# Patient Record
Sex: MALE | Race: WHITE | NOT HISPANIC OR LATINO | Employment: OTHER | URBAN - METROPOLITAN AREA
[De-identification: names, ages, dates, MRNs, and addresses within clinical notes are randomized per-mention and may not be internally consistent; named-entity substitution may affect disease eponyms.]

---

## 2017-01-27 DIAGNOSIS — E78.5 HYPERLIPIDEMIA LDL GOAL <130: ICD-10-CM

## 2017-01-27 DIAGNOSIS — E78.2 MIXED HYPERLIPIDEMIA: Primary | ICD-10-CM

## 2017-01-27 DIAGNOSIS — R73.01 IMPAIRED FASTING GLUCOSE: ICD-10-CM

## 2017-01-27 DIAGNOSIS — E03.9 ACQUIRED HYPOTHYROIDISM: ICD-10-CM

## 2017-01-27 RX ORDER — SIMVASTATIN 20 MG
20 TABLET ORAL AT BEDTIME
Qty: 90 TABLET | Refills: 0 | Status: SHIPPED | OUTPATIENT
Start: 2017-01-27 | End: 2017-05-01

## 2017-01-27 NOTE — TELEPHONE ENCOUNTER
Pt calls for refill of simvastatin. Scheduled fasting lab appt.         Recent Labs   Lab Test  01/12/16   1122  06/02/15   0924  11/19/13   0837   CHOL  160  152  189   HDL  53  55  60   LDL  85  81  97   TRIG  108  82  165*   CHOLHDLRATIO   --   2.8  3.2

## 2017-02-02 DIAGNOSIS — R73.01 IMPAIRED FASTING GLUCOSE: ICD-10-CM

## 2017-02-02 DIAGNOSIS — E03.9 ACQUIRED HYPOTHYROIDISM: ICD-10-CM

## 2017-02-02 DIAGNOSIS — E78.5 HYPERLIPIDEMIA LDL GOAL <130: ICD-10-CM

## 2017-02-02 LAB
ANION GAP SERPL CALCULATED.3IONS-SCNC: 7 MMOL/L (ref 3–14)
BUN SERPL-MCNC: 10 MG/DL (ref 7–30)
CALCIUM SERPL-MCNC: 9.4 MG/DL (ref 8.5–10.1)
CHLORIDE SERPL-SCNC: 108 MMOL/L (ref 94–109)
CHOLEST SERPL-MCNC: 161 MG/DL
CO2 SERPL-SCNC: 30 MMOL/L (ref 20–32)
CREAT SERPL-MCNC: 0.81 MG/DL (ref 0.66–1.25)
GFR SERPL CREATININE-BSD FRML MDRD: ABNORMAL ML/MIN/1.7M2
GLUCOSE SERPL-MCNC: 122 MG/DL (ref 70–99)
HDLC SERPL-MCNC: 53 MG/DL
LDLC SERPL CALC-MCNC: 89 MG/DL
NONHDLC SERPL-MCNC: 108 MG/DL
POTASSIUM SERPL-SCNC: 4 MMOL/L (ref 3.4–5.3)
SODIUM SERPL-SCNC: 145 MMOL/L (ref 133–144)
TRIGL SERPL-MCNC: 96 MG/DL
TSH SERPL DL<=0.005 MIU/L-ACNC: 0.73 MU/L (ref 0.4–4)

## 2017-02-02 PROCEDURE — 80048 BASIC METABOLIC PNL TOTAL CA: CPT | Performed by: INTERNAL MEDICINE

## 2017-02-02 PROCEDURE — 80061 LIPID PANEL: CPT | Performed by: INTERNAL MEDICINE

## 2017-02-02 PROCEDURE — 36415 COLL VENOUS BLD VENIPUNCTURE: CPT | Performed by: INTERNAL MEDICINE

## 2017-02-02 PROCEDURE — 84443 ASSAY THYROID STIM HORMONE: CPT | Performed by: INTERNAL MEDICINE

## 2017-02-06 DIAGNOSIS — R73.09 ELEVATED GLUCOSE LEVEL: Primary | ICD-10-CM

## 2017-02-09 DIAGNOSIS — R73.09 ELEVATED GLUCOSE LEVEL: ICD-10-CM

## 2017-02-09 LAB — HBA1C MFR BLD: 5.6 % (ref 4.3–6)

## 2017-02-09 PROCEDURE — 83036 HEMOGLOBIN GLYCOSYLATED A1C: CPT | Performed by: INTERNAL MEDICINE

## 2017-02-09 PROCEDURE — 36415 COLL VENOUS BLD VENIPUNCTURE: CPT | Performed by: INTERNAL MEDICINE

## 2017-05-01 DIAGNOSIS — E78.5 HYPERLIPIDEMIA LDL GOAL <130: ICD-10-CM

## 2017-05-01 NOTE — TELEPHONE ENCOUNTER
Simvastatin     Last Written Prescription Date: 01/27/17  Last Fill Quantity: 90, # refills: 0  Last Office Visit with [facility], [facility] or [facility] prescribing provider: 10/28/16       Lab Results   Component Value Date    CHOL 161 02/02/2017     Lab Results   Component Value Date    HDL 53 02/02/2017     Lab Results   Component Value Date    LDL 89 02/02/2017     Lab Results   Component Value Date    TRIG 96 02/02/2017     Lab Results   Component Value Date    CHOLHDLRATIO 2.8 06/02/2015       Labs showing if normal/abnormal  Lab Results   Component Value Date    CHOL 161 02/02/2017    TRIG 96 02/02/2017    HDL 53 02/02/2017    LDL 89 02/02/2017    VLDL 16 06/02/2015    CHOLHDLRATIO 2.8 06/02/2015

## 2017-05-02 RX ORDER — SIMVASTATIN 20 MG
20 TABLET ORAL AT BEDTIME
Qty: 90 TABLET | Refills: 1 | Status: SHIPPED | OUTPATIENT
Start: 2017-05-02 | End: 2017-11-15

## 2017-05-31 ENCOUNTER — OFFICE VISIT (OUTPATIENT)
Dept: UROLOGY | Facility: CLINIC | Age: 67
End: 2017-05-31
Payer: COMMERCIAL

## 2017-05-31 VITALS — HEART RATE: 60 BPM | WEIGHT: 240 LBS | HEIGHT: 71 IN | OXYGEN SATURATION: 95 % | BODY MASS INDEX: 33.6 KG/M2

## 2017-05-31 DIAGNOSIS — N40.0 ENLARGED PROSTATE: Primary | ICD-10-CM

## 2017-05-31 PROCEDURE — 99213 OFFICE O/P EST LOW 20 MIN: CPT | Performed by: UROLOGY

## 2017-05-31 ASSESSMENT — PAIN SCALES - GENERAL: PAINLEVEL: NO PAIN (0)

## 2017-05-31 NOTE — PROGRESS NOTES
Office Visit Note  [facility]  [phone]    UROLOGIC DIAGNOSES:   BPH and nocturia, Hx of retention    CURRENT INTERVENTIONS:   Quanta laser TURP 2016    HISTORY:   Sundar returns to clinic today, now one year after laser TURP. He reports doing very well. He only has nocturia ×1. He has a strong urinary stream and is happy with his urinary symptoms.      PAST MEDICAL HISTORY:   Past Medical History:   Diagnosis Date     Abnormal glucose     A1C 1/06 =  6.3     Allergic rhinitis, cause unspecified     mary spring time     Benign localized hyperplasia of prostate with urinary obstruction and other lower urinary tract symptoms (LUTS)(600.21)     slow stream , nocturia      Generalized osteoarthrosis, unspecified site total L knee 3/09     knees bother;; has had bilat arthroscopic     hypogonadism     low testosterone at times      Mixed hyperlipidemia      Other specified acquired hypothyroidism 10/05       PAST SURGICAL HISTORY:   Past Surgical History:   Procedure Laterality Date     ARTHROPLASTY REVISION KNEE Left 10/26/2015    Procedure: ARTHROPLASTY REVISION KNEE;  Surgeon: Froylan Dudley MD;  Location:  OR     C ANESTH,KNEE AREA SURGERY      bilateral arthroscopic knee surgery     C NONSPECIFIC PROCEDURE  6/03    colonoscopy     C NONSPECIFIC PROCEDURE  3/09     L total knee      C RAD RESEC TONSIL/PILLARS  age 46     COLONOSCOPY  10/2/2013    Procedure: COLONOSCOPY;  Colonoscopy ;  Surgeon: Juliocesar Suarez MD;  Location:  GI     ENDOSCOPIC SINUS SURGERY  3/17/2014    Procedure: ENDOSCOPIC SINUS SURGERY;  Bilateral Endoscopic Sinus Surgery ;  Surgeon: Chuy Johnson MD;  Location: [facility] OR     HC REPAIR UMBILICAL KASI,5+Y/O,REDUC  2002     LASER REVOLIX PHOTOSELECTIVE VAPORIZATION PROSTATE N/A 4/6/2016    Procedure: LASER REVOLIX / QUANTA PHOTOSELECTIVE VAPORIZATION PROSTATE;  Surgeon: Sean Malave MD;  Location: [facility] OR     ROTATOR CUFF REPAIR RT/LT  3/2012    left shoulder  "      FAMILY HISTORY:   Family History   Problem Relation Age of Onset     Hypertension Mother      Cardiovascular Mother 84     MI     Lipids Mother      C.A.D. Mother      C.A.D. Father      CHF     CEREBROVASCULAR DISEASE Brother 61       SOCIAL HISTORY:   Social History   Substance Use Topics     Smoking status: Former Smoker     Packs/day: 1.00     Years: 20.00     Quit date: 1/1/1981     Smokeless tobacco: Former User     Quit date: 1/1/1992     Alcohol use 24.0 oz/week      Comment: case of beer per week or less       Current Outpatient Prescriptions   Medication     simvastatin (ZOCOR) 20 MG tablet     levothyroxine (SYNTHROID, LEVOTHROID) 200 MCG tablet     multivitamin, therapeutic with minerals (MULTI-VITAMIN) TABS     fluticasone (FLONASE) 50 MCG/ACT nasal spray     sildenafil (VIAGRA) 100 MG tablet     SUDAFED SINUS  MG OR TABS     No current facility-administered medications for this visit.          PHYSICAL EXAM:    Pulse 60  Ht 1.803 m (5' 11\")  Wt 108.9 kg (240 lb)  SpO2 95%  BMI 33.47 kg/m2    HEENT: Normocephalic and atraumatic   Cardiac: Not done  Back/Flank: Not done  CNS/PNS: Not done  Respiratory: Normal non-labored breathing  Abdomen: Soft nontender and nondistended  Peripheral Vascular: Not done  Mental Status: Not done    Penis: Not done  Scrotal Skin: Not done  Testicles: Not done  Epididymis: Not done  Digital Rectal Exam: His prostate is moderately enlarged but benign and symmetric to palpation, central TUR defect    Cystoscopy: Not done    Imaging: None    Urinalysis: UA RESULTS:  Recent Labs   Lab Test  01/12/16   1122   COLOR  Yellow   APPEARANCE  Clear   URINEGLC  Negative   URINEBILI  Negative   URINEKETONE  Negative   SG  1.020   UBLD  Negative   URINEPH  5.5   PROTEIN  Negative   UROBILINOGEN  0.2   NITRITE  Negative   LEUKEST  Negative       PSA: Last PSA was January 2016, he reports that he is to see Dr. Fraser for his annual physical and PSA shortly.     Post Void " Residual:     Other labs: None today      IMPRESSION:  Doing well     PLAN:  He is doing very well. He was reminded to have his PSA checked at his next physical exam. He will plan on following up with me as needed in the future.     Total Time: 15 minutes                                      Total in Consultation: 15 minutes    Sean Malave M.D.

## 2017-05-31 NOTE — NURSING NOTE
Pt denies any voiding problems.  Just here for yearly fu.  Pt would like a refill of generic viagra.  NORBERT Aviles CMA

## 2017-05-31 NOTE — MR AVS SNAPSHOT
"              After Visit Summary   2017    Sundar German    MRN: [id number]           Patient Information     Date Of Birth          1950        Visit Information        Provider Department      2017 9:40 AM Sean Malave MD [facility] [place]        Today's Diagnoses     Enlarged prostate    -  1       Follow-ups after your visit        Follow-up notes from your care team     Return if symptoms worsen or fail to improve.      Who to contact     If you have questions or need follow up information about today's clinic visit or your schedule please contact [facility] [place] directly at [phone].  Normal or non-critical lab and imaging results will be communicated to you by MyChart, letter or phone within 4 business days after the clinic has received the results. If you do not hear from us within 7 days, please contact the clinic through [employer] or phone. If you have a critical or abnormal lab result, we will notify you by phone as soon as possible.  Submit refill requests through [employer] or call your pharmacy and they will forward the refill request to us. Please allow 3 business days for your refill to be completed.          Additional Information About Your Visit        MyChart Information     [employer] lets you send messages to your doctor, view your test results, renew your prescriptions, schedule appointments and more. To sign up, go to [email or web address] . Click on \"Log in\" on the left side of the screen, which will take you to the Welcome page. Then click on \"Sign up Now\" on the right side of the page.     You will be asked to enter the access code listed below, as well as some personal information. Please follow the directions to create your username and password.     Your access code is: [email or web address]  Expires: 2017  9:57 AM     Your access code will  in 90 days. If you need help or a new " "code, please call your [facility] clinic or [phone].        Care EveryWhere ID     This is your Care EveryWhere ID. This could be used by other organizations to access your [facility] medical records  [id number]        Your Vitals Were     Pulse Height Pulse Oximetry BMI (Body Mass Index)          60 1.803 m (5' 11\") 95% 33.47 kg/m2         Blood Pressure from Last 3 Encounters:   10/28/16 112/60   04/07/16 143/65   03/30/16 132/74    Weight from Last 3 Encounters:   05/31/17 108.9 kg (240 lb)   10/28/16 112.7 kg (248 lb 6.4 oz)   04/06/16 112 kg (247 lb)              Today, you had the following     No orders found for display       Primary Care Provider Office Phone # Fax #    Marlon Fraser [phone]       [facility] [address] [place] [address]        Thank you!     Thank you for choosing [facility] [place]  for your care. Our goal is always to provide you with excellent care. Hearing back from our patients is one way we can continue to improve our services. Please take a few minutes to complete the written survey that you may receive in the mail after your visit with us. Thank you!             Your Updated Medication List - Protect others around you: Learn how to safely use, store and throw away your medicines at www.disposemymeds.org.          This list is accurate as of: 5/31/17  9:57 AM.  Always use your most recent med list.                   Brand Name Dispense Instructions for use    fluticasone 50 MCG/ACT spray    FLONASE    3 Package    Spray 1-2 sprays into both nostrils daily       levothyroxine 200 MCG tablet    SYNTHROID/LEVOTHROID    90 tablet    Take 1 tablet (200 mcg) by mouth daily       Multi-vitamin Tabs tablet      Take 1 tablet by mouth daily       sildenafil 100 MG cap/tab    VIAGRA    10 tablet    Take 1 tablet (100 mg) by mouth daily as needed for erectile dysfunction       simvastatin 20 MG tablet    " ZOCOR    90 tablet    Take 1 tablet (20 mg) by mouth At Bedtime       SUDAFED SINUS  MG Tabs   Generic drug:  Pseudoephedrine-APAP      Takes when needed

## 2017-05-31 NOTE — LETTER
5/31/2017       RE: Sundar German  [address] [address]     Dear Colleague,    Thank you for referring your patient, Sundar German, to the [facility] [place] at [facility]. Please see a copy of my visit note below.    Office Visit Note  [facility]  [phone]    UROLOGIC DIAGNOSES:   BPH and nocturia, Hx of retention    CURRENT INTERVENTIONS:   Quanta laser TURP 2016    HISTORY:   Sundar returns to clinic today, now one year after laser TURP. He reports doing very well. He only has nocturia ×1. He has a strong urinary stream and is happy with his urinary symptoms.      PAST MEDICAL HISTORY:   Past Medical History:   Diagnosis Date     Abnormal glucose     A1C 1/06 =  6.3     Allergic rhinitis, cause unspecified     mary spring time     Benign localized hyperplasia of prostate with urinary obstruction and other lower urinary tract symptoms (LUTS)(600.21)     slow stream , nocturia      Generalized osteoarthrosis, unspecified site total L knee 3/09     knees bother;; has had bilat arthroscopic     hypogonadism     low testosterone at times      Mixed hyperlipidemia      Other specified acquired hypothyroidism 10/05       PAST SURGICAL HISTORY:   Past Surgical History:   Procedure Laterality Date     ARTHROPLASTY REVISION KNEE Left 10/26/2015    Procedure: ARTHROPLASTY REVISION KNEE;  Surgeon: Froylan Dudley MD;  Location:  OR     C ANESTH,KNEE AREA SURGERY      bilateral arthroscopic knee surgery     C NONSPECIFIC PROCEDURE  6/03    colonoscopy     C NONSPECIFIC PROCEDURE  3/09     L total knee      C RAD RESEC TONSIL/PILLARS  age 46     COLONOSCOPY  10/2/2013    Procedure: COLONOSCOPY;  Colonoscopy ;  Surgeon: Juliocesar Suarez MD;  Location:  GI     ENDOSCOPIC SINUS SURGERY  3/17/2014    Procedure: ENDOSCOPIC SINUS SURGERY;  Bilateral Endoscopic Sinus Surgery ;  Surgeon: Alex  "MD Chuy;  Location: [facility] OR     HC REPAIR UMBILICAL KASI,5+Y/O,REDUC  2002     LASER REVOLIX PHOTOSELECTIVE VAPORIZATION PROSTATE N/A 4/6/2016    Procedure: LASER REVOLIX / QUANTA PHOTOSELECTIVE VAPORIZATION PROSTATE;  Surgeon: Sean Malave MD;  Location: [facility] OR     ROTATOR CUFF REPAIR RT/LT  3/2012    left shoulder       FAMILY HISTORY:   Family History   Problem Relation Age of Onset     Hypertension Mother      Cardiovascular Mother 84     MI     Lipids Mother      C.A.D. Mother      C.A.D. Father      CHF     CEREBROVASCULAR DISEASE Brother 61       SOCIAL HISTORY:   Social History   Substance Use Topics     Smoking status: Former Smoker     Packs/day: 1.00     Years: 20.00     Quit date: 1/1/1981     Smokeless tobacco: Former User     Quit date: 1/1/1992     Alcohol use 24.0 oz/week      Comment: case of beer per week or less       Current Outpatient Prescriptions   Medication     simvastatin (ZOCOR) 20 MG tablet     levothyroxine (SYNTHROID, LEVOTHROID) 200 MCG tablet     multivitamin, therapeutic with minerals (MULTI-VITAMIN) TABS     fluticasone (FLONASE) 50 MCG/ACT nasal spray     sildenafil (VIAGRA) 100 MG tablet     SUDAFED SINUS  MG OR TABS     No current facility-administered medications for this visit.          PHYSICAL EXAM:    Pulse 60  Ht 1.803 m (5' 11\")  Wt 108.9 kg (240 lb)  SpO2 95%  BMI 33.47 kg/m2    HEENT: Normocephalic and atraumatic   Cardiac: Not done  Back/Flank: Not done  CNS/PNS: Not done  Respiratory: Normal non-labored breathing  Abdomen: Soft nontender and nondistended  Peripheral Vascular: Not done  Mental Status: Not done    Penis: Not done  Scrotal Skin: Not done  Testicles: Not done  Epididymis: Not done  Digital Rectal Exam: His prostate is moderately enlarged but benign and symmetric to palpation, central TUR defect    Cystoscopy: Not done    Imaging: None    Urinalysis: UA RESULTS:  Recent Labs   Lab Test  01/12/16   1122   COLOR  Yellow   APPEARANCE  " Clear   URINEGLC  Negative   URINEBILI  Negative   URINEKETONE  Negative   SG  1.020   UBLD  Negative   URINEPH  5.5   PROTEIN  Negative   UROBILINOGEN  0.2   NITRITE  Negative   LEUKEST  Negative       PSA: Last PSA was January 2016, he reports that he is to see Dr. Fraser for his annual physical and PSA shortly.     Post Void Residual:     Other labs: None today      IMPRESSION:  Doing well     PLAN:  He is doing very well. He was reminded to have his PSA checked at his next physical exam. He will plan on following up with me as needed in the future.     Total Time: 15 minutes                                      Total in Consultation: 15 minutes    Sean Malave M.D.

## 2017-08-24 ENCOUNTER — OFFICE VISIT (OUTPATIENT)
Dept: INTERNAL MEDICINE | Facility: CLINIC | Age: 67
End: 2017-08-24
Payer: COMMERCIAL

## 2017-08-24 VITALS
TEMPERATURE: 98.6 F | HEIGHT: 71 IN | BODY MASS INDEX: 34.55 KG/M2 | WEIGHT: 246.8 LBS | SYSTOLIC BLOOD PRESSURE: 142 MMHG | HEART RATE: 65 BPM | OXYGEN SATURATION: 98 % | DIASTOLIC BLOOD PRESSURE: 70 MMHG

## 2017-08-24 DIAGNOSIS — Z01.818 PREOPERATIVE EXAMINATION: Primary | ICD-10-CM

## 2017-08-24 DIAGNOSIS — Z01.818 PREOP GENERAL PHYSICAL EXAM: ICD-10-CM

## 2017-08-24 PROCEDURE — 99214 OFFICE O/P EST MOD 30 MIN: CPT | Performed by: NURSE PRACTITIONER

## 2017-08-24 NOTE — PROGRESS NOTES
[place] [address]  [facility] [address]  [phone]  Dept: [phone]    PRE-OP EVALUATION:  Today's date: 2017    Sundar German (: 1950) presents for pre-operative evaluation assessment as requested by Dr. Lange.  He requires evaluation and anesthesia risk assessment prior to undergoing surgery/procedure for treatment of bilateral cataracts .  Proposed procedure: bilateral cataract extraction    Date of Surgery/ Procedure: 17 right eye, 17 left eye  Time of Surgery/ Procedure: unknown  Hospital/Surgical Facility: [facility] [address] [place] [address] [place]  Fax number for surgical facility: [phone]  Primary Physician: Marlon Fraser  Type of Anesthesia Anticipated: to be determined    Patient has a Health Care Directive or Living Will:  NO    1. NO - Do you have a history of heart attack, stroke, stent, bypass or surgery on an artery in the head, neck, heart or legs?  2. NO - Do you ever have any pain or discomfort in your chest?  3. NO - Do you have a history of  Heart Failure?  4. NO - Are you troubled by shortness of breath when: walking on the level, up a slight hill or at night?  5. NO - Do you currently have a cold, bronchitis or other respiratory infection?  6. NO - Do you have a cough, shortness of breath or wheezing?  7. NO - Do you sometimes get pains in the calves of your legs when you walk?  8. NO - Do you or anyone in your family have previous history of blood clots?  9. NO - Do you or does anyone in your family have a serious bleeding problem such as prolonged bleeding following surgeries or cuts?  10. YES - HAVE YOU EVER HAD PROBLEMS WITH ANEMIA OR BEEN TOLD TO TAKE IRON PILLS? anemia  11. NO - Have you had any abnormal blood loss such as black, tarry or bloody stools, or abnormal vaginal bleeding?  12. NO - Have you ever had a blood transfusion?  13. NO - Have you or any of your relatives ever had problems with  anesthesia?  14. NO - Do you have sleep apnea, excessive snoring or daytime drowsiness?  15. NO - Do you have any prosthetic heart valves?  16. YES - DO YOU HAVE PROSTHETIC JOINTS? L total knee replacement  17. NO - Is there any chance that you may be pregnant?        HPI:                                                      Brief HPI related to upcoming procedure: bilateral cataracts      See problem list for active medical problems.  Problems all longstanding and stable, except as noted/documented.  See ROS for pertinent symptoms related to these conditions.                                                                                                  .    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 3/21/2012          Impaired memory 02/11/2011     Priority: Medium     Hyperlipidemia LDL goal <130 10/31/2010     Priority: Medium     BPH loc w urin obs/LUTS      Priority: Medium     slow stream , nocturia        Acquired hypothyroidism 10/11/2005     Priority: Medium     Problem list name updated by automated process. Provider to review       Allergic rhinitis 12/13/2004     Priority: Medium     Problem list name updated by automated process. Provider to review        Past Medical History:   Diagnosis Date     Abnormal glucose     A1C 1/06 =  6.3     Allergic rhinitis, cause  unspecified     mary spring time     BPH loc w urin obs/LUTS     slow stream , nocturia      Generalized osteoarthrosis, unspecified site total L knee 3/09     knees bother;; has had bilat arthroscopic     hypogonadism     low testosterone at times      Mixed hyperlipidemia      Other specified acquired hypothyroidism 10/05     Past Surgical History:   Procedure Laterality Date     ARTHROPLASTY REVISION KNEE Left 10/26/2015    Procedure: ARTHROPLASTY REVISION KNEE;  Surgeon: Froylan Dudley MD;  Location: [facility] OR     C ANESTH,KNEE AREA SURGERY      bilateral arthroscopic knee surgery     C NONSPECIFIC PROCEDURE  6/03    colonoscopy     C NONSPECIFIC PROCEDURE  3/09     L total knee      C RAD RESEC TONSIL/PILLARS  age 46     COLONOSCOPY  10/2/2013    Procedure: COLONOSCOPY;  Colonoscopy ;  Surgeon: Juliocesar Suarez MD;  Location:  GI     CYSTOSCOPY       ENDOSCOPIC SINUS SURGERY  3/17/2014    Procedure: ENDOSCOPIC SINUS SURGERY;  Bilateral Endoscopic Sinus Surgery ;  Surgeon: Chuy Johnson MD;  Location: [facility] OR     HC REPAIR UMBILICAL KASI,5+Y/O,REDUC  2002     LASER REVOLIX PHOTOSELECTIVE VAPORIZATION PROSTATE N/A 4/6/2016    Procedure: LASER REVOLIX / QUANTA PHOTOSELECTIVE VAPORIZATION PROSTATE;  Surgeon: Sean Malave MD;  Location: [facility] OR     PROSTATE SURGERY       ROTATOR CUFF REPAIR RT/LT  3/2012    left shoulder     TESTICLE SURGERY       VASECTOMY       Current Outpatient Prescriptions   Medication Sig Dispense Refill     simvastatin (ZOCOR) 20 MG tablet Take 1 tablet (20 mg) by mouth At Bedtime 90 tablet 1     levothyroxine (SYNTHROID, LEVOTHROID) 200 MCG tablet Take 1 tablet (200 mcg) by mouth daily 90 tablet 3     multivitamin, therapeutic with minerals (MULTI-VITAMIN) TABS Take 1 tablet by mouth daily       sildenafil (VIAGRA) 100 MG tablet Take 1 tablet (100 mg) by mouth daily as needed for erectile dysfunction 10 tablet 6     fluticasone (FLONASE) 50 MCG/ACT nasal spray Spray 1-2 sprays  "into both nostrils daily 3 Package 3     SUDAFED SINUS  MG OR TABS Takes when needed       OTC products: None, except as noted above    No Known Allergies   Latex Allergy: NO    Social History   Substance Use Topics     Smoking status: Former Smoker     Packs/day: 1.00     Years: 20.00     Quit date: 1/1/1981     Smokeless tobacco: Former User     Quit date: 1/1/1992     Alcohol use 24.0 oz/week      Comment: case of beer per week or less     History   Drug Use No       REVIEW OF SYSTEMS:                                                    C: NEGATIVE for fever, chills, change in weight  R: NEGATIVE for significant cough or SOB  CV: NEGATIVE for chest pain, palpitations or peripheral edema  GI: NEGATIVE for nausea, abdominal pain, heartburn, or change in bowel habits  : NEGATIVE for frequency, dysuria, or hematuria  M: NEGATIVE for significant arthralgias or myalgia  N: NEGATIVE for weakness, dizziness or paresthesias  E: NEGATIVE for temperature intolerance, skin/hair changes  H: NEGATIVE for bleeding problems  P: NEGATIVE for changes in mood or affect    EXAM:                                                    /70 (BP Location: Right arm, Patient Position: Sitting, Cuff Size: Adult Large)  Pulse 65  Temp 98.6  F (37  C) (Oral)  Ht 5' 11\" (1.803 m)  Wt 246 lb 12.8 oz (111.9 kg)  SpO2 98%  BMI 34.42 kg/m2    GENERAL APPEARANCE: healthy, alert and no distress     NECK: no adenopathy, no asymmetry, masses, or scars and thyroid normal to palpation     RESP: lungs clear to auscultation - no rales, rhonchi or wheezes     CV: regular rates and rhythm, normal S1 S2, no S3 or S4 and no murmur, click or rub     ABDOMEN:  soft, nontender, no HSM or masses and bowel sounds normal     MS: extremities normal- no gross deformities noted, no evidence of inflammation in joints, FROM in all extremities.     NEURO: Normal strength and tone, sensory exam grossly normal, mentation intact and speech normal     PSYCH: " mentation appears normal. and affect normal/bright    DIAGNOSTICS:                                                    No labs or EKG required for low risk surgery (cataract, skin procedure, breast biopsy, etc)    Recent Labs   Lab Test  02/09/17   1009  02/02/17   0926  10/28/16   1132  05/11/16   1043   11/20/09   0932   HGB   --    --   13.4  13.7   < >  13.8   PLT   --    --   237  251   < >  290   NA   --   145*  140   --    < >  139   POTASSIUM   --   4.0  4.3   --    < >  4.4   CR   --   0.81  0.81   --    < >  0.81   A1C  5.6   --    --    --    --   5.8    < > = values in this interval not displayed.        IMPRESSION:                                                    Reason for surgery/procedure: bilateral cataracts    The proposed surgical procedure is considered LOW risk.    REVISED CARDIAC RISK INDEX  The patient has the following serious cardiovascular risks for perioperative complications such as (MI, PE, VFib and 3  AV Block):  No serious cardiac risks  INTERPRETATION: 0 risks: Class I (very low risk - 0.4% complication rate)    The patient has the following additional risks for perioperative complications:  No identified additional risks      ICD-10-CM    1. Preoperative examination Z01.818 CANCELED: EKG 12-lead complete w/read - Clinics       RECOMMENDATIONS:                                                      --Consult hospital rounder / IM to assist post-op medical management        APPROVAL GIVEN to proceed with proposed procedure, without further diagnostic evaluation       Signed Electronically by: Chayito Nelson NP    Copy of this evaluation report is provided to requesting physician.    [facility] Preop Guidelines

## 2017-08-24 NOTE — MR AVS SNAPSHOT
After Visit Summary   8/24/2017    Sundar German    MRN: [id number]           Patient Information     Date Of Birth          1950        Visit Information        Provider Department      8/24/2017 10:40 AM Chayito Nelson NP [facility]        Today's Diagnoses     Preoperative examination    -  1    Preop general physical exam          Care Instructions      Before Your Surgery      Call your surgeon if there is any change in your health. This includes signs of a cold or flu (such as a sore throat, runny nose, cough, rash or fever).    Do not smoke, drink alcohol or take over the counter medicine (unless your surgeon or primary care doctor tells you to) for the 24 hours before and after surgery.    If you take prescribed drugs: Follow your doctor s orders about which medicines to take and which to stop until after surgery.    Eating and drinking prior to surgery: follow the instructions from your surgeon    Take a shower or bath the night before surgery. Use the soap your surgeon gave you to gently clean your skin. If you do not have soap from your surgeon, use your regular soap. Do not shave or scrub the surgery site.  Wear clean pajamas and have clean sheets on your bed.           Follow-ups after your visit        Who to contact     If you have questions or need follow up information about today's clinic visit or your schedule please contact [facility] directly at [phone].  Normal or non-critical lab and imaging results will be communicated to you by MyChart, letter or phone within 4 business days after the clinic has received the results. If you do not hear from us within 7 days, please contact the clinic through MyChart or phone. If you have a critical or abnormal lab result, we will notify you by phone as soon as possible.  Submit refill requests through [employer] or call your pharmacy and they will forward the refill request to us. Please allow 3  "business days for your refill to be completed.          Additional Information About Your Visit        MyChart Information     [employer] lets you send messages to your doctor, view your test results, renew your prescriptions, schedule appointments and more. To sign up, go to [email or web address] . Click on \"Log in\" on the left side of the screen, which will take you to the Welcome page. Then click on \"Sign up Now\" on the right side of the page.     You will be asked to enter the access code listed below, as well as some personal information. Please follow the directions to create your username and password.     Your access code is: [email or web address]  Expires: 2017  9:57 AM     Your access code will  in 90 days. If you need help or a new code, please call your [facility] clinic or [phone].        Care EveryWhere ID     This is your Care EveryWhere ID. This could be used by other organizations to access your [facility] medical records  [id number]        Your Vitals Were     Pulse Temperature Height Pulse Oximetry BMI (Body Mass Index)       65 98.6  F (37  C) (Oral) 5' 11\" (1.803 m) 98% 34.42 kg/m2        Blood Pressure from Last 3 Encounters:   17 142/70   10/28/16 112/60   16 143/65    Weight from Last 3 Encounters:   17 246 lb 12.8 oz (111.9 kg)   17 240 lb (108.9 kg)   10/28/16 248 lb 6.4 oz (112.7 kg)              Today, you had the following     No orders found for display       Primary Care Provider Office Phone # Fax #    Marlon Fraser [phone]       [address] [facility] [address]        Equal Access to Services     [facility] : Shannon Chin, wafito lewis, qaybta kaalmabatsheva harvey, natalia mckenzie. So [facility] [phone].    ATENCIÓN: Si habla español, tiene a bhatt disposición servicios gratuitos de asistencia lingüística. Lldimitry al [phone].    We comply with applicable federal civil rights " laws and [place] laws. We do not discriminate on the basis of race, color, national origin, age, disability sex, sexual orientation or gender identity.            Thank you!     Thank you for choosing [facility]  for your care. Our goal is always to provide you with excellent care. Hearing back from our patients is one way we can continue to improve our services. Please take a few minutes to complete the written survey that you may receive in the mail after your visit with us. Thank you!             Your Updated Medication List - Protect others around you: Learn how to safely use, store and throw away your medicines at www.disposemymeds.org.          This list is accurate as of: 8/24/17 11:33 AM.  Always use your most recent med list.                   Brand Name Dispense Instructions for use Diagnosis    fluticasone 50 MCG/ACT spray    FLONASE    3 Package    Spray 1-2 sprays into both nostrils daily    Sinusitis, chronic       levothyroxine 200 MCG tablet    SYNTHROID/LEVOTHROID    90 tablet    Take 1 tablet (200 mcg) by mouth daily    Acquired hypothyroidism       Multi-vitamin Tabs tablet      Take 1 tablet by mouth daily        sildenafil 100 MG cap/tab    VIAGRA    10 tablet    Take 1 tablet (100 mg) by mouth daily as needed for erectile dysfunction    Hypogonadism male       simvastatin 20 MG tablet    ZOCOR    90 tablet    Take 1 tablet (20 mg) by mouth At Bedtime    Hyperlipidemia LDL goal <130       SUDAFED SINUS  MG Tabs   Generic drug:  Pseudoephedrine-APAP      Takes when needed

## 2017-08-24 NOTE — NURSING NOTE
"Chief Complaint   Patient presents with     Pre-Op Exam     cataract both eyes right 8/30/17 left 9/6/17, [facility]       Initial /70 (BP Location: Right arm, Patient Position: Sitting, Cuff Size: Adult Large)  Pulse 65  Temp 98.6  F (37  C) (Oral)  Ht 5' 11\" (1.803 m)  Wt 246 lb 12.8 oz (111.9 kg)  SpO2 98%  BMI 34.42 kg/m2 Estimated body mass index is 34.42 kg/(m^2) as calculated from the following:    Height as of this encounter: 5' 11\" (1.803 m).    Weight as of this encounter: 246 lb 12.8 oz (111.9 kg).  Medication Reconciliation: complete     Kelley Cruz, JOAQUIN      "

## 2017-11-01 DIAGNOSIS — E03.9 ACQUIRED HYPOTHYROIDISM: ICD-10-CM

## 2017-11-04 RX ORDER — LEVOTHYROXINE SODIUM 200 UG/1
TABLET ORAL
Qty: 90 TABLET | Refills: 0 | Status: SHIPPED | OUTPATIENT
Start: 2017-11-04 | End: 2018-01-31

## 2017-11-15 DIAGNOSIS — E78.5 HYPERLIPIDEMIA LDL GOAL <130: ICD-10-CM

## 2017-11-15 NOTE — LETTER
[facility]  [address], [address]  [place], [place]  [address]                                            TEL:[phone]  FAX:[phone]      Sundar German  [address] [place] [phone]      November 17, 2017    Dear Sundar       We have received a refill request from your pharmacy, however, we were only able to provide a one time fill because you are due for fasting labs. Please call [phone] to schedule a lab appointment before you are due for your next refill.      Thank you,     Stormy, Triage RN

## 2017-11-15 NOTE — LETTER
[facility]  [address], [address]  [place], [place]  [address]                                            TEL:[phone]  FAX:[phone]      Sundar German  [address] [place] [id number]      November 17, 2017    Dear Sundar       We have received a refill request from your pharmacy, however, we were only able to provide a one time fill because you are due for a fasting lab appointment in Feb 2018. Please call [phone] to schedule an appointment before you are due for your next refill.      Thank you,     Rosalia Burrows RN

## 2017-11-17 RX ORDER — SIMVASTATIN 20 MG
TABLET ORAL
Qty: 90 TABLET | Refills: 0 | Status: SHIPPED | OUTPATIENT
Start: 2017-11-17 | End: 2018-01-31

## 2017-11-17 NOTE — TELEPHONE ENCOUNTER
Medication is being filled for 1 time refill only due to:  due for labs in Feb 2018.  Future order in chart.    Letter mailed.

## 2018-01-31 DIAGNOSIS — E78.5 HYPERLIPIDEMIA LDL GOAL <130: ICD-10-CM

## 2018-01-31 DIAGNOSIS — E03.9 ACQUIRED HYPOTHYROIDISM: ICD-10-CM

## 2018-02-02 RX ORDER — LEVOTHYROXINE SODIUM 200 UG/1
TABLET ORAL
Qty: 90 TABLET | Refills: 0 | Status: SHIPPED | OUTPATIENT
Start: 2018-02-02 | End: 2018-03-02

## 2018-02-02 RX ORDER — SIMVASTATIN 20 MG
TABLET ORAL
Qty: 90 TABLET | Refills: 0 | Status: SHIPPED | OUTPATIENT
Start: 2018-02-02 | End: 2018-03-02

## 2018-02-02 NOTE — TELEPHONE ENCOUNTER
Prescription approved per [facility] Refill Protocol.  Pts wife calls and schedules his yearly ov.     TSH   Date Value Ref Range Status   02/02/2017 0.73 0.40 - 4.00 mU/L Final   ]    Recent Labs   Lab Test  02/02/17   0926  01/12/16   1122  06/02/15   0924  11/19/13   0837   CHOL  161  160  152  189   HDL  53  53  55  60   LDL  89  85  81  97   TRIG  96  108  82  165*   CHOLHDLRATIO   --    --   2.8  3.2

## 2018-03-02 ENCOUNTER — OFFICE VISIT (OUTPATIENT)
Dept: FAMILY MEDICINE | Facility: CLINIC | Age: 68
End: 2018-03-02
Payer: COMMERCIAL

## 2018-03-02 VITALS
OXYGEN SATURATION: 97 % | HEART RATE: 64 BPM | BODY MASS INDEX: 35.56 KG/M2 | DIASTOLIC BLOOD PRESSURE: 82 MMHG | WEIGHT: 254 LBS | SYSTOLIC BLOOD PRESSURE: 138 MMHG | HEIGHT: 71 IN | TEMPERATURE: 97.9 F

## 2018-03-02 DIAGNOSIS — Z13.6 SCREENING FOR AAA (ABDOMINAL AORTIC ANEURYSM): ICD-10-CM

## 2018-03-02 DIAGNOSIS — Z91.81 AT RISK FOR FALLING: ICD-10-CM

## 2018-03-02 DIAGNOSIS — E03.9 ACQUIRED HYPOTHYROIDISM: ICD-10-CM

## 2018-03-02 DIAGNOSIS — E78.5 HYPERLIPIDEMIA LDL GOAL <130: ICD-10-CM

## 2018-03-02 DIAGNOSIS — M15.0 PRIMARY OSTEOARTHRITIS INVOLVING MULTIPLE JOINTS: ICD-10-CM

## 2018-03-02 DIAGNOSIS — L98.9 SKIN LESION: ICD-10-CM

## 2018-03-02 DIAGNOSIS — G47.62 SLEEP RELATED LEG CRAMPS: ICD-10-CM

## 2018-03-02 DIAGNOSIS — N40.1 BENIGN PROSTATIC HYPERPLASIA WITH LOWER URINARY TRACT SYMPTOMS, SYMPTOM DETAILS UNSPECIFIED: ICD-10-CM

## 2018-03-02 DIAGNOSIS — B35.1 FUNGAL NAIL INFECTION: ICD-10-CM

## 2018-03-02 DIAGNOSIS — Z12.11 SCREEN FOR COLON CANCER: ICD-10-CM

## 2018-03-02 DIAGNOSIS — Z12.5 SCREENING FOR PROSTATE CANCER: ICD-10-CM

## 2018-03-02 DIAGNOSIS — Z87.891 HISTORY OF SMOKING: ICD-10-CM

## 2018-03-02 DIAGNOSIS — Z23 NEED FOR PNEUMOCOCCAL VACCINATION: ICD-10-CM

## 2018-03-02 DIAGNOSIS — J32.9 CHRONIC SINUSITIS, UNSPECIFIED LOCATION: ICD-10-CM

## 2018-03-02 DIAGNOSIS — Z11.59 NEED FOR HEPATITIS C SCREENING TEST: ICD-10-CM

## 2018-03-02 DIAGNOSIS — Z00.00 ENCOUNTER FOR ROUTINE ADULT HEALTH EXAMINATION WITHOUT ABNORMAL FINDINGS: Primary | ICD-10-CM

## 2018-03-02 DIAGNOSIS — R82.90 NONSPECIFIC FINDING ON EXAMINATION OF URINE: ICD-10-CM

## 2018-03-02 DIAGNOSIS — Z23 NEED FOR TD VACCINE: ICD-10-CM

## 2018-03-02 DIAGNOSIS — Z51.81 MEDICATION MONITORING ENCOUNTER: ICD-10-CM

## 2018-03-02 LAB
ALBUMIN SERPL-MCNC: 4 G/DL (ref 3.4–5)
ALBUMIN UR-MCNC: 100 MG/DL
ALP SERPL-CCNC: 52 U/L (ref 40–150)
ALT SERPL W P-5'-P-CCNC: 64 U/L (ref 0–70)
AMORPH CRY #/AREA URNS HPF: ABNORMAL /HPF
ANION GAP SERPL CALCULATED.3IONS-SCNC: 5 MMOL/L (ref 3–14)
APPEARANCE UR: CLEAR
AST SERPL W P-5'-P-CCNC: 47 U/L (ref 0–45)
BACTERIA #/AREA URNS HPF: ABNORMAL /HPF
BILIRUB SERPL-MCNC: 0.4 MG/DL (ref 0.2–1.3)
BILIRUB UR QL STRIP: NEGATIVE
BUN SERPL-MCNC: 13 MG/DL (ref 7–30)
CALCIUM SERPL-MCNC: 9.3 MG/DL (ref 8.5–10.1)
CHLORIDE SERPL-SCNC: 106 MMOL/L (ref 94–109)
CHOLEST SERPL-MCNC: 138 MG/DL
CK SERPL-CCNC: 126 U/L (ref 30–300)
CO2 SERPL-SCNC: 29 MMOL/L (ref 20–32)
COLOR UR AUTO: YELLOW
CREAT SERPL-MCNC: 0.77 MG/DL (ref 0.66–1.25)
CREAT UR-MCNC: 170 MG/DL
ERYTHROCYTE [DISTWIDTH] IN BLOOD BY AUTOMATED COUNT: 12.7 % (ref 10–15)
GFR SERPL CREATININE-BSD FRML MDRD: >90 ML/MIN/1.7M2
GLUCOSE SERPL-MCNC: 120 MG/DL (ref 70–99)
GLUCOSE UR STRIP-MCNC: NEGATIVE MG/DL
HBA1C MFR BLD: 5.6 % (ref 4.3–6)
HCT VFR BLD AUTO: 40.9 % (ref 40–53)
HDLC SERPL-MCNC: 58 MG/DL
HGB BLD-MCNC: 13.8 G/DL (ref 13.3–17.7)
HGB UR QL STRIP: ABNORMAL
KETONES UR STRIP-MCNC: NEGATIVE MG/DL
LDLC SERPL CALC-MCNC: 60 MG/DL
LEUKOCYTE ESTERASE UR QL STRIP: NEGATIVE
MCH RBC QN AUTO: 33.9 PG (ref 26.5–33)
MCHC RBC AUTO-ENTMCNC: 33.7 G/DL (ref 31.5–36.5)
MCV RBC AUTO: 101 FL (ref 78–100)
MICROALBUMIN UR-MCNC: 115 MG/L
MICROALBUMIN/CREAT UR: 67.65 MG/G CR (ref 0–17)
MUCOUS THREADS #/AREA URNS LPF: PRESENT /LPF
NITRATE UR QL: NEGATIVE
NON-SQ EPI CELLS #/AREA URNS LPF: ABNORMAL /LPF
NONHDLC SERPL-MCNC: 80 MG/DL
PH UR STRIP: 5.5 PH (ref 5–7)
PLATELET # BLD AUTO: 249 10E9/L (ref 150–450)
POTASSIUM SERPL-SCNC: 4.1 MMOL/L (ref 3.4–5.3)
PROT SERPL-MCNC: 7.5 G/DL (ref 6.8–8.8)
PSA SERPL-ACNC: 1.07 UG/L (ref 0–4)
RBC # BLD AUTO: 4.07 10E12/L (ref 4.4–5.9)
RBC #/AREA URNS AUTO: ABNORMAL /HPF
SODIUM SERPL-SCNC: 140 MMOL/L (ref 133–144)
SOURCE: ABNORMAL
SP GR UR STRIP: >1.03 (ref 1–1.03)
T4 FREE SERPL-MCNC: 1.15 NG/DL (ref 0.76–1.46)
TRIGL SERPL-MCNC: 100 MG/DL
TSH SERPL DL<=0.005 MIU/L-ACNC: 1.25 MU/L (ref 0.4–4)
UROBILINOGEN UR STRIP-ACNC: 0.2 EU/DL (ref 0.2–1)
WBC # BLD AUTO: 5.4 10E9/L (ref 4–11)
WBC #/AREA URNS AUTO: ABNORMAL /HPF

## 2018-03-02 PROCEDURE — 90472 IMMUNIZATION ADMIN EACH ADD: CPT | Performed by: FAMILY MEDICINE

## 2018-03-02 PROCEDURE — 85027 COMPLETE CBC AUTOMATED: CPT | Performed by: FAMILY MEDICINE

## 2018-03-02 PROCEDURE — 80061 LIPID PANEL: CPT | Performed by: FAMILY MEDICINE

## 2018-03-02 PROCEDURE — G0009 ADMIN PNEUMOCOCCAL VACCINE: HCPCS | Performed by: FAMILY MEDICINE

## 2018-03-02 PROCEDURE — 90732 PPSV23 VACC 2 YRS+ SUBQ/IM: CPT | Performed by: FAMILY MEDICINE

## 2018-03-02 PROCEDURE — 83036 HEMOGLOBIN GLYCOSYLATED A1C: CPT | Performed by: FAMILY MEDICINE

## 2018-03-02 PROCEDURE — 84439 ASSAY OF FREE THYROXINE: CPT | Performed by: FAMILY MEDICINE

## 2018-03-02 PROCEDURE — 84443 ASSAY THYROID STIM HORMONE: CPT | Performed by: FAMILY MEDICINE

## 2018-03-02 PROCEDURE — 81001 URINALYSIS AUTO W/SCOPE: CPT | Performed by: FAMILY MEDICINE

## 2018-03-02 PROCEDURE — 80053 COMPREHEN METABOLIC PANEL: CPT | Performed by: FAMILY MEDICINE

## 2018-03-02 PROCEDURE — G0438 PPPS, INITIAL VISIT: HCPCS | Performed by: FAMILY MEDICINE

## 2018-03-02 PROCEDURE — G0103 PSA SCREENING: HCPCS | Performed by: FAMILY MEDICINE

## 2018-03-02 PROCEDURE — 87086 URINE CULTURE/COLONY COUNT: CPT | Performed by: FAMILY MEDICINE

## 2018-03-02 PROCEDURE — 82550 ASSAY OF CK (CPK): CPT | Performed by: FAMILY MEDICINE

## 2018-03-02 PROCEDURE — 86803 HEPATITIS C AB TEST: CPT | Performed by: FAMILY MEDICINE

## 2018-03-02 PROCEDURE — 90714 TD VACC NO PRESV 7 YRS+ IM: CPT | Performed by: FAMILY MEDICINE

## 2018-03-02 PROCEDURE — 82043 UR ALBUMIN QUANTITATIVE: CPT | Performed by: FAMILY MEDICINE

## 2018-03-02 PROCEDURE — 36415 COLL VENOUS BLD VENIPUNCTURE: CPT | Performed by: FAMILY MEDICINE

## 2018-03-02 RX ORDER — SIMVASTATIN 20 MG
20 TABLET ORAL AT BEDTIME
Qty: 90 TABLET | Refills: 3 | Status: SHIPPED | OUTPATIENT
Start: 2018-03-02 | End: 2019-03-23

## 2018-03-02 RX ORDER — FLUTICASONE PROPIONATE 50 MCG
1-2 SPRAY, SUSPENSION (ML) NASAL DAILY
Qty: 48 G | Refills: 3 | Status: SHIPPED | OUTPATIENT
Start: 2018-03-02 | End: 2018-03-02

## 2018-03-02 RX ORDER — LEVOTHYROXINE SODIUM 200 UG/1
200 TABLET ORAL DAILY
Qty: 90 TABLET | Refills: 3 | Status: SHIPPED | OUTPATIENT
Start: 2018-03-02 | End: 2019-03-23

## 2018-03-02 NOTE — MR AVS SNAPSHOT
After Visit Summary   3/2/2018    Sundar German    MRN: [id number]           Patient Information     Date Of Birth          1950        Visit Information        Provider Department      3/2/2018 10:00 AM Liu Nolasco MD [facility] [place]        Today's Diagnoses     Encounter for routine adult health examination without abnormal findings    -  1    At risk for falling        Need for prophylactic vaccination against Streptococcus pneumoniae (pneumococcus)        Screening for AAA (abdominal aortic aneurysm)        History of smoking        Primary osteoarthritis involving multiple joints        Nonspecific finding on examination of urine        Hyperlipidemia LDL goal <130        Acquired hypothyroidism        Sleep related leg cramps        Benign prostatic hyperplasia with lower urinary tract symptoms, symptom details unspecified        Screening for prostate cancer        Fungal nail infection        Chronic sinusitis, unspecified location        Need for pneumococcal vaccination        Skin lesion        Need for TD vaccine        Need for hepatitis C screening test          Care Instructions    Lamisil cream twice daily to affected toenails -- along with washing and drying thoroughly    Follow up with Dermatology for skin check, as discussed      Preventive Health Recommendations:     Male Ages 65 and over    Yearly exam:             See your health care provider every year in order to  o   Review health changes.   o   Discuss preventive care.    o   Review your medicines if your doctor has prescribed any.    Talk with your health care provider about whether you should have a test to screen for prostate cancer (PSA).    Every 3 years, have a diabetes test (fasting glucose). If you are at risk for diabetes, you should have this test more often.    Every 5 years, have a cholesterol test. Have this test more often if you are at risk for high cholesterol or heart disease.     Every 10  years, have a colonoscopy. Or, have a yearly FIT test (stool test). These exams will check for colon cancer.    Talk to with your health care provider about screening for Abdominal Aortic Aneurysm if you have a family history of AAA or have a history of smoking.  Shots:     Get a flu shot each year.     Get a tetanus shot every 10 years.     Talk to your doctor about your pneumonia vaccines. There are now two you should receive - Pneumovax (PPSV 23) and Prevnar (PCV 13).    Talk to your doctor about a shingles vaccine.     Talk to your doctor about the hepatitis B vaccine.  Nutrition:     Eat at least 5 servings of fruits and vegetables each day.     Eat whole-grain bread, whole-wheat pasta and brown rice instead of white grains and rice.     Talk to your doctor about Calcium and Vitamin D.   Lifestyle    Exercise for at least 150 minutes a week (30 minutes a day, 5 days a week). This will help you control your weight and prevent disease.     Limit alcohol to one drink per day.     No smoking.     Wear sunscreen to prevent skin cancer.     See your dentist every six months for an exam and cleaning.     See your eye doctor every 1 to 2 years to screen for conditions such as glaucoma, macular degeneration and cataracts.          Follow-ups after your visit        Additional Services     DERMATOLOGY REFERRAL       Your provider has referred you to:     FMG: [facility] [phone]   [email or web address]    [facility] D[facility]atology [facility] [phone]   [email or web address] Dr Sierra    Please be aware that coverage of these services is subject to the terms and limitations of your health insurance plan.  Call member services at your health plan with any benefit or coverage questions.      Please bring the following with you to your appointment:    (1) Any X-Rays, CTs or MRIs which have been performed.  Contact the facility where  "they were done to arrange for  prior to your scheduled appointment.    (2) List of current medications  (3) This referral request   (4) Any documents/labs given to you for this referral                  Follow-up notes from your care team     Return in about 1 year (around 3/2/2019).      Future tests that were ordered for you today     Open Future Orders        Priority Expected Expires Ordered    US Aorta Medicare AAA Screening Routine  3/2/2019 3/2/2018    Fecal colorectal cancer screen FIT Routine 3/23/2018 2018 3/2/2018            Who to contact     If you have questions or need follow up information about today's clinic visit or your schedule please contact [facility] [place] directly at [phone].  Normal or non-critical lab and imaging results will be communicated to you by [employer], letter or phone within 4 business days after the clinic has received the results. If you do not hear from us within 7 days, please contact the clinic through [employer] or phone. If you have a critical or abnormal lab result, we will notify you by phone as soon as possible.  Submit refill requests through [employer] or call your pharmacy and they will forward the refill request to us. Please allow 3 business days for your refill to be completed.          Additional Information About Your Visit        Lob Information     [employer]ob lets you send messages to your doctor, view your test results, renew your prescriptions, schedule appointments and more. To sign up, go to [email or web address] . Click on \"Log in\" on the left side of the screen, which will take you to the Welcome page. Then click on \"Sign up Now\" on the right side of the page.     You will be asked to enter the access code listed below, as well as some personal information. Please follow the directions to create your username and password.     Your access code is: [email or web address]  Expires: 2018 11:07 AM     Your access code will  in 90 " "days. If you need help or a new code, please call your [facility] clinic or [phone].        Care EveryWhere ID     This is your Care EveryWhere ID. This could be used by other organizations to access your [facility] medical records  [id number]        Your Vitals Were     Pulse Temperature Height Pulse Oximetry BMI (Body Mass Index)       64 97.9  F (36.6  C) (Oral) 5' 11\" (1.803 m) 97% 35.43 kg/m2        Blood Pressure from Last 3 Encounters:   03/02/18 142/82   08/24/17 142/70   10/28/16 112/60    Weight from Last 3 Encounters:   03/02/18 254 lb (115.2 kg)   08/24/17 246 lb 12.8 oz (111.9 kg)   05/31/17 240 lb (108.9 kg)              We Performed the Following     *UA reflex to Microscopic     Abdominal Aortic Aneurysm Screening/Tracking     Albumin Random Urine Quantitative with Creat Ratio     CBC with platelets     CK total     Comprehensive metabolic panel     DERMATOLOGY REFERRAL     Hemoglobin A1c     Hepatitis C Screen Reflex to HCV RNA Quant and Genotype     Lipid panel reflex to direct LDL Fasting     PNEUMOCOCCAL VACCINE,ADULT,SQ OR IM     Prostate spec antigen screen     T4 free     TD PRESERV FREE >=7 YRS ADS IM     TSH WITH FREE T4 REFLEX     Urine Culture Aerobic Bacterial     Urine Microscopic          Today's Medication Changes          These changes are accurate as of 3/2/18 11:07 AM.  If you have any questions, ask your nurse or doctor.               These medicines have changed or have updated prescriptions.        Dose/Directions    levothyroxine 200 MCG tablet   Commonly known as:  SYNTHROID/LEVOTHROID   This may have changed:  See the new instructions.   Used for:  Acquired hypothyroidism   Changed by:  Liu Nolasco MD        Dose:  200 mcg   Take 1 tablet (200 mcg) by mouth daily   Quantity:  90 tablet   Refills:  3       simvastatin 20 MG tablet   Commonly known as:  ZOCOR   This may have changed:  See the new instructions.   Used for:  Hyperlipidemia LDL goal <130   Changed by:  Gaurav" MD Liu        Dose:  20 mg   Take 1 tablet (20 mg) by mouth At Bedtime   Quantity:  90 tablet   Refills:  3            Where to get your medicines      [facility] [address] [address] AT [facility] [address] [address], [facility] [address]    Hours:  24-hours Phone:  [phone]     fluticasone 50 MCG/ACT spray    levothyroxine 200 MCG tablet    simvastatin 20 MG tablet                Primary Care Provider Office Phone # Fax #    Liu Nolasco [phone]       [address] [place] [address]        Equal Access to Services     DIAZ MONROE : Shannon franco hadasho Soethan, waaxda luqadaha, qaybta kaalmada adeegyada, natalia mckenzie. So [facility] [phone].    ATENCIÓN: Si habla español, tiene a bhatt disposición servicios gratuitos de asistencia lingüística. [facility] [phone].    We comply with applicable federal civil rights laws and [place] laws. We do not discriminate on the basis of race, color, national origin, age, disability, sex, sexual orientation, or gender identity.            Thank you!     Thank you for choosing [facility]  for your care. Our goal is always to provide you with excellent care. Hearing back from our patients is one way we can continue to improve our services. Please take a few minutes to complete the written survey that you may receive in the mail after your visit with us. Thank you!             Your Updated Medication List - Protect others around you: Learn how to safely use, store and throw away your medicines at www.disposemymeds.org.          This list is accurate as of 3/2/18 11:07 AM.  Always use your most recent med list.                   Brand Name Dispense Instructions for use Diagnosis    fluticasone 50 MCG/ACT spray    FLONASE    48 g    Spray 1-2 sprays into both nostrils daily    Chronic sinusitis, unspecified location        levothyroxine 200 MCG tablet    SYNTHROID/LEVOTHROID    90 tablet    Take 1 tablet (200 mcg) by mouth daily    Acquired hypothyroidism       Multi-vitamin Tabs tablet      Take 1 tablet by mouth daily        sildenafil 100 MG tablet    VIAGRA    10 tablet    Take 1 tablet (100 mg) by mouth daily as needed for erectile dysfunction    Hypogonadism male       simvastatin 20 MG tablet    ZOCOR    90 tablet    Take 1 tablet (20 mg) by mouth At Bedtime    Hyperlipidemia LDL goal <130       SUDAFED SINUS  MG Tabs   Generic drug:  Pseudoephedrine-APAP      Takes when needed

## 2018-03-02 NOTE — PROGRESS NOTES
SUBJECTIVE:   Sundar German is a 68 year old male who presents for Preventive Visit.    Are you in the first 12 months of your Medicare Part B coverage?  Yes,     Healthy Habits:    Do you get at least three servings of calcium containing foods daily (dairy, green leafy vegetables, etc.)? yes    Amount of exercise or daily activities, outside of work: none    Problems taking medications regularly No    Medication side effects: No    Have you had an eye exam in the past two years? yes    Do you see a dentist twice per year? yes    Do you have sleep apnea, excessive snoring or daytime drowsiness?no    Allergic Rhinitis/Sinusitis -- Flonase spray daily, Sudafed prn. Receiving allergy shots per Allergist (Pacheco).     Tinea Pedis -- Sundar reported fungal changes to bilateral great toenails.     ED -- Viagra 100 mg prn.     BPH -- Followed by Urology (Ananda) - last visit in 5/17. Hx of laser TURP procedure in 2016.     Hypothyroidism -- Levothyroxine 200 mcg daily.     TSH   Date Value Ref Range Status   03/02/2018 1.25 0.40 - 4.00 mU/L Final   02/02/2017 0.73 0.40 - 4.00 mU/L Final   10/28/2016 11.56 (H) 0.40 - 4.00 mU/L Final   01/12/2016 1.43 0.40 - 4.00 mU/L Final   08/25/2015 2.74 0.40 - 4.00 mU/L Final     T4 Free   Date Value Ref Range Status   03/02/2018 1.15 0.76 - 1.46 ng/dL Final   10/28/2016 0.95 0.76 - 1.46 ng/dL Final   06/02/2015 1.07 0.76 - 1.46 ng/dL Final   11/19/2013 1.21 0.70 - 1.85 ng/dL Final   07/13/2011 1.04 0.70 - 1.85 ng/dL Final     Hyperlipidemia Follow-Up    Rate your low fat/cholesterol diet?: good    Taking statin?  Yes, no muscle aches from statin    Other lipid medications/supplements?:  none    20 mg daily.     Recent Labs   Lab Test  02/02/17   0926  01/12/16   1122  06/02/15   0924  11/19/13   0837   CHOL  161  160  152  189   HDL  53  53  55  60   LDL  89  85  81  97   TRIG  96  108  82  165*   CHOLHDLRATIO   --    --   2.8  3.2       Past/recent records reviewed and  discussed for -- family hx, social hx, surgical hx, medications, immunizations, allergies.        Ability to successfully perform activities of daily living: Yes, no assistance needed    Home safety:  none identified     Hearing impairment: Yes, wears bilateral hearing aids    Fall risk:  Fallen 2 or more times in the past year?: No  Any fall with injury in the past year?: No    COGNITIVE SCREEN  1) Repeat 3 items (Banana, Sunrise, Chair)    2) Clock draw: NORMAL  3) 3 item recall: Recalls 3 objects  Results: 3 items recalled: COGNITIVE IMPAIRMENT LESS LIKELY    Mini-CogTM Copyright S Kymberly. Licensed by the author for use in [facility]; reprinted with permission ([email or web address]@[email or web address]). All rights reserved.      Reviewed and updated as needed this visit by clinical staff  Tobacco  Allergies  Meds  Med Hx  Surg Hx  Fam Hx  Soc Hx      Reviewed and updated as needed this visit by Provider  Allergies        Social History   Substance Use Topics     Smoking status: Former Smoker     Packs/day: 1.00     Years: 20.00     Quit date: 1/1/1981     Smokeless tobacco: Former User     Quit date: 1/1/1992     Alcohol use 7.2 oz/week     12 Standard drinks or equivalent per week     If you drink alcohol do you typically have >3 drinks per day or >7 drinks per week? No                        Today's PHQ-2 Score:   PHQ-2 ( 1999 Pfizer) 3/2/2018 10/28/2016   Q1: Little interest or pleasure in doing things 0 0   Q2: Feeling down, depressed or hopeless 0 0   PHQ-2 Score 0 0     Do you feel safe in your environment - Yes    Do you have a Health Care Directive?: Yes: Advance Directive has been received and scanned.    Current providers sharing in care for this patient include:   Patient Care Team:  Liu Nolasco MD as PCP - General (Family Practice)    The following health maintenance items are reviewed in Epic and correct as of today:  Health Maintenance   Topic Date Due     FIT Q1 YR  02/04/1960     ADVANCE DIRECTIVE  PLANNING Q5 YRS  03/21/2017     FALL RISK ASSESSMENT  03/30/2017     INFLUENZA VACCINE (SYSTEM ASSIGNED)  09/01/2018     TSH Q1 YEAR  03/02/2019     PSA Q1 YR  03/02/2019     WELLNESS VISIT Q1 YR  03/02/2019     LIPID SCREEN Q5 YR MALE (SYSTEM ASSIGNED)  03/02/2023     COLONOSCOPY Q10 YR  10/02/2023     TETANUS IMMUNIZATION (SYSTEM ASSIGNED)  03/02/2028     PNEUMOCOCCAL  Completed     AORTIC ANEURYSM SCREENING (SYSTEM ASSIGNED)  Completed     HEPATITIS C SCREENING  Completed     Health Maintenance     Colonoscopy:  10 years, due 10/23 (last 10/13)   FIT:  Yearly, due (none on file)              PSA:  Yearly, due (last 1/16)   DEXA:  n/a    Health Maintenance Due   Topic Date Due     FIT Q1 YR  02/04/1960     ADVANCE DIRECTIVE PLANNING Q5 YRS  03/21/2017     FALL RISK ASSESSMENT  03/30/2017       Current Problem List    Patient Active Problem List   Diagnosis     Allergic rhinitis     Acquired hypothyroidism     Hyperlipidemia LDL goal <130     Impaired memory     Advanced directives, counseling/discussion     Sleep related leg cramps     Chronic sinusitis     S/P revision of total knee, left     BPH (benign prostatic hyperplasia)     OA (osteoarthritis)       Past Medical History    Past Medical History:   Diagnosis Date     Abnormal glucose     A1C 1/06 =  6.3     Acquired hypothyroidism 10/11/2005     Problem list name updated by automated process. Provider to review     Allergic rhinitis, cause unspecified     mary spring time     Benign localized hyperplasia of prostate with urinary obstruction and other lower urinary tract symptoms (LUTS)(600.21)     slow stream , nocturia      Chronic sinusitis 3/14/2014     Environmental allergies     AIT - Dr Weiss     Hyperlipidemia LDL goal <130 10/31/2010     hypogonadism     low testosterone at times      RAFIQ (iron deficiency anemia)      Impaired memory 02/11/2011    ?     OA (osteoarthritis) total L knee 3/09    knees bother;; has had bilat arthroscopic     Sleep  related leg cramps 06/2012       Past Surgical History    Past Surgical History:   Procedure Laterality Date     ARTHROPLASTY REVISION KNEE Left 10/26/2015     ARTHROPLASTY REVISION KNEE - Dr Dudley     COLONOSCOPY  10/2/2013    diverticulosis - due 10 yrs (10/2023), initial 10/2003     CYSTOSCOPY  04/2016     ENDOSCOPIC SINUS SURGERY  3/17/2014    Procedure: ENDOSCOPIC SINUS SURGERY;  Bilateral Endoscopic Sinus Surgery ;  Surgeon: Chuy Johnson MD;  Location: [facility] OR     HC REPAIR UMBILICAL KASI,5+Y/O,REDUC  2002     LASER REVOLIX PHOTOSELECTIVE VAPORIZATION PROSTATE N/A 4/6/2016    Procedure: LASER REVOLIX / QUANTA PHOTOSELECTIVE VAPORIZATION PROSTATE;  Surgeon: Sean Malave MD;  Location: [facility] OR     ROTATOR CUFF REPAIR RT/LT  3/2012    left shoulder     SURGICAL HISTORY OF -   1996    tonsils and adenoids     SURGICAL HISTORY OF -  Left 03/2009    Lt TKA     SURGICAL HISTORY OF -       Rt Knee x 1 (meniscus, 1995), Lt x 3 (last 2009)     VASECTOMY  1986       Current Medications    Current Outpatient Prescriptions   Medication Sig Dispense Refill     levothyroxine (SYNTHROID/LEVOTHROID) 200 MCG tablet Take 1 tablet (200 mcg) by mouth daily 90 tablet 3     simvastatin (ZOCOR) 20 MG tablet Take 1 tablet (20 mg) by mouth At Bedtime 90 tablet 3     fluticasone (FLONASE) 50 MCG/ACT spray Spray 1-2 sprays into both nostrils daily 48 g 3     multivitamin, therapeutic with minerals (MULTI-VITAMIN) TABS Take 1 tablet by mouth daily       sildenafil (VIAGRA) 100 MG tablet Take 1 tablet (100 mg) by mouth daily as needed for erectile dysfunction 10 tablet 6     SUDAFED SINUS  MG OR TABS Takes when needed         Allergies    No Known Allergies    Immunizations    Immunization History   Administered Date(s) Administered     Influenza (IIV3) PF 10/01/2003, 10/21/2012, 11/19/2013, 09/14/2015     Influenza Vaccine IM 3yrs+ 4 Valent IIV4 09/15/2017     Pneumo Conj 13-V (2010&after) 01/12/2016     Pneumococcal 23  "valent 11/21/2003, 03/02/2018     TD (ADULT, 7+) 03/02/2018     TDAP Vaccine (Adacel) 10/06/2008     Zoster vaccine, live 11/19/2013       Family History    Family History   Problem Relation Age of Onset     C.A.D. Father      CHF     Heart Failure Father      Chronic Obstructive Pulmonary Disease Father      CEREBROVASCULAR DISEASE Brother 61     GASTROINTESTINAL DISEASE Brother      GI Bleed - colectomy     Hypertension Mother      Cardiovascular Mother 84     MI     Lipids Mother      Chronic Obstructive Pulmonary Disease Mother      No Known Problems Sister      No Known Problems Brother        Social History    Social History     Social History     Marital status:      Spouse name: Ingrid     Number of children: 4     Years of education: N/A     Occupational History      [employer]     Social History Main Topics     Smoking status: Former Smoker     Packs/day: 1.00     Years: 20.00     Quit date: 1/1/1981     Smokeless tobacco: Former User     Quit date: 1/1/1992     Alcohol use 7.2 oz/week     12 Standard drinks or equivalent per week     Drug use: No     Sexual activity: Yes     Partners: Female     Other Topics Concern     Not on file     Social History Narrative       ROS:  Constitutional, HEENT, cardiovascular, pulmonary, GI, , musculoskeletal, neuro, skin, endocrine and psych systems are negative, except as in HPI or otherwise noted     This document serves as a record of the services and decisions personally performed and made by Liu Nolasco MD [facility]. It was created on their behalf by Rom Silva, a trained medical scribe. The creation of this document is based the provider's statements to the medical scribe.  Rom Silva March 2, 2018 10:36 AM      OBJECTIVE:   /82  Pulse 64  Temp 97.9  F (36.6  C) (Oral)  Ht 5' 11\" (1.803 m)  Wt 254 lb (115.2 kg)  SpO2 97%  BMI 35.43 kg/m2 Estimated body mass index is 35.43 kg/(m^2) as calculated from the following:    Height as of this " "encounter: 5' 11\" (1.803 m).    Weight as of this encounter: 254 lb (115.2 kg).  EXAM:   GENERAL: healthy, alert and no distress, morbidly obese   HENT: ear canals and TM's normal upon viewing with otoscope, nose and mouth without ulcers or lesions upon viewing with otoscope  NECK: no adenopathy, no asymmetry, masses, or scars and thyroid normal to palpation  RESP: lungs clear to auscultation - no rales, rhonchi or wheezes  CV: regular rate and rhythm, normal S1 S2, no S3 or S4, no murmur, click or rub, no peripheral edema and peripheral pulses strong - no carotid bruits  ABDOMEN: soft, nontender, no hepatosplenomegaly, no masses and bowel sounds normal   (male): testicles normal without atrophy or masses, no hernias and penis normal without urethral discharge  RECTAL: normal sphincter tone, no rectal masses and prostate trace to 1+ enlarged, smooth, nontender without masses/nodules  MS: no gross musculoskeletal defects noted, no edema  SKIN: several possibly suspicious lesions noted to back  NEURO: mentation intact and speech normal  PSYCH: mentation appears normal, affect normal/bright    ASSESSMENT / PLAN:       ICD-10-CM    1. Encounter for routine adult health examination without abnormal findings Z00.00 Hepatitis C Screen Reflex to HCV RNA Quant and Genotype     TSH WITH FREE T4 REFLEX     Abdominal Aortic Aneurysm Screening/Tracking     US Aorta Medicare AAA Screening     Albumin Random Urine Quantitative with Creat Ratio     Hemoglobin A1c     CBC with platelets     Comprehensive metabolic panel     Lipid panel reflex to direct LDL Fasting     Prostate spec antigen screen     *UA reflex to Microscopic     CK total     Fecal colorectal cancer screen FIT     Urine Culture Aerobic Bacterial     Urine Microscopic     TD PRESERV FREE >=7 YRS ADS IM     PNEUMOCOCCAL VACCINE,ADULT,SQ OR IM   2. Primary osteoarthritis involving multiple joints M15.0    3. Hyperlipidemia LDL goal <130 E78.5 Comprehensive metabolic " panel     Lipid panel reflex to direct LDL Fasting     CK total     simvastatin (ZOCOR) 20 MG tablet   4. Acquired hypothyroidism E03.9 TSH WITH FREE T4 REFLEX     levothyroxine (SYNTHROID/LEVOTHROID) 200 MCG tablet     T4 free   5. Sleep related leg cramps G47.62    6. Chronic sinusitis, unspecified location J32.9 fluticasone (FLONASE) 50 MCG/ACT spray   7. Benign prostatic hyperplasia with lower urinary tract symptoms, symptom details unspecified N40.1 Prostate spec antigen screen   8. Skin lesion L98.9 DERMATOLOGY REFERRAL   9. Fungal nail infection B35.1    10. History of smoking Z87.891 Abdominal Aortic Aneurysm Screening/Tracking     US Aorta Medicare AAA Screening   11. Screening for AAA (abdominal aortic aneurysm) Z13.6 Abdominal Aortic Aneurysm Screening/Tracking     US Aorta Medicare AAA Screening   12. Screening for prostate cancer Z12.5 Prostate spec antigen screen   13. Screen for colon cancer Z12.11 Fecal colorectal cancer screen FIT   14. Nonspecific finding on examination of urine R82.90 Urine Culture Aerobic Bacterial   15. Medication monitoring encounter Z51.81 TSH WITH FREE T4 REFLEX     Albumin Random Urine Quantitative with Creat Ratio     Hemoglobin A1c     CBC with platelets     Comprehensive metabolic panel     Lipid panel reflex to direct LDL Fasting     *UA reflex to Microscopic     CK total     Urine Culture Aerobic Bacterial     Urine Microscopic     T4 free   16. Need for pneumococcal vaccination Z23 PNEUMOCOCCAL VACCINE,ADULT,SQ OR IM   17. Need for TD vaccine Z23 TD PRESERV FREE >=7 YRS ADS IM   18. Need for hepatitis C screening test Z11.59 Hepatitis C Screen Reflex to HCV RNA Quant and Genotype   19. At risk for falling Z91.81      Discussed treatment/modality options, including risk and benefits, he desires advised alcohol consumption 1oz per day or less, advised aspirin 81 mg po daily, advised 1 multivitamin per day, advised calcium 7504-8023 mg/d and Vitamin D 800-1200 IU/d,  "advised dentist every 6 months, advised diet, exercise, and weight loss, advised opthalmologist every 1-2 years, advised self testicular exam q month, diet discussed, further diagnostic(s), further health care maintenance, further lab(s), immunizations (TD, pneumovax 23), medication refill(s), OTC meds, referrals (Dermatology), and observation. All diagnosis above reviewed and noted above, otherwise stable.  See [employer] orders for further details.  Follow up in 1 year(s) and as needed.    - Pt already received influenza vaccination this season.     Health Maintenance Due   Topic Date Due     FIT Q1 YR  02/04/1960     ADVANCE DIRECTIVE PLANNING Q5 YRS  03/21/2017     FALL RISK ASSESSMENT  03/30/2017     End of Life Planning:  Patient currently has an advanced directive: none on file    COUNSELING:  Reviewed preventive health counseling, as reflected in patient instructions    BP Screening:   Last 3 BP Readings:    BP Readings from Last 3 Encounters:   03/02/18 138/82   08/24/17 142/70   10/28/16 112/60     The following was recommended to the patient:  Recommend lifestyle modifications and Anti-hypertensive phramacology therapy    Estimated body mass index is 35.43 kg/(m^2) as calculated from the following:    Height as of this encounter: 5' 11\" (1.803 m).    Weight as of this encounter: 254 lb (115.2 kg).  Weight management plan: Discussed healthy diet and exercise guidelines and patient will follow up in 12 months in clinic to re-evaluate.     reports that he quit smoking about 37 years ago. He has a 20.00 pack-year smoking history. He quit smokeless tobacco use about 26 years ago.    Appropriate preventive services were discussed with this patient, including applicable screening as appropriate for cardiovascular disease, diabetes, osteopenia/osteoporosis, and glaucoma.  As appropriate for age/gender, discussed screening for colorectal cancer, prostate cancer, breast cancer, and cervical cancer. Checklist " reviewing preventive services available has been given to the patient.    Reviewed patients plan of care and provided an AVS. The Basic Care Plan (routine screening as documented in Health Maintenance) for Sundar meets the Care Plan requirement. This Care Plan has been established and reviewed with the Patient.    Counseling Resources:  ATP IV Guidelines  Pooled Cohorts Equation Calculator  Breast Cancer Risk Calculator  FRAX Risk Assessment  ICSI Preventive Guidelines  Dietary Guidelines for Americans, 2010  USDA's MyPlate  ASA Prophylaxis  Lung CA Screening    The information in this document, created by the medical scribe for me, accurately reflects the services I personally performed and the decisions made by me. I have reviewed and approved this document for accuracy.   Liu Nolasco MD FAAFP            Liu Nolasco MD, [address]  [address] [phone] Fax

## 2018-03-02 NOTE — NURSING NOTE
"Chief Complaint   Patient presents with     Physical       Initial /82  Pulse 64  Temp 97.9  F (36.6  C) (Oral)  Ht 5' 11\" (1.803 m)  Wt 254 lb (115.2 kg)  SpO2 97%  BMI 35.43 kg/m2 Estimated body mass index is 35.43 kg/(m^2) as calculated from the following:    Height as of this encounter: 5' 11\" (1.803 m).    Weight as of this encounter: 254 lb (115.2 kg)..  BP completed using cuff size: ursula White MA  "

## 2018-03-02 NOTE — PATIENT INSTRUCTIONS
Lamisil cream twice daily to affected toenails -- along with washing and drying thoroughly    Follow up with Dermatology for skin check, as discussed      Preventive Health Recommendations:     Male Ages 65 and over    Yearly exam:             See your health care provider every year in order to  o   Review health changes.   o   Discuss preventive care.    o   Review your medicines if your doctor has prescribed any.    Talk with your health care provider about whether you should have a test to screen for prostate cancer (PSA).    Every 3 years, have a diabetes test (fasting glucose). If you are at risk for diabetes, you should have this test more often.    Every 5 years, have a cholesterol test. Have this test more often if you are at risk for high cholesterol or heart disease.     Every 10 years, have a colonoscopy. Or, have a yearly FIT test (stool test). These exams will check for colon cancer.    Talk to with your health care provider about screening for Abdominal Aortic Aneurysm if you have a family history of AAA or have a history of smoking.  Shots:     Get a flu shot each year.     Get a tetanus shot every 10 years.     Talk to your doctor about your pneumonia vaccines. There are now two you should receive - Pneumovax (PPSV 23) and Prevnar (PCV 13).    Talk to your doctor about a shingles vaccine.     Talk to your doctor about the hepatitis B vaccine.  Nutrition:     Eat at least 5 servings of fruits and vegetables each day.     Eat whole-grain bread, whole-wheat pasta and brown rice instead of white grains and rice.     Talk to your doctor about Calcium and Vitamin D.   Lifestyle    Exercise for at least 150 minutes a week (30 minutes a day, 5 days a week). This will help you control your weight and prevent disease.     Limit alcohol to one drink per day.     No smoking.     Wear sunscreen to prevent skin cancer.     See your dentist every six months for an exam and cleaning.     See your eye doctor every 1  to 2 years to screen for conditions such as glaucoma, macular degeneration and cataracts.

## 2018-03-03 LAB
BACTERIA SPEC CULT: NO GROWTH
SPECIMEN SOURCE: NORMAL

## 2018-03-05 ENCOUNTER — DOCUMENTATION ONLY (OUTPATIENT)
Dept: VASCULAR SURGERY | Facility: CLINIC | Age: 68
End: 2018-03-05

## 2018-03-05 LAB — HCV AB SERPL QL IA: NONREACTIVE

## 2018-03-05 RX ORDER — FLUTICASONE PROPIONATE 50 MCG
SPRAY, SUSPENSION (ML) NASAL
Qty: 3 BOTTLE | Refills: 3 | Status: SHIPPED | OUTPATIENT
Start: 2018-03-05 | End: 2019-03-06

## 2018-03-05 NOTE — TELEPHONE ENCOUNTER
90 day supply requested.  Prescription approved per [facility] Refill Protocol.      Cate Painting, BS, RN, PHN  [facility]) [phone]

## 2018-03-06 ENCOUNTER — TELEPHONE (OUTPATIENT)
Dept: FAMILY MEDICINE | Facility: CLINIC | Age: 68
End: 2018-03-06

## 2018-03-06 DIAGNOSIS — R73.03 PREDIABETES: Primary | ICD-10-CM

## 2018-03-06 NOTE — TELEPHONE ENCOUNTER
See result note.    Cate Painting, PARVIZ, RN, N  [facility]) [phone]

## 2018-03-06 NOTE — LETTER
[facility]  [address] [place] [address]                  [phone]   March 6, 2018    Sundar ANDRAE German  [address] [address]      Dear Sundar,    Here is a summary of your recent test results:    They showed:     Labs are overall quite good, except:     Minimally increased protein in urine.   Elevated glucose in the prediabetes range.     We advise:     Nutrition consult/Prediabetes consult with Paige.     FM[facility]: Diabetes Education - [facility] [phone]   [email or web address]     Balanced low cholesterol diet.   Regular exercise.   Weight loss.   Close follow up to review all labs.     For additional lab test information, [email or web address] is an excellent reference.     Let us know if you have any questions or concerns.     Thank you for choosing us for your health care needs!     Your test results are enclosed.      Please contact me if you have any questions.    In addition, here is a list of due or overdue Health Maintenance reminders.    Health Maintenance Due   Topic Date Due     Colon Cancer Screening - FIT Test - yearly  02/04/1960     Discuss Advance Directive Planning  03/21/2017       Please call us at [phone] (or use [employer]) to address the above recommendations.            Thank you very much for trusting [facility]..     Healthy regards,        Liu Nolasco M.D.        Results for orders placed or performed in visit on 03/02/18   Hepatitis C Screen Reflex to HCV RNA Quant and Genotype   Result Value Ref Range    Hepatitis C Antibody Nonreactive NR^Nonreactive   TSH WITH FREE T4 REFLEX   Result Value Ref Range    TSH 1.25 0.40 - 4.00 mU/L   Albumin Random Urine Quantitative with Creat Ratio   Result Value Ref Range    Creatinine Urine 170 mg/dL    Albumin Urine mg/L 115 mg/L    Albumin Urine mg/g Cr 67.65 (H) 0 - 17 mg/g Cr   Hemoglobin A1c    Result Value Ref Range    Hemoglobin A1C 5.6 4.3 - 6.0 %   CBC with platelets   Result Value Ref Range    WBC 5.4 4.0 - 11.0 10e9/L    RBC Count 4.07 (L) 4.4 - 5.9 10e12/L    Hemoglobin 13.8 13.3 - 17.7 g/dL    Hematocrit 40.9 40.0 - 53.0 %     (H) 78 - 100 fl    MCH 33.9 (H) 26.5 - 33.0 pg    MCHC 33.7 31.5 - 36.5 g/dL    RDW 12.7 10.0 - 15.0 %    Platelet Count 249 150 - 450 10e9/L   Comprehensive metabolic panel   Result Value Ref Range    Sodium 140 133 - 144 mmol/L    Potassium 4.1 3.4 - 5.3 mmol/L    Chloride 106 94 - 109 mmol/L    Carbon Dioxide 29 20 - 32 mmol/L    Anion Gap 5 3 - 14 mmol/L    Glucose 120 (H) 70 - 99 mg/dL    Urea Nitrogen 13 7 - 30 mg/dL    Creatinine 0.77 0.66 - 1.25 mg/dL    GFR Estimate >90 >60 mL/min/1.7m2    GFR Estimate If Black >90 >60 mL/min/1.7m2    Calcium 9.3 8.5 - 10.1 mg/dL    Bilirubin Total 0.4 0.2 - 1.3 mg/dL    Albumin 4.0 3.4 - 5.0 g/dL    Protein Total 7.5 6.8 - 8.8 g/dL    Alkaline Phosphatase 52 40 - 150 U/L    ALT 64 0 - 70 U/L    AST 47 (H) 0 - 45 U/L   Lipid panel reflex to direct LDL Fasting   Result Value Ref Range    Cholesterol 138 <200 mg/dL    Triglycerides 100 <150 mg/dL    HDL Cholesterol 58 >39 mg/dL    LDL Cholesterol Calculated 60 <100 mg/dL    Non HDL Cholesterol 80 <130 mg/dL   Prostate spec antigen screen   Result Value Ref Range    PSA 1.07 0 - 4 ug/L   *UA reflex to Microscopic   Result Value Ref Range    Color Urine Yellow     Appearance Urine Clear     Glucose Urine Negative NEG^Negative mg/dL    Bilirubin Urine Negative NEG^Negative    Ketones Urine Negative NEG^Negative mg/dL    Specific Gravity Urine >1.030 1.003 - 1.035    Blood Urine Small (A) NEG^Negative    pH Urine 5.5 5.0 - 7.0 pH    Protein Albumin Urine 100 (A) NEG^Negative mg/dL    Urobilinogen Urine 0.2 0.2 - 1.0 EU/dL    Nitrite Urine Negative NEG^Negative    Leukocyte Esterase Urine Negative NEG^Negative    Source Midstream Urine    CK total   Result Value Ref Range    CK  Total 126 30 - 300 U/L   Urine Microscopic   Result Value Ref Range    WBC Urine 10-25 (A) OTO5^0 - 5 /HPF    RBC Urine 10-25 (A) OTO2^O - 2 /HPF    Squamous Epithelial /LPF Urine Few FEW^Few /LPF    Bacteria Urine Few (A) NEG^Negative /HPF    Amorphous Crystals Many (A) NEG^Negative /HPF    Mucous Urine Present (A) NEG^Negative /LPF   T4 free   Result Value Ref Range    T4 Free 1.15 0.76 - 1.46 ng/dL   Urine Culture Aerobic Bacterial   Result Value Ref Range    Specimen Description Midstream Urine     Culture Micro No growth

## 2018-03-06 NOTE — TELEPHONE ENCOUNTER
Reason for Call:  Other returning call    Detailed comments: Pt called this afternoon returning a phone call for a nurse. Please give pt a call back ASAP. Thank you.    Phone Number Patient can be reached at: Home number on file [phone] (home)    Best Time:     Can we leave a detailed message on this number? YES    Call taken on 3/6/2018 at 3:29 PM by Nathalie Linares

## 2018-03-07 ENCOUNTER — DOCUMENTATION ONLY (OUTPATIENT)
Dept: VASCULAR SURGERY | Facility: CLINIC | Age: 68
End: 2018-03-07

## 2018-03-07 ENCOUNTER — HOSPITAL ENCOUNTER (OUTPATIENT)
Dept: ULTRASOUND IMAGING | Facility: CLINIC | Age: 68
Discharge: HOME | End: 2018-03-07
Attending: FAMILY MEDICINE | Admitting: FAMILY MEDICINE
Payer: MEDICARE

## 2018-03-07 DIAGNOSIS — Z13.6 SCREENING FOR AAA (ABDOMINAL AORTIC ANEURYSM): ICD-10-CM

## 2018-03-07 DIAGNOSIS — Z87.891 HISTORY OF SMOKING: ICD-10-CM

## 2018-03-07 DIAGNOSIS — Z00.00 ENCOUNTER FOR ROUTINE ADULT HEALTH EXAMINATION WITHOUT ABNORMAL FINDINGS: ICD-10-CM

## 2018-03-07 PROCEDURE — 76706 US ABDL AORTA SCREEN AAA: CPT

## 2018-03-29 ENCOUNTER — TRANSFERRED RECORDS (OUTPATIENT)
Dept: HEALTH INFORMATION MANAGEMENT | Facility: CLINIC | Age: 68
End: 2018-03-29

## 2018-04-15 PROBLEM — C43.9 MELANOMA (H): Status: ACTIVE | Noted: 2018-04-01

## 2018-04-21 ENCOUNTER — NURSE TRIAGE (OUTPATIENT)
Dept: NURSING | Facility: CLINIC | Age: 68
End: 2018-04-21

## 2018-04-21 NOTE — TELEPHONE ENCOUNTER
"Spouse calling reporting patient had \"a mole removed from his back\" 4/19/19. Reporting \"5 stitches underneath and 40 stitches on top.\" Patient is currently sleeping. \"He has thrown up twice.\" Vomiting at 6 pm and 830 pm. Temp 101 (O) in past 30 minutes.  Attempted to contact [facility] ([facility] [place]) they do not have an on call provider. Advised caller recommendation would be to be seen with in 4 hours. Caller verbalized understanding. Denies further questions.    Rivka Hargrove RN  [facility]      "

## 2018-08-03 RX ORDER — SILDENAFIL CITRATE 20 MG/1
TABLET ORAL
Qty: 90 TABLET | Refills: 0 | OUTPATIENT
Start: 2018-08-03

## 2018-08-10 DIAGNOSIS — E29.1 HYPOGONADISM MALE: ICD-10-CM

## 2018-08-10 RX ORDER — SILDENAFIL 100 MG/1
100 TABLET, FILM COATED ORAL DAILY PRN
Qty: 10 TABLET | Refills: 0 | Status: SHIPPED | OUTPATIENT
Start: 2018-08-10 | End: 2018-11-13

## 2018-08-15 DIAGNOSIS — N52.9 ED (ERECTILE DYSFUNCTION): Primary | ICD-10-CM

## 2018-08-15 RX ORDER — SILDENAFIL CITRATE 20 MG/1
TABLET ORAL
Qty: 90 TABLET | Refills: 0 | Status: SHIPPED | OUTPATIENT
Start: 2018-08-15 | End: 2018-09-26

## 2018-09-26 DIAGNOSIS — N52.9 ERECTILE DYSFUNCTION, UNSPECIFIED ERECTILE DYSFUNCTION TYPE: ICD-10-CM

## 2018-09-26 RX ORDER — SILDENAFIL CITRATE 20 MG/1
20 TABLET ORAL DAILY
Qty: 90 TABLET | Refills: 0 | Status: SHIPPED | OUTPATIENT
Start: 2018-09-26 | End: 2018-11-13

## 2018-10-24 ENCOUNTER — OFFICE VISIT (OUTPATIENT)
Dept: UROLOGY | Facility: CLINIC | Age: 68
End: 2018-10-24
Payer: COMMERCIAL

## 2018-10-24 VITALS — HEART RATE: 62 BPM | HEIGHT: 71 IN | WEIGHT: 254 LBS | BODY MASS INDEX: 35.56 KG/M2 | OXYGEN SATURATION: 96 %

## 2018-10-24 DIAGNOSIS — N40.1 BENIGN PROSTATIC HYPERPLASIA WITH LOWER URINARY TRACT SYMPTOMS, SYMPTOM DETAILS UNSPECIFIED: Primary | ICD-10-CM

## 2018-10-24 DIAGNOSIS — E66.01 MORBID OBESITY (H): ICD-10-CM

## 2018-10-24 DIAGNOSIS — N52.9 ERECTILE DYSFUNCTION, UNSPECIFIED ERECTILE DYSFUNCTION TYPE: ICD-10-CM

## 2018-10-24 LAB
ALBUMIN UR-MCNC: NEGATIVE MG/DL
APPEARANCE UR: CLEAR
BILIRUB UR QL STRIP: NEGATIVE
COLOR UR AUTO: YELLOW
GLUCOSE UR STRIP-MCNC: NEGATIVE MG/DL
HGB UR QL STRIP: ABNORMAL
KETONES UR STRIP-MCNC: NEGATIVE MG/DL
LEUKOCYTE ESTERASE UR QL STRIP: NEGATIVE
NITRATE UR QL: NEGATIVE
PH UR STRIP: 5.5 PH (ref 5–7)
RESIDUAL VOLUME (RV) (EXTERNAL): 124
SOURCE: ABNORMAL
SP GR UR STRIP: 1.02 (ref 1–1.03)
UROBILINOGEN UR STRIP-ACNC: 0.2 EU/DL (ref 0.2–1)

## 2018-10-24 PROCEDURE — 81003 URINALYSIS AUTO W/O SCOPE: CPT | Mod: QW | Performed by: UROLOGY

## 2018-10-24 PROCEDURE — 99213 OFFICE O/P EST LOW 20 MIN: CPT | Performed by: UROLOGY

## 2018-10-24 RX ORDER — SILDENAFIL CITRATE 20 MG/1
TABLET ORAL
Qty: 90 TABLET | Refills: 3 | Status: SHIPPED | OUTPATIENT
Start: 2018-10-24 | End: 2018-12-31

## 2018-10-24 RX ORDER — SILDENAFIL CITRATE 20 MG/1
TABLET ORAL
Qty: 90 TABLET | Refills: 0 | Status: SHIPPED | OUTPATIENT
Start: 2018-10-24 | End: 2021-10-01

## 2018-10-24 ASSESSMENT — PAIN SCALES - GENERAL: PAINLEVEL: NO PAIN (0)

## 2018-10-24 NOTE — NURSING NOTE
Pt up to void 3 times a nt.  Wants to talk new options.  pvr by [id number]  Pt needs a refill of sildenafil.    NORBERT Aviles, CMA

## 2018-10-24 NOTE — PROGRESS NOTES
Office Visit Note  [facility]  [phone]    UROLOGIC DIAGNOSES:   BPH and Hx of retention    CURRENT INTERVENTIONS:   Quanta laser TURP 2016    HISTORY:   Sundar returns to clinic today for refill of generic viagra/ he reports good success with the medication at the 60mg dose. He has noted some slight slowing of his urinary stream compared to right after his surgery but nothing bothersome      PAST MEDICAL HISTORY:   Past Medical History:   Diagnosis Date     Abnormal glucose     A1C 1/06 =  6.3     Acquired hypothyroidism 10/11/2005     Problem list name updated by automated process. Provider to review     Allergic rhinitis, cause unspecified     mary spring time     Benign localized hyperplasia of prostate with urinary obstruction and other lower urinary tract symptoms (LUTS)(600.21)     slow stream , nocturia      Chronic sinusitis 3/14/2014     Environmental allergies     AIT - Dr Weiss     Hyperlipidemia LDL goal <130 10/31/2010     hypogonadism     low testosterone at times      RAFIQ (iron deficiency anemia)      Impaired memory 02/11/2011    ?     Melanoma (H) 04/2018    Melanoma 0.7 mm depth, Levar III, superficial spreading, stage T1a     OA (osteoarthritis) total L knee 3/09    knees bother;; has had bilat arthroscopic     Prediabetes      Sleep related leg cramps 06/2012       PAST SURGICAL HISTORY:   Past Surgical History:   Procedure Laterality Date     ARTHROPLASTY REVISION KNEE Left 10/26/2015     ARTHROPLASTY REVISION KNEE - Dr Dudley     COLONOSCOPY  10/2/2013    diverticulosis - due 10 yrs (10/2023), initial 10/2003     CYSTOSCOPY  04/2016     ENDOSCOPIC SINUS SURGERY  3/17/2014    Procedure: ENDOSCOPIC SINUS SURGERY;  Bilateral Endoscopic Sinus Surgery ;  Surgeon: Chuy Johnson MD;  Location: [facility] OR     HC REPAIR UMBILICAL KASI,5+Y/O,REDUC  2002     LASER REVOLIX PHOTOSELECTIVE VAPORIZATION PROSTATE N/A 4/6/2016    Procedure: LASER REVOLIX / QUANTA PHOTOSELECTIVE VAPORIZATION  PROSTATE;  Surgeon: Sean Malave MD;  Location: [facility] OR     ROTATOR CUFF REPAIR RT/LT  3/2012    left shoulder     SURGICAL HISTORY OF -   1996    tonsils and adenoids     SURGICAL HISTORY OF -  Left 03/2009    Lt TKA     SURGICAL HISTORY OF -       Rt Knee x 1 (meniscus, 1995), Lt x 3 (last 2009)     VASECTOMY  1986       FAMILY HISTORY:   Family History   Problem Relation Age of Onset     C.A.D. Father      CHF     Heart Failure Father      Chronic Obstructive Pulmonary Disease Father      Cerebrovascular Disease Brother 61     GASTROINTESTINAL DISEASE Brother      GI Bleed - colectomy     Hypertension Mother      Cardiovascular Mother 84     MI     Lipids Mother      Chronic Obstructive Pulmonary Disease Mother      No Known Problems Sister      No Known Problems Brother        SOCIAL HISTORY:   Social History   Substance Use Topics     Smoking status: Former Smoker     Packs/day: 1.00     Years: 20.00     Quit date: 1/1/1981     Smokeless tobacco: Former User     Quit date: 1/1/1992     Alcohol use 7.2 oz/week     12 Standard drinks or equivalent per week       Current Outpatient Prescriptions   Medication     fluticasone (FLONASE) 50 MCG/ACT spray     levothyroxine (SYNTHROID/LEVOTHROID) 200 MCG tablet     multivitamin, therapeutic with minerals (MULTI-VITAMIN) TABS     sildenafil (REVATIO) 20 MG tablet     sildenafil (VIAGRA) 100 MG tablet     simvastatin (ZOCOR) 20 MG tablet     SUDAFED SINUS  MG OR TABS     No current facility-administered medications for this visit.          PHYSICAL EXAM:    There were no vitals taken for this visit.    Constitutional: Well developed. Conversant and in no acute distress  Eyes: Anicteric sclera, conjunctiva clear, normal extraocular movements  ENT: Normocephalic and atraumatic,   Skin: Warm and dry. No rashes or lesions  Cardiac: No peripheral edema  Back/Flank: Not done  CNS/PNS: Normal musculature and movements, moves all extremities normally  Respiratory:  Normal non-labored breathing  Abdomen: Soft nontender and nondistended  Peripheral Vascular: No peripheral edema  Mental Status/Psych: Alert and Oriented x 3. Normal mood and affect    Penis: Not done  Scrotal Skin: Not done  Testicles: Not done  Epididymis: Not done  Digital Rectal Exam:     Cystoscopy: Not done    Imaging: None    Urinalysis: UA RESULTS:  Recent Labs   Lab Test  03/02/18   1017   COLOR  Yellow   APPEARANCE  Clear   URINEGLC  Negative   URINEBILI  Negative   URINEKETONE  Negative   SG  >1.030   UBLD  Small*   URINEPH  5.5   PROTEIN  100*   UROBILINOGEN  0.2   NITRITE  Negative   LEUKEST  Negative   RBCU  10-25*   WBCU  10-25*       PSA: 1.07    Post Void Residual:     Other labs: None today      IMPRESSION:  Doing well    PLAN:  Viagra refilled. Discussed urinary symptoms. He will observe for now and follow up with me as needed if symptoms worsen. He can have generic viagra refilled at his annual physical in the future      Sean Malave M.D.

## 2018-10-24 NOTE — LETTER
10/24/2018       RE: Sundar German  [address] [address]     Dear Colleague,    Thank you for referring your patient, Sundar German, to the [facility] [place] at [facility]. Please see a copy of my visit note below.    Office Visit Note  [facility]  [phone]    UROLOGIC DIAGNOSES:   BPH and Hx of retention    CURRENT INTERVENTIONS:   Quanta laser TURP 2016    HISTORY:   Sundar returns to clinic today for refill of generic viagra/ he reports good success with the medication at the 60mg dose. He has noted some slight slowing of his urinary stream compared to right after his surgery but nothing bothersome      PAST MEDICAL HISTORY:   Past Medical History:   Diagnosis Date     Abnormal glucose     A1C 1/06 =  6.3     Acquired hypothyroidism 10/11/2005     Problem list name updated by automated process. Provider to review     Allergic rhinitis, cause unspecified     mary spring time     Benign localized hyperplasia of prostate with urinary obstruction and other lower urinary tract symptoms (LUTS)(600.21)     slow stream , nocturia      Chronic sinusitis 3/14/2014     Environmental allergies     AIT - Dr Weiss     Hyperlipidemia LDL goal <130 10/31/2010     hypogonadism     low testosterone at times      RAFIQ (iron deficiency anemia)      Impaired memory 02/11/2011    ?     Melanoma (H) 04/2018    Melanoma 0.7 mm depth, Levar III, superficial spreading, stage T1a     OA (osteoarthritis) total L knee 3/09    knees bother;; has had bilat arthroscopic     Prediabetes      Sleep related leg cramps 06/2012       PAST SURGICAL HISTORY:   Past Surgical History:   Procedure Laterality Date     ARTHROPLASTY REVISION KNEE Left 10/26/2015     ARTHROPLASTY REVISION KNEE - Dr Dudley     COLONOSCOPY  10/2/2013    diverticulosis - due 10 yrs (10/2023), initial 10/2003     CYSTOSCOPY  04/2016     ENDOSCOPIC  SINUS SURGERY  3/17/2014    Procedure: ENDOSCOPIC SINUS SURGERY;  Bilateral Endoscopic Sinus Surgery ;  Surgeon: Chuy Johnson MD;  Location: [facility] OR     HC REPAIR UMBILICAL KASI,5+Y/O,REDUC  2002     LASER REVOLIX PHOTOSELECTIVE VAPORIZATION PROSTATE N/A 4/6/2016    Procedure: LASER REVOLIX / QUANTA PHOTOSELECTIVE VAPORIZATION PROSTATE;  Surgeon: Sean Malave MD;  Location: [facility] OR     ROTATOR CUFF REPAIR RT/LT  3/2012    left shoulder     SURGICAL HISTORY OF -   1996    tonsils and adenoids     SURGICAL HISTORY OF -  Left 03/2009    Lt TKA     SURGICAL HISTORY OF -       Rt Knee x 1 (meniscus, 1995), Lt x 3 (last 2009)     VASECTOMY  1986       FAMILY HISTORY:   Family History   Problem Relation Age of Onset     C.A.D. Father      CHF     Heart Failure Father      Chronic Obstructive Pulmonary Disease Father      Cerebrovascular Disease Brother 61     GASTROINTESTINAL DISEASE Brother      GI Bleed - colectomy     Hypertension Mother      Cardiovascular Mother 84     MI     Lipids Mother      Chronic Obstructive Pulmonary Disease Mother      No Known Problems Sister      No Known Problems Brother        SOCIAL HISTORY:   Social History   Substance Use Topics     Smoking status: Former Smoker     Packs/day: 1.00     Years: 20.00     Quit date: 1/1/1981     Smokeless tobacco: Former User     Quit date: 1/1/1992     Alcohol use 7.2 oz/week     12 Standard drinks or equivalent per week       Current Outpatient Prescriptions   Medication     fluticasone (FLONASE) 50 MCG/ACT spray     levothyroxine (SYNTHROID/LEVOTHROID) 200 MCG tablet     multivitamin, therapeutic with minerals (MULTI-VITAMIN) TABS     sildenafil (REVATIO) 20 MG tablet     sildenafil (VIAGRA) 100 MG tablet     simvastatin (ZOCOR) 20 MG tablet     SUDAFED SINUS  MG OR TABS     No current facility-administered medications for this visit.          PHYSICAL EXAM:    There were no vitals taken for this visit.    Constitutional: Well developed.  Conversant and in no acute distress  Eyes: Anicteric sclera, conjunctiva clear, normal extraocular movements  ENT: Normocephalic and atraumatic,   Skin: Warm and dry. No rashes or lesions  Cardiac: No peripheral edema  Back/Flank: Not done  CNS/PNS: Normal musculature and movements, moves all extremities normally  Respiratory: Normal non-labored breathing  Abdomen: Soft nontender and nondistended  Peripheral Vascular: No peripheral edema  Mental Status/Psych: Alert and Oriented x 3. Normal mood and affect    Penis: Not done  Scrotal Skin: Not done  Testicles: Not done  Epididymis: Not done  Digital Rectal Exam:     Cystoscopy: Not done    Imaging: None    Urinalysis: UA RESULTS:  Recent Labs   Lab Test  03/02/18   1017   COLOR  Yellow   APPEARANCE  Clear   URINEGLC  Negative   URINEBILI  Negative   URINEKETONE  Negative   SG  >1.030   UBLD  Small*   URINEPH  5.5   PROTEIN  100*   UROBILINOGEN  0.2   NITRITE  Negative   LEUKEST  Negative   RBCU  10-25*   WBCU  10-25*       PSA: 1.07    Post Void Residual:     Other labs: None today      IMPRESSION:  Doing well    PLAN:  Viagra refilled. Discussed urinary symptoms. He will observe for now and follow up with me as needed if symptoms worsen. He can have generic viagra refilled at his annual physical in the future      Sean Malave M.D.

## 2018-10-24 NOTE — MR AVS SNAPSHOT
After Visit Summary   10/24/2018    Sundar German    MRN: [id number]           Patient Information     Date Of Birth          1950        Visit Information        Provider Department      10/24/2018 10:45 AM Sean Malave MD [facility]        Today's Diagnoses     Benign prostatic hyperplasia with lower urinary tract symptoms, symptom details unspecified    -  1       Follow-ups after your visit        Your next 10 appointments already scheduled     Jan 02, 2019  8:45 AM CST   Return Visit with Sean Malave MD   [facility] ([facility])    [address]  [address]  [facility] [address] [phone]              Who to contact     If you have questions or need follow up information about today's clinic visit or your schedule please contact [facility] directly at [phone].  Normal or non-critical lab and imaging results will be communicated to you by MyChart, letter or phone within 4 business days after the clinic has received the results. If you do not hear from us within 7 days, please contact the clinic through [employer] or phone. If you have a critical or abnormal lab result, we will notify you by phone as soon as possible.  Submit refill requests through [employer]form or call your pharmacy and they will forward the refill request to us. Please allow 3 business days for your refill to be completed.          Additional Information About Your Visit        MyChart Information     [employer]form gives you secure access to your electronic health record. If you see a primary care provider, you can also send messages to your care team and make appointments. If you have questions, please call your primary care clinic.  If you do not have a primary care provider, please call [phone] and they will assist you.        Care  "EveryWhere ID     This is your Care EveryWhere ID. This could be used by other organizations to access your [facility] medical records  [id number]        Your Vitals Were     Pulse Height Pulse Oximetry BMI (Body Mass Index)          62 1.803 m (5' 11\") 96% 35.43 kg/m2         Blood Pressure from Last 3 Encounters:   03/02/18 138/82   08/24/17 142/70   10/28/16 112/60    Weight from Last 3 Encounters:   10/24/18 115.2 kg (254 lb)   03/02/18 115.2 kg (254 lb)   08/24/17 111.9 kg (246 lb 12.8 oz)              We Performed the Following     Bladder scan     UA without Microscopic        Primary Care Provider Office Phone # Fax #    Liu Nolasco [phone] [address] [address]        Equal Access to Services     [facility]: Hadii shawna franco hadasho Soomaali, waaxda luqadaha, qaybta kaalmada adeegyada, natalia trevinoin hayalexandra damon . So [facility] [phone].    ATENCIÓN: Si habla español, tiene a bhatt disposición servicios gratuitos de asistencia lingüística. Moise al [phone].    We comply with applicable federal civil rights laws and [place] laws. We do not discriminate on the basis of race, color, national origin, age, disability, sex, sexual orientation, or gender identity.            Thank you!     Thank you for choosing [facility] [place]  for your care. Our goal is always to provide you with excellent care. Hearing back from our patients is one way we can continue to improve our services. Please take a few minutes to complete the written survey that you may receive in the mail after your visit with us. Thank you!             Your Updated Medication List - Protect others around you: Learn how to safely use, store and throw away your medicines at www.disposemymeds.org.          This list is accurate as of 10/24/18 11:10 AM.  Always use your most recent med list.                   Brand Name Dispense Instructions for use " Diagnosis    fluticasone 50 MCG/ACT spray    FLONASE    3 Bottle    SHAKE LIQUID AND USE 1 TO 2 SPRAYS IN EACH NOSTRIL DAILY    Chronic sinusitis, unspecified location       levothyroxine 200 MCG tablet    SYNTHROID/LEVOTHROID    90 tablet    Take 1 tablet (200 mcg) by mouth daily    Acquired hypothyroidism       Multi-vitamin Tabs tablet      Take 1 tablet by mouth daily        sildenafil 100 MG tablet    VIAGRA    10 tablet    Take 1 tablet (100 mg) by mouth daily as needed    Hypogonadism male       sildenafil 20 MG tablet    REVATIO    90 tablet    Take 1 tablet (20 mg) by mouth daily    Erectile dysfunction, unspecified erectile dysfunction type       simvastatin 20 MG tablet    ZOCOR    90 tablet    Take 1 tablet (20 mg) by mouth At Bedtime    Hyperlipidemia LDL goal <130       SUDAFED SINUS  MG Tabs   Generic drug:  Pseudoephedrine-APAP      Takes when needed

## 2018-11-13 ENCOUNTER — OFFICE VISIT (OUTPATIENT)
Dept: FAMILY MEDICINE | Facility: CLINIC | Age: 68
End: 2018-11-13
Payer: COMMERCIAL

## 2018-11-13 ENCOUNTER — TELEPHONE (OUTPATIENT)
Dept: FAMILY MEDICINE | Facility: CLINIC | Age: 68
End: 2018-11-13

## 2018-11-13 VITALS
WEIGHT: 240 LBS | SYSTOLIC BLOOD PRESSURE: 120 MMHG | HEART RATE: 79 BPM | DIASTOLIC BLOOD PRESSURE: 80 MMHG | BODY MASS INDEX: 33.6 KG/M2 | TEMPERATURE: 98.3 F | HEIGHT: 71 IN | OXYGEN SATURATION: 97 %

## 2018-11-13 DIAGNOSIS — C43.59 MALIGNANT MELANOMA OF TORSO EXCLUDING BREAST (H): ICD-10-CM

## 2018-11-13 DIAGNOSIS — J20.9 ACUTE BRONCHITIS, UNSPECIFIED ORGANISM: Primary | ICD-10-CM

## 2018-11-13 PROCEDURE — 99213 OFFICE O/P EST LOW 20 MIN: CPT | Performed by: FAMILY MEDICINE

## 2018-11-13 RX ORDER — AMOXICILLIN 875 MG
875 TABLET ORAL 2 TIMES DAILY
Qty: 14 TABLET | Refills: 0 | Status: SHIPPED | OUTPATIENT
Start: 2018-11-13 | End: 2019-02-27

## 2018-11-13 NOTE — TELEPHONE ENCOUNTER
Returned patient call, patient is having lasix eye surgery this Thursday.  A same -day appointment was schedule with a provider.    KARY Esteban, RN  [employer] [facility]

## 2018-11-13 NOTE — PROGRESS NOTES
"  SUBJECTIVE:                                                      Sundar German is a 68 year old male who presents to clinic today for the following health issues:    Acute Illness   Acute illness concerns: Sinus- pt having eye surgery (2 days from now) 11/15/2018  Onset: 3 days ago    Fever: no    Chills/Sweats: no    Headache (location?): no    Sinus Pressure:no    Conjunctivitis:  no    Ear Pain: no    Rhinorrhea: no     Congestion: YES    Sore Throat: no     Cough: YES-productive of yellow sputum and green sputum    Wheeze: no    Decreased Appetite: no    Nausea: no    Vomiting: no    Diarrhea:  no    Dysuria/Freq.: no    Fatigue/Achiness: sleeping a lot last couple of days    Sick/Strep Exposure: no     Therapies Tried and outcome: Sudafed- [place]    Worse today than yesterday.     Problem list and histories reviewed & adjusted, as indicated.  Additional history: as documented    ROS:  Constitutional, HEENT, cardiovascular, pulmonary, GI, , musculoskeletal, neuro, skin, endocrine and psych systems are negative, except as otherwise noted.    This document serves as a record of the services and decisions personally performed and made by Roscoe Womack MD. It was created on his behalf by Erwin Marie, a trained medical scribe. The creation of this document is based the provider's statements to the medical scribe.  Scribe Erwin Marie 2:58 PM, November 13, 2018    OBJECTIVE:                                                    /80  Pulse 79  Temp 98.3  F (36.8  C) (Oral)  Ht 1.803 m (5' 11\")  Wt 108.9 kg (240 lb)  SpO2 97%  BMI 33.47 kg/m2 Body mass index is 33.47 kg/(m^2).   GENERAL: healthy, alert, well nourished, well hydrated, no distress  HENT: ear canals- normal; TMs- normal; Nose- normal; Mouth- no ulcers, no lesions  NECK: no tenderness, no adenopathy, no asymmetry, no masses, no stiffness; thyroid- normal to palpation  RESP: lungs clear to auscultation - no rales, no rhonchi, no wheezes  CV: regular " "rates and rhythm, normal S1 S2, no S3 or S4 and no murmur, no click or rub -  ABDOMEN: soft, no tenderness, no  hepatosplenomegaly, no masses, normal bowel sounds    ASSESSMENT/PLAN:                                                    Sundar was seen today for sinus problem.    Diagnoses and all orders for this visit:    Acute bronchitis, unspecified organism - viral vs bacterial, begin antibiotic trial  -     amoxicillin (AMOXIL) 875 MG tablet; Take 1 tablet (875 mg) by mouth 2 times daily for 7 days    h/o Malignant melanoma of torso excluding breast (H) - surgery done and doing well now.       Risks, benefits and alternatives of treatments discussed. Plan agreed on.      Followup: Return in about 1 week (around 11/20/2018), or if symptoms worsen or fail to improve.    See patient instructions.       BMI:   Estimated body mass index is 33.47 kg/(m^2) as calculated from the following:    Height as of this encounter: 1.803 m (5' 11\").    Weight as of this encounter: 108.9 kg (240 lb).   Weight management plan: Discussed healthy diet and exercise guidelines and patient will follow up in 12 months in clinic to re-evaluate.        Buddy Womack MD   Pager: [phone]    "

## 2018-11-13 NOTE — TELEPHONE ENCOUNTER
Reason for Call:  Other call back    Detailed comments: Pt's wife called this afternoon and wanted to see if the pt could get in with Dr. Nolasco today due to the pt having surgery on Thursday of this week, but he is NOT feeling well with a poss. Sinus infection. Please give PT a call directly. Thank you.    Phone Number Patient can be reached at: Home number on file [phone] (home)    Best Time:     Can we leave a detailed message on this number? YES    Call taken on 11/13/2018 at 12:46 PM by Nathalie Linares

## 2018-11-13 NOTE — MR AVS SNAPSHOT
"              After Visit Summary   11/13/2018    Sundar German    MRN: [id number]           Patient Information     Date Of Birth          1950        Visit Information        Provider Department      11/13/2018 2:20 PM Roscoe Womack MD [facility]        Today's Diagnoses     Acute bronchitis, unspecified organism    -  1    h/o Malignant melanoma of torso excluding breast (H)           Follow-ups after your visit        Who to contact     If you have questions or need follow up information about today's clinic visit or your schedule please contact [facility] directly at [phone].  Normal or non-critical lab and imaging results will be communicated to you by MyChart, letter or phone within 4 business days after the clinic has received the results. If you do not hear from us within 7 days, please contact the clinic through [employer] or phone. If you have a critical or abnormal lab result, we will notify you by phone as soon as possible.  Submit refill requests through [employer] or call your pharmacy and they will forward the refill request to us. Please allow 3 business days for your refill to be completed.          Additional Information About Your Visit        MyChart Information     [employer] gives you secure access to your electronic health record. If you see a primary care provider, you can also send messages to your care team and make appointments. If you have questions, please call your primary care clinic.  If you do not have a primary care provider, please call [phone] and they will assist you.        Care EveryWhere ID     This is your Care EveryWhere ID. This could be used by other organizations to access your [facility] medical records  [id number]        Your Vitals Were     Pulse Temperature Height Pulse Oximetry BMI (Body Mass Index)       79 98.3  F (36.8  C) (Oral) 5' 11\" (1.803 m) 97% 33.47 kg/m2        Blood Pressure from Last 3 Encounters:   11/13/18 " 120/80   03/02/18 138/82   08/24/17 142/70    Weight from Last 3 Encounters:   11/13/18 240 lb (108.9 kg)   10/24/18 254 lb (115.2 kg)   03/02/18 254 lb (115.2 kg)              Today, you had the following     No orders found for display         Today's Medication Changes          These changes are accurate as of 11/13/18  3:09 PM.  If you have any questions, ask your nurse or doctor.               Start taking these medicines.        Dose/Directions    amoxicillin 875 MG tablet   Commonly known as:  AMOXIL   Used for:  Acute bronchitis, unspecified organism   Started by:  Roscoe Womack MD        Dose:  875 mg   Take 1 tablet (875 mg) by mouth 2 times daily for 7 days   Quantity:  14 tablet   Refills:  0            Where to get your medicines      These medications were sent to [facility] - [address] [address]     Phone:  [phone]     amoxicillin 875 MG tablet                Primary Care Provider Office Phone # Fax #    Liu Nolasco [phone]       [address] [place] [id number]        Equal Access to Services     DIAZ MONROE AH: Hadii shawna franco hadasho Sofélixali, waaxda luqadaha, qaybta kaalmada adeegyada, natalia mckenzie. So [facility] [phone].    ATENCIÓN: Si habla español, tiene a bhatt disposición servicios gratuitos de asistencia lingüística. [facility] [phone].    We comply with applicable federal civil rights laws and [place] laws. We do not discriminate on the basis of race, color, national origin, age, disability, sex, sexual orientation, or gender identity.            Thank you!     Thank you for choosing [facility]  for your care. Our goal is always to provide you with excellent care. Hearing back from our patients is one way we can continue to improve our services. Please take a few minutes to complete the written survey that you may  receive in the mail after your visit with us. Thank you!             Your Updated Medication List - Protect others around you: Learn how to safely use, store and throw away your medicines at www.disposemymeds.org.          This list is accurate as of 11/13/18  3:09 PM.  Always use your most recent med list.                   Brand Name Dispense Instructions for use Diagnosis    amoxicillin 875 MG tablet    AMOXIL    14 tablet    Take 1 tablet (875 mg) by mouth 2 times daily for 7 days    Acute bronchitis, unspecified organism       fluticasone 50 MCG/ACT spray    FLONASE    3 Bottle    SHAKE LIQUID AND USE 1 TO 2 SPRAYS IN EACH NOSTRIL DAILY    Chronic sinusitis, unspecified location       levothyroxine 200 MCG tablet    SYNTHROID/LEVOTHROID    90 tablet    Take 1 tablet (200 mcg) by mouth daily    Acquired hypothyroidism       Multi-vitamin Tabs tablet      Take 1 tablet by mouth daily        * sildenafil 20 MG tablet    REVATIO    90 tablet    Take 1 tablet (20 mg) by mouth three times daily for pulmonary hypertension.  Never use with nitroglycerin, terazosin or doxazosin.    Erectile dysfunction, unspecified erectile dysfunction type       * sildenafil 20 MG tablet    REVATIO    90 tablet    Take 1 tablet (20 mg) by mouth three daily as needed.    Erectile dysfunction, unspecified erectile dysfunction type       simvastatin 20 MG tablet    ZOCOR    90 tablet    Take 1 tablet (20 mg) by mouth At Bedtime    Hyperlipidemia LDL goal <130       SUDAFED SINUS  MG Tabs   Generic drug:  Pseudoephedrine-APAP      Takes when needed        * Notice:  This list has 2 medication(s) that are the same as other medications prescribed for you. Read the directions carefully, and ask your doctor or other care provider to review them with you.

## 2018-12-31 ENCOUNTER — OFFICE VISIT (OUTPATIENT)
Dept: FAMILY MEDICINE | Facility: CLINIC | Age: 68
End: 2018-12-31
Payer: COMMERCIAL

## 2018-12-31 ENCOUNTER — ANCILLARY PROCEDURE (OUTPATIENT)
Dept: GENERAL RADIOLOGY | Facility: CLINIC | Age: 68
End: 2018-12-31
Attending: FAMILY MEDICINE
Payer: COMMERCIAL

## 2018-12-31 VITALS
WEIGHT: 245.8 LBS | HEIGHT: 71 IN | HEART RATE: 59 BPM | DIASTOLIC BLOOD PRESSURE: 78 MMHG | BODY MASS INDEX: 34.41 KG/M2 | OXYGEN SATURATION: 95 % | TEMPERATURE: 98.4 F | SYSTOLIC BLOOD PRESSURE: 130 MMHG

## 2018-12-31 DIAGNOSIS — R05.8 PRODUCTIVE COUGH: ICD-10-CM

## 2018-12-31 DIAGNOSIS — J01.90 ACUTE SINUSITIS WITH COEXISTING CONDITION REQUIRING PROPHYLACTIC TREATMENT: ICD-10-CM

## 2018-12-31 DIAGNOSIS — J20.9 ACUTE BRONCHITIS WITH COEXISTING CONDITION REQUIRING PROPHYLACTIC TREATMENT: Primary | ICD-10-CM

## 2018-12-31 PROCEDURE — 99214 OFFICE O/P EST MOD 30 MIN: CPT | Performed by: FAMILY MEDICINE

## 2018-12-31 PROCEDURE — 71046 X-RAY EXAM CHEST 2 VIEWS: CPT | Mod: FY

## 2018-12-31 RX ORDER — ALBUTEROL SULFATE 90 UG/1
2 AEROSOL, METERED RESPIRATORY (INHALATION) EVERY 6 HOURS PRN
Qty: 8.5 G | Refills: 1 | Status: SHIPPED | OUTPATIENT
Start: 2018-12-31 | End: 2019-02-27

## 2018-12-31 RX ORDER — AZITHROMYCIN 250 MG/1
TABLET, FILM COATED ORAL
Qty: 6 TABLET | Refills: 1 | Status: SHIPPED | OUTPATIENT
Start: 2018-12-31 | End: 2019-02-27

## 2018-12-31 RX ORDER — CODEINE PHOSPHATE AND GUAIFENESIN 10; 100 MG/5ML; MG/5ML
1-2 SOLUTION ORAL EVERY 4 HOURS PRN
Qty: 240 ML | Refills: 1 | Status: SHIPPED | OUTPATIENT
Start: 2018-12-31 | End: 2019-02-27

## 2018-12-31 ASSESSMENT — MIFFLIN-ST. JEOR: SCORE: 1912.07

## 2018-12-31 NOTE — PROGRESS NOTES
SUBJECTIVE:                                                    Sundar German is a 68 year old male who presents to clinic today for the following health issues:    Acute Illness - he was seen 6 weeks ago by Dr. Womack and diagnosed with acute bronchitis, he was given Amoxicillin   Acute illness concerns: sinus  Onset: x1.5 weeks    Fever: no    Chills/Sweats: no    Headache (location?): no    Sinus Pressure:YES- post-nasal drainage and facial pain    Conjunctivitis:  no    Ear Pain: no    Rhinorrhea: YES- yellow/green    Congestion: YES- nasal    Sore Throat: YES- mild     Cough: YES-productive of yellow sputum, productive of green sputum    Wheeze: YES    Decreased Appetite: no    Nausea: no    Vomiting: no    Diarrhea:  no    Dysuria/Freq.: no    Fatigue/Achiness: YES- both    Sick/Strep Exposure: YES- spouse was recently seen for cold     Therapies Tried and outcome: Mucinex D, Mucinex DM, Delsym cough syrup, Benadryl, cough drops, Alcaseltzer cold plus - no relief    Patient Active Problem List   Diagnosis     Allergic rhinitis     Acquired hypothyroidism     Hyperlipidemia LDL goal <130     Impaired memory     Advanced directives, counseling/discussion     Sleep related leg cramps     Chronic sinusitis     S/P revision of total knee, left     BPH (benign prostatic hyperplasia)     OA (osteoarthritis)     Prediabetes     Obesity (BMI 35.0-39.9) with comorbidity (H)     h/o Malignant melanoma of torso excluding breast (H)       Current Outpatient Medications   Medication Sig Dispense Refill     fluticasone (FLONASE) 50 MCG/ACT spray SHAKE LIQUID AND USE 1 TO 2 SPRAYS IN EACH NOSTRIL DAILY 3 Bottle 3     levothyroxine (SYNTHROID/LEVOTHROID) 200 MCG tablet Take 1 tablet (200 mcg) by mouth daily 90 tablet 3     multivitamin, therapeutic with minerals (MULTI-VITAMIN) TABS Take 1 tablet by mouth daily       sildenafil (REVATIO) 20 MG tablet Take 1 tablet (20 mg) by mouth three daily as needed. 90 tablet 0      "simvastatin (ZOCOR) 20 MG tablet Take 1 tablet (20 mg) by mouth At Bedtime 90 tablet 3     SUDAFED SINUS  MG OR TABS Takes when needed          No Known Allergies       Problem list and histories reviewed & adjusted, as indicated.  Additional history: as documented    Reviewed and updated as needed this visit by clinical staff  Tobacco  Allergies  Meds  Med Hx  Surg Hx  Fam Hx  Soc Hx      Reviewed and updated as needed this visit by Provider         ROS:   ROS: 12 point ROS neg other than the symptoms noted above    OBJECTIVE:                                                    /78 (BP Location: Left arm, Patient Position: Chair, Cuff Size: Adult Large)   Pulse 59   Temp 98.4  F (36.9  C) (Oral)   Ht 1.803 m (5' 11\")   Wt 111.5 kg (245 lb 12.8 oz)   SpO2 95%   BMI 34.28 kg/m    Body mass index is 34.28 kg/m .   GENERAL: healthy, alert, well nourished, well hydrated, no distress  HENT: ear canals- normal; TMs- normal; Nose- congested; with bilateral maxillary sinus tenderness to palpation; Mouth- no ulcers, no lesions  NECK: no tenderness, no adenopathy, no asymmetry, no masses, no stiffness; thyroid- normal to palpation  RESP: lungs clear to auscultation - no rales, no rhonchi, no wheezes,  with deep breathing, but with cough has coarse moist rhonchi and wheezes  at the mid posterior fields that radiate throughout the lungs and don't clear with continued coughing.   CV: regular rates and rhythm, normal S1 S2, no S3 or S4 and no murmur, no click or rub -  ABDOMEN: soft, no tenderness, no  hepatosplenomegaly, no masses, normal bowel sounds  MS: extremities- no gross deformities noted, no edema    Diagnostic test results:  Diagnostic Test Results:  CXR - negative     ASSESSMENT/PLAN:                                                        ICD-10-CM    1. Productive cough R05 XR Chest 2 Views     guaiFENesin-codeine (ROBITUSSIN AC) 100-10 MG/5ML solution   2. Acute bronchitis with coexisting " condition requiring prophylactic treatment J20.9 azithromycin (ZITHROMAX) 250 MG tablet     albuterol (PROAIR HFA/PROVENTIL HFA/VENTOLIN HFA) 108 (90 Base) MCG/ACT inhaler     guaiFENesin-codeine (ROBITUSSIN AC) 100-10 MG/5ML solution   3. Acute sinusitis with coexisting condition requiring prophylactic treatment J01.90 azithromycin (ZITHROMAX) 250 MG tablet       See Patient Instructions.  Please, call or return to clinic or go to the ER immediately if signs or symptoms worsen or fail to improve as anticipated.          Adriana Stiles MD    [facility]

## 2018-12-31 NOTE — PATIENT INSTRUCTIONS
Acute Bronchitis                  What is acute bronchitis?   Bronchitis is an infection of the air passages--that is, the tubes that connect the windpipe to the lungs. It causes swelling and irritation of the airways. With acute bronchitis you usually have a cough that produces phlegm and pain behind the breastbone when you breathe deeply or cough.   How does it occur?   Bronchitis often occurs with viral infections of the respiratory tract, such as colds and flu. Bronchitis may also be caused by bacterial infections. It may occur with childhood illnesses such as measles and whooping cough.   Attacks are most frequent during the winter or when the level of air pollution is high.   Infants, young children, older adults, smokers, and people with heart disease or lung disease (including asthma and allergies) are most likely to get acute bronchitis.   What are the symptoms?   Symptoms may include:   a deep cough that produces yellowish or greenish phlegm   pain behind the breastbone when you breathe deeply or cough   wheezing   feeling short of breath   fever   chills   headache   sore muscles.   How is it diagnosed?   Your healthcare provider will ask about your symptoms and examine you. You may have tests, such as:   a test of phlegm to look for bacteria   chest X-ray   blood tests.   How is it treated?   Acute bronchitis often does not require medical treatment. Resting at home and drinking plenty of fluids to keep the mucus loose may be all you need to do to get better in a few days. If your symptoms are severe or you have other health problems (such as heart or lung disease or diabetes), you may need to take antibiotics.   How long will the effects last?   Most of the time acute bronchitis clears up in a few days. Your cough may slowly get better in 1 to 2 weeks.   It may take you longer to recover if:   You are a smoker.   You live in an area where air pollution is a problem.   You have a heart  or lung disease.   You have any other continuing health problems.   How can I take care of myself?   You can help yourself by:   following the full treatment your healthcare provider recommends   using a vaporizer, humidifier, or steam from hot water to add moisture to the air   drinking plenty of liquids   taking cough medicine if recommended by your healthcare provider   resting in bed   taking aspirin or acetaminophen to reduce fever and relieve headache and muscle pain (Check with your healthcare provider before you give any medicine that contains aspirin or salicylates to a child or teen. This includes medicines like baby aspirin, some cold medicines, and Pepto Bismol. Children and teens who take aspirin are at risk for a serious illness called Reye's syndrome.)   eating healthy meals.   Call your healthcare provider if:   You have trouble breathing.   You have a fever of 101.5?F (38.6?C) or higher.   You cough up blood.   Your symptoms are getting worse instead of better.   You don't start to feel better after 3 days of treatment.   You have any symptoms that concern you.   How can I help prevent acute bronchitis?   To reduce your risk of getting a respiratory infection:   Do not smoke.   Wash your hands often, especially when you are around people with colds (upper respiratory infections).   If you have asthma or allergies, keep your symptoms under good control.   Get regular exercise.   Eat healthy foods.       Published by [employer].  This content is reviewed periodically and is subject to change as new health information becomes available. The information is intended to inform and educate and is not a replacement for medical evaluation, advice, diagnosis or treatment by a healthcare professional.   Developed by [employer].   ? 2010 [employer] and/or its affiliates. All Rights Reserved.   Copyright   Clinical Reference Systems 2011                        Sinusitis           What is sinusitis?    Sinusitis is swollen, infected linings of the sinuses. The sinuses are hollow spaces in the bones of your face and skull. They connect with the nose through small openings. Like the nose, their linings make mucus.   How does it occur?   Sinusitis occurs when the sinus linings become infected. The passageways from the sinuses to the nose are very narrow. Swelling and mucus may block the passageways. This leads to pressure changes in the sinuses that can be painful.   A number of things can cause swelling and sinusitis. Most often it's allergens (things that cause allergies, like pollen and mold) and viruses, such as viruses that cause the common cold. Whether the cause is allergies or a virus, the sinus linings can swell. When swelling causes the sinus passageway to swell shut, bacteria, viruses, and even fungus can be trapped in the sinuses and cause a sinus infection.   If your nasal bones have been injured or are deformed, causing partial blockage of the sinus openings, you are more likely to get sinusitis.   What are the symptoms?   Symptoms include:   feeling of fullness or pressure in your head   a headache that is most painful when you first wake up in the morning or when you bend your head down or forward   pain above or below your eyes   aching in the upper jaw and teeth   runny or stuffy nose   cough, especially at night   fluid draining down the back of your throat (postnasal drainage)   sore throat in the morning or evening.   How is it diagnosed?   Your healthcare provider will ask about your symptoms and will examine you. You may have an X-ray to look for swelling, fluid, or small benign growths (polyps) in the sinuses.   How is it treated?   Decongestants may help. They may be nonprescription or prescription. They are available as liquids, pills, and nose sprays.   Your healthcare provider may prescribe an antibiotic. In some cases you may need to take decongestants and antibiotics for several weeks.    You may need nonprescription medicine for pain, such as acetaminophen or ibuprofen. Check with your healthcare provider before you give any medicine that contains aspirin or salicylates to a child or teen. This includes medicines like baby aspirin, some cold medicines, and Pepto Bismol. Children and teens who take aspirin are at risk for a serious illness called Reye's syndrome. Ibuprofen is an NSAID. Nonsteroidal anti-inflammatory medicines (NSAIDs) may cause stomach bleeding and other problems. These risks increase with age. Read the label and take as directed. Unless recommended by your healthcare provider, do not take NSAIDs for more than 10 days for any reason.   If you have chronic or repeated sinus infections, allergies may be the cause. Your healthcare provider may prescribe antihistamine tablets or prescription nasal sprays (steroids or cromolyn) to treat the allergies.   If you have chronic, severe sinusitis that does not respond to treatment with medicines, surgery may be done. The surgeon can create an extra or enlarged passageway in the wall of the sinus cavity. This allows the sinuses to drain more easily through the nasal passages. This should help them stay free of infection.   How long will the effects last?   Symptoms may get better gradually over 3 to 10 days. Depending on what caused the sinusitis and how severe it is, it may last for days or weeks. The symptoms may come back if you do not finish all of your antibiotic.   How can I take care of myself?   Follow your healthcare provider's instructions.   If you are taking an antibiotic, take all of it as directed by your provider. If you stop taking the medicine when your symptoms are gone but before you have taken all of the medicine, symptoms may come back.   Avoid tobacco smoke.   If you have allergies, take care to avoid the things you are allergic to, such as animal dander.   Add moisture to the air with a humidifier or a vaporizer, unless  you have mold allergy (mold may grow in your vaporizer).   Inhale steam from a basin of hot water or shower to help open your sinuses and relieve pain.   Use saline nasal sprays to help wash out nasal passages and clear some mucus from the airways.   Use decongestants as directed on the label or by your provider.   If you are using a nonprescription nasal-spray decongestant, generally you should not use it for more than 3 days. After 3 days it may cause your symptoms to get worse. Ask your healthcare provider if it is OK for you to use a nasal spray decongestant longer than this.   Get plenty of rest.   Drink more fluids to keep the mucus as thin as possible so your sinuses can drain more easily.   Put warm compresses on painful areas.   Take antibiotics as prescribed. Use all of the medicine, even after you feel better. Some sinus infections require 2 to 4 weeks of antibiotic treatment.   See your healthcare provider if the pain lasts for several days or gets worse.   If the sinus areas above or below your eyes are swollen or bulging, see your healthcare provider right away. This symptom may mean that the infection is spreading. A spreading infection can affect other parts of your body--even the brain--and needs to be treated promptly.   How can I help prevent sinusitis?   Treat your colds and allergies promptly. Use decongestants as soon as you start having symptoms.   Do not smoke and stay away from secondhand smoke.   Drink lots of fluids to keep the mucus thin.   Humidify your home if the air is particularly dry.   If you have sinus infections often, consider having allergy tests.   If sinusitis continues to be a problem despite treatment, you might need an exam by an ear, nose, and throat doctor (called an ENT or otolaryngologist). The specialist will check for polyps or a deformed bone that may be blocking your sinuses.     Published by [employer].  This content is reviewed periodically and is subject to  change as new health information becomes available. The information is intended to inform and educate and is not a replacement for medical evaluation, advice, diagnosis or treatment by a healthcare professional.   Developed by [employer].   ? 2010 [employer] [facility] and/or its affiliates. All Rights Reserved.   Copyright   Clinical Reference Systems 2011  Adult Health Advisor                 Thank you for choosing [facility]  for your Health Care. It was a pleasure seeing you at your visit today. Please contact us with any questions or concerns you may have.                   Adriana Stiles MD                                  To reach your [facility] care team after hours call:   [phone]    Our clinic hours are:     Monday- 7:30 am - 7:00 pm                             Tuesday through Friday- 7:30 am - 5:00 pm                                        Saturday- 8:00 am - 12:00 pm                  Phone:  [phone]    Our pharmacy hours are:     Monday  8:00 am to 7:00 pm      Tuesday through Friday 8:00am to 6:00pm                        Saturday - 9:00 am to 1:00 pm      Sunday : Closed.              Phone:  [phone]      There is also information available at our web site:  [email or web address]    If your provider ordered any lab tests and you do not receive the results within 10 business days, please call the clinic.    If you need a medication refill please contact your pharmacy.  Please allow 2 business days for your refill to be completed.    Our clinic offers telephone visits and e visits.  Please ask one of your team members to explain more.      Use [employer] (secure email communication and access to your chart) to send your primary care provider a message or make an appointment. Ask someone on your Team how to sign up for [employer].

## 2019-02-27 ENCOUNTER — HOSPITAL ENCOUNTER (INPATIENT)
Facility: CLINIC | Age: 69
LOS: 2 days | Discharge: HOME | DRG: 378 | End: 2019-03-01
Attending: EMERGENCY MEDICINE | Admitting: HOSPITALIST
Payer: COMMERCIAL

## 2019-02-27 ENCOUNTER — HOSPITAL ENCOUNTER (OUTPATIENT)
Facility: CLINIC | Age: 69
End: 2019-02-27
Attending: INTERNAL MEDICINE | Admitting: INTERNAL MEDICINE
Payer: COMMERCIAL

## 2019-02-27 ENCOUNTER — TELEPHONE (OUTPATIENT)
Dept: FAMILY MEDICINE | Facility: CLINIC | Age: 69
End: 2019-02-27

## 2019-02-27 ENCOUNTER — OFFICE VISIT (OUTPATIENT)
Dept: FAMILY MEDICINE | Facility: CLINIC | Age: 69
End: 2019-02-27
Payer: COMMERCIAL

## 2019-02-27 VITALS
HEART RATE: 83 BPM | OXYGEN SATURATION: 98 % | BODY MASS INDEX: 34.3 KG/M2 | SYSTOLIC BLOOD PRESSURE: 118 MMHG | WEIGHT: 245 LBS | DIASTOLIC BLOOD PRESSURE: 66 MMHG | HEIGHT: 71 IN | TEMPERATURE: 98.2 F

## 2019-02-27 DIAGNOSIS — R73.03 PREDIABETES: ICD-10-CM

## 2019-02-27 DIAGNOSIS — K92.2 UGI BLEED: ICD-10-CM

## 2019-02-27 DIAGNOSIS — K62.5 BRBPR (BRIGHT RED BLOOD PER RECTUM): ICD-10-CM

## 2019-02-27 DIAGNOSIS — K25.4 GASTROINTESTINAL HEMORRHAGE ASSOCIATED WITH GASTRIC ULCER: Primary | ICD-10-CM

## 2019-02-27 DIAGNOSIS — K92.1 MELENA: ICD-10-CM

## 2019-02-27 DIAGNOSIS — E78.5 HYPERLIPIDEMIA LDL GOAL <130: ICD-10-CM

## 2019-02-27 DIAGNOSIS — K92.1 MELENA: Primary | ICD-10-CM

## 2019-02-27 DIAGNOSIS — E66.811 CLASS 1 OBESITY WITH SERIOUS COMORBIDITY AND BODY MASS INDEX (BMI) OF 34.0 TO 34.9 IN ADULT, UNSPECIFIED OBESITY TYPE: ICD-10-CM

## 2019-02-27 LAB
ALBUMIN SERPL-MCNC: 3.5 G/DL (ref 3.4–5)
ALP SERPL-CCNC: 53 U/L (ref 40–150)
ALT SERPL W P-5'-P-CCNC: 39 U/L (ref 0–70)
ANION GAP SERPL CALCULATED.3IONS-SCNC: 7 MMOL/L (ref 3–14)
AST SERPL W P-5'-P-CCNC: 34 U/L (ref 0–45)
BASOPHILS # BLD AUTO: 0 10E9/L (ref 0–0.2)
BASOPHILS NFR BLD AUTO: 0.3 %
BILIRUB SERPL-MCNC: 0.4 MG/DL (ref 0.2–1.3)
BLD PROD TYP BPU: NORMAL
BLD UNIT ID BPU: 0
BLOOD PRODUCT CODE: NORMAL
BPU ID: NORMAL
BUN SERPL-MCNC: 14 MG/DL (ref 7–30)
CALCIUM SERPL-MCNC: 8.6 MG/DL (ref 8.5–10.1)
CHLORIDE SERPL-SCNC: 109 MMOL/L (ref 94–109)
CO2 SERPL-SCNC: 26 MMOL/L (ref 20–32)
CREAT SERPL-MCNC: 0.85 MG/DL (ref 0.66–1.25)
DIFFERENTIAL METHOD BLD: ABNORMAL
EOSINOPHIL # BLD AUTO: 0.3 10E9/L (ref 0–0.7)
EOSINOPHIL NFR BLD AUTO: 4.3 %
ERYTHROCYTE [DISTWIDTH] IN BLOOD BY AUTOMATED COUNT: 15.3 % (ref 10–15)
ERYTHROCYTE [DISTWIDTH] IN BLOOD BY AUTOMATED COUNT: 15.8 % (ref 10–15)
GFR SERPL CREATININE-BSD FRML MDRD: 90 ML/MIN/{1.73_M2}
GLUCOSE SERPL-MCNC: 119 MG/DL (ref 70–99)
HCT VFR BLD AUTO: 21.8 % (ref 40–53)
HCT VFR BLD AUTO: 22.8 % (ref 40–53)
HGB BLD-MCNC: 6.9 G/DL (ref 13.3–17.7)
HGB BLD-MCNC: 7 G/DL (ref 13.3–17.7)
HGB BLD-MCNC: 7.2 G/DL (ref 13.3–17.7)
IMM GRANULOCYTES # BLD: 0.1 10E9/L (ref 0–0.4)
IMM GRANULOCYTES NFR BLD: 1.2 %
LYMPHOCYTES # BLD AUTO: 2.3 10E9/L (ref 0.8–5.3)
LYMPHOCYTES NFR BLD AUTO: 30.5 %
MCH RBC QN AUTO: 34.3 PG (ref 26.5–33)
MCH RBC QN AUTO: 34.7 PG (ref 26.5–33)
MCHC RBC AUTO-ENTMCNC: 30.7 G/DL (ref 31.5–36.5)
MCHC RBC AUTO-ENTMCNC: 31.7 G/DL (ref 31.5–36.5)
MCV RBC AUTO: 110 FL (ref 78–100)
MCV RBC AUTO: 112 FL (ref 78–100)
MONOCYTES # BLD AUTO: 0.8 10E9/L (ref 0–1.3)
MONOCYTES NFR BLD AUTO: 10.8 %
NEUTROPHILS # BLD AUTO: 4.1 10E9/L (ref 1.6–8.3)
NEUTROPHILS NFR BLD AUTO: 52.9 %
NRBC # BLD AUTO: 0.3 10*3/UL
NRBC BLD AUTO-RTO: 4 /100
PLATELET # BLD AUTO: 258 10E9/L (ref 150–450)
PLATELET # BLD AUTO: 264 10E9/L (ref 150–450)
POTASSIUM SERPL-SCNC: 3.5 MMOL/L (ref 3.4–5.3)
PROT SERPL-MCNC: 6.9 G/DL (ref 6.8–8.8)
RBC # BLD AUTO: 1.99 10E12/L (ref 4.4–5.9)
RBC # BLD AUTO: 2.04 10E12/L (ref 4.4–5.9)
SODIUM SERPL-SCNC: 142 MMOL/L (ref 133–144)
TRANSFUSION STATUS PATIENT QL: NORMAL
TRANSFUSION STATUS PATIENT QL: NORMAL
TSH SERPL DL<=0.005 MIU/L-ACNC: 3.09 MU/L (ref 0.4–4)
WBC # BLD AUTO: 7.4 10E9/L (ref 4–11)
WBC # BLD AUTO: 7.7 10E9/L (ref 4–11)

## 2019-02-27 PROCEDURE — 25800030 ZZH RX IP 258 OP 636: Performed by: HOSPITALIST

## 2019-02-27 PROCEDURE — 80053 COMPREHEN METABOLIC PANEL: CPT | Performed by: FAMILY MEDICINE

## 2019-02-27 PROCEDURE — 96366 THER/PROPH/DIAG IV INF ADDON: CPT

## 2019-02-27 PROCEDURE — C9113 INJ PANTOPRAZOLE SODIUM, VIA: HCPCS | Performed by: EMERGENCY MEDICINE

## 2019-02-27 PROCEDURE — 36430 TRANSFUSION BLD/BLD COMPNT: CPT

## 2019-02-27 PROCEDURE — P9016 RBC LEUKOCYTES REDUCED: HCPCS | Performed by: EMERGENCY MEDICINE

## 2019-02-27 PROCEDURE — 12000000 ZZH R&B MED SURG/OB

## 2019-02-27 PROCEDURE — 25800030 ZZH RX IP 258 OP 636: Performed by: EMERGENCY MEDICINE

## 2019-02-27 PROCEDURE — 86850 RBC ANTIBODY SCREEN: CPT | Performed by: EMERGENCY MEDICINE

## 2019-02-27 PROCEDURE — 96367 TX/PROPH/DG ADDL SEQ IV INF: CPT

## 2019-02-27 PROCEDURE — 99285 EMERGENCY DEPT VISIT HI MDM: CPT | Mod: 25

## 2019-02-27 PROCEDURE — 25000128 H RX IP 250 OP 636: Performed by: EMERGENCY MEDICINE

## 2019-02-27 PROCEDURE — 99223 1ST HOSP IP/OBS HIGH 75: CPT | Mod: AI | Performed by: HOSPITALIST

## 2019-02-27 PROCEDURE — 80053 COMPREHEN METABOLIC PANEL: CPT | Performed by: EMERGENCY MEDICINE

## 2019-02-27 PROCEDURE — 96365 THER/PROPH/DIAG IV INF INIT: CPT

## 2019-02-27 PROCEDURE — 85025 COMPLETE CBC W/AUTO DIFF WBC: CPT | Performed by: EMERGENCY MEDICINE

## 2019-02-27 PROCEDURE — 85018 HEMOGLOBIN: CPT | Performed by: HOSPITALIST

## 2019-02-27 PROCEDURE — 85027 COMPLETE CBC AUTOMATED: CPT | Performed by: FAMILY MEDICINE

## 2019-02-27 PROCEDURE — 36415 COLL VENOUS BLD VENIPUNCTURE: CPT | Performed by: HOSPITALIST

## 2019-02-27 PROCEDURE — 86901 BLOOD TYPING SEROLOGIC RH(D): CPT | Performed by: EMERGENCY MEDICINE

## 2019-02-27 PROCEDURE — 99214 OFFICE O/P EST MOD 30 MIN: CPT | Performed by: FAMILY MEDICINE

## 2019-02-27 PROCEDURE — 86900 BLOOD TYPING SEROLOGIC ABO: CPT | Performed by: EMERGENCY MEDICINE

## 2019-02-27 PROCEDURE — 84443 ASSAY THYROID STIM HORMONE: CPT | Performed by: EMERGENCY MEDICINE

## 2019-02-27 PROCEDURE — 86923 COMPATIBILITY TEST ELECTRIC: CPT | Performed by: EMERGENCY MEDICINE

## 2019-02-27 RX ORDER — POTASSIUM CHLORIDE 1.5 G/1.58G
20-40 POWDER, FOR SOLUTION ORAL
Status: DISCONTINUED | OUTPATIENT
Start: 2019-02-27 | End: 2019-03-01 | Stop reason: HOSPADM

## 2019-02-27 RX ORDER — POTASSIUM CHLORIDE 1500 MG/1
20-40 TABLET, EXTENDED RELEASE ORAL
Status: DISCONTINUED | OUTPATIENT
Start: 2019-02-27 | End: 2019-03-01 | Stop reason: HOSPADM

## 2019-02-27 RX ORDER — OMEPRAZOLE 40 MG/1
40 CAPSULE, DELAYED RELEASE ORAL DAILY
Qty: 30 CAPSULE | Refills: 1 | Status: SHIPPED | OUTPATIENT
Start: 2019-02-27 | End: 2019-02-27

## 2019-02-27 RX ORDER — ONDANSETRON 4 MG/1
4 TABLET, ORALLY DISINTEGRATING ORAL EVERY 6 HOURS PRN
Status: DISCONTINUED | OUTPATIENT
Start: 2019-02-27 | End: 2019-03-01 | Stop reason: HOSPADM

## 2019-02-27 RX ORDER — LANOLIN ALCOHOL/MO/W.PET/CERES
3 CREAM (GRAM) TOPICAL
Status: DISCONTINUED | OUTPATIENT
Start: 2019-02-27 | End: 2019-03-01 | Stop reason: HOSPADM

## 2019-02-27 RX ORDER — POTASSIUM CHLORIDE 7.45 MG/ML
10 INJECTION INTRAVENOUS
Status: DISCONTINUED | OUTPATIENT
Start: 2019-02-27 | End: 2019-03-01 | Stop reason: HOSPADM

## 2019-02-27 RX ORDER — SODIUM CHLORIDE 9 MG/ML
INJECTION, SOLUTION INTRAVENOUS CONTINUOUS
Status: DISCONTINUED | OUTPATIENT
Start: 2019-02-27 | End: 2019-02-28

## 2019-02-27 RX ORDER — OMEPRAZOLE 40 MG/1
CAPSULE, DELAYED RELEASE ORAL
Qty: 90 CAPSULE | Refills: 1 | OUTPATIENT
Start: 2019-02-27

## 2019-02-27 RX ORDER — PROCHLORPERAZINE MALEATE 5 MG
5 TABLET ORAL EVERY 6 HOURS PRN
Status: DISCONTINUED | OUTPATIENT
Start: 2019-02-27 | End: 2019-03-01 | Stop reason: HOSPADM

## 2019-02-27 RX ORDER — ACETAMINOPHEN 325 MG/1
650 TABLET ORAL EVERY 4 HOURS PRN
Status: DISCONTINUED | OUTPATIENT
Start: 2019-02-27 | End: 2019-03-01 | Stop reason: HOSPADM

## 2019-02-27 RX ORDER — NALOXONE HYDROCHLORIDE 0.4 MG/ML
.1-.4 INJECTION, SOLUTION INTRAMUSCULAR; INTRAVENOUS; SUBCUTANEOUS
Status: DISCONTINUED | OUTPATIENT
Start: 2019-02-27 | End: 2019-03-01 | Stop reason: HOSPADM

## 2019-02-27 RX ORDER — CYCLOSPORINE 0.5 MG/ML
1 EMULSION OPHTHALMIC 2 TIMES DAILY
COMMUNITY
End: 2020-07-27

## 2019-02-27 RX ORDER — POTASSIUM CL/LIDO/0.9 % NACL 10MEQ/0.1L
10 INTRAVENOUS SOLUTION, PIGGYBACK (ML) INTRAVENOUS
Status: DISCONTINUED | OUTPATIENT
Start: 2019-02-27 | End: 2019-03-01 | Stop reason: HOSPADM

## 2019-02-27 RX ORDER — POTASSIUM CHLORIDE 29.8 MG/ML
20 INJECTION INTRAVENOUS
Status: DISCONTINUED | OUTPATIENT
Start: 2019-02-27 | End: 2019-03-01 | Stop reason: HOSPADM

## 2019-02-27 RX ORDER — PROCHLORPERAZINE 25 MG
12.5 SUPPOSITORY, RECTAL RECTAL EVERY 12 HOURS PRN
Status: DISCONTINUED | OUTPATIENT
Start: 2019-02-27 | End: 2019-03-01 | Stop reason: HOSPADM

## 2019-02-27 RX ORDER — ONDANSETRON 2 MG/ML
4 INJECTION INTRAMUSCULAR; INTRAVENOUS EVERY 6 HOURS PRN
Status: DISCONTINUED | OUTPATIENT
Start: 2019-02-27 | End: 2019-03-01 | Stop reason: HOSPADM

## 2019-02-27 RX ORDER — LEVOTHYROXINE SODIUM 100 UG/1
200 TABLET ORAL DAILY
Status: DISCONTINUED | OUTPATIENT
Start: 2019-02-28 | End: 2019-03-01 | Stop reason: HOSPADM

## 2019-02-27 RX ADMIN — SODIUM CHLORIDE 100 ML: 9 INJECTION, SOLUTION INTRAVENOUS at 18:13

## 2019-02-27 RX ADMIN — SODIUM CHLORIDE 8 MG/HR: 9 INJECTION, SOLUTION INTRAVENOUS at 18:44

## 2019-02-27 RX ADMIN — SODIUM CHLORIDE: 9 INJECTION, SOLUTION INTRAVENOUS at 21:22

## 2019-02-27 RX ADMIN — SODIUM CHLORIDE 80 MG: 9 INJECTION, SOLUTION INTRAVENOUS at 18:43

## 2019-02-27 ASSESSMENT — MIFFLIN-ST. JEOR
SCORE: 1892.56
SCORE: 1903.44

## 2019-02-27 ASSESSMENT — ACTIVITIES OF DAILY LIVING (ADL)
TRANSFERRING: 0-->INDEPENDENT
AMBULATION: 0-->INDEPENDENT
RETIRED_EATING: 0-->INDEPENDENT
TOILETING: 0-->INDEPENDENT
RETIRED_COMMUNICATION: 0-->UNDERSTANDS/COMMUNICATES WITHOUT DIFFICULTY
FALL_HISTORY_WITHIN_LAST_SIX_MONTHS: NO
SWALLOWING: 0-->SWALLOWS FOODS/LIQUIDS WITHOUT DIFFICULTY
BATHING: 0-->INDEPENDENT
COGNITION: 0 - NO COGNITION ISSUES REPORTED
DRESS: 0-->INDEPENDENT

## 2019-02-27 ASSESSMENT — ENCOUNTER SYMPTOMS
ABDOMINAL PAIN: 0
WEAKNESS: 0
SHORTNESS OF BREATH: 1
NAUSEA: 0
VOMITING: 0
BLOOD IN STOOL: 1

## 2019-02-27 NOTE — ED TRIAGE NOTES
Pt presents to ED with c/o black stools x2 wks. Pt was seen at clinic today and had blood drawn. Hgb 6.9. Referred to ED. ABC intact.

## 2019-02-27 NOTE — ED NOTES
Bed: ED12  Expected date: 2/27/19  Expected time: 4:50 PM  Means of arrival:   Comments:  Pt from triage BIMAL

## 2019-02-27 NOTE — TELEPHONE ENCOUNTER
"Requested Prescriptions   Pending Prescriptions Disp Refills     omeprazole (PRILOSEC) 40 MG DR capsule [Pharmacy Med Name: OMEPRAZOLE 40MG CAPSULES] 90 capsule 1    Last Written Prescription Date:  2/27/2019  Last Fill Quantity: 30,  # refills: 1   Last office visit: No previous visit found with prescribing provider:  2/27/2019 Gaurav   Future Office Visit:   Next 5 appointments (look out 90 days)    May 15, 2019 10:40 AM CDT  PHYSICAL with Liu Nolasco MD  [facility] ([facility] [address] [place] [address] [phone]          Sig: TAKE 1 CAPSULE(40 MG) BY MOUTH DAILY    PPI Protocol Passed - 2/27/2019  3:23 PM       Passed - Not on Clopidogrel (unless Pantoprazole ordered)       Passed - No diagnosis of osteoporosis on record       Passed - Recent (12 mo) or future (30 days) visit within the authorizing provider's specialty    Patient had office visit in the last 12 months or has a visit in the next 30 days with authorizing provider or within the authorizing provider's specialty.  See \"Patient Info\" tab in inbasket, or \"Choose Columns\" in Meds & Orders section of the refill encounter.             Passed - Medication is active on med list       Passed - Patient is age 18 or older        Duplicate.  Provider sent in a short supply, may not be intended for long-term use.    PARVIZ EstebanN, RN  [employer]    "

## 2019-02-27 NOTE — ED PROVIDER NOTES
"  History     Chief Complaint:  Rectal Bleeding    The history is provided by the patient and the spouse.      Sundar German is a 69 year old male with a history of diverticulosis, iron deficiency anemia, not anticoagulated outside of [facility] daily for shoulder pain, who presents for evaluation of noticing dark stools. The patient's wife reports that the patient sought evaluation in [facility] at around 2:20 PM as he had been passing more BM's than normal, as well as that his BM's have been black / occasionally \"pinkish\" since yesterday. He was then advised to come into the emergency department which he promptly did. Patient states that in the last few days he has also noticed that he will become dizzy if he gets up too quickly. Patient denies nausea, vomiting, abdominal pain, weakness, pain anywhere, or other complaint. Denies past history of a blood transfusion. Patient adds that he was somewhat more short of breath yesterday with exertion.    Hematology from [facility] (3:21 PM, 2/27/2019):   2/27/2019   WBC 7.4   Hemoglobin 6.9 (LL)   Hematocrit 21.8 (L)   Platelet Count 264   RBC Count 1.99 (L)    (H)   MCH 34.7 (H)   MCHC 31.7   RDW 15.3 (H)     Allergies:  No known drug allergies     Medications:    Flonase  Levothyroxine  Multivitamin   Prilosec  Metamucil  Zocor  Revatio     Past Medical History:    Allergic rhinitis  Acquired hypothyroidism  ACP  Chronic sinusitis  Osteoarthritis  Benign prostatic hyperplasia  Prediabetes  Malignant melanoma of torso excluding breast  Hypogonadism  Iron deficiency anemia     Past Surgical History:    Arthroplasty revision of knee  Colonoscopy  Cystoscopy  Endoscopic sinus surgery  Repair umbilical hernia   Rotator cuff repair  Tonsillectomy & adenoidectomy, combined  Meniscus repair  Vasectomy     Family History:    CHF  Heart failure  COPD  CVD  GI bleed - colectomy  Hypertension  MI    Social History:  Presents with spouse   Tobacco use: Former smoker (1 " ppd for 20 years, quit 38 years ago)  Alcohol use: Yes (2-3 drinks per week)  PCP: Liu Nolasco   Marital Status:        Review of Systems   Respiratory: Positive for shortness of breath (exertional).    Gastrointestinal: Positive for blood in stool. Negative for abdominal pain, nausea and vomiting.   Neurological: Negative for weakness.   All other systems reviewed and are negative.    Physical Exam     Patient Vitals for the past 24 hrs:   BP Temp Temp src Pulse Heart Rate Resp SpO2   02/27/19 1815 -- -- -- -- 64 23 98 %   02/27/19 1715 151/64 -- -- -- 67 22 98 %   02/27/19 1714 151/64 -- -- 67 -- -- 98 %   02/27/19 1709 -- 98.4  F (36.9  C) Oral -- -- 16 98 %        Physical Exam  Nursing note and vitals reviewed.  Constitutional: Cooperative. Appears pale  HENT:   Mouth/Throat: Moist mucous membranes.   Eyes: EOMI, nonicteric sclera  Cardiovascular: Normal rate, regular rhythm, no murmurs, rubs, or gallops  Pulmonary/Chest: Effort normal and breath sounds normal. No respiratory distress. No wheezes. No rales.   Abdominal: Soft. Nontender, nondistended, no guarding or rigidity. BS present.   Musculoskeletal: Normal range of motion.   Neurological: Alert. Moves all extremities spontaneously.   Skin: Skin is warm and dry. No rash noted.   Psychiatric: Normal mood and affect.      Emergency Department Course     Laboratory:  CBC: HGB 7.0 (L) o/w WNL (WBC 7.7, )   CMP:  (H) o/w WNL (Creatinine 0.85)      Interventions:  1813:  mL IV Bolus every hour PRN  1844: Protonix 8 mg/hr in NaCl 100 mL IV Infusion  1843: Protonix 80 mg intermittent infusion in NaCl 100 mL     Emergency Department Course:  Past medical records, nursing notes, and vitals reviewed.  1720: I performed an exam of the patient and obtained history, as documented above.     IV inserted and blood drawn. Above interventions provided. Blood was sent to the lab for further testing, results above.     1745: I discussed the patient  with Dr. Cai of [facility].     Findings and plan explained to the Patient and patient who consent to admission.     1805: Discussed the patient with Dr. Castro, who will admit the patient to a med telemetry bed for further monitoring, evaluation, and treatment.       Impression & Plan      Medical Decision Making:  Sundar German is a 69 year old male who presents to the emergency department for evaluation of frequent stools in the last 2-3 days which have recently turned black with an occasional pink tinge to them.  Patient has been experiencing these for the last 2 days.  Patient sought evaluation for this in clinic at about 3:20 PM today where he was found to have a hemoglobin of 6.9.  He then promptly came to the emergency department where a repeat CBC found hemoglobin to be 7.0.  On my initial evaluation, the patient does appear somewhat pale but otherwise denies being symptomatic. Verbal consent for blood transfusion obtained at that time. I discussed the patient with Dr. Cai of [facility] regarding potential upper endoscopy. Ultimately, the patient was accepted by Dr. Castro to a med telemetry bed to monitor his hemoglobin and vital signs overnight with plan for EGD in the morning.. Patient admitted in stable condition.  All questions answered.    Diagnosis:    ICD-10-CM   1. UGI bleed K92.2       Disposition:  Admitted to hospitalist service.    Scribe Disclosure:  I, Yifan Henry, am serving as a scribe at 5:13 PM on 2/27/2019 to document services personally performed by Monster Claros MD based on my observations and the provider's statements to me.   2/27/2019   [facility] EMERGENCY DEPARTMENT     Monster Claros MD  02/28/19 0313

## 2019-02-27 NOTE — TELEPHONE ENCOUNTER
Called # below - spoke with patient's spouse, Ingrid (CTC on file)    Stated patient was not currently with her to have RN triage - will have patient call back.       Tanna Goodrich RN  [facility]

## 2019-02-27 NOTE — TELEPHONE ENCOUNTER
Patient stopped into clinic    Black Stools  Onset: 1 Week    Description:   Consistency of stool: black, formed stool  Blood in stool: YES - maybe 1 x small amount in toilet  Number of loose stools: 2-3 stools per day for 1 week    Progression of Symptoms:  same    Accompanying Signs & Symptoms:  Fever: no   Nausea or vomiting; no   Abdominal pain: no   Episodes of constipation: no - patient takes Metamucil daily  Weight loss: no     History:   Ill contacts: no   Recent use of antibiotics: YES- 2018 & 2018 for bronchitis   Recent travels: no          Recent medication-new or changes(Rx or OTC): YES- Patient started taking Total Restore (for healthy gut) x 10 days ago - 3 caps per day    Precipitating factors:   None    Alleviating factors:   None    Therapies Tried and outcome:  Stopped taking Total Restore 4 days ago - unsure if this is helping      Last Colonoscopy - 10/02/2013  Impression:                - Diverticulosis sigmoid colon and descending colon.                 - The examination was otherwise normal.  Recommendation:            - Repeat colonoscopy in 10 years for screening purposes.    Family history - brother had GI bleed 2018 ( 3 months after due to infection, could not find bleed)    Advised OV with MD MUNA - scheduled for today, 19, @ 8734 with MD MUNA    Advised patient that if new or worsening symptoms appear (reviewed new & worsening symptoms) to call the clinic or be seen in the the ER  Patient stated an understanding and agreed with plan.      Tanna Goodrich RN  [place] [facility]

## 2019-02-27 NOTE — PATIENT INSTRUCTIONS
Stop Total Restore.  Endoscopy and colonoscopy ordered today -   scheduled for Friday, 3/1/19, @ 1:30pm (check-in at 1pm).     [facility] - check in at emergency room entrance,   [address]  [place]     Nurse will call to go over prep directions        [facility]                        To reach your care team during and after hours:   [phone]  To reach our pharmacy:        [phone]    Clinic Hours                        Our clinic hours are:    Monday   7:30 am to 7:00 pm                  Tuesday through Friday 7:30 am to 5:00 pm                             Saturday   8:00 am to 12:00 pm      Sunday   Closed      Pharmacy Hours                        Our pharmacy hours are:    Monday   8:30 am to 7:00 pm       Tuesday to Friday  8:30 am to 6:00 pm                       Saturday    9:00 am to 1:00 pm              Sunday    Closed              There is also information available at our web site:  [email or web address]    If your provider ordered any lab tests and you do not receive the results within 10 business days, please call the clinic.    If you need a medication refill please contact your pharmacy.  Please allow 2-3 business days for your refill to be completed.    Our clinic offers telephone visits and e visits.  Please ask one of your team members to explain more.      Use [employer] (secure email communication and access to your chart) to send your primary care provider a message or make an appointment. Ask someone on your Team how to sign up for [employer].  Immunizations                      Immunization History   Administered Date(s) Administered     Influenza (IIV3) PF 10/01/2003, 10/21/2012, 11/19/2013, 09/14/2015     Influenza Vaccine IM 3yrs+ 4 Valent IIV4 09/15/2017     Pneumo Conj 13-V (2010&after) 01/12/2016     Pneumococcal 23 valent 11/21/2003, 03/02/2018     TD (ADULT, 7+) 03/02/2018     TDAP Vaccine (Adacel) 10/06/2008     Zoster vaccine,  live 11/19/2013        Health Maintenance                         Health Maintenance Due   Topic Date Due     Colon Cancer Screening - FIT Test - yearly  [birth year]04/1960     Zoster (Shingles) Vaccine (2 of 3) 01/14/2014     Discuss Advance Directive Planning  03/21/2017     Thyroid Function Lab (TSH) - yearly  03/02/2019     Annual Wellness Visit  03/02/2019     FALL RISK ASSESSMENT  03/02/2019     Prostate Test (PSA) - yearly  03/02/2019     Depression Assessment 2 - yearly  03/02/2019

## 2019-02-27 NOTE — LETTER
[facility]  [address] [place] [address]                  [phone]   March 4, 2019    Sundar German  [address] [address]      Dear Sundar,    Here is a summary of your recent test results:    Labs are as discussed, glucose was elevated as well.     We advise:     Follow up in clinic, after being discharged from the hospital.     For additional lab test information, [email or web address] is an excellent reference.     Let us know if you have any questions or concerns.     Your test results are enclosed.      Please contact me if you have any questions.    In addition, here is a list of due or overdue Health Maintenance reminders.    Health Maintenance Due   Topic Date Due     Colon Cancer Screening - FIT Test - yearly  02[birth year]04/1960     Zoster (Shingles) Vaccine (2 of 3) 01/14/2014     Prostate Test (PSA) - yearly  03/02/2019     Annual Wellness Visit  03/02/2019     FALL RISK ASSESSMENT  03/02/2019     Depression Assessment 2 - yearly  03/02/2019       Please call us at [phone] (or use [employer]) to address the above recommendations.            Thank you very much for trusting [facility]..     Healthy regards,        Liu Nolasco M.D.        Results for orders placed or performed in visit on 02/27/19   Comprehensive metabolic panel   Result Value Ref Range    Sodium 139 133 - 144 mmol/L    Potassium 4.1 3.4 - 5.3 mmol/L    Chloride 108 94 - 109 mmol/L    Carbon Dioxide 25 20 - 32 mmol/L    Anion Gap 6 3 - 14 mmol/L    Glucose 127 (H) 70 - 99 mg/dL    Urea Nitrogen 14 7 - 30 mg/dL    Creatinine 0.81 0.66 - 1.25 mg/dL    GFR Estimate >90 >60 mL/min/[1.73_m2]    GFR Estimate If Black >90 >60 mL/min/[1.73_m2]    Calcium 9.1 8.5 - 10.1 mg/dL    Bilirubin Total 0.4 0.2 - 1.3 mg/dL    Albumin 3.6 3.4 - 5.0 g/dL    Protein Total 6.5 (L) 6.8 - 8.8 g/dL    Alkaline Phosphatase 47 40 - 150 U/L    ALT 36 0 - 70 U/L    AST 31 0 -  45 U/L   CBC with platelets   Result Value Ref Range    WBC 7.4 4.0 - 11.0 10e9/L    RBC Count 1.99 (L) 4.4 - 5.9 10e12/L    Hemoglobin 6.9 (LL) 13.3 - 17.7 g/dL    Hematocrit 21.8 (L) 40.0 - 53.0 %     (H) 78 - 100 fl    MCH 34.7 (H) 26.5 - 33.0 pg    MCHC 31.7 31.5 - 36.5 g/dL    RDW 15.3 (H) 10.0 - 15.0 %    Platelet Count 264 150 - 450 10e9/L

## 2019-02-27 NOTE — PROGRESS NOTES
SUBJECTIVE:   Sundar German is a 69 year old male who presents to clinic today for the following health issues:    Black Stools  Onset: 1 Week    Description:   Consistency of stool: black, formed stool  Blood in stool: YES - maybe 1 x small amount in toilet  Number of loose stools: 2-3 stools per day for 1 week    Progression of Symptoms:  same    Accompanying Signs & Symptoms:  Fever: no   Nausea or vomiting; no   Abdominal pain: no   Episodes of constipation: no - patient takes Metamucil daily  Weight loss: no     History:   Ill contacts: no   Recent use of antibiotics: YES- 11/2018 & 12/2018 for bronchitis   Recent travels: no          Recent medication-new or changes(Rx or OTC): YES- Patient started taking Total Restore (for healthy gut) x 10 days ago - 3 caps per day    Precipitating factors:   None    Alleviating factors:   None  Therapies Tried and outcome:  Stopped taking Total Restore 4 days ago - unsure if this is helping     Last Colonoscopy - 10/02/2013  Impression:                - Diverticulosis sigmoid colon and descending colon.                 - The examination was otherwise normal.  Recommendation:            - Repeat colonoscopy in 10 years for screening purposes.    Patient reports he started taking Total Restore 10 days ago and noticed his stools started to turn black and saw some blood in the toilet. Patient reports he stopped the Total Restore on 2/23/2019 and his stools have not returned to normal color. Patient reports on 2/24-2/25/2019 he saw that the water in the toilet was pink in color. Patient reports over the last 2 days the pink water is no longer present. Patient denies taking Pepto-bismal. Patient reports he use to take an Iron supplement but has not taken that in 1 month plus. Patient reports he takes 1 Aleve per day for joint pain management. Patient reports he has been having some mild lightheadedness/dizziness when he stands up or moves. Patient reports some sharp  short-lived RLQ pain that happened 1 time. Family history - brother had GI bleed 2018 ( 3 months after due to infection, could not find bleed)    Pt also notes that he is noticing his heart skipping - has noticed up to 3 times in 1 minute    Allergic Rhinitis/Chronic Sinusitis: Stable. Patient's Allergic Rhinitis/Chronic Sinusitis is well controlled by Flonase and Zyrtec D.     Problem list and histories reviewed & adjusted, as indicated.  Additional history: as documented    BP Readings from Last 3 Encounters:   19 117/70   19 118/66   18 130/78       body mass index is 34.17 kg/m .    Wt Readings from Last 4 Encounters:   19 111.1 kg (245 lb)   18 111.5 kg (245 lb 12.8 oz)   18 108.9 kg (240 lb)   10/24/18 115.2 kg (254 lb)       Health Maintenance    Health Maintenance Due   Topic Date Due     FIT Q1 YR  1960     ZOSTER IMMUNIZATION (2 of 3) 2014     ADVANCE DIRECTIVE PLANNING Q5 YRS  2017     TSH Q1 YEAR  2019     MEDICARE ANNUAL WELLNESS VISIT  2019     FALL RISK ASSESSMENT  2019     PSA Q1 YR  2019     PHQ-2 Q1 YR  2019       Current Problem List    Patient Active Problem List   Diagnosis     Allergic rhinitis     Acquired hypothyroidism     Hyperlipidemia LDL goal <130     Impaired memory     Advanced directives, counseling/discussion     Sleep related leg cramps     Chronic sinusitis     S/P revision of total knee, left     BPH (benign prostatic hyperplasia)     OA (osteoarthritis)     Prediabetes     Obesity (BMI 35.0-39.9) with comorbidity (H)     h/o Malignant melanoma of torso excluding breast (H)       Past Medical History    Past Medical History:   Diagnosis Date     Abnormal glucose     A1C  =  6.3     Acquired hypothyroidism 10/11/2005     Problem list name updated by automated process. Provider to review     Allergic rhinitis, cause unspecified     mary spring time     Benign localized hyperplasia of  prostate with urinary obstruction and other lower urinary tract symptoms (LUTS)(600.21)     slow stream, nocturia - Dr Malave     Chronic sinusitis 3/14/2014     Environmental allergies     AIT - Dr Weiss     Hyperlipidemia LDL goal <130 10/31/2010     hypogonadism     low testosterone at times      RAFIQ (iron deficiency anemia)      Impaired memory 02/11/2011    ?     Melanoma (H) 04/2018    Melanoma 0.7 mm depth, Levar III, superficial spreading, stage T1a     OA (osteoarthritis) total L knee 3/09    knees bother;; has had bilat arthroscopic     Prediabetes      Sleep related leg cramps 06/2012       Past Surgical History    Past Surgical History:   Procedure Laterality Date     ARTHROPLASTY REVISION KNEE Left 10/26/2015     ARTHROPLASTY REVISION KNEE - Dr Dudley     COLONOSCOPY  10/2/2013    diverticulosis - due 10 yrs (10/2023), initial 10/2003     CYSTOSCOPY  04/2016     ENDOSCOPIC SINUS SURGERY  3/17/2014    Procedure: ENDOSCOPIC SINUS SURGERY;  Bilateral Endoscopic Sinus Surgery ;  Surgeon: Chuy Johnson MD;  Location: [facility] OR     HC REPAIR UMBILICAL KASI,5+Y/O,REDUC  2002     LASER REVOLIX PHOTOSELECTIVE VAPORIZATION PROSTATE N/A 4/6/2016    Procedure: LASER REVOLIX / QUANTA PHOTOSELECTIVE VAPORIZATION PROSTATE;  Surgeon: Sean Malave MD;  Location: [facility] OR     ROTATOR CUFF REPAIR RT/LT  3/2012    left shoulder     SURGICAL HISTORY OF -   1996    tonsils and adenoids     SURGICAL HISTORY OF -  Left 03/2009    Lt TKA     SURGICAL HISTORY OF -       Rt Knee x 1 (meniscus, 1995), Lt x 3 (last 2009)     VASECTOMY  1986       Current Medications    No current outpatient medications on file.       Allergies    No Known Allergies    Immunizations    Immunization History   Administered Date(s) Administered     Influenza (IIV3) PF 10/01/2003, 10/21/2012, 11/19/2013, 09/14/2015     Influenza Vaccine IM 3yrs+ 4 Valent IIV4 09/15/2017     Pneumo Conj 13-V (2010&after) 01/12/2016     Pneumococcal 23 valent  2003, 2018     TD (ADULT, 7+) 2018     TDAP Vaccine (Adacel) 10/06/2008     Zoster vaccine, live 2013       Family History    Family History   Problem Relation Age of Onset     C.A.D. Father         CHF     Heart Failure Father      Chronic Obstructive Pulmonary Disease Father      Cerebrovascular Disease Brother 61     Gastrointestinal Disease Brother         GI Bleed - colectomy     Hypertension Mother      Cardiovascular Mother 84        MI     Lipids Mother      Chronic Obstructive Pulmonary Disease Mother      No Known Problems Sister      No Known Problems Brother        Social History    Social History     Socioeconomic History     Marital status:      Spouse name: Ingrid     Number of children: 4     Years of education: Not on file     Highest education level: Not on file   Occupational History     Employer: [employer]   Social Needs     Financial resource strain: Not on file     Food insecurity:     Worry: Not on file     Inability: Not on file     Transportation needs:     Medical: Not on file     Non-medical: Not on file   Tobacco Use     Smoking status: Former Smoker     Packs/day: 1.00     Years: 20.00     Pack years: 20.00     Last attempt to quit: 1981     Years since quittin.1     Smokeless tobacco: Former User     Quit date: 1992   Substance and Sexual Activity     Alcohol use: Yes     Alcohol/week: 7.2 oz     Types: 12 Standard drinks or equivalent per week     Comment: 12 per week avg     Drug use: No     Sexual activity: Yes     Partners: Female   Lifestyle     Physical activity:     Days per week: Not on file     Minutes per session: Not on file     Stress: Not on file   Relationships     Social connections:     Talks on phone: Not on file     Gets together: Not on file     Attends [employer] service: Not on file     Active member of club or organization: Not on file     Attends meetings of clubs or organizations: Not on file     Relationship  "status: Not on file     Intimate partner violence:     Fear of current or ex partner: Not on file     Emotionally abused: Not on file     Physically abused: Not on file     Forced sexual activity: Not on file   Other Topics Concern     Parent/sibling w/ CABG, MI or angioplasty before 65F 55M? Not Asked   Social History Narrative     Not on file       All above reviewed and updated, all stable unless otherwise noted    Recent labs reviewed    ROS:  Constitutional, HEENT, cardiovascular, pulmonary, GI, , musculoskeletal, neuro, skin, endocrine and psych systems are negative, except as otherwise noted.    OBJECTIVE:                                                    /66   Pulse 83   Temp 98.2  F (36.8  C) (Oral)   Ht 1.803 m (5' 11\")   Wt 111.1 kg (245 lb)   SpO2 98%   BMI 34.17 kg/m    Body mass index is 34.17 kg/m .  GENERAL: healthy, alert and no distress  EYES: Eyes grossly normal to inspection  HENT:ear canals and TM's normal upon viewing with otoscope, nose and mouth without ulcers or lesions upon viewing with otoscope  NECK: no tenderness, no adenopathy, no asymmetry, no masses, no stiffness; thyroid- normal to palpation  RESP: lungs clear to auscultation - no rales, no rhonchi, no wheezes  CV: regular rates and rhythm, normal S1 S2, no S3 or S4 and no murmur, no click or rub -  ABDOMEN: soft, no tenderness, no  hepatosplenomegaly, no masses, normal bowel sounds  MS: extremities- no gross deformities noted, no edema  SKIN: no suspicious lesions, no rashes  NEURO: strength and tone- normal, sensory exam- grossly normal, mentation- intact, speech- normal  BACK: no CVA tenderness, no paralumbar tenderness  PSYCH: Alert and oriented times 3; speech- coherent , normal rate and volume; able to articulate logical thoughts, able to abstract reason, no tangential thoughts, no hallucinations or delusions, affect- normal    DIAGNOSTICS/PROCEDURES:                                                      Results " for orders placed or performed in visit on 02/27/19 (from the past 24 hour(s))   CBC with platelets   Result Value Ref Range    WBC 7.4 4.0 - 11.0 10e9/L    RBC Count 1.99 (L) 4.4 - 5.9 10e12/L    Hemoglobin 6.9 (LL) 13.3 - 17.7 g/dL    Hematocrit 21.8 (L) 40.0 - 53.0 %     (H) 78 - 100 fl    MCH 34.7 (H) 26.5 - 33.0 pg    MCHC 31.7 31.5 - 36.5 g/dL    RDW 15.3 (H) 10.0 - 15.0 %    Platelet Count 264 150 - 450 10e9/L        ASSESSMENT/PLAN:                                                        ICD-10-CM    1. Melena K92.1 GASTROENTEROLOGY ADULT REF PROCEDURE ONLY     Comprehensive metabolic panel     CBC with platelets     DISCONTINUED: omeprazole (PRILOSEC) 40 MG DR capsule     DISCONTINUED: omeprazole (PRILOSEC) 40 MG DR capsule   2. BRBPR (bright red blood per rectum) K62.5 GASTROENTEROLOGY ADULT REF PROCEDURE ONLY     Comprehensive metabolic panel     CBC with platelets     DISCONTINUED: omeprazole (PRILOSEC) 40 MG DR capsule     DISCONTINUED: omeprazole (PRILOSEC) 40 MG DR capsule   3. Prediabetes R73.03 Comprehensive metabolic panel   4. Hyperlipidemia LDL goal <130 E78.5    5. Class 1 obesity with serious comorbidity and body mass index (BMI) of 34.0 to 34.9 in adult, unspecified obesity type E66.9     Z68.34      Discussed treatment/modality options, including risk and benefits, he desires advised 1 multivitamin per day, advised dentist every 6 months, advised diet and exercise and advised opthalmologist every 1-2 years. All diagnosis above reviewed and noted above, otherwise stable.  See [facility] orders for further details.  Follow up as needed.    1) Patient presented today with black stools. Patient advised to stop taking Total Restore. Labs drawn today. Endoscopy and Colonoscopy ordered today. Patient prescribed Omeprazole today.    2) Follow up if symptoms persist or worsen.    3) Patient's Allergic Rhinitis/Chronic Sinusitis is well controlled by Flonase and Zyrtec D.     4) Recommend  Heat/Ice/Stretching and 1000 mg Tylenol every 8 hours as needed for joint pain management. Advised to hold Aleve.    5) Follow up in 3 months for complete physical exam.    Hemoglobin returned as 6.9, Sent to [facility] ER ASAP, kellie 911, wife to drive    Health Maintenance Due   Topic Date Due     FIT Q1 YR  02/04/1960     ZOSTER IMMUNIZATION (2 of 3) 01/14/2014     ADVANCE DIRECTIVE PLANNING Q5 YRS  03/21/2017     TSH Q1 YEAR  03/02/2019     MEDICARE ANNUAL WELLNESS VISIT  03/02/2019     FALL RISK ASSESSMENT  03/02/2019     PSA Q1 YR  03/02/2019     PHQ-2 Q1 YR  03/02/2019     This document serves as a record of the services and decisions personally performed and made by Liu Nolasco MD. It was created on his behalf by Moises Vitale, a trained medical scribe. The creation of this document is based on the provider's statements to the medical scribe.  Moises Vitale February 27, 2019 2:47 PM     The information in this document, created by the medical scribe for me, accurately reflects the services I personally performed and the decisions made by me. I have reviewed and approved this document for accuracy prior to leaving the patient care area.  February 27, 2019            Liu Nolasco MD [address]  [phone] Fax

## 2019-02-27 NOTE — TELEPHONE ENCOUNTER
Reason for Call:  Same Day Appointment, Requested Provider:  Liu Nolasco MD    PCP: Liu Nolasco    Reason for visit: Pt wife called in to schedule an appt for stomach issues    Duration of symptoms: a couple weeks    Have you been treated for this in the past? Yes    Additional comments: Please call back with a time to be schedule. Pt is open to a day or time    Can we leave a detailed message on this number? YES    Phone number patient can be reached at: Home number on file [phone] (home)    Best Time:     Call taken on 2/27/2019 at 10:20 AM by Kae Aguilar

## 2019-02-28 LAB
ABO + RH BLD: NORMAL
ABO + RH BLD: NORMAL
ALBUMIN SERPL-MCNC: 3.6 G/DL (ref 3.4–5)
ALP SERPL-CCNC: 47 U/L (ref 40–150)
ALT SERPL W P-5'-P-CCNC: 36 U/L (ref 0–70)
ANION GAP SERPL CALCULATED.3IONS-SCNC: 5 MMOL/L (ref 3–14)
ANION GAP SERPL CALCULATED.3IONS-SCNC: 6 MMOL/L (ref 3–14)
AST SERPL W P-5'-P-CCNC: 31 U/L (ref 0–45)
BILIRUB SERPL-MCNC: 0.4 MG/DL (ref 0.2–1.3)
BLD GP AB SCN SERPL QL: NORMAL
BLD PROD TYP BPU: NORMAL
BLD PROD TYP BPU: NORMAL
BLD UNIT ID BPU: 0
BLOOD BANK CMNT PATIENT-IMP: NORMAL
BLOOD PRODUCT CODE: NORMAL
BPU ID: NORMAL
BUN SERPL-MCNC: 11 MG/DL (ref 7–30)
BUN SERPL-MCNC: 14 MG/DL (ref 7–30)
CALCIUM SERPL-MCNC: 8 MG/DL (ref 8.5–10.1)
CALCIUM SERPL-MCNC: 9.1 MG/DL (ref 8.5–10.1)
CHLORIDE SERPL-SCNC: 108 MMOL/L (ref 94–109)
CHLORIDE SERPL-SCNC: 113 MMOL/L (ref 94–109)
CO2 SERPL-SCNC: 24 MMOL/L (ref 20–32)
CO2 SERPL-SCNC: 25 MMOL/L (ref 20–32)
CREAT SERPL-MCNC: 0.81 MG/DL (ref 0.66–1.25)
CREAT SERPL-MCNC: 0.88 MG/DL (ref 0.66–1.25)
ERYTHROCYTE [DISTWIDTH] IN BLOOD BY AUTOMATED COUNT: 17.2 % (ref 10–15)
FOLATE SERPL-MCNC: 43.5 NG/ML
GFR SERPL CREATININE-BSD FRML MDRD: 88 ML/MIN/{1.73_M2}
GFR SERPL CREATININE-BSD FRML MDRD: >90 ML/MIN/{1.73_M2}
GLUCOSE SERPL-MCNC: 108 MG/DL (ref 70–99)
GLUCOSE SERPL-MCNC: 127 MG/DL (ref 70–99)
HCT VFR BLD AUTO: 22.2 % (ref 40–53)
HGB BLD-MCNC: 6.9 G/DL (ref 13.3–17.7)
HGB BLD-MCNC: 8.1 G/DL (ref 13.3–17.7)
MCH RBC QN AUTO: 33.3 PG (ref 26.5–33)
MCHC RBC AUTO-ENTMCNC: 31.1 G/DL (ref 31.5–36.5)
MCV RBC AUTO: 107 FL (ref 78–100)
NUM BPU REQUESTED: 2
PLATELET # BLD AUTO: 202 10E9/L (ref 150–450)
POTASSIUM SERPL-SCNC: 3.9 MMOL/L (ref 3.4–5.3)
POTASSIUM SERPL-SCNC: 4.1 MMOL/L (ref 3.4–5.3)
PROT SERPL-MCNC: 6.5 G/DL (ref 6.8–8.8)
RBC # BLD AUTO: 2.07 10E12/L (ref 4.4–5.9)
SODIUM SERPL-SCNC: 139 MMOL/L (ref 133–144)
SODIUM SERPL-SCNC: 142 MMOL/L (ref 133–144)
SPECIMEN EXP DATE BLD: NORMAL
TRANSFUSION STATUS PATIENT QL: NORMAL
TRANSFUSION STATUS PATIENT QL: NORMAL
UPPER GI ENDOSCOPY: NORMAL
VIT B12 SERPL-MCNC: 706 PG/ML (ref 193–986)
WBC # BLD AUTO: 5.8 10E9/L (ref 4–11)

## 2019-02-28 PROCEDURE — 43239 EGD BIOPSY SINGLE/MULTIPLE: CPT | Performed by: INTERNAL MEDICINE

## 2019-02-28 PROCEDURE — 85018 HEMOGLOBIN: CPT | Performed by: HOSPITALIST

## 2019-02-28 PROCEDURE — 36415 COLL VENOUS BLD VENIPUNCTURE: CPT | Performed by: HOSPITALIST

## 2019-02-28 PROCEDURE — P9016 RBC LEUKOCYTES REDUCED: HCPCS | Performed by: EMERGENCY MEDICINE

## 2019-02-28 PROCEDURE — 88305 TISSUE EXAM BY PATHOLOGIST: CPT | Mod: 26 | Performed by: INTERNAL MEDICINE

## 2019-02-28 PROCEDURE — C9113 INJ PANTOPRAZOLE SODIUM, VIA: HCPCS | Performed by: EMERGENCY MEDICINE

## 2019-02-28 PROCEDURE — 0DB68ZX EXCISION OF STOMACH, VIA NATURAL OR ARTIFICIAL OPENING ENDOSCOPIC, DIAGNOSTIC: ICD-10-PCS | Performed by: INTERNAL MEDICINE

## 2019-02-28 PROCEDURE — 25000132 ZZH RX MED GY IP 250 OP 250 PS 637: Performed by: HOSPITALIST

## 2019-02-28 PROCEDURE — 80048 BASIC METABOLIC PNL TOTAL CA: CPT | Performed by: HOSPITALIST

## 2019-02-28 PROCEDURE — 25000125 ZZHC RX 250: Performed by: INTERNAL MEDICINE

## 2019-02-28 PROCEDURE — 88342 IMHCHEM/IMCYTCHM 1ST ANTB: CPT | Mod: 26 | Performed by: INTERNAL MEDICINE

## 2019-02-28 PROCEDURE — 25800030 ZZH RX IP 258 OP 636: Performed by: HOSPITALIST

## 2019-02-28 PROCEDURE — 99233 SBSQ HOSP IP/OBS HIGH 50: CPT | Performed by: HOSPITALIST

## 2019-02-28 PROCEDURE — 25000128 H RX IP 250 OP 636: Performed by: EMERGENCY MEDICINE

## 2019-02-28 PROCEDURE — 25800030 ZZH RX IP 258 OP 636: Performed by: EMERGENCY MEDICINE

## 2019-02-28 PROCEDURE — 82746 ASSAY OF FOLIC ACID SERUM: CPT | Performed by: HOSPITALIST

## 2019-02-28 PROCEDURE — 12000000 ZZH R&B MED SURG/OB

## 2019-02-28 PROCEDURE — 82607 VITAMIN B-12: CPT | Performed by: HOSPITALIST

## 2019-02-28 PROCEDURE — 25000128 H RX IP 250 OP 636: Performed by: INTERNAL MEDICINE

## 2019-02-28 PROCEDURE — 88342 IMHCHEM/IMCYTCHM 1ST ANTB: CPT | Performed by: INTERNAL MEDICINE

## 2019-02-28 PROCEDURE — G0500 MOD SEDAT ENDO SERVICE >5YRS: HCPCS | Performed by: INTERNAL MEDICINE

## 2019-02-28 PROCEDURE — 88305 TISSUE EXAM BY PATHOLOGIST: CPT | Performed by: INTERNAL MEDICINE

## 2019-02-28 PROCEDURE — 85027 COMPLETE CBC AUTOMATED: CPT | Performed by: HOSPITALIST

## 2019-02-28 RX ORDER — SIMVASTATIN 20 MG
20 TABLET ORAL AT BEDTIME
Status: DISCONTINUED | OUTPATIENT
Start: 2019-02-28 | End: 2019-03-01 | Stop reason: HOSPADM

## 2019-02-28 RX ORDER — PANTOPRAZOLE SODIUM 40 MG/1
40 TABLET, DELAYED RELEASE ORAL
Status: DISCONTINUED | OUTPATIENT
Start: 2019-02-28 | End: 2019-03-01 | Stop reason: HOSPADM

## 2019-02-28 RX ORDER — FLUMAZENIL 0.1 MG/ML
0.2 INJECTION, SOLUTION INTRAVENOUS
Status: ACTIVE | OUTPATIENT
Start: 2019-02-28 | End: 2019-03-01

## 2019-02-28 RX ORDER — FENTANYL CITRATE 50 UG/ML
INJECTION, SOLUTION INTRAMUSCULAR; INTRAVENOUS PRN
Status: DISCONTINUED | OUTPATIENT
Start: 2019-02-28 | End: 2019-02-28 | Stop reason: HOSPADM

## 2019-02-28 RX ORDER — LIDOCAINE 40 MG/G
CREAM TOPICAL
Status: DISCONTINUED | OUTPATIENT
Start: 2019-02-28 | End: 2019-02-28 | Stop reason: HOSPADM

## 2019-02-28 RX ORDER — CYCLOSPORINE 0.5 MG/ML
1 EMULSION OPHTHALMIC 2 TIMES DAILY
Status: DISCONTINUED | OUTPATIENT
Start: 2019-02-28 | End: 2019-03-01 | Stop reason: HOSPADM

## 2019-02-28 RX ORDER — NALOXONE HYDROCHLORIDE 0.4 MG/ML
.1-.4 INJECTION, SOLUTION INTRAMUSCULAR; INTRAVENOUS; SUBCUTANEOUS
Status: DISCONTINUED | OUTPATIENT
Start: 2019-02-28 | End: 2019-03-01 | Stop reason: HOSPADM

## 2019-02-28 RX ORDER — SIMVASTATIN 20 MG
20 TABLET ORAL AT BEDTIME
Status: DISCONTINUED | OUTPATIENT
Start: 2019-02-28 | End: 2019-02-28

## 2019-02-28 RX ADMIN — PANTOPRAZOLE SODIUM 40 MG: 40 TABLET, DELAYED RELEASE ORAL at 16:14

## 2019-02-28 RX ADMIN — CYCLOSPORINE 1 DROP: 0.5 EMULSION OPHTHALMIC at 19:49

## 2019-02-28 RX ADMIN — SIMVASTATIN 20 MG: 20 TABLET, FILM COATED ORAL at 19:48

## 2019-02-28 RX ADMIN — LEVOTHYROXINE SODIUM 200 MCG: 100 TABLET ORAL at 08:04

## 2019-02-28 RX ADMIN — SODIUM CHLORIDE: 9 INJECTION, SOLUTION INTRAVENOUS at 08:04

## 2019-02-28 RX ADMIN — ACETAMINOPHEN 650 MG: 325 TABLET, FILM COATED ORAL at 09:58

## 2019-02-28 RX ADMIN — SODIUM CHLORIDE 8 MG/HR: 9 INJECTION, SOLUTION INTRAVENOUS at 03:03

## 2019-02-28 ASSESSMENT — ACTIVITIES OF DAILY LIVING (ADL)
ADLS_ACUITY_SCORE: 12

## 2019-02-28 NOTE — H&P
[facility]  Hospitalist H&P    Name: Sundar German      MRN: [id number]  YOB: 1950    Age: 69 year old  Date of admission: 2/27/2019  Primary care provider: Liu Nolasco            Assessment and Plan:   Sundar German is a 69 year old male with a history of osteoarthritis, hyperlipidemia, BPH, allergies and chronic sinusitis, insulin resistance, hypothyroidism who presents with melena and acute blood loss anemia likely consistent with an upper GI bleed.    1. Melena and suspected upper GI bleed: Clearly has GI blood loss and symptomatic blood loss anemia.  Suspect he has peptic ulcer disease related to his NSAID use.  We will continue n.p.o. status and IV fluids.  Continue Protonix drip overnight, was given 80 mg IV bolus in the ED.  Request GI consultation and I anticipate EGD will be performed in the morning.  Monitor on telemetry and watch heart rate and blood pressure closely.  2. Symptomatic blood loss anemia: Having shortness of breath, lightheadedness, and activity intolerance likely related to symptomatic anemia.  I agree with 1 unit of packed red blood cells now.  We will continue serial hemoglobin checks and transfuse for hemoglobin of less than 7.0.    3. Hyperlipidemia: Resume statin when diet started.  4. Hypothyroidism: Resume levothyroxine when diet started.  Check TSH given macrocytosis.    Code status: Full.  Admit to inpatient status.  Prophylaxis: PCD's.  Disposition: Home 2-3 days.            Chief Complaint:   Dark stools         History of Present Illness:   Sundar German is a 69 year old male who presents with dark stools.  History was obtained from my discussion with the patient at the bedside.  I also discussed the case with the ED provider.  The electronic medical record was also reviewed.    Symptoms began last well, on Sunday.  He noticed dark stools, black.  Having about 2-3 a day since that time.  No abdominal pain nor nausea/vomiting.  No red stools.   Some lightheadedness and dizziness.  Some shortness of breath and fatigue with minimal activity tolerance.  Has not lost consciousness.  Reports taking daily naproxen, typically on an empty stomach in the morning.  Has occasional alcohol but not regular.  No aspirin use and no strong anticoagulant use.    Due to his stools he went to see his PCP today.  Had labs drawn showing a hemoglobin of 6.9 and was directed to the emergency department.  Here his vital signs are stable but his hemoglobin was confirmed at about 7.0.  Getting 1 unit of packed red blood cells.  GI was consulted from the ED and will see him tomorrow and likely perform EGD.  Started on a Protonix drip with IV fluids.  Otherwise feels well.            Past Medical History:     Past Medical History:   Diagnosis Date     Abnormal glucose     A1C 1/06 =  6.3     Acquired hypothyroidism 10/11/2005     Problem list name updated by automated process. Provider to review     Allergic rhinitis, cause unspecified     mary spring time     Benign localized hyperplasia of prostate with urinary obstruction and other lower urinary tract symptoms (LUTS)(600.21)     slow stream, nocturia - Dr Malave     Chronic sinusitis 3/14/2014     Environmental allergies     AIT - Dr Weiss     Hyperlipidemia LDL goal <130 10/31/2010     hypogonadism     low testosterone at times      RAFIQ (iron deficiency anemia)      Impaired memory 02/11/2011    ?     Melanoma (H) 04/2018    Melanoma 0.7 mm depth, Levar III, superficial spreading, stage T1a     OA (osteoarthritis) total L knee 3/09    knees bother;; has had bilat arthroscopic     Prediabetes      Sleep related leg cramps 06/2012             Past Surgical History:     Past Surgical History:   Procedure Laterality Date     ARTHROPLASTY REVISION KNEE Left 10/26/2015     ARTHROPLASTY REVISION KNEE - Dr Dudley     COLONOSCOPY  10/2/2013    diverticulosis - due 10 yrs (10/2023), initial 10/2003     CYSTOSCOPY  04/2016      ENDOSCOPIC SINUS SURGERY  3/17/2014    Procedure: ENDOSCOPIC SINUS SURGERY;  Bilateral Endoscopic Sinus Surgery ;  Surgeon: Chuy Johnson MD;  Location: [facility] OR     HC REPAIR UMBILICAL KASI,5+Y/O,REDUC       LASER REVOLIX PHOTOSELECTIVE VAPORIZATION PROSTATE N/A 2016    Procedure: LASER REVOLIX / QUANTA PHOTOSELECTIVE VAPORIZATION PROSTATE;  Surgeon: Sean Malave MD;  Location: [facility] OR     ROTATOR CUFF REPAIR RT/LT  3/2012    left shoulder     SURGICAL HISTORY OF -       tonsils and adenoids     SURGICAL HISTORY OF -  Left 2009    Lt TKA     SURGICAL HISTORY OF -       Rt Knee x 1 (meniscus, ), Lt x 3 (last )     VASECTOMY               Social History:     Social History     Tobacco Use     Smoking status: Former Smoker     Packs/day: 1.00     Years: 20.00     Pack years: 20.00     Last attempt to quit: 1981     Years since quittin.1     Smokeless tobacco: Former User     Quit date: 1992   Substance Use Topics     Alcohol use: Yes     Alcohol/week: 7.2 oz     Types: 12 Standard drinks or equivalent per week     Comment: 12 per week avg             Family History:   The family history was fully reviewed and non-contributory in this case.         Allergies:   No Known Allergies          Medications:     Prior to Admission medications    Medication Sig Last Dose Taking? Auth Provider   SUDAFED SINUS  MG OR TABS Takes when needed Past Month at Unknown time Yes    fluticasone (FLONASE) 50 MCG/ACT spray SHAKE LIQUID AND USE 1 TO 2 SPRAYS IN EACH NOSTRIL DAILY   Liu Nolasco MD   levothyroxine (SYNTHROID/LEVOTHROID) 200 MCG tablet Take 1 tablet (200 mcg) by mouth daily   Liu Nolasco MD   multivitamin, therapeutic with minerals (MULTI-VITAMIN) TABS Take 1 tablet by mouth daily   Reported, Patient   psyllium (METAMUCIL/KONSYL) Packet Take 1 packet by mouth daily   Reported, Patient   sildenafil (REVATIO) 20 MG tablet Take 1 tablet (20 mg) by mouth three daily as needed.  More than a month at Unknown time  Sean Malave MD   simvastatin (ZOCOR) 20 MG tablet Take 1 tablet (20 mg) by mouth At Bedtime   Liu Nolasco MD             Review of Systems:   A Comprehensive greater than 10 system review of systems was carried out.  Pertinent positives and negatives are noted above.  Otherwise negative for contributory information.           Physical Exam:   Blood pressure 151/64, pulse 67, temperature 98.4  F (36.9  C), temperature source Oral, resp. rate 23, SpO2 98 %.  Wt Readings from Last 1 Encounters:   02/27/19 111.1 kg (245 lb)     Exam:  GENERAL: No apparent distress. Awake, alert, and fully oriented.  HEENT: Normocephalic, atraumatic. Extraocular movements intact.  CARDIOVASCULAR: Regular rate and rhythm without murmurs or rubs. No S3.  PULMONARY: Clear to auscultation bilaterally.  ABDOMINAL: Soft, non-tender, non-distended. Bowel sounds normoactive.   EXTREMITIES: No cyanosis or clubbing. No appreciable edema.  NEUROLOGICAL: CN 2-12 grossly intact, no focal neurological deficits.  DERMATOLOGICAL: No rash, ulcer, bruising, nor jaundice.          Data:   Laboratory:  Recent Labs   Lab 02/27/19  1718 02/27/19  1521   WBC 7.7 7.4   HGB 7.0* 6.9*   HCT 22.8* 21.8*   * 110*    264     Recent Labs   Lab 02/27/19  1718      POTASSIUM 3.5   CHLORIDE 109   CO2 26   ANIONGAP 7   *   BUN 14   CR 0.85   GFRESTIMATED 90   GFRESTBLACK >90   VIKKI 8.6     No results for input(s): CULT in the last 168 hours.    Imaging:  No results found for this or any previous visit (from the past 24 hour(s)).    Satya Castro DO MPH  [facility] Hospitalist  [address] [place] [place] [address]  Pager: [phone]  02/27/2019

## 2019-02-28 NOTE — ED TRIAGE NOTES
[facility]  ED Nurse Handoff Report    Sundar German is a 69 year old male   ED Chief complaint: Rectal Bleeding  . ED Diagnosis:   Final diagnoses:   UGI bleed     Allergies: No Known Allergies    Code Status:    Activity level - Baseline/Home:  Independent. Activity Level - Current:   Stand with Assist. Lift room needed: No. Bariatric: No   Needed: No   Isolation: No. Infection: Not Applicable.     Vital Signs:   Vitals:    02/27/19 1715 02/27/19 1815 02/27/19 1859 02/27/19 1913   BP: 151/64  141/81 150/68   Pulse:       Resp: 22 23 18   Temp:   98.5  F (36.9  C) 98.2  F (36.8  C)   TempSrc:   Oral Oral   SpO2: 98% 98%  99%       Cardiac Rhythm:  ,      Pain level:    Patient confused: No. Patient Falls Risk: Yes.   Elimination Status:     Patient Report - Initial Complaint: Abnormal Labs. Focused Assessment: Pt c/o black stools x 2 weeks. Pt was seen at clinic and hbg 6.9  Tests Performed: labs. Abnormal Results: hgb 7.   Treatments provided: protonix, PRBC  Family Comments: at bedside  OBS brochure/video discussed/provided to patient:  N/A  ED Medications:   Medications   pantoprazole (PROTONIX) 80 mg in sodium chloride 0.9 % 100 mL infusion (8 mg/hr Intravenous Started 2/27/19 1844)   0.9% sodium chloride BOLUS (100 mLs Intravenous New Bag 2/27/19 1813)   pantoprazole (PROTONIX) 80 mg in sodium chloride 0.9 % 100 mL intermittent infusion (80 mg Intravenous Given 2/27/19 1843)     Drips infusing:  Yes  For the majority of the shift, the patient's behavior Green. Interventions performed were .     Severe Sepsis OR Septic Shock Diagnosis Present: No      ED Nurse Name/Phone Number: Renata Samir,   7:35 PM    RECEIVING UNIT ED HANDOFF REVIEW    Above ED Nurse Handoff Report was reviewed: Yes  Reviewed by: Zuly Plascencia on February 27, 2019 at 8:08 PM

## 2019-02-28 NOTE — PLAN OF CARE
Patient transferred from ED at approximately 2045 with melena. Denies pain. VSS. SBA for mobility. NPO. No bowel movement since admission to floor. I unit blood given, tolerated. IV protonix and fluids. Wife and daughter present. Tele autumn at 55. GI consult.

## 2019-02-28 NOTE — PLAN OF CARE
Ambulatory Status:  Pt up standby.  Orientation: a/o  VS:  HR autumn at times   Tele:SR with 1st degree block (per tele tech)   Pain:  denies  Resp: LS clear.  GI:  denies nausea.  NPO with ice chips/meds diet. +BS.  Passing flatus.  Last BM 2/27.  :  voiding without difficulty   Labs:  HGB 6.9-infused 1 unit RBC  Consults:  GI  Disposition:  possible discharge tomorrow

## 2019-02-28 NOTE — DISCHARGE INSTRUCTIONS
The patient has received a copy of the Provation  report the doctor has written and discharge instructions have been discussed with the patient and responsible adult.  All questions were addressed and answered prior to patient discharge.    Understanding H. pylori and Ulcers  Traditionally, ulcers, or sores in the lining of your digestive tract, were thought to be caused by too much spicy food, stress, or an anxious personality. We now know that most ulcers are probably due to infection with bacteria known as Helicobacter pylori (H. pylori).     H. pylori invade and disturb the lining of the digestive tract. Acid may weaken the area, causing an ulcer.      Common Ulcer Symptoms  Burning, cramping, or hunger-like pain in the stomach area, often one to three hours after a meal or in the middle of the night  Pain that gets better or worse with eating  Nausea or vomiting  Black, tarry, or bloody stools (which means the ulcer is bleeding)  Or you may have no symptoms.     An ulcer can form in two areas of the digestive tract; the stomach and the duodenum (where the stomach meets the small intestine).      Your Evaluation  An evaluation by your doctor can show if you have an ulcer and determine whether it was caused by H. pylori. Your doctor may ask you questions, examine you, and possibly do some tests. These may include:  A special X-ray called a barium upper gastrointestinal series, to help locate an ulcer. During the test, you drink a chalky liquid. This liquid helps the ulcer show up on the X-ray.  An endoscopic exam, done with a long tube passed through your mouth into your stomach, to give the doctor a closer look at your ulcer. You will be lightly sedated for this procedure. Your doctor can also take a tissue sample to test for H. pylori.  Blood, stool, and breath tests are also available to show whether you have H. pylori in your digestive tract.  Your Treatment  To kill H. pylori so your ulcer can heal, your  doctor will probably prescribe antibiotics. Other ulcer medications that help reduce stomach acid may also be prescribed as well. Testing after treatment is recommended to be sure the H. pylori infection is gone. Usually, killing H. pylori helps keep the ulcer from returning.    [id number] [facility], [address] [place] [address], [place] [address]. All rights reserved. This information is not intended as a substitute for professional medical care. Always follow your healthcare professional's instructions.

## 2019-02-28 NOTE — CONSULTS
GASTROENTEROLOGY CONSULTATION      Sundar German  [address] [facility] [address]  69 year old male     Admission Date/Time: 2/27/2019  Primary Care Provider: Liu Nolasco  Referring / Attending Physician:  Dr. Castro     We were asked to see the patient in consultation by Dr. Castro for evaluation of melena.        HPI:  Sundar German is a 69 year old male with history of arthritis, hyperlipidemia, BPH, allergies, insulin resistance, hypothyroidism and chronic sinusitis who presents with black  Stools x 5 days. He reports noting black stools starting Sunday. He is having about 1 stool per day. He reports associated mild bloating but otherwise feels well and denies nausea, vomiting, dysphagia, heartburn, abdominal pain, weakness. He has noted mild  Shortness of breath or lightheadedness. He does take Aleve daily.    Reports colonoscopy 2014 unremarkable.       PAST MEDICAL HISTORY:  Patient Active Problem List    Diagnosis Date Noted     GI bleed 02/27/2019     Priority: Medium     Obesity (BMI 35.0-39.9) with comorbidity (H) 10/24/2018     Priority: Medium     h/o Malignant melanoma of torso excluding breast (H) 04/13/2018     Priority: Medium     Prediabetes      Priority: Medium     OA (osteoarthritis)      Priority: Medium     knees bother;; has had bilat arthroscopic       BPH (benign prostatic hyperplasia) 04/06/2016     Priority: Medium     S/P revision of total knee, left 10/26/2015     Priority: Medium     Chronic sinusitis 03/14/2014     Priority: Medium     Sleep related leg cramps 06/20/2012     Priority: Medium     Impaired memory 02/11/2011     Priority: Medium     Hyperlipidemia LDL goal <130 10/31/2010     Priority: Medium     Acquired hypothyroidism 10/11/2005     Priority: Medium     Problem list name updated by automated process. Provider to review       Allergic rhinitis 12/13/2004     Priority: Medium     Problem list name updated by automated process. Provider to review             ROS: A comprehensive ten point review of systems was negative aside from those in mentioned in the HPI.       MEDICATIONS:   Prior to Admission medications    Medication Sig Start Date End Date Taking? Authorizing Provider   cycloSPORINE (RESTASIS) 0.05 % ophthalmic emulsion Place 1 drop into the right eye 2 times daily   Yes Unknown, Entered By History   fluticasone (FLONASE) 50 MCG/ACT spray SHAKE LIQUID AND USE 1 TO 2 SPRAYS IN EACH NOSTRIL DAILY 3/5/18  Yes Liu Nolasco MD   levothyroxine (SYNTHROID/LEVOTHROID) 200 MCG tablet Take 1 tablet (200 mcg) by mouth daily 3/2/18  Yes Liu Nolasco MD   multivitamin, therapeutic with minerals (MULTI-VITAMIN) TABS Take 1 tablet by mouth daily   Yes Reported, Patient   psyllium (METAMUCIL/KONSYL) Packet Take 1 packet by mouth daily   Yes Reported, Patient   simvastatin (ZOCOR) 20 MG tablet Take 1 tablet (20 mg) by mouth At Bedtime 3/2/18  Yes Liu Nolasco MD   SUDAFED SINUS  MG OR TABS Takes when needed   Yes    sildenafil (REVATIO) 20 MG tablet Take 1 tablet (20 mg) by mouth three daily as needed. 10/24/18   Sean Malave MD        ALLERGIES: No Known Allergies     SOCIAL HISTORY:  Social History     Tobacco Use     Smoking status: Former Smoker     Packs/day: 1.00     Years: 20.00     Pack years: 20.00     Last attempt to quit: 1981     Years since quittin.1     Smokeless tobacco: Former User     Quit date: 1992   Substance Use Topics     Alcohol use: Yes     Alcohol/week: 7.2 oz     Types: 12 Standard drinks or equivalent per week     Comment: 12 per week avg     Drug use: No        FAMILY HISTORY:  Family History   Problem Relation Age of Onset     C.A.D. Father         CHF     Heart Failure Father      Chronic Obstructive Pulmonary Disease Father      Cerebrovascular Disease Brother 61     Gastrointestinal Disease Brother         GI Bleed - colectomy     Hypertension Mother      Cardiovascular Mother 84        MI     Lipids Mother       "Chronic Obstructive Pulmonary Disease Mother      No Known Problems Sister      No Known Problems Brother       Brother with some unknown source of GI bleed.  after operative complications following colectomy.      PHYSICAL EXAM:     /49 (BP Location: Right arm)   Pulse 57   Temp 96.8  F (36  C) (Oral)   Resp 18   Ht 1.803 m (5' 11\")   Wt 110 kg (242 lb 9.6 oz)   SpO2 97%   BMI 33.84 kg/m       PHYSICAL EXAM:  GENERAL: No acute distress  SKIN: no suspicious lesions, rashes, jaundice, or spider angiomas  HEAD: Normocephalic. Atraumatic.  NECK: Neck supple. No adenopathy.   EYES: No scleral icterus  RESPIRATORY: Good transmission. CTA bilaterally.   CARDIOVASCULAR: RRR, normal S1, S2,  No murmur appreciated  GASTROINTESTINAL: +BS, soft, non tender, non distended, no hepatosplenomegaly, no masses/guarding/rebound  JOINT/EXTREMITIES:  no gross deformities noted, normal muscle tone  NEURO: CN 2-12 grossly intact, no focal deficits  PSYCH: Normal affect              ADDITIONAL COMMENTS:   I reviewed the patient's new clinical lab test results.   Recent Labs   Lab Test 19  0617 19  2230 19  1718 19  1521   WBC 5.8  --  7.7 7.4   HGB 6.9* 7.2* 7.0* 6.9*   *  --  112* 110*     --  258 264     Recent Labs   Lab Test 19  0617 19  1718 18  1016   POTASSIUM 3.9 3.5 4.1   CHLORIDE 113* 109 106   CO2 24 26 29   BUN 11 14 13   ANIONGAP 5 7 5     Recent Labs   Lab Test 19  1718 10/24/18  1103 18  1017 18  1016 10/28/16  1132 16  1122   ALBUMIN 3.5  --   --  4.0 4.0 3.9   BILITOTAL 0.4  --   --  0.4 0.4 0.3   ALT 39  --   --  64 58 45   AST 34  --   --  47* 40 27   PROTEIN  --  Negative 100*  --   --  Negative        IMAGING / ENDOSCOPY          CONSULTATION ASSESSMENT AND PLAN:    Sundar German is a 69 year old admitted with melena in setting of NSAID use. Hg down to 6.9 (was 13 last year). MCV is noted to be elevated 110. He does " drink regularly but low suspicion for variceal bleeding given normal platelets and rate of bleeding. Other considerations would include AVM, small bowel or colonic source or less likely malignancy.    -Continue PPI drip  -EGD planned for later today  -Monitor H/H and transfuse as needed      I discussed the patient plan with Dr. Sinclair. Thank you for asking us to participate in the care of this patient.    Aurelia Gore PA-C  [facility]

## 2019-02-28 NOTE — PLAN OF CARE
Pt alert and oriented, makes his needs known. Pt states no dizziness when up now. VSS. Pt did receive 1 unit of blood yesterday for a hemoglobin of 6.9/7.0. Recheck 1 hour after the blood infused was 7.2. Will recheck hemoglobin this am. Pt to have a GI consult and probable EGD later today. Pt's wife at the bedside and supportive.

## 2019-02-28 NOTE — PROGRESS NOTES
[facility]    Hospitalist Progress Note  Name: Sundar German    MRN: [id number]  Provider:  Satya Castro DO, MPH  Date of Service: 02/28/2019    Summary of Stay: Sundar German is a 69 year old male with a history of osteoarthritis, hyperlipidemia, BPH, allergies and chronic sinusitis, insulin resistance, hypothyroidism who presents with melena and acute blood loss anemia likely consistent with an upper GI bleed.     1. Melena and suspected upper GI bleed: Clearly has GI blood loss and symptomatic blood loss anemia.  Suspect he has peptic ulcer disease related to his NSAID use.  We will continue n.p.o. status and IV fluids.  Continue Protonix drip overnight, was given 80 mg IV bolus in the ED.  Appreciate GI consultation and EGD will be performed in the morning.  Monitor on telemetry and watch heart rate and blood pressure closely.  2. Symptomatic blood loss anemia and macrocytic anemia: Had shortness of breath, lightheadedness, and activity intolerance likely related to symptomatic anemia.  Had 1 U PRBC last night, hemoglobin still 6.9 likely from hemodilution.  I agree with another 1 unit of packed red blood cells now.  We will continue serial hemoglobin checks and transfuse for hemoglobin of less than 7.0.  Check B12 and folate level given macrocytosis.  3. Hyperlipidemia: Resume statin when diet started.  4. Hypothyroidism: Resume levothyroxine when diet started.  TSH OK.    DVT Prophylaxis: Pneumatic Compression Devices  Code Status: Full Code  Disposition: Expected discharge in 1 days to home. Goals prior to discharge include EGD and monitor Hb.   Incidental Findings: None.  Family updated today: Yes      Interval History   The patient reports doing well. No chest pain or shortness of breath. No nausea, vomiting, diarrhea, constipation. No fevers. No other specific complaints identified.     -Data reviewed today: I personally reviewed all new labs and imaging results over the last 24 hours.      Physical Exam   Temp: 97.1  F (36.2  C) Temp src: Oral BP: 121/60 Pulse: 51 Heart Rate: (!) 49 Resp: 16 SpO2: 97 % O2 Device: None (Room air)    Vitals:    02/27/19 2047   Weight: 110 kg (242 lb 9.6 oz)     Vital Signs with Ranges  Temp:  [96.8  F (36  C)-98.5  F (36.9  C)] 97.1  F (36.2  C)  Pulse:  [51-83] 51  Heart Rate:  [49-67] 49  Resp:  [16-23] 16  BP: (115-151)/(40-91) 121/60  SpO2:  [97 %-100 %] 97 %  I/O last 3 completed shifts:  In: 1276 [I.V.:976]  Out: -     GENERAL: No apparent distress. Awake, alert, and fully oriented.  HEENT: Normocephalic, atraumatic. Extraocular movements intact.  CARDIOVASCULAR: Regular rate and rhythm without murmurs or rubs. No S3.  PULMONARY: Clear bilaterally.  GASTROINTESTINAL: Soft, non-tender, non-distended. Bowel sounds normoactive.   EXTREMITIES: No cyanosis or clubbing. No edema.  NEUROLOGICAL: CN 2-12 grossly intact, no focal neurological deficits.  DERMATOLOGICAL: No rash, ulcer, bruising, nor jaundice.     Medications     pantoprazole (PROTONIX) infusion ADULT/PEDS GREATER than or EQUAL to 45 kg 8 mg/hr (02/28/19 0812)     sodium chloride 100 mL/hr at 02/28/19 0804       cycloSPORINE  1 drop Right Eye BID     levothyroxine  200 mcg Oral Daily     Data     Laboratory:  Recent Labs   Lab 02/28/19  0617 02/27/19  2230 02/27/19  1718 02/27/19  1521   WBC 5.8  --  7.7 7.4   HGB 6.9* 7.2* 7.0* 6.9*   HCT 22.2*  --  22.8* 21.8*   *  --  112* 110*     --  258 264     Recent Labs   Lab 02/28/19  0617 02/27/19  1718    142   POTASSIUM 3.9 3.5   CHLORIDE 113* 109   CO2 24 26   ANIONGAP 5 7   * 119*   BUN 11 14   CR 0.88 0.85   GFRESTIMATED 88 90   GFRESTBLACK >90 >90   VIKKI 8.0* 8.6     Recent Labs   Lab 02/28/19  0617 02/27/19  2230 02/27/19  1718   HGB 6.9* 7.2* 7.0*     No results for input(s): CULT in the last 168 hours.    Imaging:  No results found for this or any previous visit (from the past 24 hour(s)).      Satya Castro DO MPH  [facility]  Hospitalist  [address] [place] [address]  Pager: [phone]  02/28/2019

## 2019-02-28 NOTE — PHARMACY-ADMISSION MEDICATION HISTORY
Admission medication history interview status for this patient is complete. See [facility] admission navigator for allergy information, prior to admission medications and immunization status.     Medication history interview source(s):Patient  Medication history resources (including written lists, pill bottles, clinic record):[facility] List  Primary pharmacy[facility] [address] [facility] [address]    Changes made to PTA medication list:  Added: Restasis  Deleted: -  Changed: -    Actions taken by pharmacist (provider contacted, etc):None     Additional medication history information:None    Medication reconciliation/reorder completed by provider prior to medication history? No    Do you take OTC medications (eg tylenol, ibuprofen, fish oil, eye/ear drops, etc)? Y    Prior to Admission medications    Medication Sig Last Dose Taking? Auth Provider   cycloSPORINE (RESTASIS) 0.05 % ophthalmic emulsion Place 1 drop into the right eye 2 times daily 2/27/2019 at am Yes Unknown, Entered By History   fluticasone (FLONASE) 50 MCG/ACT spray SHAKE LIQUID AND USE 1 TO 2 SPRAYS IN EACH NOSTRIL DAILY 2/26/2019 at Unknown time Yes Liu Nolasco MD   levothyroxine (SYNTHROID/LEVOTHROID) 200 MCG tablet Take 1 tablet (200 mcg) by mouth daily 2/27/2019 at am Yes Liu Nolasco MD   multivitamin, therapeutic with minerals (MULTI-VITAMIN) TABS Take 1 tablet by mouth daily 2/26/2019 at Unknown time Yes Reported, Patient   psyllium (METAMUCIL/KONSYL) Packet Take 1 packet by mouth daily 2/27/2019 at am Yes Reported, Patient   simvastatin (ZOCOR) 20 MG tablet Take 1 tablet (20 mg) by mouth At Bedtime 2/26/2019 at pm Yes Liu Nolasco MD   SUDAFED SINUS  MG OR TABS Takes when needed Past Month at Unknown time Yes    sildenafil (REVATIO) 20 MG tablet Take 1 tablet (20 mg) by mouth three daily as needed. More than a month at Unknown time  Sean Malave MD

## 2019-02-28 NOTE — ED NOTES
Pt resting comfortably at this time awaiting admission, blood transfusion running, no s/s of reaction. Pt denies needs at this time

## 2019-02-28 NOTE — PROCEDURES
PRE-PROCEDURE H&P    CHIEF COMPLAINT / REASON FOR PROCEDURE:  melena    PERTINENT HISTORY :    Past Medical History:   Diagnosis Date     Abnormal glucose     A1C 1/06 =  6.3     Acquired hypothyroidism 10/11/2005     Problem list name updated by automated process. Provider to review     Allergic rhinitis, cause unspecified     mary spring time     Benign localized hyperplasia of prostate with urinary obstruction and other lower urinary tract symptoms (LUTS)(600.21)     slow stream, nocturia - Dr Malave     Chronic sinusitis 3/14/2014     Environmental allergies     AIT - Dr Weiss     Hyperlipidemia LDL goal <130 10/31/2010     hypogonadism     low testosterone at times      RAFIQ (iron deficiency anemia)      Impaired memory 02/11/2011    ?     Melanoma (H) 04/2018    Melanoma 0.7 mm depth, Levar III, superficial spreading, stage T1a     OA (osteoarthritis) total L knee 3/09    knees bother;; has had bilat arthroscopic     Prediabetes      Sleep related leg cramps 06/2012      Past Surgical History:   Procedure Laterality Date     ARTHROPLASTY REVISION KNEE Left 10/26/2015     ARTHROPLASTY REVISION KNEE - Dr Dudley     COLONOSCOPY  10/2/2013    diverticulosis - due 10 yrs (10/2023), initial 10/2003     CYSTOSCOPY  04/2016     ENDOSCOPIC SINUS SURGERY  3/17/2014    Procedure: ENDOSCOPIC SINUS SURGERY;  Bilateral Endoscopic Sinus Surgery ;  Surgeon: Chuy Johnson MD;  Location: [facility] OR     HC REPAIR UMBILICAL KASI,5+Y/O,REDUC  2002     LASER REVOLIX PHOTOSELECTIVE VAPORIZATION PROSTATE N/A 4/6/2016    Procedure: LASER REVOLIX / QUANTA PHOTOSELECTIVE VAPORIZATION PROSTATE;  Surgeon: Sean Malave MD;  Location: [facility] OR     ROTATOR CUFF REPAIR RT/LT  3/2012    left shoulder     SURGICAL HISTORY OF -   1996    tonsils and adenoids     SURGICAL HISTORY OF -  Left 03/2009    Lt TKA     SURGICAL HISTORY OF -       Rt Knee x 1 (meniscus, 1995), Lt x 3 (last 2009)     VASECTOMY  1986         Bleeding tendencies:   No    Relevant Family History:  NONE     Relevant Social History:  NONE      A relevant review of systems was performed and was negative      ALLERGIES/SENSITIVITIES: No Known Allergies    CURRENT MEDICATIONS:   No current outpatient medications on file.        PRE-SEDATION ASSESSMENT:    Lung Exam:  normal  Heart Exam:  normal  Airway Exam: normal  Previous reaction to anesthesia/sedation:   No  Sedation plan based on assessment: Moderate (conscious) sedation  ASA Classification:  2 - Mild systemic disease        IMPRESSION:  melena    PLAN:  EGD    Angela Sinclair  [facility]  Office: [phone]

## 2019-03-01 VITALS
HEIGHT: 71 IN | SYSTOLIC BLOOD PRESSURE: 132 MMHG | BODY MASS INDEX: 33.96 KG/M2 | OXYGEN SATURATION: 100 % | HEART RATE: 50 BPM | TEMPERATURE: 97.1 F | RESPIRATION RATE: 16 BRPM | WEIGHT: 242.6 LBS | DIASTOLIC BLOOD PRESSURE: 61 MMHG

## 2019-03-01 LAB
ERYTHROCYTE [DISTWIDTH] IN BLOOD BY AUTOMATED COUNT: 17.2 % (ref 10–15)
HCT VFR BLD AUTO: 26 % (ref 40–53)
HGB BLD-MCNC: 8.2 G/DL (ref 13.3–17.7)
MCH RBC QN AUTO: 33.5 PG (ref 26.5–33)
MCHC RBC AUTO-ENTMCNC: 31.5 G/DL (ref 31.5–36.5)
MCV RBC AUTO: 106 FL (ref 78–100)
PLATELET # BLD AUTO: 220 10E9/L (ref 150–450)
RBC # BLD AUTO: 2.45 10E12/L (ref 4.4–5.9)
WBC # BLD AUTO: 5.7 10E9/L (ref 4–11)

## 2019-03-01 PROCEDURE — 99239 HOSP IP/OBS DSCHRG MGMT >30: CPT | Performed by: HOSPITALIST

## 2019-03-01 PROCEDURE — 85027 COMPLETE CBC AUTOMATED: CPT | Performed by: HOSPITALIST

## 2019-03-01 PROCEDURE — 25000132 ZZH RX MED GY IP 250 OP 250 PS 637: Performed by: HOSPITALIST

## 2019-03-01 PROCEDURE — 36415 COLL VENOUS BLD VENIPUNCTURE: CPT | Performed by: HOSPITALIST

## 2019-03-01 RX ORDER — PANTOPRAZOLE SODIUM 40 MG/1
40 TABLET, DELAYED RELEASE ORAL
Qty: 180 TABLET | Refills: 0 | Status: SHIPPED | OUTPATIENT
Start: 2019-03-01 | End: 2019-06-19

## 2019-03-01 RX ADMIN — CYCLOSPORINE 1 DROP: 0.5 EMULSION OPHTHALMIC at 08:39

## 2019-03-01 RX ADMIN — LEVOTHYROXINE SODIUM 200 MCG: 100 TABLET ORAL at 06:54

## 2019-03-01 RX ADMIN — PANTOPRAZOLE SODIUM 40 MG: 40 TABLET, DELAYED RELEASE ORAL at 06:54

## 2019-03-01 RX ADMIN — ACETAMINOPHEN 650 MG: 325 TABLET, FILM COATED ORAL at 06:54

## 2019-03-01 ASSESSMENT — ACTIVITIES OF DAILY LIVING (ADL)
ADLS_ACUITY_SCORE: 12

## 2019-03-01 NOTE — DISCHARGE SUMMARY
Hospitalist Discharge Summary  [facility]    Sundar German MRN# [id number]   YOB: 1950 Age: 69 year old     Date of Admission:  2/27/2019  Date of Discharge:  3/1/2019  Admitting Physician:  Satya Castro DO  Discharge Physician:  Satya Castro  Discharging Service:  Hospitalist     Primary Provider: Liu Nolasco          Discharge Diagnosis:   Melena and upper GI bleeding secondary to peptic ulcer disease with gastric and duodenal ulcers  Acute blood loss anemia  Symptomatic anemia  Hyperlipidemia  Hypothyroidism  Osteoarthritis and degenerative joint disease             Discharge Disposition:   Discharged to home           Allergies:   No Known Allergies           Discharge Medications:   Current Discharge Medication List      START taking these medications    Details   pantoprazole (PROTONIX) 40 MG EC tablet Take 1 tablet (40 mg) by mouth 2 times daily (before meals)  Qty: 180 tablet, Refills: 0    Associated Diagnoses: Gastrointestinal hemorrhage associated with gastric ulcer         CONTINUE these medications which have NOT CHANGED    Details   cycloSPORINE (RESTASIS) 0.05 % ophthalmic emulsion Place 1 drop into the right eye 2 times daily      fluticasone (FLONASE) 50 MCG/ACT spray SHAKE LIQUID AND USE 1 TO 2 SPRAYS IN EACH NOSTRIL DAILY  Qty: 3 Bottle, Refills: 3    Comments: **Patient requests 90 days supply**  Associated Diagnoses: Chronic sinusitis, unspecified location      levothyroxine (SYNTHROID/LEVOTHROID) 200 MCG tablet Take 1 tablet (200 mcg) by mouth daily  Qty: 90 tablet, Refills: 3    Associated Diagnoses: Acquired hypothyroidism      multivitamin, therapeutic with minerals (MULTI-VITAMIN) TABS Take 1 tablet by mouth daily      psyllium (METAMUCIL/KONSYL) Packet Take 1 packet by mouth daily      simvastatin (ZOCOR) 20 MG tablet Take 1 tablet (20 mg) by mouth At Bedtime  Qty: 90 tablet, Refills: 3    Associated Diagnoses: Hyperlipidemia LDL goal <130     "  SUDAFED SINUS  MG OR TABS Takes when needed      sildenafil (REVATIO) 20 MG tablet Take 1 tablet (20 mg) by mouth three daily as needed.  Qty: 90 tablet, Refills: 0    Associated Diagnoses: Erectile dysfunction, unspecified erectile dysfunction type                    Condition on Discharge:   Discharge condition: Stable   Discharge vitals: Blood pressure 134/61, pulse 50, temperature 97.2  F (36.2  C), temperature source Oral, resp. rate 16, height 1.803 m (5' 11\"), weight 110 kg (242 lb 9.6 oz), SpO2 97 %.   Code status on discharge: Full Code      BASIC PHYSICAL EXAMINATION:  GENERAL: No apparent distress.  CARDIOVASCULAR: Regular rate and rhythm without murmurs.  PULMONARY: Clear to auscultation bilaterally.   GASTROINTESTINAL: Abdomen soft, non-tender.  EXTREMITIES: No edema, pulses intact.  NEUROLOGIC: No focal deficits.            History of Illness:   See detailed admission note for full details.               Procedures excluding imaging which is summarized below:   Please see details in the electronic medical record.           Consultations:   GASTROENTEROLOGY IP CONSULT          Significant Results:   Results for orders placed or performed in visit on 12/31/18   XR Chest 2 Views    Narrative    CHEST TWO VIEWS  12/31/2018 4:54 PM     HISTORY: 68-year-old patient with productive cough.       Impression    IMPRESSION: Since March 7, 2014, heart size is normal. No pleural  effusion, pneumothorax, or abnormal area of consolidation.    SUNDAR LERMA MD       Transthoracic Echocardiogram Results:  No results found for this or any previous visit (from the past 4320 hour(s)).             Pending Results:   Unresulted Labs Ordered in the Past 30 Days of this Admission     Date and Time Order Name Status Description    2/28/2019 1501 Surgical pathology exam In process                       Discharge Instructions and Follow-Up:   Discharge instructions and follow-up:   Discharge Procedure Orders "   Follow-up and recommended labs and tests    Order Comments: Follow up with primary care provider, Liu Nolasco, within 7 days to evaluate medication change and for hospital follow- up.  No follow up labs or test are needed.  Follow up with GI in 2-3 months to repeat EGD.  Protonix 40 mg twice daily for 3 months, then daily indefinitely.   Avoid NSAIDs and alcohol.     Activity   Order Comments: Your activity upon discharge: activity as tolerated     Order Specific Question Answer Comments   Is discharge order? Yes      Full Code     Order Specific Question Answer Comments   Code status determined by: Discussion with patient/legal decision maker      Diet   Order Comments: Follow this diet upon discharge: Orders Placed This Encounter      Regular Diet Adult     Order Specific Question Answer Comments   Is discharge order? Yes              Hospital Course:   Sundar German is a 69 year old male with a history of osteoarthritis, hyperlipidemia, BPH, allergies and chronic sinusitis, insulin resistance, hypothyroidism who presents with melena and acute blood loss anemia likely consistent with an upper GI bleed.  He was started on a Protonix drip.  He required 2 units of packed red blood cells now with stable hemoglobin of about 8.  He underwent EGD yesterday showing nonbleeding gastric ulcers with no stigmata of bleeding and one nonbleeding duodenal ulcer with no stigmata of bleeding.  Likely etiology of his peptic ulcer disease is NSAIDs.  He will avoid NSAIDs that he uses for his arthritis pain.  The recommendation from gastroenterology is twice daily PPI for 3 months and a repeat EGD at that time.  I also instructed him to avoid alcohol use.  He is aware of the signs and symptoms of a return of GI bleeding.  Appears with all bleeding at this time has stopped that he is feeling much better after transfusion.  He is hemodynamically stable and appears suitable for discharge.  Surgical pathology and EGD biopsies are  pending at this time.    The patient was seen, examined, and counseled on this day. The hospitalization and plan of care was reviewed with the patient extensively. All questions were addressed and the patient agreed to follow-up as noted above.      Total time spent in face to face contact with the patient and coordinating discharge was:  35 Minutes    Satya Castro DO, MPH  [facility] Hospitalist  [address] [place] [address]  Pager: [phone]  03/01/2019

## 2019-03-01 NOTE — PLAN OF CARE
Pt alert and oriented, up ind in the room. Pt denies complaints overnight. Pt's hemoglobin 8.1 yesterday after 2nd unit of blood. EGD showed non-bleeding ulcers to stomach and duodenum. Pt hopes to discharge to home later today.

## 2019-03-01 NOTE — PLAN OF CARE
Patient hospitalized for melena. Cleared for discharge to home today per MD. Discharge instructions, medications, and follow-ups reviewed with patient and spouse in detail. Discharge medications, including Protonix were filled and sent with patient. Patient and spouse verbalized understanding of discharge instructions. Belongings were returned to patient at time of discharge. Spouse providing transport home.

## 2019-03-01 NOTE — PLAN OF CARE
Admitted for UGI bleed.   VSS, Tele bradycardic, RA, denies pain.   BS active x4, passing flatus, no stools, voiding well.   IVF and protonix drip dc'd.   Tolerating regular diet.   EGD done today.   Hgb 8.1 this handy.   Likely discharge tomorrow.   Will continue to monitor.

## 2019-03-04 ENCOUNTER — TELEPHONE (OUTPATIENT)
Dept: FAMILY MEDICINE | Facility: CLINIC | Age: 69
End: 2019-03-04

## 2019-03-04 LAB — COPATH REPORT: NORMAL

## 2019-03-04 NOTE — TELEPHONE ENCOUNTER
ED / Discharge Outreach Protocol    Patient Contact    Attempt # 1    Was call answered?  No.  Unable to leave message.    Pt is scheduled 3/6/19 with TS.    Kae Wang RN  [facility]

## 2019-03-04 NOTE — TELEPHONE ENCOUNTER
Patient discharged from [facility] for inpatient hospital stay on 3/1 for ugi bleen, gi hemorrhage associated with gastric ulcer.    Please contact patient to follow up;  appointment scheduled 3/6 with TS.    ER / IP:  0/1    Care Coordination:  yeni Martinez

## 2019-03-05 NOTE — TELEPHONE ENCOUNTER
"ED/Discharge Protocol    \"Hi, my name is Neris Laguerre, a registered nurse, and I am calling on behalf of Dr. Nolasco's office at [facility].  I am calling to follow up and see how things are going for you after your recent visit.\"    \"I see that you were in the (ER/UC/IP) on 2/27/2019.    How are you doing now that you are home?\" I am doing better     Is patient experiencing symptoms that may require a hospital visit?  None     Discharge Instructions    \"Let's review your discharge instructions.  What is/are the follow-up recommendations?  Pt. Response: I am going to see MD MUNA tomorrow     \"Were you instructed to make a follow-up appointment?\"  Pt. Response: Yes.  Has appointment been made?   Yes      \"When you see the provider, I would recommend that you bring your discharge instructions with you.    Medications    \"How many new medications are you on since your hospitalization/ED visit?\"    0-1  \"How many of your current medicines changed (dose, timing, name, etc.) while you were in the hospital/ED visit?\"   0-1  \"Do you have questions about your medications?\"   No  \"Were you newly diagnosed with heart failure, COPD, diabetes or did you have a heart attack?\"   No  For patients on insulin: \"Did you start on insulin in the hospital or did you have your insulin dose changed?\"   No    Medication reconciliation completed? Yes    Was MTM referral placed (*Make sure to put transitions as reason for referral)?   No    Call Summary    \"Do you have any questions or concerns about your condition or care plan at the moment?\"    No  Triage nurse advice given: if anything changes please call the clinic     Patient was in ER 1 in the past year (assess appropriateness of ER visits.)      \"If you have questions or things don't continue to improve, we encourage you contact us through the main clinic number,  [phone].  Even if the clinic is not open, triage nurses are available 24/7 to help you.     We would like you to " "know that our clinic has extended hours (provide information).  We also have urgent care (provide details on closest location and hours/contact info)\"      \"Thank you for your time and take care!\"             "

## 2019-03-06 ENCOUNTER — OFFICE VISIT (OUTPATIENT)
Dept: FAMILY MEDICINE | Facility: CLINIC | Age: 69
End: 2019-03-06
Payer: COMMERCIAL

## 2019-03-06 VITALS
SYSTOLIC BLOOD PRESSURE: 124 MMHG | OXYGEN SATURATION: 98 % | WEIGHT: 242 LBS | BODY MASS INDEX: 33.88 KG/M2 | DIASTOLIC BLOOD PRESSURE: 60 MMHG | HEART RATE: 66 BPM | HEIGHT: 71 IN | TEMPERATURE: 98.1 F

## 2019-03-06 DIAGNOSIS — K25.4 GASTROINTESTINAL HEMORRHAGE ASSOCIATED WITH GASTRIC ULCER: Primary | ICD-10-CM

## 2019-03-06 DIAGNOSIS — R74.8 ABNORMAL LEVELS OF OTHER SERUM ENZYMES: ICD-10-CM

## 2019-03-06 DIAGNOSIS — C43.59 MALIGNANT MELANOMA OF TORSO EXCLUDING BREAST (H): ICD-10-CM

## 2019-03-06 DIAGNOSIS — R73.03 PREDIABETES: ICD-10-CM

## 2019-03-06 DIAGNOSIS — Z51.81 MEDICATION MONITORING ENCOUNTER: ICD-10-CM

## 2019-03-06 DIAGNOSIS — E66.811 CLASS 1 OBESITY WITH SERIOUS COMORBIDITY AND BODY MASS INDEX (BMI) OF 33.0 TO 33.9 IN ADULT, UNSPECIFIED OBESITY TYPE: ICD-10-CM

## 2019-03-06 DIAGNOSIS — K26.4 GASTROINTESTINAL HEMORRHAGE ASSOCIATED WITH DUODENAL ULCER: ICD-10-CM

## 2019-03-06 DIAGNOSIS — D64.9 ANEMIA, UNSPECIFIED TYPE: ICD-10-CM

## 2019-03-06 PROBLEM — C43.9 MELANOMA (H): Status: RESOLVED | Noted: 2018-04-01 | Resolved: 2018-11-13

## 2019-03-06 LAB
ERYTHROCYTE [DISTWIDTH] IN BLOOD BY AUTOMATED COUNT: 14.8 % (ref 10–15)
FOLATE SERPL-MCNC: 28.5 NG/ML
HCT VFR BLD AUTO: 33.1 % (ref 40–53)
HGB BLD-MCNC: 10.6 G/DL (ref 13.3–17.7)
MCH RBC QN AUTO: 33 PG (ref 26.5–33)
MCHC RBC AUTO-ENTMCNC: 32 G/DL (ref 31.5–36.5)
MCV RBC AUTO: 103 FL (ref 78–100)
PLATELET # BLD AUTO: 373 10E9/L (ref 150–450)
RBC # BLD AUTO: 3.21 10E12/L (ref 4.4–5.9)
VIT B12 SERPL-MCNC: 740 PG/ML (ref 193–986)
WBC # BLD AUTO: 5 10E9/L (ref 4–11)

## 2019-03-06 PROCEDURE — 82607 VITAMIN B-12: CPT | Performed by: FAMILY MEDICINE

## 2019-03-06 PROCEDURE — 85027 COMPLETE CBC AUTOMATED: CPT | Performed by: FAMILY MEDICINE

## 2019-03-06 PROCEDURE — 82728 ASSAY OF FERRITIN: CPT | Performed by: FAMILY MEDICINE

## 2019-03-06 PROCEDURE — 83550 IRON BINDING TEST: CPT | Performed by: FAMILY MEDICINE

## 2019-03-06 PROCEDURE — 82977 ASSAY OF GGT: CPT | Performed by: FAMILY MEDICINE

## 2019-03-06 PROCEDURE — 83540 ASSAY OF IRON: CPT | Performed by: FAMILY MEDICINE

## 2019-03-06 PROCEDURE — 82746 ASSAY OF FOLIC ACID SERUM: CPT | Performed by: FAMILY MEDICINE

## 2019-03-06 PROCEDURE — 80053 COMPREHEN METABOLIC PANEL: CPT | Performed by: FAMILY MEDICINE

## 2019-03-06 PROCEDURE — 99495 TRANSJ CARE MGMT MOD F2F 14D: CPT | Performed by: FAMILY MEDICINE

## 2019-03-06 PROCEDURE — 36415 COLL VENOUS BLD VENIPUNCTURE: CPT | Performed by: FAMILY MEDICINE

## 2019-03-06 ASSESSMENT — MIFFLIN-ST. JEOR: SCORE: 1889.83

## 2019-03-06 NOTE — PROGRESS NOTES
SUBJECTIVE:   Sundar German is a 69 year old male who presents to clinic today for the following health issues:    Hospital Follow-up Visit:    Hospital/Nursing Home/IP Rehab Facility: [facility]  Date of Admission: 2/27/19  Date of Discharge: 3/1/19  Reason(s) for Admission: GI bleed            Problems taking medications regularly:  None       Medication changes since discharge: protonix was added       Problems adhering to non-medication therapy:  None    Summary of hospitalization:  [facility] discharge summary reviewed  Diagnostic Tests/Treatments reviewed.  Follow up needed: none  Other Healthcare Providers Involved in Patient s Care:         None  Update since discharge: improved.     Post Discharge Medication Reconciliation: discharge medications reconciled, continue medications without change.  Plan of care communicated with patient     Coding guidelines for this visit:  Type of Medical   Decision Making Face-to-Face Visit       within 7 Days of discharge Face-to-Face Visit        within 14 days of discharge   Moderate Complexity [id number]   High Complexity [id number]          3/6/2019 Hospital Follow up    Patient was seen in the hospital from 2/27-3/1/2019 due to melena and upper GI bleeding. Patient was transfused with 2 units of blood. Patient reports he is feeling good since being discharged from the hospital on 3/1/2019. Patient reports he no longer has any black or bloody stools. Patient informed GI doctor that saw him in the hospital that he has been taking 2 Aleve daily for years. Patient was advised to discontinue anti-inflammatories and use only Tylenol as needed to prevent future GI bleeding. Patient reports he had a HA this morning but took some Tylenol and his symptoms were relieved. Patient reports his SOB on exertion and fatigue have improved since last week but are still present. Patient was prescribed 40 mg Protonix BID for further GI bleed prevention. Patient  reports he feels 110% improved since hospitalization.    EGD noted:                            - Normal esophagus.                             - Non-bleeding gastric ulcers with no stigmata of                             bleeding.                             - One non-bleeding duodenal ulcer with no stigmata                             of bleeding.     Melanoma: Stable. No issues. Patient had complete skin examination last month.     Prediabetes:     Glucose   Date Value Ref Range Status   02/28/2019 108 (H) 70 - 99 mg/dL Final   02/27/2019 119 (H) 70 - 99 mg/dL Final   02/27/2019 127 (H) 70 - 99 mg/dL Final   03/02/2018 120 (H) 70 - 99 mg/dL Final     Comment:     Fasting specimen   02/02/2017 122 (H) 70 - 99 mg/dL Final     Comment:     Fasting specimen     Lab Results   Component Value Date    A1C 5.6 03/02/2018    A1C 5.6 02/09/2017    A1C 5.8 11/20/2009    A1C 5.9 10/06/2008    A1C 5.3 08/23/2007 2/27-3/1/2019 Hospital Note       Hospital Course:   Sundar German is a 69 year old male with a history of osteoarthritis, hyperlipidemia, BPH, allergies and chronic sinusitis, insulin resistance, hypothyroidism who presents with melena and acute blood loss anemia likely consistent with an upper GI bleed.  He was started on a Protonix drip.  He required 2 units of packed red blood cells now with stable hemoglobin of about 8.  He underwent EGD yesterday showing nonbleeding gastric ulcers with no stigmata of bleeding and one nonbleeding duodenal ulcer with no stigmata of bleeding.  Likely etiology of his peptic ulcer disease is NSAIDs.  He will avoid NSAIDs that he uses for his arthritis pain.  The recommendation from gastroenterology is twice daily PPI for 3 months and a repeat EGD at that time.  I also instructed him to avoid alcohol use.  He is aware of the signs and symptoms of a return of GI bleeding.  Appears with all bleeding at this time has stopped that he is feeling much better after transfusion.  He is  hemodynamically stable and appears suitable for discharge.  Surgical pathology and EGD biopsies are pending at this time.     The patient was seen, examined, and counseled on this day. The hospitalization and plan of care was reviewed with the patient extensively. All questions were addressed and the patient agreed to follow-up as noted above.     Current Discharge Medication List            START taking these medications     Details   pantoprazole (PROTONIX) 40 MG EC tablet Take 1 tablet (40 mg) by mouth 2 times daily (before meals)  Qty: 180 tablet, Refills: 0     Associated Diagnoses: Gastrointestinal hemorrhage associated with gastric ulcer                CONTINUE these medications which have NOT CHANGED     Details   cycloSPORINE (RESTASIS) 0.05 % ophthalmic emulsion Place 1 drop into the right eye 2 times daily       fluticasone (FLONASE) 50 MCG/ACT spray SHAKE LIQUID AND USE 1 TO 2 SPRAYS IN EACH NOSTRIL DAILY  Qty: 3 Bottle, Refills: 3     Comments: **Patient requests 90 days supply**  Associated Diagnoses: Chronic sinusitis, unspecified location       levothyroxine (SYNTHROID/LEVOTHROID) 200 MCG tablet Take 1 tablet (200 mcg) by mouth daily  Qty: 90 tablet, Refills: 3     Associated Diagnoses: Acquired hypothyroidism       multivitamin, therapeutic with minerals (MULTI-VITAMIN) TABS Take 1 tablet by mouth daily       psyllium (METAMUCIL/KONSYL) Packet Take 1 packet by mouth daily       simvastatin (ZOCOR) 20 MG tablet Take 1 tablet (20 mg) by mouth At Bedtime  Qty: 90 tablet, Refills: 3     Associated Diagnoses: Hyperlipidemia LDL goal <130       SUDAFED SINUS  MG OR TABS Takes when needed       sildenafil (REVATIO) 20 MG tablet Take 1 tablet (20 mg) by mouth three daily as needed.  Qty: 90 tablet, Refills: 0     Associated Diagnoses: Erectile dysfunction, unspecified erectile dysfunction type          2/27/2019 Office Note    Black Stools  Onset: 1 Week    Description:   Consistency of stool: black,  formed stool  Blood in stool: YES - maybe 1 x small amount in toilet  Number of loose stools: 2-3 stools per day for 1 week    Progression of Symptoms:  same    Accompanying Signs & Symptoms:  Fever: no   Nausea or vomiting; no   Abdominal pain: no   Episodes of constipation: no - patient takes Metamucil daily  Weight loss: no     History:   Ill contacts: no   Recent use of antibiotics: YES- 2018 & 2018 for bronchitis   Recent travels: no          Recent medication-new or changes(Rx or OTC): YES- Patient started taking Total Restore (for healthy gut) x 10 days ago - 3 caps per day    Precipitating factors:   None    Alleviating factors:   None  Therapies Tried and outcome:  Stopped taking Total Restore 4 days ago - unsure if this is helping     Last Colonoscopy - 10/02/2013  Impression:                - Diverticulosis sigmoid colon and descending colon.                 - The examination was otherwise normal.  Recommendation:            - Repeat colonoscopy in 10 years for screening purposes.     Patient reports he started taking Total Restore 10 days ago and noticed his stools started to turn black and saw some blood in the toilet. Patient reports he stopped the Total Restore on 2019 and his stools have not returned to normal color. Patient reports on -2019 he saw that the water in the toilet was pink in color. Patient reports over the last 2 days the pink water is no longer present. Patient denies taking Pepto-bismal. Patient reports he use to take an Iron supplement but has not taken that in 1 month plus. Patient reports he takes 1 Aleve per day for joint pain management. Patient reports he has been having some mild lightheadedness/dizziness when he stands up or moves. Patient reports some sharp short-lived RLQ pain that happened 1 time. Family history - brother had GI bleed 2018 ( 3 months after due to infection, could not find bleed)     Pt also notes that he is noticing his heart  skipping - has noticed up to 3 times in 1 minute     Allergic Rhinitis/Chronic Sinusitis: Stable. Patient's Allergic Rhinitis/Chronic Sinusitis is well controlled by Flonase and Zyrtec D.     Problem list and histories reviewed & adjusted, as indicated.  Additional history: as documented    BP Readings from Last 3 Encounters:   03/06/19 124/60   03/01/19 132/61   02/27/19 118/66       body mass index is 33.75 kg/m .    Wt Readings from Last 4 Encounters:   03/06/19 109.8 kg (242 lb)   02/27/19 110 kg (242 lb 9.6 oz)   02/27/19 111.1 kg (245 lb)   12/31/18 111.5 kg (245 lb 12.8 oz)       Health Maintenance    Health Maintenance Due   Topic Date Due     FIT Q1 YR  02/04/1960     ZOSTER IMMUNIZATION (2 of 3) 01/14/2014     PSA Q1 YR  03/02/2019     MEDICARE ANNUAL WELLNESS VISIT  03/02/2019     FALL RISK ASSESSMENT  03/02/2019     PHQ-2 Q1 YR  03/02/2019       Current Problem List    Patient Active Problem List   Diagnosis     Allergic rhinitis     Acquired hypothyroidism     Hyperlipidemia LDL goal <130     Impaired memory     Sleep related leg cramps     Chronic sinusitis     S/P revision of total knee, left     BPH (benign prostatic hyperplasia)     OA (osteoarthritis)     Prediabetes     Class 1 obesity with serious comorbidity and body mass index (BMI) of 33.0 to 33.9 in adult, unspecified obesity type     h/o Malignant melanoma of torso excluding breast (H)     GI bleed       Past Medical History    Past Medical History:   Diagnosis Date     Abnormal glucose     A1C 1/06 =  6.3     Acquired hypothyroidism 10/11/2005     Problem list name updated by automated process. Provider to review     Allergic rhinitis, cause unspecified     mary spring time     Benign localized hyperplasia of prostate with urinary obstruction and other lower urinary tract symptoms (LUTS)(600.21)     slow stream, nocturia - Dr Malave     Chronic sinusitis 3/14/2014     Environmental allergies     AIT - Dr Weiss     Hyperlipidemia LDL  goal <130 10/31/2010     hypogonadism     low testosterone at times      RAFIQ (iron deficiency anemia)      Impaired memory 02/11/2011    ?     Melanoma (H) 04/2018    Melanoma 0.7 mm depth, Levar III, superficial spreading, stage T1a     OA (osteoarthritis) total L knee 3/09    knees bother;; has had bilat arthroscopic     Prediabetes      Sleep related leg cramps 06/2012       Past Surgical History    Past Surgical History:   Procedure Laterality Date     ARTHROPLASTY REVISION KNEE Left 10/26/2015     ARTHROPLASTY REVISION KNEE - Dr Dudley     COLONOSCOPY  10/2/2013    diverticulosis - due 10 yrs (10/2023), initial 10/2003     CYSTOSCOPY  04/2016     ENDOSCOPIC SINUS SURGERY  3/17/2014    Procedure: ENDOSCOPIC SINUS SURGERY;  Bilateral Endoscopic Sinus Surgery ;  Surgeon: Chuy Johnson MD;  Location: [facility] OR     ESOPHAGOSCOPY, GASTROSCOPY, DUODENOSCOPY (EGD), COMBINED N/A 2/28/2019    Procedure: COMBINED ESOPHAGOSCOPY, GASTROSCOPY, DUODENOSCOPY (EGD), BIOPSY SINGLE OR MULTIPLE with biopsies;  Surgeon: Angela Sinclair MD;  Location:  GI     HC REPAIR UMBILICAL KASI,5+Y/O,REDUC  2002     LASER REVOLIX PHOTOSELECTIVE VAPORIZATION PROSTATE N/A 4/6/2016    Procedure: LASER REVOLIX / QUANTA PHOTOSELECTIVE VAPORIZATION PROSTATE;  Surgeon: Sean Malave MD;  Location: [facility] OR     ROTATOR CUFF REPAIR RT/LT  3/2012    left shoulder     SURGICAL HISTORY OF -   1996    tonsils and adenoids     SURGICAL HISTORY OF -  Left 03/2009    Lt TKA     SURGICAL HISTORY OF -       Rt Knee x 1 (meniscus, 1995), Lt x 3 (last 2009)     VASECTOMY  1986       Current Medications    Current Outpatient Medications   Medication Sig Dispense Refill     levothyroxine (SYNTHROID/LEVOTHROID) 200 MCG tablet Take 1 tablet (200 mcg) by mouth daily 90 tablet 3     multivitamin, therapeutic with minerals (MULTI-VITAMIN) TABS Take 1 tablet by mouth daily       pantoprazole (PROTONIX) 40 MG EC tablet Take 1 tablet (40 mg) by mouth 2 times  daily (before meals) 180 tablet 0     psyllium (METAMUCIL/KONSYL) Packet Take 1 packet by mouth daily       simvastatin (ZOCOR) 20 MG tablet Take 1 tablet (20 mg) by mouth At Bedtime 90 tablet 3     cycloSPORINE (RESTASIS) 0.05 % ophthalmic emulsion Place 1 drop into the right eye 2 times daily       sildenafil (REVATIO) 20 MG tablet Take 1 tablet (20 mg) by mouth three daily as needed. 90 tablet 0     SUDAFED SINUS  MG OR TABS Takes when needed         Allergies    No Known Allergies    Immunizations    Immunization History   Administered Date(s) Administered     Influenza (IIV3) PF 10/01/2003, 10/21/2012, 11/19/2013, 09/14/2015     Influenza Vaccine IM 3yrs+ 4 Valent IIV4 09/15/2017     Pneumo Conj 13-V (2010&after) 01/12/2016     Pneumococcal 23 valent 11/21/2003, 03/02/2018     TD (ADULT, 7+) 03/02/2018     TDAP Vaccine (Adacel) 10/06/2008     Zoster vaccine, live 11/19/2013       Family History    Family History   Problem Relation Age of Onset     C.A.D. Father         CHF     Heart Failure Father      Chronic Obstructive Pulmonary Disease Father      Cerebrovascular Disease Brother 61     Gastrointestinal Disease Brother         GI Bleed - colectomy     Hypertension Mother      Cardiovascular Mother 84        MI     Lipids Mother      Chronic Obstructive Pulmonary Disease Mother      No Known Problems Sister      No Known Problems Brother        Social History    Social History     Socioeconomic History     Marital status:      Spouse name: Ingrid     Number of children: 4     Years of education: Not on file     Highest education level: Not on file   Occupational History     Employer: [employer]   Social Needs     Financial resource strain: Not on file     Food insecurity:     Worry: Not on file     Inability: Not on file     Transportation needs:     Medical: Not on file     Non-medical: Not on file   Tobacco Use     Smoking status: Former Smoker     Packs/day: 1.00     Years: 20.00     Pack  "years: 20.00     Last attempt to quit: 1981     Years since quittin.2     Smokeless tobacco: Former User     Quit date: 1992   Substance and Sexual Activity     Alcohol use: Yes     Alcohol/week: 7.2 oz     Types: 12 Standard drinks or equivalent per week     Comment: 12 per week avg     Drug use: No     Sexual activity: Yes     Partners: Female   Lifestyle     Physical activity:     Days per week: Not on file     Minutes per session: Not on file     Stress: Not on file   Relationships     Social connections:     Talks on phone: Not on file     Gets together: Not on file     Attends Confucianist service: Not on file     Active member of club or organization: Not on file     Attends meetings of clubs or organizations: Not on file     Relationship status: Not on file     Intimate partner violence:     Fear of current or ex partner: Not on file     Emotionally abused: Not on file     Physically abused: Not on file     Forced sexual activity: Not on file   Other Topics Concern     Parent/sibling w/ CABG, MI or angioplasty before 65F 55M? Not Asked   Social History Narrative     Not on file       All above reviewed and updated, all stable unless otherwise noted    Recent labs reviewed    ROS:  Constitutional, HEENT, cardiovascular, pulmonary, GI, , musculoskeletal, neuro, skin, endocrine and psych systems are negative, except as otherwise noted.    OBJECTIVE:                                                    /60   Pulse 66   Temp 98.1  F (36.7  C) (Oral)   Ht 1.803 m (5' 11\")   Wt 109.8 kg (242 lb)   SpO2 98%   BMI 33.75 kg/m    Body mass index is 33.75 kg/m .  GENERAL: healthy, alert and no distress  EYES: Eyes grossly normal to inspection  HENT:ear canals and TM's normal upon viewing with otoscope, nose and mouth without ulcers or lesions upon viewing with otoscope, Minimal bilateral cerumen impaction  NECK: no tenderness, no adenopathy, no asymmetry, no masses, no stiffness; thyroid- normal to " palpation  RESP: lungs clear to auscultation - no rales, no rhonchi, no wheezes  CV: regular rates and rhythm, normal S1 S2, no S3 or S4 and no murmur, no click or rub -  ABDOMEN: soft, no tenderness, no  hepatosplenomegaly, no masses, normal bowel sounds  MS: extremities- no gross deformities noted, no edema  SKIN: no suspicious lesions, no rashes  NEURO: strength and tone- normal, sensory exam- grossly normal, mentation- intact, speech- normal  BACK: no CVA tenderness, no paralumbar tenderness  PSYCH: Alert and oriented times 3; speech- coherent , normal rate and volume; able to articulate logical thoughts, able to abstract reason, no tangential thoughts, no hallucinations or delusions, affect- normal    DIAGNOSTICS/PROCEDURES:                                                      Results for orders placed or performed in visit on 03/06/19 (from the past 24 hour(s))   CBC with platelets   Result Value Ref Range    WBC 5.0 4.0 - 11.0 10e9/L    RBC Count 3.21 (L) 4.4 - 5.9 10e12/L    Hemoglobin 10.6 (L) 13.3 - 17.7 g/dL    Hematocrit 33.1 (L) 40.0 - 53.0 %     (H) 78 - 100 fl    MCH 33.0 26.5 - 33.0 pg    MCHC 32.0 31.5 - 36.5 g/dL    RDW 14.8 10.0 - 15.0 %    Platelet Count 373 150 - 450 10e9/L        Labs Pending     ASSESSMENT/PLAN:                                                        ICD-10-CM    1. Gastrointestinal hemorrhage associated with gastric ulcer K25.4 Comprehensive metabolic panel     GGT     CBC with platelets   2. Gastrointestinal hemorrhage associated with duodenal ulcer K26.4 Comprehensive metabolic panel     GGT     CBC with platelets   3. Anemia, unspecified type D64.9 CBC with platelets     Iron and iron binding capacity     Ferritin     Vitamin B12     Folate   4. Prediabetes R73.03 Comprehensive metabolic panel   5. Malignant melanoma of torso excluding breast (H) C43.59    6. Class 1 obesity with serious comorbidity and body mass index (BMI) of 33.0 to 33.9 in adult, unspecified  obesity type E66.9     Z68.33    7. Medication monitoring encounter Z51.81 Comprehensive metabolic panel     GGT     CBC with platelets   8. Abnormal levels of other serum enzymes  R74.8 GGT     Discussed treatment/modality options, including risk and benefits, he desires advised 1 multivitamin per day, advised dentist every 6 months, advised diet and exercise and advised opthalmologist every 1-2 years. All diagnosis above reviewed and noted above, otherwise stable.  See [facility] orders for further details.  Follow up as needed.    1) Patient reports he is 110% improved since hospitalization for melena and GI bleed. Hemoglobin with further labs performed today. Patient advised to continue Protonix use as prescribed. Patient advised to avoid anti-inflammatories. Patient advised to resume taking multivitamin with iron.    2) Follow up in 2-3 weeks for medication recheck visit.    Health Maintenance Due   Topic Date Due     FIT Q1 YR  02/04/1960     ZOSTER IMMUNIZATION (2 of 3) 01/14/2014     PSA Q1 YR  03/02/2019     MEDICARE ANNUAL WELLNESS VISIT  03/02/2019     FALL RISK ASSESSMENT  03/02/2019     PHQ-2 Q1 YR  03/02/2019     This document serves as a record of the services and decisions personally performed and made by Liu Nolasco MD. It was created on his behalf by Moises Vitale, a trained medical scribe. The creation of this document is based on the provider's statements to the medical scribe.  Moises Vitale March 6, 2019 12:01 PM     The information in this document, created by the medical scribe for me, accurately reflects the services I personally performed and the decisions made by me. I have reviewed and approved this document for accuracy prior to leaving the patient care area.  March 6, 2019            Liu Nolasco MD [address]  [id number] [phone] Fax

## 2019-03-06 NOTE — PATIENT INSTRUCTIONS
Avoid anti-inflammatories. Start taking Multivitamin with Iron again.    [facility]                        To reach your care team during and after hours:   [phone]  To reach our pharmacy:        [phone]    Clinic Hours                        Our clinic hours are:    Monday   7:30 am to 7:00 pm                  Tuesday through Friday 7:30 am to 5:00 pm                             Saturday   8:00 am to 12:00 pm      Sunday   Closed      Pharmacy Hours                        Our pharmacy hours are:    Monday   8:30 am to 7:00 pm       Tuesday to Friday  8:30 am to 6:00 pm                       Saturday    9:00 am to 1:00 pm              Sunday    Closed              There is also information available at our web site:  [email or web address]    If your provider ordered any lab tests and you do not receive the results within 10 business days, please call the clinic.    If you need a medication refill please contact your pharmacy.  Please allow 2-3 business days for your refill to be completed.    Our clinic offers telephone visits and e visits.  Please ask one of your team members to explain more.      Use [employer] (secure email communication and access to your chart) to send your primary care provider a message or make an appointment. Ask someone on your Team how to sign up for [facility].  Immunizations                      Immunization History   Administered Date(s) Administered     Influenza (IIV3) PF 10/01/2003, 10/21/2012, 11/19/2013, 09/14/2015     Influenza Vaccine IM 3yrs+ 4 Valent IIV4 09/15/2017     Pneumo Conj 13-V (2010&after) 01/12/2016     Pneumococcal 23 valent 11/21/2003, 03/02/2018     TD (ADULT, 7+) 03/02/2018     TDAP Vaccine (Adacel) 10/06/2008     Zoster vaccine, live 11/19/2013        Health Maintenance                         Health Maintenance Due   Topic Date Due     Colon Cancer Screening - FIT Test - yearly  02/04/1960     Zoster (Shingles) Vaccine (2 of 3) 01/14/2014      Prostate Test (PSA) - yearly  03/02/2019     Annual Wellness Visit  03/02/2019     FALL RISK ASSESSMENT  03/02/2019     Depression Assessment 2 - yearly  03/02/2019

## 2019-03-06 NOTE — LETTER
[facility]  [address] [place] [address]                  [phone]   March 8, 2019    Sundar German  [address] [facility] [facility] [id number]      Dear Sundar,    Here is a summary of your recent test results:    Labs are overall normal/improving.     We advise:     Continue current cares with close follow up.     For additional lab test information, [email or web address] is an excellent reference.     Let us know if you have any questions or concerns.     Thank you for choosing us for your health care needs!     Your test results are enclosed.      Please contact me if you have any questions.    In addition, here is a list of due or overdue Health Maintenance reminders.    Health Maintenance Due   Topic Date Due     Colon Cancer Screening - FIT Test - yearly  02/04/1960     Zoster (Shingles) Vaccine (2 of 3) 01/14/2014     Prostate Test (PSA) - yearly  03/02/2019     Annual Wellness Visit  03/02/2019     FALL RISK ASSESSMENT  03/02/2019     Depression Assessment 2 - yearly  03/02/2019       Please call us at [phone] (or use [employer]) to address the above recommendations.            Thank you very much for trusting [facility]..     Healthy regards,        Liu Nolasco M.D.        Results for orders placed or performed in visit on 03/06/19   Comprehensive metabolic panel   Result Value Ref Range    Sodium 142 133 - 144 mmol/L    Potassium 4.5 3.4 - 5.3 mmol/L    Chloride 110 (H) 94 - 109 mmol/L    Carbon Dioxide 24 20 - 32 mmol/L    Anion Gap 8 3 - 14 mmol/L    Glucose 101 (H) 70 - 99 mg/dL    Urea Nitrogen 14 7 - 30 mg/dL    Creatinine 1.00 0.66 - 1.25 mg/dL    GFR Estimate 77 >60 mL/min/[1.73_m2]    GFR Estimate If Black 89 >60 mL/min/[1.73_m2]    Calcium 9.3 8.5 - 10.1 mg/dL    Bilirubin Total 0.4 0.2 - 1.3 mg/dL    Albumin 4.0 3.4 - 5.0 g/dL    Protein Total 7.4 6.8 - 8.8 g/dL    Alkaline Phosphatase 53 40 - 150 U/L    ALT 50 0 -  70 U/L    AST 48 (H) 0 - 45 U/L   GGT   Result Value Ref Range    GGT 37 0 - 75 U/L   CBC with platelets   Result Value Ref Range    WBC 5.0 4.0 - 11.0 10e9/L    RBC Count 3.21 (L) 4.4 - 5.9 10e12/L    Hemoglobin 10.6 (L) 13.3 - 17.7 g/dL    Hematocrit 33.1 (L) 40.0 - 53.0 %     (H) 78 - 100 fl    MCH 33.0 26.5 - 33.0 pg    MCHC 32.0 31.5 - 36.5 g/dL    RDW 14.8 10.0 - 15.0 %    Platelet Count 373 150 - 450 10e9/L   Iron and iron binding capacity   Result Value Ref Range    Iron 57 35 - 180 ug/dL    Iron Binding Cap 414 240 - 430 ug/dL    Iron Saturation Index 14 (L) 15 - 46 %   Ferritin   Result Value Ref Range    Ferritin 170 26 - 388 ng/mL   Vitamin B12   Result Value Ref Range    Vitamin B12 740 193 - 986 pg/mL   Folate   Result Value Ref Range    Folate 28.5 >5.4 ng/mL

## 2019-03-07 LAB
ALBUMIN SERPL-MCNC: 4 G/DL (ref 3.4–5)
ALP SERPL-CCNC: 53 U/L (ref 40–150)
ALT SERPL W P-5'-P-CCNC: 50 U/L (ref 0–70)
ANION GAP SERPL CALCULATED.3IONS-SCNC: 8 MMOL/L (ref 3–14)
AST SERPL W P-5'-P-CCNC: 48 U/L (ref 0–45)
BILIRUB SERPL-MCNC: 0.4 MG/DL (ref 0.2–1.3)
BUN SERPL-MCNC: 14 MG/DL (ref 7–30)
CALCIUM SERPL-MCNC: 9.3 MG/DL (ref 8.5–10.1)
CHLORIDE SERPL-SCNC: 110 MMOL/L (ref 94–109)
CO2 SERPL-SCNC: 24 MMOL/L (ref 20–32)
CREAT SERPL-MCNC: 1 MG/DL (ref 0.66–1.25)
FERRITIN SERPL-MCNC: 170 NG/ML (ref 26–388)
GFR SERPL CREATININE-BSD FRML MDRD: 77 ML/MIN/{1.73_M2}
GGT SERPL-CCNC: 37 U/L (ref 0–75)
GLUCOSE SERPL-MCNC: 101 MG/DL (ref 70–99)
IRON SATN MFR SERPL: 14 % (ref 15–46)
IRON SERPL-MCNC: 57 UG/DL (ref 35–180)
POTASSIUM SERPL-SCNC: 4.5 MMOL/L (ref 3.4–5.3)
PROT SERPL-MCNC: 7.4 G/DL (ref 6.8–8.8)
SODIUM SERPL-SCNC: 142 MMOL/L (ref 133–144)
TIBC SERPL-MCNC: 414 UG/DL (ref 240–430)

## 2019-03-23 ENCOUNTER — OFFICE VISIT (OUTPATIENT)
Dept: FAMILY MEDICINE | Facility: CLINIC | Age: 69
End: 2019-03-23
Payer: COMMERCIAL

## 2019-03-23 VITALS
TEMPERATURE: 98.1 F | HEIGHT: 71 IN | HEART RATE: 69 BPM | DIASTOLIC BLOOD PRESSURE: 72 MMHG | OXYGEN SATURATION: 98 % | BODY MASS INDEX: 32.9 KG/M2 | SYSTOLIC BLOOD PRESSURE: 120 MMHG | WEIGHT: 235 LBS

## 2019-03-23 DIAGNOSIS — K26.4 GASTROINTESTINAL HEMORRHAGE ASSOCIATED WITH DUODENAL ULCER: ICD-10-CM

## 2019-03-23 DIAGNOSIS — E03.9 ACQUIRED HYPOTHYROIDISM: ICD-10-CM

## 2019-03-23 DIAGNOSIS — Z51.81 MEDICATION MONITORING ENCOUNTER: ICD-10-CM

## 2019-03-23 DIAGNOSIS — D64.9 ANEMIA, UNSPECIFIED TYPE: ICD-10-CM

## 2019-03-23 DIAGNOSIS — E78.5 HYPERLIPIDEMIA LDL GOAL <130: ICD-10-CM

## 2019-03-23 DIAGNOSIS — K25.4 GASTROINTESTINAL HEMORRHAGE ASSOCIATED WITH GASTRIC ULCER: ICD-10-CM

## 2019-03-23 DIAGNOSIS — J01.90 ACUTE NON-RECURRENT SINUSITIS, UNSPECIFIED LOCATION: ICD-10-CM

## 2019-03-23 DIAGNOSIS — J06.9 ACUTE URI: Primary | ICD-10-CM

## 2019-03-23 DIAGNOSIS — E66.811 CLASS 1 OBESITY WITH SERIOUS COMORBIDITY AND BODY MASS INDEX (BMI) OF 32.0 TO 32.9 IN ADULT, UNSPECIFIED OBESITY TYPE: ICD-10-CM

## 2019-03-23 LAB
ALBUMIN SERPL-MCNC: 3.9 G/DL (ref 3.4–5)
ALP SERPL-CCNC: 54 U/L (ref 40–150)
ALT SERPL W P-5'-P-CCNC: 48 U/L (ref 0–70)
ANION GAP SERPL CALCULATED.3IONS-SCNC: 5 MMOL/L (ref 3–14)
AST SERPL W P-5'-P-CCNC: 31 U/L (ref 0–45)
BILIRUB SERPL-MCNC: 0.3 MG/DL (ref 0.2–1.3)
BUN SERPL-MCNC: 17 MG/DL (ref 7–30)
CALCIUM SERPL-MCNC: 9.5 MG/DL (ref 8.5–10.1)
CHLORIDE SERPL-SCNC: 111 MMOL/L (ref 94–109)
CO2 SERPL-SCNC: 26 MMOL/L (ref 20–32)
CREAT SERPL-MCNC: 0.77 MG/DL (ref 0.66–1.25)
ERYTHROCYTE [DISTWIDTH] IN BLOOD BY AUTOMATED COUNT: 13.3 % (ref 10–15)
FERRITIN SERPL-MCNC: 81 NG/ML (ref 26–388)
GFR SERPL CREATININE-BSD FRML MDRD: >90 ML/MIN/{1.73_M2}
GLUCOSE SERPL-MCNC: 111 MG/DL (ref 70–99)
HCT VFR BLD AUTO: 35.8 % (ref 40–53)
HGB BLD-MCNC: 11.8 G/DL (ref 13.3–17.7)
IRON SATN MFR SERPL: 13 % (ref 15–46)
IRON SERPL-MCNC: 48 UG/DL (ref 35–180)
MCH RBC QN AUTO: 32.6 PG (ref 26.5–33)
MCHC RBC AUTO-ENTMCNC: 33 G/DL (ref 31.5–36.5)
MCV RBC AUTO: 99 FL (ref 78–100)
PLATELET # BLD AUTO: 274 10E9/L (ref 150–450)
POTASSIUM SERPL-SCNC: 4 MMOL/L (ref 3.4–5.3)
PROT SERPL-MCNC: 7.7 G/DL (ref 6.8–8.8)
RBC # BLD AUTO: 3.62 10E12/L (ref 4.4–5.9)
SODIUM SERPL-SCNC: 142 MMOL/L (ref 133–144)
TIBC SERPL-MCNC: 382 UG/DL (ref 240–430)
WBC # BLD AUTO: 5.3 10E9/L (ref 4–11)

## 2019-03-23 PROCEDURE — 83540 ASSAY OF IRON: CPT | Performed by: FAMILY MEDICINE

## 2019-03-23 PROCEDURE — 36415 COLL VENOUS BLD VENIPUNCTURE: CPT | Performed by: FAMILY MEDICINE

## 2019-03-23 PROCEDURE — 80053 COMPREHEN METABOLIC PANEL: CPT | Performed by: FAMILY MEDICINE

## 2019-03-23 PROCEDURE — 82728 ASSAY OF FERRITIN: CPT | Performed by: FAMILY MEDICINE

## 2019-03-23 PROCEDURE — 85027 COMPLETE CBC AUTOMATED: CPT | Performed by: FAMILY MEDICINE

## 2019-03-23 PROCEDURE — 83550 IRON BINDING TEST: CPT | Performed by: FAMILY MEDICINE

## 2019-03-23 PROCEDURE — 99214 OFFICE O/P EST MOD 30 MIN: CPT | Performed by: FAMILY MEDICINE

## 2019-03-23 RX ORDER — SIMVASTATIN 20 MG
20 TABLET ORAL AT BEDTIME
Qty: 90 TABLET | Refills: 3 | Status: SHIPPED | OUTPATIENT
Start: 2019-03-23 | End: 2019-06-19

## 2019-03-23 RX ORDER — AZITHROMYCIN 250 MG/1
TABLET, FILM COATED ORAL
Qty: 6 TABLET | Refills: 0 | Status: SHIPPED | OUTPATIENT
Start: 2019-03-23 | End: 2019-06-19

## 2019-03-23 RX ORDER — LEVOTHYROXINE SODIUM 200 UG/1
200 TABLET ORAL DAILY
Qty: 90 TABLET | Refills: 3 | Status: SHIPPED | OUTPATIENT
Start: 2019-03-23 | End: 2019-05-09

## 2019-03-23 ASSESSMENT — MIFFLIN-ST. JEOR: SCORE: 1858.08

## 2019-03-23 NOTE — PROGRESS NOTES
SUBJECTIVE:   Sundar German is a 69 year old male who presents to clinic today for the following health issues:    Acute Illness     Acute illness concerns: Cough    Onset: 1 week      Fever: no    Chills/Sweats: no    Headache (location?): no    Sinus Pressure:no    Conjunctivitis:  no    Ear Pain: no    Rhinorrhea: YES - yellow/green/clear    Congestion: YES- chest    Sore Throat: no     Cough: YES-productive of clear/yellow/green sputum    Wheeze: YES- at night    Decreased Appetite: no    Nausea: no    Vomiting: no    Diarrhea:  no    Dysuria/Freq.: no    Fatigue/Achiness: YES    Sick/Strep Exposure: YES     Therapies Tried and outcome: mucinex/zyrtec    Recent GI Bleed - no current issues - no signs of bleeding - EGD follow up and physical in the next 1-2 months - on protonix BID - normal brown stools    3/6/19    Hospital Follow-up Visit:     Hospital/Nursing Home/IP Rehab Facility: [facility]  Date of Admission: 2/27/19  Date of Discharge: 3/1/19  Reason(s) for Admission: GI bleed            Problems taking medications regularly:  None       Medication changes since discharge: protonix was added       Problems adhering to non-medication therapy:  None     Summary of hospitalization:  [facility] discharge summary reviewed  Diagnostic Tests/Treatments reviewed.  Follow up needed: none  Other Healthcare Providers Involved in Patient s Care:         None  Update since discharge: improved.      Post Discharge Medication Reconciliation: discharge medications reconciled, continue medications without change.  Plan of care communicated with patient         3/6/2019 Hospital Follow up     Patient was seen in the hospital from 2/27-3/1/2019 due to melena and upper GI bleeding. Patient was transfused with 2 units of blood. Patient reports he is feeling good since being discharged from the hospital on 3/1/2019. Patient reports he no longer has any black or bloody stools. Patient informed GI doctor  that saw him in the hospital that he has been taking 2 Aleve daily for years. Patient was advised to discontinue anti-inflammatories and use only Tylenol as needed to prevent future GI bleeding. Patient reports he had a HA this morning but took some Tylenol and his symptoms were relieved. Patient reports his SOB on exertion and fatigue have improved since last week but are still present. Patient was prescribed 40 mg Protonix BID for further GI bleed prevention. Patient reports he feels 110% improved since hospitalization.     EGD noted:                            - Normal esophagus.                             - Non-bleeding gastric ulcers with no stigmata of                             bleeding.                             - One non-bleeding duodenal ulcer with no stigmata                             of bleeding.      Melanoma: Stable. No issues. Patient had complete skin examination last month.      Prediabetes:              Glucose   Date Value Ref Range Status   02/28/2019 108 (H) 70 - 99 mg/dL Final   02/27/2019 119 (H) 70 - 99 mg/dL Final   02/27/2019 127 (H) 70 - 99 mg/dL Final   03/02/2018 120 (H) 70 - 99 mg/dL Final       Comment:       Fasting specimen   02/02/2017 122 (H) 70 - 99 mg/dL Final       Comment:       Fasting specimen            Lab Results   Component Value Date     A1C 5.6 03/02/2018     A1C 5.6 02/09/2017     A1C 5.8 11/20/2009     A1C 5.9 10/06/2008     A1C 5.3 08/23/2007 2/27-3/1/2019 Hospital Note        Hospital Course:   Sundar German is a 69 year old male with a history of osteoarthritis, hyperlipidemia, BPH, allergies and chronic sinusitis, insulin resistance, hypothyroidism who presents with melena and acute blood loss anemia likely consistent with an upper GI bleed.  He was started on a Protonix drip.  He required 2 units of packed red blood cells now with stable hemoglobin of about 8.  He underwent EGD yesterday showing nonbleeding gastric ulcers with no stigmata of bleeding  and one nonbleeding duodenal ulcer with no stigmata of bleeding.  Likely etiology of his peptic ulcer disease is NSAIDs.  He will avoid NSAIDs that he uses for his arthritis pain.  The recommendation from gastroenterology is twice daily PPI for 3 months and a repeat EGD at that time.  I also instructed him to avoid alcohol use.  He is aware of the signs and symptoms of a return of GI bleeding.  Appears with all bleeding at this time has stopped that he is feeling much better after transfusion.  He is hemodynamically stable and appears suitable for discharge.  Surgical pathology and EGD biopsies are pending at this time.     The patient was seen, examined, and counseled on this day. The hospitalization and plan of care was reviewed with the patient extensively. All questions were addressed and the patient agreed to follow-up as noted above.                                2/27/2019 Office Note     Black Stools  Onset: 1 Week    Description:   Consistency of stool: black, formed stool  Blood in stool: YES - maybe 1 x small amount in toilet  Number of loose stools: 2-3 stools per day for 1 week    Progression of Symptoms:  same    Accompanying Signs & Symptoms:  Fever: no   Nausea or vomiting; no   Abdominal pain: no   Episodes of constipation: no - patient takes Metamucil daily  Weight loss: no     History:   Ill contacts: no   Recent use of antibiotics: YES- 11/2018 & 12/2018 for bronchitis   Recent travels: no          Recent medication-new or changes(Rx or OTC): YES- Patient started taking Total Restore (for healthy gut) x 10 days ago - 3 caps per day    Precipitating factors:   None    Alleviating factors:   None  Therapies Tried and outcome:  Stopped taking Total Restore 4 days ago - unsure if this is helping     Last Colonoscopy - 10/02/2013  Impression:                - Diverticulosis sigmoid colon and descending colon.                 - The examination was otherwise normal.  Recommendation:            - Repeat  colonoscopy in 10 years for screening purposes.     Patient reports he started taking Total Restore 10 days ago and noticed his stools started to turn black and saw some blood in the toilet. Patient reports he stopped the Total Restore on 2019 and his stools have not returned to normal color. Patient reports on -2019 he saw that the water in the toilet was pink in color. Patient reports over the last 2 days the pink water is no longer present. Patient denies taking Pepto-bismal. Patient reports he use to take an Iron supplement but has not taken that in 1 month plus. Patient reports he takes 1 Aleve per day for joint pain management. Patient reports he has been having some mild lightheadedness/dizziness when he stands up or moves. Patient reports some sharp short-lived RLQ pain that happened 1 time. Family history - brother had GI bleed 2018 ( 3 months after due to infection, could not find bleed)     Pt also notes that he is noticing his heart skipping - has noticed up to 3 times in 1 minute     Allergic Rhinitis/Chronic Sinusitis: Stable. Patient's Allergic Rhinitis/Chronic Sinusitis is well controlled by Flonase and Zyrtec D.     Problem list and histories reviewed & adjusted, as indicated.  Additional history: as documented    Reviewed and updated as needed this visit by clinical staff  Tobacco  Allergies  Meds  Med Hx  Surg Hx  Fam Hx  Soc Hx      Reviewed and updated as needed this visit by Provider       BP Readings from Last 3 Encounters:   19 120/72   19 124/60   19 132/61       body mass index is 32.78 kg/m .    Wt Readings from Last 4 Encounters:   19 106.6 kg (235 lb)   19 109.8 kg (242 lb)   19 110 kg (242 lb 9.6 oz)   19 111.1 kg (245 lb)       Health Maintenance    Health Maintenance Due   Topic Date Due     FIT Q1 YR  1960     ZOSTER IMMUNIZATION (2 of 3) 2014     PSA Q1 YR  2019     PHQ-2 Q1 YR  2019      MEDICARE ANNUAL WELLNESS VISIT  03/02/2019     FALL RISK ASSESSMENT  03/02/2019       Current Problem List    Patient Active Problem List   Diagnosis     Allergic rhinitis     Acquired hypothyroidism     Hyperlipidemia LDL goal <130     Impaired memory     Sleep related leg cramps     Chronic sinusitis     S/P revision of total knee, left     BPH (benign prostatic hyperplasia)     OA (osteoarthritis)     Prediabetes     Class 1 obesity with serious comorbidity and body mass index (BMI) of 33.0 to 33.9 in adult, unspecified obesity type     h/o Malignant melanoma of torso excluding breast (H)     GI bleed       Past Medical History    Past Medical History:   Diagnosis Date     Abnormal glucose     A1C 1/06 =  6.3     Acquired hypothyroidism 10/11/2005     Problem list name updated by automated process. Provider to review     Allergic rhinitis, cause unspecified     mary spring time     Benign localized hyperplasia of prostate with urinary obstruction and other lower urinary tract symptoms (LUTS)(600.21)     slow stream, nocturia - Dr Malave     Chronic sinusitis 3/14/2014     Environmental allergies     AIT - Dr Weiss     Hyperlipidemia LDL goal <130 10/31/2010     hypogonadism     low testosterone at times      RAFIQ (iron deficiency anemia)      Impaired memory 02/11/2011    ?     Melanoma (H) 04/2018    Melanoma 0.7 mm depth, Levar III, superficial spreading, stage T1a     OA (osteoarthritis) total L knee 3/09    knees bother;; has had bilat arthroscopic     Prediabetes      Sleep related leg cramps 06/2012       Past Surgical History    Past Surgical History:   Procedure Laterality Date     ARTHROPLASTY REVISION KNEE Left 10/26/2015     ARTHROPLASTY REVISION KNEE - Dr Dudley     COLONOSCOPY  10/2/2013    diverticulosis - due 10 yrs (10/2023), initial 10/2003     CYSTOSCOPY  04/2016     ENDOSCOPIC SINUS SURGERY  3/17/2014    Procedure: ENDOSCOPIC SINUS SURGERY;  Bilateral Endoscopic Sinus Surgery ;  Surgeon:  Chuy Johnson MD;  Location: [facility] OR     ESOPHAGOSCOPY, GASTROSCOPY, DUODENOSCOPY (EGD), COMBINED N/A 2/28/2019    Non bleeding gastric ulcers and duodenal ulcer. Recheck 3 months. Dr Sinclair     HC REPAIR UMBILICAL KASI,5+Y/O,REDUC  2002     LASER REVOLIX PHOTOSELECTIVE VAPORIZATION PROSTATE N/A 4/6/2016    Procedure: LASER REVOLIX / QUANTA PHOTOSELECTIVE VAPORIZATION PROSTATE;  Surgeon: Sean Malave MD;  Location: [facility] OR     ROTATOR CUFF REPAIR RT/LT  3/2012    left shoulder     SURGICAL HISTORY OF -   1996    tonsils and adenoids     SURGICAL HISTORY OF -  Left 03/2009    Lt TKA     SURGICAL HISTORY OF -       Rt Knee x 1 (meniscus, 1995), Lt x 3 (last 2009)     VASECTOMY  1986       Current Medications    Current Outpatient Medications   Medication Sig Dispense Refill     azithromycin (ZITHROMAX) 250 MG tablet 2 tabs day 1 and the 1 tab daily for 4 more days 6 tablet 0     levothyroxine (SYNTHROID/LEVOTHROID) 200 MCG tablet Take 1 tablet (200 mcg) by mouth daily 90 tablet 3     multivitamin, therapeutic with minerals (MULTI-VITAMIN) TABS Take 1 tablet by mouth daily       pantoprazole (PROTONIX) 40 MG EC tablet Take 1 tablet (40 mg) by mouth 2 times daily (before meals) 180 tablet 0     psyllium (METAMUCIL/KONSYL) Packet Take 1 packet by mouth daily       simvastatin (ZOCOR) 20 MG tablet Take 1 tablet (20 mg) by mouth At Bedtime 90 tablet 3     SUDAFED SINUS  MG OR TABS Takes when needed       cycloSPORINE (RESTASIS) 0.05 % ophthalmic emulsion Place 1 drop into the right eye 2 times daily       sildenafil (REVATIO) 20 MG tablet Take 1 tablet (20 mg) by mouth three daily as needed. 90 tablet 0       Allergies    No Known Allergies    Immunizations    Immunization History   Administered Date(s) Administered     Influenza (IIV3) PF 10/01/2003, 10/21/2012, 11/19/2013, 09/14/2015     Influenza Vaccine IM 3yrs+ 4 Valent IIV4 09/15/2017     Pneumo Conj 13-V (2010&after) 01/12/2016     Pneumococcal 23 valent  2003, 2018     TD (ADULT, 7+) 2018     TDAP Vaccine (Adacel) 10/06/2008     Zoster vaccine, live 2013       Family History    Family History   Problem Relation Age of Onset     C.A.D. Father         CHF     Heart Failure Father      Chronic Obstructive Pulmonary Disease Father      Cerebrovascular Disease Brother 61     Gastrointestinal Disease Brother         GI Bleed - colectomy     Hypertension Mother      Cardiovascular Mother 84        MI     Lipids Mother      Chronic Obstructive Pulmonary Disease Mother      No Known Problems Sister      No Known Problems Brother        Social History    Social History     Socioeconomic History     Marital status:      Spouse name: Ingrid     Number of children: 4     Years of education: Not on file     Highest education level: Not on file   Occupational History     Employer: [employer]   Social Needs     Financial resource strain: Not on file     Food insecurity:     Worry: Not on file     Inability: Not on file     Transportation needs:     Medical: Not on file     Non-medical: Not on file   Tobacco Use     Smoking status: Former Smoker     Packs/day: 1.00     Years: 20.00     Pack years: 20.00     Last attempt to quit: 1981     Years since quittin.2     Smokeless tobacco: Former User     Quit date: 1992   Substance and Sexual Activity     Alcohol use: Yes     Alcohol/week: 7.2 oz     Types: 12 Standard drinks or equivalent per week     Comment: 12 per week avg     Drug use: No     Sexual activity: Yes     Partners: Female   Lifestyle     Physical activity:     Days per week: Not on file     Minutes per session: Not on file     Stress: Not on file   Relationships     Social connections:     Talks on phone: Not on file     Gets together: Not on file     Attends Christian service: Not on file     Active member of club or organization: Not on file     Attends meetings of clubs or organizations: Not on file     Relationship  "status: Not on file     Intimate partner violence:     Fear of current or ex partner: Not on file     Emotionally abused: Not on file     Physically abused: Not on file     Forced sexual activity: Not on file   Other Topics Concern     Parent/sibling w/ CABG, MI or angioplasty before 65F 55M? Not Asked   Social History Narrative     Not on file       All above reviewed and updated, all stable unless otherwise noted    Recent labs reviewed    ROS:  CONSTITUTIONAL: NEGATIVE for fever, chills, change in weight  INTEGUMENTARY/SKIN: NEGATIVE for worrisome rashes, moles or lesions  EYES: NEGATIVE for vision changes or irritation  ENT/MOUTH: NEGATIVE for ear, mouth and throat problems  RESP: NEGATIVE for significant cough or SOB  CV: NEGATIVE for chest pain, palpitations or peripheral edema  GI: NEGATIVE for nausea, abdominal pain, heartburn, or change in bowel habits  : NEGATIVE for frequency, dysuria, or hematuria  MUSCULOSKELETAL: NEGATIVE for significant arthralgias or myalgia  NEURO: NEGATIVE for weakness, dizziness or paresthesias  ENDOCRINE: NEGATIVE for temperature intolerance, skin/hair changes  HEME: NEGATIVE for bleeding problems  PSYCHIATRIC: NEGATIVE for changes in mood or affect    OBJECTIVE:                                                    /72   Pulse 69   Temp 98.1  F (36.7  C) (Oral)   Ht 1.803 m (5' 11\")   Wt 106.6 kg (235 lb)   SpO2 98%   BMI 32.78 kg/m    Body mass index is 32.78 kg/m .  GENERAL: healthy, alert and no distress  EYES: Eyes grossly normal to inspection, extraocular movements - intact, and PERRL  HENT: ear canals- normal; TMs- normal; Nose- normal; Mouth- no ulcers, no lesions  NECK: no tenderness, no adenopathy, no asymmetry, no masses, no stiffness; thyroid- normal to palpation  RESP: lungs clear to auscultation - no rales, no rhonchi, no wheezes  CV: regular rates and rhythm, normal S1 S2, no S3 or S4 and no murmur, no click or rub -  ABDOMEN: soft, no tenderness, no  " hepatosplenomegaly, no masses, normal bowel sounds  MS: extremities- no gross deformities noted, no edema  SKIN: no suspicious lesions, no rashes  NEURO: strength and tone- normal, sensory exam- grossly normal, mentation- intact, speech- normal, reflexes- symmetric  BACK: no CVA tenderness, no paralumbar tenderness  PSYCH: Alert and oriented times 3; speech- coherent , normal rate and volume; able to articulate logical thoughts, able to abstract reason, no tangential thoughts, no hallucinations or delusions, affect- normal  LYMPHATICS: no cervical adenopathy    DIAGNOSTICS/PROCEDURES:                                                      Pending     ASSESSMENT/PLAN:                                                        ICD-10-CM    1. Acute URI J06.9 azithromycin (ZITHROMAX) 250 MG tablet   2. Acute non-recurrent sinusitis, unspecified location J01.90 azithromycin (ZITHROMAX) 250 MG tablet   3. Gastrointestinal hemorrhage associated with gastric ulcer K25.4 Comprehensive metabolic panel     CBC with platelets     Iron and iron binding capacity     Ferritin   4. Gastrointestinal hemorrhage associated with duodenal ulcer K26.4 Comprehensive metabolic panel     CBC with platelets     Iron and iron binding capacity     Ferritin   5. Anemia, unspecified type D64.9 CBC with platelets     Iron and iron binding capacity     Ferritin   6. Acquired hypothyroidism E03.9 levothyroxine (SYNTHROID/LEVOTHROID) 200 MCG tablet   7. Hyperlipidemia LDL goal <130 E78.5 simvastatin (ZOCOR) 20 MG tablet   8. Class 1 obesity with serious comorbidity and body mass index (BMI) of 32.0 to 32.9 in adult, unspecified obesity type E66.9     Z68.32    9. Medication monitoring encounter Z51.81 Comprehensive metabolic panel     CBC with platelets     Iron and iron binding capacity     Ferritin     Discussed treatment/modality options, including risk and benefits he desires:    Zithromax.  Sx cares.  Labs.  Med refills.  EGD soon  CPX fasting soon.      All diagnosis above reviewed and noted above, otherwise stable.  [facility].     Return in about 2 months (around 5/23/2019), or if symptoms worsen or fail to improve, for Complete Physical.    Health Maintenance Due   Topic Date Due     FIT Q1 YR  02/04/1960     ZOSTER IMMUNIZATION (2 of 3) 01/14/2014     PSA Q1 YR  03/02/2019     PHQ-2 Q1 YR  03/02/2019     MEDICARE ANNUAL WELLNESS VISIT  03/02/2019     FALL RISK ASSESSMENT  03/02/2019       See Patient Instructions           Liu Nolasco MD [address]  [address] [phone] Fax

## 2019-03-25 PROBLEM — Z29.8 * * * SBE PROPHYLAXIS * * *: Status: ACTIVE | Noted: 2019-03-25

## 2019-05-06 DIAGNOSIS — E03.9 ACQUIRED HYPOTHYROIDISM: ICD-10-CM

## 2019-05-07 ENCOUNTER — TELEPHONE (OUTPATIENT)
Dept: FAMILY MEDICINE | Facility: CLINIC | Age: 69
End: 2019-05-07

## 2019-05-07 DIAGNOSIS — E66.811 CLASS 1 OBESITY WITH SERIOUS COMORBIDITY AND BODY MASS INDEX (BMI) OF 33.0 TO 33.9 IN ADULT, UNSPECIFIED OBESITY TYPE: ICD-10-CM

## 2019-05-07 DIAGNOSIS — Z00.00 ROUTINE HISTORY AND PHYSICAL EXAMINATION OF ADULT: Primary | ICD-10-CM

## 2019-05-07 DIAGNOSIS — Z00.00 ROUTINE HISTORY AND PHYSICAL EXAMINATION OF ADULT: ICD-10-CM

## 2019-05-07 DIAGNOSIS — R73.03 PREDIABETES: ICD-10-CM

## 2019-05-07 DIAGNOSIS — Z12.5 SCREENING FOR PROSTATE CANCER: ICD-10-CM

## 2019-05-07 DIAGNOSIS — K92.2 GI BLEED: ICD-10-CM

## 2019-05-07 DIAGNOSIS — E03.9 ACQUIRED HYPOTHYROIDISM: ICD-10-CM

## 2019-05-07 DIAGNOSIS — E78.5 HYPERLIPIDEMIA LDL GOAL <130: ICD-10-CM

## 2019-05-07 DIAGNOSIS — Z12.11 SPECIAL SCREENING FOR MALIGNANT NEOPLASMS, COLON: Primary | ICD-10-CM

## 2019-05-07 PROCEDURE — G0103 PSA SCREENING: HCPCS | Performed by: FAMILY MEDICINE

## 2019-05-07 PROCEDURE — 83540 ASSAY OF IRON: CPT | Performed by: FAMILY MEDICINE

## 2019-05-07 PROCEDURE — 82728 ASSAY OF FERRITIN: CPT | Performed by: FAMILY MEDICINE

## 2019-05-07 PROCEDURE — 82550 ASSAY OF CK (CPK): CPT | Performed by: FAMILY MEDICINE

## 2019-05-07 PROCEDURE — 83036 HEMOGLOBIN GLYCOSYLATED A1C: CPT | Performed by: FAMILY MEDICINE

## 2019-05-07 PROCEDURE — 84439 ASSAY OF FREE THYROXINE: CPT | Performed by: FAMILY MEDICINE

## 2019-05-07 PROCEDURE — 80053 COMPREHEN METABOLIC PANEL: CPT | Performed by: FAMILY MEDICINE

## 2019-05-07 PROCEDURE — 80061 LIPID PANEL: CPT | Performed by: FAMILY MEDICINE

## 2019-05-07 PROCEDURE — 83550 IRON BINDING TEST: CPT | Performed by: FAMILY MEDICINE

## 2019-05-07 PROCEDURE — 84443 ASSAY THYROID STIM HORMONE: CPT | Performed by: FAMILY MEDICINE

## 2019-05-07 PROCEDURE — 36415 COLL VENOUS BLD VENIPUNCTURE: CPT | Performed by: FAMILY MEDICINE

## 2019-05-07 PROCEDURE — 85027 COMPLETE CBC AUTOMATED: CPT | Performed by: FAMILY MEDICINE

## 2019-05-07 NOTE — TELEPHONE ENCOUNTER
Cmp, cbc, ck, lipid reflex, iron, ferritin, tibc, UA micro/UC if, microalbumin, psa, fit, a1c, tsh, free t4    DX  Routine physical  Recent GI bleed  See problem list

## 2019-05-07 NOTE — TELEPHONE ENCOUNTER
Future orders placed.      [employer], PARVIZ, RN, N  [facility] Triage  ) [phone]

## 2019-05-08 LAB
ALBUMIN SERPL-MCNC: 3.9 G/DL (ref 3.4–5)
ALP SERPL-CCNC: 61 U/L (ref 40–150)
ALT SERPL W P-5'-P-CCNC: 30 U/L (ref 0–70)
ANION GAP SERPL CALCULATED.3IONS-SCNC: 1 MMOL/L (ref 3–14)
AST SERPL W P-5'-P-CCNC: 27 U/L (ref 0–45)
BILIRUB SERPL-MCNC: 0.4 MG/DL (ref 0.2–1.3)
BUN SERPL-MCNC: 14 MG/DL (ref 7–30)
CALCIUM SERPL-MCNC: 9.6 MG/DL (ref 8.5–10.1)
CHLORIDE SERPL-SCNC: 109 MMOL/L (ref 94–109)
CHOLEST SERPL-MCNC: 157 MG/DL
CK SERPL-CCNC: 165 U/L (ref 30–300)
CO2 SERPL-SCNC: 29 MMOL/L (ref 20–32)
CREAT SERPL-MCNC: 0.78 MG/DL (ref 0.66–1.25)
ERYTHROCYTE [DISTWIDTH] IN BLOOD BY AUTOMATED COUNT: 13.7 % (ref 10–15)
FERRITIN SERPL-MCNC: 54 NG/ML (ref 26–388)
GFR SERPL CREATININE-BSD FRML MDRD: >90 ML/MIN/{1.73_M2}
GLUCOSE SERPL-MCNC: 106 MG/DL (ref 70–99)
HBA1C MFR BLD: 5.5 % (ref 0–5.6)
HCT VFR BLD AUTO: 41.3 % (ref 40–53)
HDLC SERPL-MCNC: 51 MG/DL
HGB BLD-MCNC: 13.7 G/DL (ref 13.3–17.7)
IRON SATN MFR SERPL: 16 % (ref 15–46)
IRON SERPL-MCNC: 59 UG/DL (ref 35–180)
LDLC SERPL CALC-MCNC: 94 MG/DL
MCH RBC QN AUTO: 31.7 PG (ref 26.5–33)
MCHC RBC AUTO-ENTMCNC: 33.2 G/DL (ref 31.5–36.5)
MCV RBC AUTO: 96 FL (ref 78–100)
NONHDLC SERPL-MCNC: 106 MG/DL
PLATELET # BLD AUTO: 307 10E9/L (ref 150–450)
POTASSIUM SERPL-SCNC: 4.2 MMOL/L (ref 3.4–5.3)
PROT SERPL-MCNC: 8 G/DL (ref 6.8–8.8)
PSA SERPL-ACNC: 0.77 UG/L (ref 0–4)
RBC # BLD AUTO: 4.32 10E12/L (ref 4.4–5.9)
SODIUM SERPL-SCNC: 139 MMOL/L (ref 133–144)
T4 FREE SERPL-MCNC: 1.41 NG/DL (ref 0.76–1.46)
TIBC SERPL-MCNC: 372 UG/DL (ref 240–430)
TRIGL SERPL-MCNC: 61 MG/DL
TSH SERPL DL<=0.005 MIU/L-ACNC: 0.03 MU/L (ref 0.4–4)
WBC # BLD AUTO: 6 10E9/L (ref 4–11)

## 2019-05-08 RX ORDER — LEVOTHYROXINE SODIUM 200 UG/1
TABLET ORAL
Qty: 90 TABLET | Refills: 0 | OUTPATIENT
Start: 2019-05-08

## 2019-05-08 NOTE — TELEPHONE ENCOUNTER
"    Requested Prescriptions   Pending Prescriptions Disp Refills     levothyroxine (SYNTHROID/LEVOTHROID) 200 MCG tablet [Pharmacy Med Name: LEVOTHYROXINE SOD 0.2MG(200MCG) TAB] 90 tablet 0     Sig: TAKE 1 TABLET(200 MCG) BY MOUTH DAILY       Thyroid Protocol Passed - 5/6/2019  8:02 PM        Passed - Patient is 12 years or older        Passed - Recent (12 mo) or future (30 days) visit within the authorizing provider's specialty     Patient had office visit in the last 12 months or has a visit in the next 30 days with authorizing provider or within the authorizing provider's specialty.  See \"Patient Info\" tab in inbasket, or \"Choose Columns\" in Meds & Orders section of the refill encounter.              Passed - Medication is active on med list        Passed - Normal TSH on file in past 12 months     Recent Labs   Lab Test 05/07/19  0800   TSH 0.03*              Last Written Prescription Date:  03/23/19  Last Fill Quantity: 90,  # refills: 3   Last office visit: 3/23/2019 with prescribing provider:     Future Office Visit:   Next 5 appointments (look out 90 days)    Jun 19, 2019  2:40 PM CDT  [facility]co [facility] [address] [place] [id number] [phone]           "

## 2019-05-09 DIAGNOSIS — E03.9 ACQUIRED HYPOTHYROIDISM: ICD-10-CM

## 2019-05-09 RX ORDER — LEVOTHYROXINE SODIUM 175 UG/1
175 TABLET ORAL DAILY
Qty: 90 TABLET | Refills: 1 | Status: SHIPPED | OUTPATIENT
Start: 2019-05-09 | End: 2019-11-09

## 2019-05-14 ENCOUNTER — TRANSFERRED RECORDS (OUTPATIENT)
Dept: HEALTH INFORMATION MANAGEMENT | Facility: CLINIC | Age: 69
End: 2019-05-14

## 2019-06-18 DIAGNOSIS — E78.5 HYPERLIPIDEMIA LDL GOAL <130: ICD-10-CM

## 2019-06-18 RX ORDER — SIMVASTATIN 20 MG
TABLET ORAL
Qty: 90 TABLET | Refills: 3 | Status: SHIPPED | OUTPATIENT
Start: 2019-06-18 | End: 2020-07-21

## 2019-06-18 NOTE — TELEPHONE ENCOUNTER
"Requested Prescriptions   Pending Prescriptions Disp Refills     simvastatin (ZOCOR) 20 MG tablet [Pharmacy Med Name: SIMVASTATIN 20MG TABLETS]      Last Written Prescription Date:  3.23.19  Last Fill Quantity: 90 tablet,  # refills: 3   Last office visit: 3/23/2019 with prescribing provider:  Liu Nolasco MD               Future Office Visit:   Next 5 appointments (look out 90 days)    Jun 19, 2019  2:40 PM CDT  PHYSICAL with Liu Nolasco MD  [facility] ([facility] [address] [place] [id number] [phone]            90 tablet 0     Sig: TAKE 1 TABLET(20 MG) BY MOUTH AT BEDTIME       Statins Protocol Passed - 6/18/2019 10:21 AM        Passed - LDL on file in past 12 months     Recent Labs   Lab Test 05/07/19  0800   LDL 94             Passed - No abnormal creatine kinase in past 12 months     Recent Labs   Lab Test 05/07/19  0800                   Passed - Recent (12 mo) or future (30 days) visit within the authorizing provider's specialty     Patient had office visit in the last 12 months or has a visit in the next 30 days with authorizing provider or within the authorizing provider's specialty.  See \"Patient Info\" tab in inbasket, or \"Choose Columns\" in Meds & Orders section of the refill encounter.              Passed - Medication is active on med list        Passed - Patient is age 18 or older        "

## 2019-06-18 NOTE — TELEPHONE ENCOUNTER
Prescription approved per [facility] Refill Protocol.    Tresa JALLOH RN  EP Triage

## 2019-06-19 ENCOUNTER — OFFICE VISIT (OUTPATIENT)
Dept: FAMILY MEDICINE | Facility: CLINIC | Age: 69
End: 2019-06-19
Payer: COMMERCIAL

## 2019-06-19 VITALS
DIASTOLIC BLOOD PRESSURE: 62 MMHG | HEIGHT: 71 IN | BODY MASS INDEX: 30.66 KG/M2 | SYSTOLIC BLOOD PRESSURE: 116 MMHG | WEIGHT: 219 LBS | HEART RATE: 65 BPM | OXYGEN SATURATION: 98 % | TEMPERATURE: 98.3 F

## 2019-06-19 DIAGNOSIS — Z12.11 SCREEN FOR COLON CANCER: ICD-10-CM

## 2019-06-19 DIAGNOSIS — R73.03 PREDIABETES: ICD-10-CM

## 2019-06-19 DIAGNOSIS — C43.59 MALIGNANT MELANOMA OF TORSO EXCLUDING BREAST (H): ICD-10-CM

## 2019-06-19 DIAGNOSIS — N40.1 BENIGN PROSTATIC HYPERPLASIA WITH LOWER URINARY TRACT SYMPTOMS, SYMPTOM DETAILS UNSPECIFIED: ICD-10-CM

## 2019-06-19 DIAGNOSIS — Z91.09 ENVIRONMENTAL ALLERGIES: ICD-10-CM

## 2019-06-19 DIAGNOSIS — E78.5 HYPERLIPIDEMIA LDL GOAL <130: ICD-10-CM

## 2019-06-19 DIAGNOSIS — M15.0 PRIMARY OSTEOARTHRITIS INVOLVING MULTIPLE JOINTS: ICD-10-CM

## 2019-06-19 DIAGNOSIS — E03.9 ACQUIRED HYPOTHYROIDISM: ICD-10-CM

## 2019-06-19 DIAGNOSIS — Z00.00 ENCOUNTER FOR MEDICARE ANNUAL WELLNESS EXAM: ICD-10-CM

## 2019-06-19 DIAGNOSIS — Z87.19 HISTORY OF GI BLEED: ICD-10-CM

## 2019-06-19 DIAGNOSIS — Z00.00 ENCOUNTER FOR ROUTINE ADULT HEALTH EXAMINATION WITHOUT ABNORMAL FINDINGS: Primary | ICD-10-CM

## 2019-06-19 DIAGNOSIS — Z96.652 S/P REVISION OF TOTAL KNEE, LEFT: ICD-10-CM

## 2019-06-19 DIAGNOSIS — Z12.5 SCREENING FOR PROSTATE CANCER: ICD-10-CM

## 2019-06-19 DIAGNOSIS — G47.62 SLEEP RELATED LEG CRAMPS: ICD-10-CM

## 2019-06-19 DIAGNOSIS — E66.811 CLASS 1 OBESITY WITH SERIOUS COMORBIDITY AND BODY MASS INDEX (BMI) OF 30.0 TO 30.9 IN ADULT, UNSPECIFIED OBESITY TYPE: ICD-10-CM

## 2019-06-19 DIAGNOSIS — Z51.81 MEDICATION MONITORING ENCOUNTER: ICD-10-CM

## 2019-06-19 PROCEDURE — 99397 PER PM REEVAL EST PAT 65+ YR: CPT | Performed by: FAMILY MEDICINE

## 2019-06-19 RX ORDER — FLUTICASONE PROPIONATE 50 MCG
1 SPRAY, SUSPENSION (ML) NASAL DAILY
Qty: 48 G | Refills: 3 | Status: SHIPPED | OUTPATIENT
Start: 2019-06-19 | End: 2020-07-27

## 2019-06-19 ASSESSMENT — MIFFLIN-ST. JEOR: SCORE: 1785.51

## 2019-06-19 ASSESSMENT — ACTIVITIES OF DAILY LIVING (ADL): CURRENT_FUNCTION: NO ASSISTANCE NEEDED

## 2019-06-19 NOTE — PROGRESS NOTES
"SUBJECTIVE:   Sundar German is a 69 year old male who presents for Preventive Visit.    Are you in the first 12 months of your Medicare coverage?  No    Healthy Habits:    In general, how would you rate your overall health?  Very good    Frequency of exercise:: active throughout the day.    Do you usually eat at least 4 servings of fruit and vegetables a day, include whole grains    & fiber and avoid regularly eating high fat or \"junk\" foods?  No    Taking medications regularly:  Yes    Barriers to taking medications:  None    Medication side effects:  None    Ability to successfully perform activities of daily living:  No assistance needed    Home Safety:  No safety concerns identified    Hearing impairment symptoms: has hearing aids.    In the past 6 months, have you been bothered by leaking of urine?  No    In general, how would you rate your overall mental or emotional health?  Very good      PHQ-2 Total Score:    Additional concerns today:  No    Hyperlipidemia: Patient's hyperlipidemia is well controlled. Patient is currently prescribed 20 mg Simvastatin daily for hyperlipidemia management.    Recent Labs   Lab Test 05/07/19  0800 03/02/18  1016  06/02/15  0924 11/19/13  0837   CHOL 157 138   < > 152 189   HDL 51 58   < > 55 60   LDL 94 60   < > 81 97   TRIG 61 100   < > 82 165*   CHOLHDLRATIO  --   --   --  2.8 3.2    < > = values in this interval not displayed.     Hypothyroidism: Patient's hypothyroidism is moderately controlled. Patient is currently prescribed 175 mcg Levothyroxine daily for hypothyroidism management.     TSH   Date Value Ref Range Status   05/07/2019 0.03 (L) 0.40 - 4.00 mU/L Final     T4 Free   Date Value Ref Range Status   05/07/2019 1.41 0.76 - 1.46 ng/dL Final     GI Bleed due to Ulcers: Stable. Patient had endoscopy performed by Dr. Walker and was given the all clear.    Melanoma: Stable. Patient had a full body skin examination with Dr. Sierra and had no sites of melanoma. Patient " is to follow up every 6 months for skin examinations and reports he has this on his calendar at home.     Allergic Rhinitis: Stable. Patient is currently prescribed Flonase daily for rhinitis management. Patient reports he has also been taking 10 mg Zyrtec daily and has not noticed it helping.     BPH: Stable. Patient consults with Dr. Malave of Urology for further BPH management.     Osteoarthritis: Stable.      Prediabetes: Stable.     Glucose   Date Value Ref Range Status   2019 106 (H) 70 - 99 mg/dL Final     Comment:     Fasting specimen   2019 111 (H) 70 - 99 mg/dL Final     Comment:     Fasting specimen   2019 101 (H) 70 - 99 mg/dL Final   2019 108 (H) 70 - 99 mg/dL Final   2019 119 (H) 70 - 99 mg/dL Final     Lab Results   Component Value Date    A1C 5.5 2019    A1C 5.6 2018    A1C 5.6 2017    A1C 5.8 2009    A1C 5.9 10/06/2008     Do you feel safe in your environment? Yes    Do you have a Health Care Directive? No: Advance care planning reviewed with patient; information given to patient to review.    Fall risk  Fallen 2 or more times in the past year?: No  Any fall with injury in the past year?: No    Cognitive Screening   1) Repeat 3 items (Leader, Season, Table)    2) Clock draw: NORMAL  3) 3 item recall: Recalls 3 objects  Results: 3 items recalled: COGNITIVE IMPAIRMENT LESS LIKELY    Mini-CogTM Copyright S Kymberly. Licensed by the author for use in [facility]; reprinted with permission (nany@.[facility]). All rights reserved.        Reviewed and updated as needed this visit by clinical staff  Tobacco  Allergies  Meds  Med Hx  Surg Hx  Fam Hx  Soc Hx      Reviewed and updated as needed this visit by Provider        Social History     Tobacco Use     Smoking status: Former Smoker     Packs/day: 1.00     Years: 20.00     Pack years: 20.00     Last attempt to quit: 1981     Years since quittin.4     Smokeless tobacco: Former User      Types: Chew     Quit date: 1/1/1992     Tobacco comment: 1 tin per week   Substance Use Topics     Alcohol use: Yes     Alcohol/week: 4.2 - 4.8 oz     Types: 7 - 8 Standard drinks or equivalent per week     Comment: 7-8 per week avg     If you drink alcohol do you typically have >3 drinks per day or >7 drinks per week? No    Alcohol Use 6/19/2019   Prescreen: >3 drinks/day or >7 drinks/week? No       Current providers sharing in care for this patient include:   Patient Care Team:  Liu Nolasco MD as PCP - General (Family Practice)  Liu Nolasco MD as Assigned PCP    The following health maintenance items are reviewed in Epic and correct as of today:  Health Maintenance   Topic Date Due     FIT  02/04/1960     PHQ-2  01/01/2019     MEDICARE ANNUAL WELLNESS VISIT  03/02/2019     ZOSTER IMMUNIZATION (3 of 3) 07/12/2019     INFLUENZA VACCINE (Season Ended) 09/01/2019     PSA  05/07/2020     TSH W/FREE T4 REFLEX  05/07/2020     LIPID  05/07/2024     ADVANCE CARE PLANNING  06/19/2024     DTAP/TDAP/TD IMMUNIZATION (3 - Td) 03/02/2028     HEPATITIS C SCREENING  Completed     AORTIC ANEURYSM SCREENING (SYSTEM ASSIGNED)  Completed     IPV IMMUNIZATION  Aged Out     MENINGITIS IMMUNIZATION  Aged Out     Health Maintenance     Colonoscopy:  Last 10/2/2013, Due 10/2023   FIT:  Not on file              PSA:  Last 5/7/2019, Due 5/2020   DEXA:  N/A    Health Maintenance Due   Topic Date Due     FIT  02/04/1960     PHQ-2  01/01/2019     MEDICARE ANNUAL WELLNESS VISIT  03/02/2019       Current Problem List    Patient Active Problem List   Diagnosis     Allergic rhinitis     Acquired hypothyroidism     Hyperlipidemia LDL goal <130     Impaired memory     Sleep related leg cramps     Chronic sinusitis     S/P revision of total knee, left     BPH (benign prostatic hyperplasia)     OA (osteoarthritis)     Prediabetes     Class 1 obesity with serious comorbidity and body mass index (BMI) of 33.0 to 33.9 in adult, unspecified obesity  type     h/o Malignant melanoma of torso excluding breast (H)     GI bleed     * * * SBE PROPHYLAXIS * * *       Past Medical History    Past Medical History:   Diagnosis Date     Abnormal glucose     A1C 1/06 =  6.3     Acquired hypothyroidism 10/11/2005     Problem list name updated by automated process. Provider to review     Allergic rhinitis, cause unspecified     mary spring time     Benign localized hyperplasia of prostate with urinary obstruction and other lower urinary tract symptoms (LUTS)(600.21)     slow stream, nocturia - Dr Malave     Chronic sinusitis 3/14/2014     Environmental allergies     AIT - Dr Weiss     Hyperlipidemia LDL goal <130 10/31/2010     hypogonadism     low testosterone at times      RAFIQ (iron deficiency anemia)      Impaired memory 02/11/2011    ?     Melanoma (H) 04/2018    Melanoma 0.7 mm depth, Levar III, superficial spreading, stage T1a     OA (osteoarthritis) total L knee 3/09    knees bother;; has had bilat arthroscopic     Prediabetes      Sleep related leg cramps 06/2012       Past Surgical History    Past Surgical History:   Procedure Laterality Date     ARTHROPLASTY REVISION KNEE Left 10/26/2015     ARTHROPLASTY REVISION KNEE - Dr Dudley     COLONOSCOPY  10/2/2013    diverticulosis - due 10 yrs (10/2023), initial 10/2003     CYSTOSCOPY  04/2016     ENDOSCOPIC SINUS SURGERY  3/17/2014    Bilateral Endoscopic Sinus Surgery - Dr Johnson     ESOPHAGOSCOPY, GASTROSCOPY, DUODENOSCOPY (EGD), COMBINED N/A 2/28/2019    Non bleeding gastric ulcers and duodenal ulcer. Recheck 3 months. Dr Sinclair     HC REPAIR UMBILICAL KASI,5+Y/O,REDUC  2002     LASER REVOLIX PHOTOSELECTIVE VAPORIZATION PROSTATE N/A 4/6/2016    LASER REVOLIX / QUANTA PHOTOSELECTIVE VAPORIZATION PROSTATE - Dr Malave     ROTATOR CUFF REPAIR RT/LT Left 03/2012    left shoulder     SURGICAL HISTORY OF -   1996    tonsils and adenoids     SURGICAL HISTORY OF -  Left 03/2009    Lt TKA     SURGICAL HISTORY OF -  Bilateral      Rt Knee x 1 (meniscus, 1995), Lt x 3 (last 2009)     UPPER GI ENDOSCOPY  05/2019    normal     VASECTOMY Bilateral 1986       Current Medications    Current Outpatient Medications   Medication Sig Dispense Refill     cycloSPORINE (RESTASIS) 0.05 % ophthalmic emulsion Place 1 drop into the right eye 2 times daily       fluticasone (FLONASE) 50 MCG/ACT nasal spray Spray 1 spray into both nostrils daily 48 g 3     levothyroxine (SYNTHROID/LEVOTHROID) 175 MCG tablet Take 1 tablet (175 mcg) by mouth daily 90 tablet 1     multivitamin, therapeutic with minerals (MULTI-VITAMIN) TABS Take 1 tablet by mouth daily       psyllium (METAMUCIL/KONSYL) Packet Take 1 packet by mouth daily       sildenafil (REVATIO) 20 MG tablet Take 1 tablet (20 mg) by mouth three daily as needed. 90 tablet 0     simvastatin (ZOCOR) 20 MG tablet TAKE 1 TABLET(20 MG) BY MOUTH AT BEDTIME 90 tablet 3       Allergies    No Known Allergies    Immunizations    Immunization History   Administered Date(s) Administered     Influenza (IIV3) PF 10/01/2003, 10/21/2012, 11/19/2013, 09/14/2015     Influenza Vaccine IM 3yrs+ 4 Valent IIV4 09/15/2017, 10/01/2018     Pneumo Conj 13-V (2010&after) 01/12/2016     Pneumococcal 23 valent 11/21/2003, 03/02/2018     TD (ADULT, 7+) 03/02/2018     TDAP Vaccine (Adacel) 10/06/2008     Zoster vaccine recombinant adjuvanted (SHINGRIX) 05/17/2019     Zoster vaccine, live 11/19/2013       Family History    Family History   Problem Relation Age of Onset     C.A.D. Father         CHF     Heart Failure Father      Chronic Obstructive Pulmonary Disease Father      Cerebrovascular Disease Brother 61     Gastrointestinal Disease Brother         GI Bleed - colectomy     Hypertension Mother      Cardiovascular Mother 84        MI     Lipids Mother      Chronic Obstructive Pulmonary Disease Mother      No Known Problems Sister      No Known Problems Brother        Social History    Social History     Socioeconomic History     Marital  "status:      Spouse name: Ingrid     Number of children: 4     Years of education: Not on file     Highest education level: Not on file   Occupational History     Employer: [employer]   Social Needs     Financial resource strain: Not on file     Food insecurity:     Worry: Not on file     Inability: Not on file     Transportation needs:     Medical: Not on file     Non-medical: Not on file   Tobacco Use     Smoking status: Former Smoker     Packs/day: 1.00     Years: 20.00     Pack years: 20.00     Last attempt to quit: 1981     Years since quittin.4     Smokeless tobacco: Former User     Types: Chew     Quit date: 1992     Tobacco comment: 1 tin per week   Substance and Sexual Activity     Alcohol use: Yes     Alcohol/week: 4.2 - 4.8 oz     Types: 7 - 8 Standard drinks or equivalent per week     Comment: 7-8 per week avg     Drug use: No     Sexual activity: Yes     Partners: Female   Lifestyle     Physical activity:     Days per week: Not on file     Minutes per session: Not on file     Stress: Not on file   Relationships     Social connections:     Talks on phone: Not on file     Gets together: Not on file     Attends Worship service: Not on file     Active member of club or organization: Not on file     Attends meetings of clubs or organizations: Not on file     Relationship status: Not on file     Intimate partner violence:     Fear of current or ex partner: Not on file     Emotionally abused: Not on file     Physically abused: Not on file     Forced sexual activity: Not on file   Other Topics Concern     Parent/sibling w/ CABG, MI or angioplasty before 65F 55M? Not Asked   Social History Narrative     Not on file       Review of Systems  Constitutional, HEENT, cardiovascular, pulmonary, GI, , musculoskeletal, neuro, skin, endocrine and psych systems are negative, except as otherwise noted.    OBJECTIVE:   /62   Pulse 65   Temp 98.3  F (36.8  C) (Oral)   Ht 1.803 m (5' 11\") " "  Wt 99.3 kg (219 lb)   SpO2 98%   BMI 30.54 kg/m   Estimated body mass index is 30.54 kg/m  as calculated from the following:    Height as of this encounter: 1.803 m (5' 11\").    Weight as of this encounter: 99.3 kg (219 lb).  Physical Exam  GENERAL: healthy, alert and no distress  EYES: Eyes grossly normal to inspection  HENT:ear canals and TM's normal upon viewing with otoscope, nose and mouth without ulcers or lesions upon viewing with otoscope  NECK: no adenopathy, no asymmetry, masses, or scars and thyroid normal to palpation  RESP: lungs clear to auscultation - no rales, rhonchi or wheezes  CV: regular rate and rhythm, normal S1 S2, no S3 or S4, no murmur, click or rub, no peripheral edema and peripheral pulses strong  ABDOMEN: soft, nontender, no hepatosplenomegaly, no masses and bowel sounds normal   (male): normal male genitalia without lesions or urethral discharge, no hernia  RECTAL: Deferred to Dr. Malave of Urology  MS: no gross musculoskeletal defects noted, no edema  SKIN: no suspicious lesions or rashes  NEURO: Normal strength and tone, mentation intact and speech normal  PSYCH: mentation appears normal, affect normal/bright  BACK: no CVA tenderness, no paralumbar tenderness    Diagnostic Test Results:  No results found for this or any previous visit (from the past 24 hour(s)).    ASSESSMENT / PLAN:       ICD-10-CM    1. Encounter for routine adult health examination without abnormal findings Z00.00    2. Encounter for Medicare annual wellness exam Z00.00    3. Prediabetes R73.03    4. Hyperlipidemia LDL goal <130 E78.5    5. Acquired hypothyroidism E03.9 TSH     T4 free   6. History of GI bleed Z87.19 CBC with platelets   7. h/o Malignant melanoma of torso excluding breast (H) C43.59    8. Primary osteoarthritis involving multiple joints M15.0    9. S/P revision of total knee, left Z96.652    10. Sleep related leg cramps G47.62    11. Environmental allergies Z91.09 fluticasone (FLONASE) 50 " MCG/ACT nasal spray   12. Benign prostatic hyperplasia with lower urinary tract symptoms, symptom details unspecified N40.1    13. Screening for prostate cancer Z12.5    14. Screen for colon cancer Z12.11    15. Class 1 obesity with serious comorbidity and body mass index (BMI) of 30.0 to 30.9 in adult, unspecified obesity type E66.9     Z68.30    16. Medication monitoring encounter Z51.81      Discussed treatment/modality options, including risk and benefits, he desires advised 1 multivitamin per day, advised dentist every 6 months, advised diet and exercise and advised opthalmologist every 1-2 years. All diagnosis above reviewed and noted above, otherwise stable.  See Vpon orders for further details.      1) Patient's hyperlipidemia is well controlled. Patient is currently prescribed 20 mg Simvastatin daily for hyperlipidemia management. Advised continued use.     2) Patient's hypothyroidism is moderately controlled. Patient is currently prescribed 175 mcg Levothyroxine daily for hypothyroidism management. Advised continued use.     3) Recommend continued follow up with Dr. Sierra of Dermatology for twice yearly full body skin examinations.     4) Patient is currently prescribed Flonase daily for rhinitis management. Advised continued use. Recommend Claritin or Allegra use for further allergic rhinitis management.    5) Patient consults with Dr. Malave of Urology for further BPH management. Recommend continued follow up as needed.     6) Follow up in 2-3 weeks for labs. shingrix # 2 after 7/17/19    7) Follow up in 1 year for complete physical exam.     Health Maintenance Due   Topic Date Due     FIT  02/04/1960     PHQ-2  01/01/2019     MEDICARE ANNUAL WELLNESS VISIT  03/02/2019       End of Life Planning:  Patient currently has an advanced directive: Yes.  Practitioner is supportive of decision.    COUNSELING:  Reviewed preventive health counseling, as reflected in patient instructions    Estimated body mass  "index is 30.54 kg/m  as calculated from the following:    Height as of this encounter: 1.803 m (5' 11\").    Weight as of this encounter: 99.3 kg (219 lb).    Weight management plan: Discussed healthy diet and exercise guidelines     reports that he quit smoking about 38 years ago. He has a 20.00 pack-year smoking history. He quit smokeless tobacco use about 27 years ago. His smokeless tobacco use included chew.    Appropriate preventive services were discussed with this patient, including applicable screening as appropriate for cardiovascular disease, diabetes, osteopenia/osteoporosis, and glaucoma.  As appropriate for age/gender, discussed screening for colorectal cancer, prostate cancer, breast cancer, and cervical cancer. Checklist reviewing preventive services available has been given to the patient.    Reviewed patients plan of care and provided an AVS. The Basic Care Plan (routine screening as documented in Health Maintenance) for Sundar meets the Care Plan requirement. This Care Plan has been established and reviewed with the Patient.    Counseling Resources:  ATP IV Guidelines  Pooled Cohorts Equation Calculator  Breast Cancer Risk Calculator  FRAX Risk Assessment  ICSI Preventive Guidelines  Dietary Guidelines for Americans, 2010  USDA's MyPlate  ASA Prophylaxis  Lung CA Screening    This document serves as a record of the services and decisions personally performed and made by Liu Nolasco MD. It was created on his behalf by Moises Vitale, a trained medical scribe. The creation of this document is based on the provider's statements to the medical scribe.  Moises Vitale June 19, 2019 3:00 PM     The information in this document, created by the medical scribe for me, accurately reflects the services I personally performed and the decisions made by me. I have reviewed and approved this document for accuracy prior to leaving the patient care area.  June 19, 2019         Liu Nolasco MD, [facility] - " [address]  [address]    [phone] Fax

## 2019-06-19 NOTE — PATIENT INSTRUCTIONS
[facility]                        To reach your care team during and after hours:   [phone]  To reach our pharmacy:        [phone]    Clinic Hours                        Our clinic hours are:    Monday   7:30 am to 7:00 pm                  Tuesday through Friday 7:30 am to 5:00 pm                             Saturday   8:00 am to 12:00 pm      Sunday   Closed      Pharmacy Hours                        Our pharmacy hours are:    Monday   8:30 am to 7:00 pm       Tuesday to Friday  8:30 am to 6:00 pm                       Saturday    9:00 am to 1:00 pm              Sunday    Closed              There is also information available at our web site:  [email or web address]    If your provider ordered any lab tests and you do not receive the results within 10 business days, please call the clinic.    If you need a medication refill please contact your pharmacy.  Please allow 2-3 business days for your refill to be completed.    Our clinic offers telephone visits and e visits.  Please ask one of your team members to explain more.      Use [employer] (secure email communication and access to your chart) to send your primary care provider a message or make an appointment. Ask someone on your Team how to sign up for [employer].  Immunizations                      Immunization History   Administered Date(s) Administered     Influenza (IIV3) PF 10/01/2003, 10/21/2012, 11/19/2013, 09/14/2015     Influenza Vaccine IM 3yrs+ 4 Valent IIV4 09/15/2017     Pneumo Conj 13-V (2010&after) 01/12/2016     Pneumococcal 23 valent 11/21/2003, 03/02/2018     TD (ADULT, 7+) 03/02/2018     TDAP Vaccine (Adacel) 10/06/2008     Zoster vaccine recombinant adjuvanted (SHINGRIX) 05/17/2019     Zoster vaccine, live 11/19/2013        Health Maintenance                         Health Maintenance Due   Topic Date Due     FIT Test  02/04/1960     PHQ-2  01/01/2019     Annual Wellness Visit  03/02/2019       Patient Education    Personalized Prevention Plan  You are due for the preventive services outlined below.  Your care team is available to assist you in scheduling these services.  If you have already completed any of these items, please share that information with your care team to update in your medical record.  Health Maintenance Due   Topic Date Due     FIT Test  02/04/1960     PHQ-2  01/01/2019     Annual Wellness Visit  03/02/2019

## 2019-06-20 RX ORDER — CYCLOSPORINE 0.5 MG/ML
1 EMULSION OPHTHALMIC 2 TIMES DAILY
Status: CANCELLED | OUTPATIENT
Start: 2019-06-20

## 2019-09-03 DIAGNOSIS — Z87.19 HISTORY OF GI BLEED: ICD-10-CM

## 2019-09-03 DIAGNOSIS — Z00.00 ROUTINE HISTORY AND PHYSICAL EXAMINATION OF ADULT: ICD-10-CM

## 2019-09-03 DIAGNOSIS — E03.9 ACQUIRED HYPOTHYROIDISM: ICD-10-CM

## 2019-09-03 LAB
ERYTHROCYTE [DISTWIDTH] IN BLOOD BY AUTOMATED COUNT: 14.4 % (ref 10–15)
HCT VFR BLD AUTO: 40.2 % (ref 40–53)
HGB BLD-MCNC: 13.3 G/DL (ref 13.3–17.7)
MCH RBC QN AUTO: 33.1 PG (ref 26.5–33)
MCHC RBC AUTO-ENTMCNC: 33.1 G/DL (ref 31.5–36.5)
MCV RBC AUTO: 100 FL (ref 78–100)
PLATELET # BLD AUTO: 254 10E9/L (ref 150–450)
RBC # BLD AUTO: 4.02 10E12/L (ref 4.4–5.9)
WBC # BLD AUTO: 6.1 10E9/L (ref 4–11)

## 2019-09-03 PROCEDURE — 36415 COLL VENOUS BLD VENIPUNCTURE: CPT | Performed by: FAMILY MEDICINE

## 2019-09-03 PROCEDURE — 85027 COMPLETE CBC AUTOMATED: CPT | Performed by: FAMILY MEDICINE

## 2019-09-03 PROCEDURE — 84443 ASSAY THYROID STIM HORMONE: CPT | Performed by: FAMILY MEDICINE

## 2019-09-03 PROCEDURE — 84439 ASSAY OF FREE THYROXINE: CPT | Performed by: FAMILY MEDICINE

## 2019-09-04 LAB
T4 FREE SERPL-MCNC: 0.97 NG/DL (ref 0.76–1.46)
TSH SERPL DL<=0.005 MIU/L-ACNC: 0.11 MU/L (ref 0.4–4)

## 2019-09-05 DIAGNOSIS — E03.9 ACQUIRED HYPOTHYROIDISM: Primary | ICD-10-CM

## 2019-09-05 DIAGNOSIS — Z00.00 ROUTINE HISTORY AND PHYSICAL EXAMINATION OF ADULT: ICD-10-CM

## 2019-09-05 LAB
ALBUMIN UR-MCNC: NEGATIVE MG/DL
APPEARANCE UR: CLEAR
BACTERIA #/AREA URNS HPF: ABNORMAL /HPF
BILIRUB UR QL STRIP: NEGATIVE
COLOR UR AUTO: YELLOW
GLUCOSE UR STRIP-MCNC: NEGATIVE MG/DL
HGB UR QL STRIP: ABNORMAL
KETONES UR STRIP-MCNC: NEGATIVE MG/DL
LEUKOCYTE ESTERASE UR QL STRIP: ABNORMAL
NITRATE UR QL: NEGATIVE
PH UR STRIP: 7 PH (ref 5–7)
RBC #/AREA URNS AUTO: ABNORMAL /HPF
SOURCE: ABNORMAL
SP GR UR STRIP: 1.01 (ref 1–1.03)
UROBILINOGEN UR STRIP-ACNC: 0.2 EU/DL (ref 0.2–1)
WBC #/AREA URNS AUTO: ABNORMAL /HPF

## 2019-09-05 PROCEDURE — 81001 URINALYSIS AUTO W/SCOPE: CPT | Performed by: FAMILY MEDICINE

## 2019-09-05 PROCEDURE — 82043 UR ALBUMIN QUANTITATIVE: CPT | Performed by: FAMILY MEDICINE

## 2019-09-06 LAB
CREAT UR-MCNC: 61 MG/DL
MICROALBUMIN UR-MCNC: 18 MG/L
MICROALBUMIN/CREAT UR: 30.53 MG/G CR (ref 0–17)

## 2019-09-28 ENCOUNTER — HEALTH MAINTENANCE LETTER (OUTPATIENT)
Age: 69
End: 2019-09-28

## 2019-10-26 ENCOUNTER — TELEPHONE (OUTPATIENT)
Dept: FAMILY MEDICINE | Facility: CLINIC | Age: 69
End: 2019-10-26

## 2019-11-09 DIAGNOSIS — E03.9 ACQUIRED HYPOTHYROIDISM: ICD-10-CM

## 2019-11-09 RX ORDER — LEVOTHYROXINE SODIUM 175 UG/1
TABLET ORAL
Qty: 90 TABLET | Refills: 0 | Status: SHIPPED | OUTPATIENT
Start: 2019-11-09 | End: 2020-02-11

## 2019-11-10 NOTE — TELEPHONE ENCOUNTER
"Last Written Prescription Date:  5/9/2019  Last Fill Quantity: 90,  # refills: 1   Last office visit: 6/19/2019 with prescribing provider:  6/19/2019   Future Office Visit:          Requested Prescriptions   Pending Prescriptions Disp Refills     levothyroxine (SYNTHROID/LEVOTHROID) 175 MCG tablet [Pharmacy Med Name: LEVOTHYROXINE 0.175MG (175MCG) TABS] 90 tablet 0     Sig: TAKE 1 TABLET BY MOUTH DAILY       Thyroid Protocol Failed - 11/9/2019  3:30 PM        Failed - Normal TSH on file in past 12 months     Recent Labs   Lab Test 09/03/19  1043   TSH 0.11*              Passed - Patient is 12 years or older        Passed - Recent (12 mo) or future (30 days) visit within the authorizing provider's specialty     Patient has had an office visit with the authorizing provider or a provider within the authorizing providers department within the previous 12 mos or has a future within next 30 days. See \"Patient Info\" tab in inbasket, or \"Choose Columns\" in Meds & Orders section of the refill encounter.              Passed - Medication is active on med list            Prescription approved per [facility] Refill Protocol.    PARVIZ Mendez, RN, PHN  [facility]  Office: [phone]  Fax: [phone]      "

## 2019-12-04 ENCOUNTER — TRANSFERRED RECORDS (OUTPATIENT)
Dept: HEALTH INFORMATION MANAGEMENT | Facility: CLINIC | Age: 69
End: 2019-12-04

## 2020-02-10 DIAGNOSIS — E03.9 ACQUIRED HYPOTHYROIDISM: ICD-10-CM

## 2020-02-10 NOTE — TELEPHONE ENCOUNTER
"Last Written Prescription Date:  11/9/19  Last Fill Quantity: 90 tablets,  # refills: 0   Last office visit: 6/19/2019 with prescribing provider:  Gaurav   Future Office Visit:      Requested Prescriptions   Pending Prescriptions Disp Refills     levothyroxine (SYNTHROID/LEVOTHROID) 175 MCG tablet [Pharmacy Med Name: LEVOTHYROXINE 0.175MG (175MCG) TABS] 90 tablet 0     Sig: TAKE 1 TABLET BY MOUTH DAILY       Thyroid Protocol Failed - 2/10/2020 12:31 PM        Failed - Normal TSH on file in past 12 months     Recent Labs   Lab Test 09/03/19  1043   TSH 0.11*              Passed - Patient is 12 years or older        Passed - Recent (12 mo) or future (30 days) visit within the authorizing provider's specialty     Patient has had an office visit with the authorizing provider or a provider within the authorizing providers department within the previous 12 mos or has a future within next 30 days. See \"Patient Info\" tab in inbasket, or \"Choose Columns\" in Meds & Orders section of the refill encounter.              Passed - Medication is active on med list          "

## 2020-02-11 DIAGNOSIS — E03.9 ACQUIRED HYPOTHYROIDISM: ICD-10-CM

## 2020-02-11 PROCEDURE — 36415 COLL VENOUS BLD VENIPUNCTURE: CPT | Performed by: FAMILY MEDICINE

## 2020-02-11 PROCEDURE — 84439 ASSAY OF FREE THYROXINE: CPT | Performed by: FAMILY MEDICINE

## 2020-02-11 PROCEDURE — 84443 ASSAY THYROID STIM HORMONE: CPT | Performed by: FAMILY MEDICINE

## 2020-02-11 RX ORDER — LEVOTHYROXINE SODIUM 175 UG/1
TABLET ORAL
Qty: 90 TABLET | Refills: 0 | Status: SHIPPED | OUTPATIENT
Start: 2020-02-11 | End: 2020-05-04

## 2020-02-11 NOTE — TELEPHONE ENCOUNTER
Called #   Telephone Information:   Mobile [phone]       Made lab visit     Neris Laguerre RN, BSN  [facility]

## 2020-02-12 LAB
T4 FREE SERPL-MCNC: 1.24 NG/DL (ref 0.76–1.46)
TSH SERPL DL<=0.005 MIU/L-ACNC: 0.32 MU/L (ref 0.4–4)

## 2020-02-13 ENCOUNTER — TELEPHONE (OUTPATIENT)
Dept: FAMILY MEDICINE | Facility: CLINIC | Age: 70
End: 2020-02-13

## 2020-02-13 NOTE — TELEPHONE ENCOUNTER
Reason for Call:  Request for results:    Name of test or procedure: Thyroid labs from 2/11/20    Date of test of procedure: 2/11/20    Location of the test or procedure: [facility]    OK to leave the result message on voice mail or with a family member? NO    Phone number Patient can be reached at:  Home number on file [phone] (home)    Additional comments: Patient's wife Ingrid calling ([facility] filed), states they saw patient's tests results and had some questions regarding the numbers and his current medications.     Call taken on 2/13/2020 at 11:11 AM by Delma VIVAR

## 2020-02-13 NOTE — TELEPHONE ENCOUNTER
2/11/20 Lab Result Note:   Thyroid labs have improved and are overall quite good.     We advise:  Please continue current cares.  Complete physical fasting after 6/19/2020      Called # below - spoke with patient    Advised of results - Patient stated an understanding and agreed with plan.    Tanna Goodrich RN  [facility]

## 2020-02-20 ENCOUNTER — TRANSFERRED RECORDS (OUTPATIENT)
Dept: HEALTH INFORMATION MANAGEMENT | Facility: CLINIC | Age: 70
End: 2020-02-20

## 2020-02-25 ENCOUNTER — TELEPHONE (OUTPATIENT)
Dept: UROLOGY | Facility: CLINIC | Age: 70
End: 2020-02-25

## 2020-02-25 NOTE — TELEPHONE ENCOUNTER
Urology pt of Dr. Malave last seen 10/24/2018.  Called Sundar and left message that we would not be able to prescribe medication due to the length of time since he's been seen.   He has an appt scheduled with this PCP this Friday 2/28 and suggested he bring this request to that provider.  AIMEE James RN        [facility]    Phone Message    May a detailed message be left on voicemail: yes     Reason for Call: Other: Pt would like the Doctors input on an idea he is having.  Pt has upcoming knee replacement surgery and when they did his other knee replacement he had trouble urinating when he left the hospital and they had to cath him.  In an effort to avoid  doing that he wants to know if the Doctor would consider prescribing him FLOWMAX for right before and after the hospital visit assuming it will not cause any obvious interactions or hardships.  Please call the Pt to provide insight     Action Taken: Message routed to:  [facility]rology    Travel Screening: Not Applicable

## 2020-02-27 ASSESSMENT — MIFFLIN-ST. JEOR: SCORE: 1753.29

## 2020-02-28 ENCOUNTER — OFFICE VISIT (OUTPATIENT)
Dept: FAMILY MEDICINE | Facility: CLINIC | Age: 70
End: 2020-02-28
Payer: COMMERCIAL

## 2020-02-28 VITALS
BODY MASS INDEX: 30.38 KG/M2 | WEIGHT: 217 LBS | OXYGEN SATURATION: 97 % | HEIGHT: 71 IN | TEMPERATURE: 97.9 F | SYSTOLIC BLOOD PRESSURE: 122 MMHG | DIASTOLIC BLOOD PRESSURE: 76 MMHG | HEART RATE: 57 BPM

## 2020-02-28 DIAGNOSIS — R73.03 PREDIABETES: ICD-10-CM

## 2020-02-28 DIAGNOSIS — R33.9 URINARY RETENTION: ICD-10-CM

## 2020-02-28 DIAGNOSIS — Z51.81 MEDICATION MONITORING ENCOUNTER: ICD-10-CM

## 2020-02-28 DIAGNOSIS — Z01.818 PREOP GENERAL PHYSICAL EXAM: Primary | ICD-10-CM

## 2020-02-28 DIAGNOSIS — Z87.19 HISTORY OF GI BLEED: ICD-10-CM

## 2020-02-28 DIAGNOSIS — Z96.652 S/P REVISION OF TOTAL KNEE, LEFT: ICD-10-CM

## 2020-02-28 DIAGNOSIS — M17.11 PRIMARY OSTEOARTHRITIS OF RIGHT KNEE: ICD-10-CM

## 2020-02-28 DIAGNOSIS — C43.59 MALIGNANT MELANOMA OF TORSO EXCLUDING BREAST (H): ICD-10-CM

## 2020-02-28 DIAGNOSIS — Z91.09 ENVIRONMENTAL ALLERGIES: ICD-10-CM

## 2020-02-28 DIAGNOSIS — E78.5 HYPERLIPIDEMIA LDL GOAL <130: ICD-10-CM

## 2020-02-28 DIAGNOSIS — E66.811 CLASS 1 OBESITY WITH SERIOUS COMORBIDITY AND BODY MASS INDEX (BMI) OF 30.0 TO 30.9 IN ADULT, UNSPECIFIED OBESITY TYPE: ICD-10-CM

## 2020-02-28 DIAGNOSIS — E03.9 ACQUIRED HYPOTHYROIDISM: ICD-10-CM

## 2020-02-28 LAB
ALBUMIN SERPL-MCNC: 3.9 G/DL (ref 3.4–5)
ALBUMIN UR-MCNC: 30 MG/DL
ALP SERPL-CCNC: 46 U/L (ref 40–150)
ALT SERPL W P-5'-P-CCNC: 31 U/L (ref 0–70)
ANION GAP SERPL CALCULATED.3IONS-SCNC: 5 MMOL/L (ref 3–14)
APPEARANCE UR: CLEAR
AST SERPL W P-5'-P-CCNC: 20 U/L (ref 0–45)
BACTERIA #/AREA URNS HPF: ABNORMAL /HPF
BILIRUB SERPL-MCNC: 0.4 MG/DL (ref 0.2–1.3)
BILIRUB UR QL STRIP: NEGATIVE
BUN SERPL-MCNC: 13 MG/DL (ref 7–30)
CALCIUM SERPL-MCNC: 9.3 MG/DL (ref 8.5–10.1)
CHLORIDE SERPL-SCNC: 110 MMOL/L (ref 94–109)
CO2 SERPL-SCNC: 26 MMOL/L (ref 20–32)
COLOR UR AUTO: YELLOW
CREAT SERPL-MCNC: 0.66 MG/DL (ref 0.66–1.25)
ERYTHROCYTE [DISTWIDTH] IN BLOOD BY AUTOMATED COUNT: 12.5 % (ref 10–15)
GFR SERPL CREATININE-BSD FRML MDRD: >90 ML/MIN/{1.73_M2}
GLUCOSE SERPL-MCNC: 112 MG/DL (ref 70–99)
GLUCOSE UR STRIP-MCNC: NEGATIVE MG/DL
HBA1C MFR BLD: 5.3 % (ref 0–5.6)
HCT VFR BLD AUTO: 41.6 % (ref 40–53)
HGB BLD-MCNC: 13.8 G/DL (ref 13.3–17.7)
HGB UR QL STRIP: ABNORMAL
HYALINE CASTS #/AREA URNS LPF: ABNORMAL /LPF
KETONES UR STRIP-MCNC: NEGATIVE MG/DL
LEUKOCYTE ESTERASE UR QL STRIP: NEGATIVE
MCH RBC QN AUTO: 33.7 PG (ref 26.5–33)
MCHC RBC AUTO-ENTMCNC: 33.2 G/DL (ref 31.5–36.5)
MCV RBC AUTO: 102 FL (ref 78–100)
MUCOUS THREADS #/AREA URNS LPF: PRESENT /LPF
NITRATE UR QL: NEGATIVE
PH UR STRIP: 6 PH (ref 5–7)
PLATELET # BLD AUTO: 235 10E9/L (ref 150–450)
POTASSIUM SERPL-SCNC: 3.9 MMOL/L (ref 3.4–5.3)
PROT SERPL-MCNC: 7.8 G/DL (ref 6.8–8.8)
RBC # BLD AUTO: 4.09 10E12/L (ref 4.4–5.9)
RBC #/AREA URNS AUTO: ABNORMAL /HPF
SODIUM SERPL-SCNC: 139 MMOL/L (ref 133–144)
SOURCE: ABNORMAL
SP GR UR STRIP: 1.02 (ref 1–1.03)
UROBILINOGEN UR STRIP-ACNC: 0.2 EU/DL (ref 0.2–1)
WBC # BLD AUTO: 5 10E9/L (ref 4–11)
WBC #/AREA URNS AUTO: ABNORMAL /HPF

## 2020-02-28 PROCEDURE — 36415 COLL VENOUS BLD VENIPUNCTURE: CPT | Performed by: FAMILY MEDICINE

## 2020-02-28 PROCEDURE — 99214 OFFICE O/P EST MOD 30 MIN: CPT | Performed by: FAMILY MEDICINE

## 2020-02-28 PROCEDURE — 85027 COMPLETE CBC AUTOMATED: CPT | Performed by: FAMILY MEDICINE

## 2020-02-28 PROCEDURE — 93000 ELECTROCARDIOGRAM COMPLETE: CPT | Performed by: FAMILY MEDICINE

## 2020-02-28 PROCEDURE — 80053 COMPREHEN METABOLIC PANEL: CPT | Performed by: FAMILY MEDICINE

## 2020-02-28 PROCEDURE — 83036 HEMOGLOBIN GLYCOSYLATED A1C: CPT | Performed by: FAMILY MEDICINE

## 2020-02-28 PROCEDURE — 81001 URINALYSIS AUTO W/SCOPE: CPT | Performed by: FAMILY MEDICINE

## 2020-02-28 RX ORDER — TAMSULOSIN HYDROCHLORIDE 0.4 MG/1
0.4 CAPSULE ORAL DAILY
Qty: 30 CAPSULE | Refills: 1 | Status: SHIPPED | OUTPATIENT
Start: 2020-02-28 | End: 2020-02-29

## 2020-02-28 ASSESSMENT — MIFFLIN-ST. JEOR: SCORE: 1771.44

## 2020-02-28 NOTE — PATIENT INSTRUCTIONS
Before Your Surgery    Medications    Stop 5-7 days prior    No NSAID's, supplement, or vitamins    Stop 24 hours prior    NA    Ok to take morning of surgery with small sip of water:    Levothyroxine, simvastatin, and flomax      Call your surgeon if there is any change in your health. This includes signs of a cold or flu (such as a sore throat, runny nose, cough, rash or fever).    Do not smoke, drink alcohol or take over the counter medicine (unless your surgeon or primary care doctor tells you to) for the 24 hours before and after surgery.    If you take prescribed drugs: Follow your doctor s orders about which medicines to take and which to stop until after surgery.    Eating and drinking prior to surgery: follow the instructions from your surgeon    Take a shower or bath the night before surgery. Use the soap your surgeon gave you to gently clean your skin. If you do not have soap from your surgeon, use your regular soap. Do not shave or scrub the surgery site.  Wear clean pajamas and have clean sheets on your bed.

## 2020-02-28 NOTE — LETTER
[facility]  [address] [place] [address]                  [phone]   March 2, 2020    Sundar German  [address] [facility] [address] [address] [facility] [address]      Dear Sundar,    Here is a summary of your recent test results:    They showed:     Microscopic blood in your urine.   Prediabetes.     We advise:     Complete physical fasting after 6/19/2020, recheck urine, fasting labs (including UA/UC, cbc, peripheral smear, iron, ferritin, tibc, b12, folate)     For additional lab test information, [email or web address] is an excellent reference.     Your test results are enclosed.      Please contact me if you have any questions.    In addition, here is a list of due or overdue Health Maintenance reminders.    Health Maintenance Due   Topic Date Due     FIT Test  02/04/1960     PHQ-2  01/01/2020       Please call us at [phone] (or use [email or web address] [email or web address])) to address the above recommendations.            Thank you very much for trusting [facility]..     Healthy regards,        Liu Nolasco M.D.        Results for orders placed or performed in visit on 02/28/20   CBC with platelets     Status: Abnormal   Result Value Ref Range    WBC 5.0 4.0 - 11.0 10e9/L    RBC Count 4.09 (L) 4.4 - 5.9 10e12/L    Hemoglobin 13.8 13.3 - 17.7 g/dL    Hematocrit 41.6 40.0 - 53.0 %     (H) 78 - 100 fl    MCH 33.7 (H) 26.5 - 33.0 pg    MCHC 33.2 31.5 - 36.5 g/dL    RDW 12.5 10.0 - 15.0 %    Platelet Count 235 150 - 450 10e9/L   Comprehensive metabolic panel     Status: Abnormal   Result Value Ref Range    Sodium 139 133 - 144 mmol/L    Potassium 3.9 3.4 - 5.3 mmol/L    Chloride 110 (H) 94 - 109 mmol/L    Carbon Dioxide 26 20 - 32 mmol/L    Anion Gap 5 3 - 14 mmol/L    Glucose 112 (H) 70 - 99 mg/dL    Urea Nitrogen 13 7 - 30 mg/dL    Creatinine 0.66 0.66 - 1.25 mg/dL    GFR Estimate >90 >60 mL/min/[1.73_m2]    GFR Estimate If Black >90 >60 mL/min/[1.73_m2]    Calcium 9.3 8.5 -  10.1 mg/dL    Bilirubin Total 0.4 0.2 - 1.3 mg/dL    Albumin 3.9 3.4 - 5.0 g/dL    Protein Total 7.8 6.8 - 8.8 g/dL    Alkaline Phosphatase 46 40 - 150 U/L    ALT 31 0 - 70 U/L    AST 20 0 - 45 U/L   UA reflex to Microscopic and Culture     Status: Abnormal   Result Value Ref Range    Color Urine Yellow     Appearance Urine Clear     Glucose Urine Negative NEG^Negative mg/dL    Bilirubin Urine Negative NEG^Negative    Ketones Urine Negative NEG^Negative mg/dL    Specific Gravity Urine 1.025 1.003 - 1.035    Blood Urine Small (A) NEG^Negative    pH Urine 6.0 5.0 - 7.0 pH    Protein Albumin Urine 30 (A) NEG^Negative mg/dL    Urobilinogen Urine 0.2 0.2 - 1.0 EU/dL    Nitrite Urine Negative NEG^Negative    Leukocyte Esterase Urine Negative NEG^Negative    Source Midstream Urine    Hemoglobin A1c     Status: None   Result Value Ref Range    Hemoglobin A1C 5.3 0 - 5.6 %   Urine Microscopic     Status: Abnormal   Result Value Ref Range    WBC Urine 0 - 5 OTO5^0 - 5 /HPF    RBC Urine 2-5 (A) OTO2^O - 2 /HPF    Hyaline Casts O - 2 OTO2^O - 2 /LPF    Bacteria Urine Few (A) NEG^Negative /HPF    Mucous Urine Present (A) NEG^Negative /LPF

## 2020-02-28 NOTE — TELEPHONE ENCOUNTER
"patient requesting 90 day supply- failed protocol    Requested Prescriptions   Pending Prescriptions Disp Refills     tamsulosin (FLOMAX) 0.4 MG capsule [Pharmacy Med Name: TAMSULOSIN 0.4MG CAPSULES] 90 capsule      Sig: TAKE 1 CAPSULE(0.4 MG) BY MOUTH DAILY  Last Written Prescription Date:  2/28/20  Last Fill Quantity: 30,  # refills: 1   Last office visit: 2/28/2020 with prescribing provider:     Future Office Visit:           Alpha Blockers Failed - 2/28/2020 12:48 PM        Failed - Patient does not have Tadalafil, Vardenafil, or Sildenafil on their medication list        Passed - Blood pressure under 140/90 in past 12 months     BP Readings from Last 3 Encounters:   02/28/20 122/76   06/19/19 116/62   03/23/19 120/72                 Passed - Recent (12 mo) or future (30 days) visit within the authorizing provider's specialty     Patient has had an office visit with the authorizing provider or a provider within the authorizing providers department within the previous 12 mos or has a future within next 30 days. See \"Patient Info\" tab in inbasket, or \"Choose Columns\" in Meds & Orders section of the refill encounter.              Passed - Medication is active on med list        Passed - Patient is 18 years of age or older          "

## 2020-02-28 NOTE — PROGRESS NOTES
[address] [address]  [phone]  Dept: [phone]    PRE-OP EVALUATION:  Today's date: 2020    Sundar German (: 1950) presents for pre-operative evaluation assessment as requested by Dr. Dudley.  He requires evaluation and anesthesia risk assessment prior to undergoing surgery/procedure for treatment of knee pain .    Proposed Surgery/ Procedure: Right TKA  Date of Surgery/ Procedure: 3/16/20  Time of Surgery/ Procedure: 7:20am  Hospital/Surgical Facility[facility]  Primary Physician: Liu Nolasco  Type of Anesthesia Anticipated: General    Patient has a Health Care Directive or Living Will:  NO    1. NO - Do you have a history of heart attack, stroke, stent, bypass or surgery on an artery in the head, neck, heart or legs?  2. NO - Do you ever have any pain or discomfort in your chest?  3. NO - Do you have a history of  Heart Failure?  4. NO - Are you troubled by shortness of breath when: walking on the level, up a slight hill or at night?  5. NO - Do you currently have a cold, bronchitis or other respiratory infection?  6. NO - Do you have a cough, shortness of breath or wheezing?  7. NO - Do you sometimes get pains in the calves of your legs when you walk?  8. NO - Do you or anyone in your family have previous history of blood clots?  9. NO - Do you or does anyone in your family have a serious bleeding problem such as prolonged bleeding following surgeries or cuts?  10. YES - HAVE YOU EVER HAD PROBLEMS WITH ANEMIA OR BEEN TOLD TO TAKE IRON PILLS? After Lt TKA, GI Bleed  11. NO - Have you had any abnormal blood loss such as black, tarry or bloody stools, or abnormal vaginal bleeding?  12. YES - HAVE YOU EVER HAD A BLOOD TRANSFUSION? After GI Bleed  13. YES - HAVE YOU OR ANY OF YOUR RELATIVES EVER HAD PROBLEMS WITH ANESTHESIA? Urinary retention  14. NO - Do you have sleep apnea, excessive snoring or daytime drowsiness?  15. NO - Do you  have any prosthetic heart valves?  16. NO - Do you have prosthetic joints?  17. NO - Is there any chance that you may be pregnant?    [facility]    Preoperative Evaluation    HPI    HPI related to upcoming procedure: Rt Knee pain, severe OA, having Rt Knee replacement, Hx of Lt TKA x 2.    Prediabetes    Glucose   Date Value Ref Range Status   05/07/2019 106 (H) 70 - 99 mg/dL Final     Comment:     Fasting specimen   03/23/2019 111 (H) 70 - 99 mg/dL Final     Comment:     Fasting specimen   03/06/2019 101 (H) 70 - 99 mg/dL Final   02/28/2019 108 (H) 70 - 99 mg/dL Final   02/27/2019 119 (H) 70 - 99 mg/dL Final     Lab Results   Component Value Date    A1C 5.5 05/07/2019    A1C 5.6 03/02/2018    A1C 5.6 02/09/2017    A1C 5.8 11/20/2009    A1C 5.9 10/06/2008     Lipids    Recent Labs   Lab Test 05/07/19  0800 03/02/18  1016  06/02/15  0924 11/19/13  0837   CHOL 157 138   < > 152 189   HDL 51 58   < > 55 60   LDL 94 60   < > 81 97   TRIG 61 100   < > 82 165*   CHOLHDLRATIO  --   --   --  2.8 3.2    < > = values in this interval not displayed.     Hypothyroid    TSH   Date Value Ref Range Status   02/11/2020 0.32 (L) 0.40 - 4.00 mU/L Final     Normal free T4 at 1.24    Medical History    Patient Active Problem List   Diagnosis     Allergic rhinitis     Acquired hypothyroidism     Hyperlipidemia LDL goal <130     Impaired memory     Sleep related leg cramps     Chronic sinusitis     S/P revision of total knee, left     BPH (benign prostatic hyperplasia)     OA (osteoarthritis)     Prediabetes     Class 1 obesity with serious comorbidity and body mass index (BMI) of 33.0 to 33.9 in adult, unspecified obesity type     h/o Malignant melanoma of torso excluding breast (H)     GI bleed     * * * SBE PROPHYLAXIS * * *       Past Medical History:   Diagnosis Date     Abnormal glucose     A1C 1/06 =  6.3     Acquired hypothyroidism 10/11/2005     Problem list name updated by automated process. Provider to  review     Allergic rhinitis, cause unspecified     mary spring time     Benign localized hyperplasia of prostate with urinary obstruction and other lower urinary tract symptoms (LUTS)(600.21)     slow stream, nocturia - Dr Malave     Chronic sinusitis 3/14/2014     Environmental allergies     AIT - Dr Weiss     History of blood transfusion      Hyperlipidemia LDL goal <130 10/31/2010     hypogonadism     low testosterone at times      RAFIQ (iron deficiency anemia)      Impaired memory 02/11/2011    ?     Melanoma (H) 04/2018    Melanoma 0.7 mm depth, Levar III, superficial spreading, stage T1a     OA (osteoarthritis) total L knee 3/09    knees bother;; has had bilat arthroscopic     Prediabetes      Sleep related leg cramps 06/2012       Past Surgical History:   Procedure Laterality Date     ARTHROPLASTY REVISION KNEE Left 10/26/2015     ARTHROPLASTY REVISION KNEE - Dr Dudley     COLONOSCOPY  10/2/2013    diverticulosis - due 10 yrs (10/2023), initial 10/2003     CYSTOSCOPY  04/2016     ENDOSCOPIC SINUS SURGERY  3/17/2014    Bilateral Endoscopic Sinus Surgery - Dr Johnson     ESOPHAGOSCOPY, GASTROSCOPY, DUODENOSCOPY (EGD), COMBINED N/A 2/28/2019    Non bleeding gastric ulcers and duodenal ulcer. Recheck 3 months. Dr Sinclair     HC REPAIR UMBILICAL KASI,5+Y/O,REDUC  2002     LASER REVOLIX PHOTOSELECTIVE VAPORIZATION PROSTATE N/A 4/6/2016    LASER REVOLIX / QUANTA PHOTOSELECTIVE VAPORIZATION PROSTATE - Dr Malave     ROTATOR CUFF REPAIR RT/LT Left 03/2012    left shoulder     SURGICAL HISTORY OF -   1996    tonsils and adenoids     SURGICAL HISTORY OF -  Left 03/2009    Lt TKA     SURGICAL HISTORY OF -  Bilateral     Rt Knee x 1 (meniscus, 1995), Lt x 3 (last 2009)     UPPER GI ENDOSCOPY  05/2019    normal     VASECTOMY Bilateral 1986       Current Outpatient Medications   Medication Sig Dispense Refill     cycloSPORINE (RESTASIS) 0.05 % ophthalmic emulsion Place 1 drop into the right eye 2 times daily        fluticasone (FLONASE) 50 MCG/ACT nasal spray Spray 1 spray into both nostrils daily 48 g 3     levothyroxine (SYNTHROID/LEVOTHROID) 175 MCG tablet TAKE 1 TABLET BY MOUTH DAILY 90 tablet 0     multivitamin, therapeutic with minerals (MULTI-VITAMIN) TABS Take 1 tablet by mouth daily       psyllium (METAMUCIL/KONSYL) Packet Take 1 packet by mouth daily       sildenafil (REVATIO) 20 MG tablet Take 1 tablet (20 mg) by mouth three daily as needed. 90 tablet 0     simvastatin (ZOCOR) 20 MG tablet TAKE 1 TABLET(20 MG) BY MOUTH AT BEDTIME 90 tablet 3     tamsulosin (FLOMAX) 0.4 MG capsule Take 1 capsule (0.4 mg) by mouth daily 30 capsule 1       OTC products: tylenol arthritis    No Known Allergies    Latex Allergy: NO    Family History   Problem Relation Age of Onset     C.A.D. Father         CHF     Heart Failure Father      Chronic Obstructive Pulmonary Disease Father      Cerebrovascular Disease Brother 61     Gastrointestinal Disease Brother         GI Bleed - colectomy     Hypertension Mother      Cardiovascular Mother 84        MI     Lipids Mother      Chronic Obstructive Pulmonary Disease Mother      No Known Problems Sister      No Known Problems Brother        Social History     Socioeconomic History     Marital status:      Spouse name: Ingrid     Number of children: 4     Years of education: Not on file     Highest education level: Not on file   Occupational History     Employer: [employer]   Social Needs     Financial resource strain: Not on file     Food insecurity:     Worry: Not on file     Inability: Not on file     Transportation needs:     Medical: Not on file     Non-medical: Not on file   Tobacco Use     Smoking status: Former Smoker     Packs/day: 1.00     Years: 20.00     Pack years: 20.00     Last attempt to quit: 1981     Years since quittin.1     Smokeless tobacco: Former User     Types: Chew     Quit date: 1992     Tobacco comment: 1 tin per week   Substance and Sexual  Activity     Alcohol use: Yes     Alcohol/week: 7.0 - 8.0 standard drinks     Types: 7 - 8 Standard drinks or equivalent per week     Comment: 1 beer daily     Drug use: No     Sexual activity: Yes     Partners: Female   Lifestyle     Physical activity:     Days per week: Not on file     Minutes per session: Not on file     Stress: Not on file   Relationships     Social connections:     Talks on phone: Not on file     Gets together: Not on file     Attends Cheondoism service: Not on file     Active member of club or organization: Not on file     Attends meetings of clubs or organizations: Not on file     Relationship status: Not on file     Intimate partner violence:     Fear of current or ex partner: Not on file     Emotionally abused: Not on file     Physically abused: Not on file     Forced sexual activity: Not on file   Other Topics Concern     Parent/sibling w/ CABG, MI or angioplasty before 65F 55M? Not Asked   Social History Narrative     Not on file       History   Drug Use No       ROS    CONSTITUTIONAL: NEGATIVE for fever, chills, change in weight  INTEGUMENTARY/SKIN: NEGATIVE for worrisome rashes, moles or lesions  EYES: NEGATIVE for vision changes or irritation  ENT/MOUTH: NEGATIVE for ear, mouth and throat problems  RESP: NEGATIVE for significant cough or SOB  BREAST: NEGATIVE for masses, tenderness or discharge  CV: NEGATIVE for chest pain, palpitations or peripheral edema  GI: NEGATIVE for nausea, abdominal pain, heartburn, or change in bowel habits  : NEGATIVE for frequency, dysuria, or hematuria  MUSCULOSKELETAL: NEGATIVE for significant arthralgias or myalgia  NEURO: NEGATIVE for weakness, dizziness or paresthesias  ENDOCRINE: NEGATIVE for temperature intolerance, skin/hair changes  HEME: NEGATIVE for bleeding problems  PSYCHIATRIC: NEGATIVE for changes in mood or affect    Exam    Vitals:    02/28/20 1113   BP: 122/76   Pulse: 57   Temp: 97.9  F (36.6  C)   TempSrc: Oral   SpO2: 97%   Weight:  "98.4 kg (217 lb)   Height: 1.803 m (5' 11\")         GENERAL APPEARANCE: healthy, alert and no distress     EYES: EOMI,  PERRL     HENT: ear canals and TM's normal and nose and mouth without ulcers or lesions     NECK: no adenopathy, no asymmetry, masses, or scars and thyroid normal to palpation     RESP: lungs clear to auscultation - no rales, rhonchi or wheezes     CV: regular rates and rhythm, normal S1 S2, no S3 or S4 and slight systolic murmur, click or rub     ABDOMEN:  soft, nontender, no HSM or masses and bowel sounds normal     MS: extremities normal- no gross deformities noted, no evidence of inflammation in joints, FROM in all extremities.     SKIN: no suspicious lesions or rashes     NEURO: Normal strength and tone, sensory exam grossly normal, mentation intact and speech normal     PSYCH: mentation appears normal. and affect normal/bright     LYMPHATICS: no cervical adenopathy    Diagnostics    EKG: appears normal, sinus bradycardia, normal axis, normal intervals, except UT at 0.24, no acute ST/T changes c/w ischemia, no LVH by voltage criteria, unchanged from previous tracings    Labs pending    Orders Only on 02/11/2020   Component Date Value Ref Range Status     T4 Free 02/11/2020 1.24  0.76 - 1.46 ng/dL Final     TSH 02/11/2020 0.32* 0.40 - 4.00 mU/L Final     Impression    Reason for surgery/procedure: Right knee severe OA    The proposed surgical procedure is considered INTERMEDIATE risk.    REVISED CARDIAC RISK INDEX  The patient has the following serious cardiovascular risks for perioperative complications such as (MI, PE, VFib and 3  AV Block):  No serious cardiac risks  INTERPRETATION: 1 risks: Class II (low risk - 0.9% complication rate)    The patient has the following additional risks for perioperative complications:  No identified additional risks      ICD-10-CM    1. Preop general physical exam Z01.818 CBC with platelets     Comprehensive metabolic panel     EKG 12-lead complete w/read - " Clinics     UA reflex to Microscopic and Culture   2. Primary osteoarthritis of right knee M17.11    3. Urinary retention R33.9 tamsulosin (FLOMAX) 0.4 MG capsule   4. S/P revision of total knee, left Z96.652    5. Prediabetes R73.03 Hemoglobin A1c   6. Hyperlipidemia LDL goal <130 E78.5    7. Acquired hypothyroidism E03.9    8. History of GI bleed Z87.19    9. h/o Malignant melanoma of torso excluding breast (H) C43.59    10. Environmental allergies Z91.09    11. Class 1 obesity with serious comorbidity and body mass index (BMI) of 30.0 to 30.9 in adult, unspecified obesity type E66.9     Z68.30    12. Medication monitoring encounter Z51.81        Recommendations    --Consult hospital rounder / IM to assist post-op medical management    Medications    Stop 5-7 days prior    No NSAID's, supplement, or vitamins    Stop 24 hours prior    NA    Ok to take morning of surgery with small sip of water:    Levothyroxine, simvastatin, and flomax    APPROVAL GIVEN to proceed with proposed procedure, without further diagnostic evaluation       Signed Electronically by:            Liu Nolasco MD, FAAFP     [facility]  [address] [place] [address]  [email or web address]   Office: [phone]  Fax: [phone]  Pager: [phone]       Copy of this evaluation report is provided to requesting physician.    [facility] Preop Guidelines    Revised Cardiac Risk Index

## 2020-02-29 RX ORDER — TAMSULOSIN HYDROCHLORIDE 0.4 MG/1
CAPSULE ORAL
Qty: 90 CAPSULE | Refills: 3 | Status: SHIPPED | OUTPATIENT
Start: 2020-02-29 | End: 2020-07-27

## 2020-03-02 DIAGNOSIS — Z01.818 PREOP GENERAL PHYSICAL EXAM: Primary | ICD-10-CM

## 2020-03-02 LAB
MRSA DNA SPEC QL NAA+PROBE: NEGATIVE
SPECIMEN SOURCE: NORMAL

## 2020-03-02 PROCEDURE — 87641 MR-STAPH DNA AMP PROBE: CPT | Performed by: FAMILY MEDICINE

## 2020-03-02 PROCEDURE — 87640 STAPH A DNA AMP PROBE: CPT | Mod: XU | Performed by: FAMILY MEDICINE

## 2020-03-16 ENCOUNTER — APPOINTMENT (OUTPATIENT)
Dept: PHYSICAL THERAPY | Facility: CLINIC | Age: 70
End: 2020-03-16
Attending: ORTHOPAEDIC SURGERY
Payer: COMMERCIAL

## 2020-03-16 ENCOUNTER — APPOINTMENT (OUTPATIENT)
Dept: GENERAL RADIOLOGY | Facility: CLINIC | Age: 70
End: 2020-03-16
Attending: ORTHOPAEDIC SURGERY
Payer: COMMERCIAL

## 2020-03-16 ENCOUNTER — ANESTHESIA EVENT (OUTPATIENT)
Dept: SURGERY | Facility: CLINIC | Age: 70
End: 2020-03-16
Payer: COMMERCIAL

## 2020-03-16 ENCOUNTER — HOSPITAL ENCOUNTER (OUTPATIENT)
Facility: CLINIC | Age: 70
Discharge: HOME | End: 2020-03-18
Attending: ORTHOPAEDIC SURGERY | Admitting: ORTHOPAEDIC SURGERY
Payer: COMMERCIAL

## 2020-03-16 ENCOUNTER — ANESTHESIA (OUTPATIENT)
Dept: SURGERY | Facility: CLINIC | Age: 70
End: 2020-03-16
Payer: COMMERCIAL

## 2020-03-16 DIAGNOSIS — Z96.651 STATUS POST TOTAL RIGHT KNEE REPLACEMENT: ICD-10-CM

## 2020-03-16 DIAGNOSIS — Z96.659 HISTORY OF TOTAL KNEE REPLACEMENT, UNSPECIFIED LATERALITY: Primary | ICD-10-CM

## 2020-03-16 PROCEDURE — 97116 GAIT TRAINING THERAPY: CPT | Mod: GP | Performed by: PHYSICAL THERAPIST

## 2020-03-16 PROCEDURE — C1776 JOINT DEVICE (IMPLANTABLE): HCPCS | Performed by: ORTHOPAEDIC SURGERY

## 2020-03-16 PROCEDURE — 27810169 ZZH OR IMPLANT GENERAL: Performed by: ORTHOPAEDIC SURGERY

## 2020-03-16 PROCEDURE — 97110 THERAPEUTIC EXERCISES: CPT | Mod: GP | Performed by: PHYSICAL THERAPIST

## 2020-03-16 PROCEDURE — 37000009 ZZH ANESTHESIA TECHNICAL FEE, EACH ADDTL 15 MIN: Performed by: ORTHOPAEDIC SURGERY

## 2020-03-16 PROCEDURE — 25000128 H RX IP 250 OP 636: Performed by: ANESTHESIOLOGY

## 2020-03-16 PROCEDURE — 25000132 ZZH RX MED GY IP 250 OP 250 PS 637: Performed by: INTERNAL MEDICINE

## 2020-03-16 PROCEDURE — 37000008 ZZH ANESTHESIA TECHNICAL FEE, 1ST 30 MIN: Performed by: ORTHOPAEDIC SURGERY

## 2020-03-16 PROCEDURE — 36000063 ZZH SURGERY LEVEL 4 EA 15 ADDTL MIN: Performed by: ORTHOPAEDIC SURGERY

## 2020-03-16 PROCEDURE — 71000014 ZZH RECOVERY PHASE 1 LEVEL 2 FIRST HR: Performed by: ORTHOPAEDIC SURGERY

## 2020-03-16 PROCEDURE — 25000132 ZZH RX MED GY IP 250 OP 250 PS 637: Performed by: PHYSICIAN ASSISTANT

## 2020-03-16 PROCEDURE — 97530 THERAPEUTIC ACTIVITIES: CPT | Mod: GP | Performed by: PHYSICAL THERAPIST

## 2020-03-16 PROCEDURE — 40000986 XR KNEE PORT RT 1/2 VW: Mod: RT

## 2020-03-16 PROCEDURE — 99207 ZZC CDG-CODE CATEGORY CHANGED: CPT | Performed by: INTERNAL MEDICINE

## 2020-03-16 PROCEDURE — 25000128 H RX IP 250 OP 636: Performed by: ORTHOPAEDIC SURGERY

## 2020-03-16 PROCEDURE — 25800030 ZZH RX IP 258 OP 636: Performed by: ANESTHESIOLOGY

## 2020-03-16 PROCEDURE — 99213 OFFICE O/P EST LOW 20 MIN: CPT | Performed by: INTERNAL MEDICINE

## 2020-03-16 PROCEDURE — 25000128 H RX IP 250 OP 636: Performed by: PHYSICIAN ASSISTANT

## 2020-03-16 PROCEDURE — 25000125 ZZHC RX 250

## 2020-03-16 PROCEDURE — 97161 PT EVAL LOW COMPLEX 20 MIN: CPT | Mod: GP | Performed by: PHYSICAL THERAPIST

## 2020-03-16 PROCEDURE — 27210794 ZZH OR GENERAL SUPPLY STERILE: Performed by: ORTHOPAEDIC SURGERY

## 2020-03-16 PROCEDURE — 40000306 ZZH STATISTIC PRE PROC ASSESS II: Performed by: ORTHOPAEDIC SURGERY

## 2020-03-16 PROCEDURE — 25000128 H RX IP 250 OP 636

## 2020-03-16 PROCEDURE — 25000132 ZZH RX MED GY IP 250 OP 250 PS 637: Performed by: ORTHOPAEDIC SURGERY

## 2020-03-16 PROCEDURE — 25000125 ZZHC RX 250: Performed by: ANESTHESIOLOGY

## 2020-03-16 PROCEDURE — 25800030 ZZH RX IP 258 OP 636: Performed by: ORTHOPAEDIC SURGERY

## 2020-03-16 PROCEDURE — C1713 ANCHOR/SCREW BN/BN,TIS/BN: HCPCS | Performed by: ORTHOPAEDIC SURGERY

## 2020-03-16 PROCEDURE — 25800025 ZZH RX 258: Performed by: ORTHOPAEDIC SURGERY

## 2020-03-16 PROCEDURE — 25000125 ZZHC RX 250: Performed by: PHYSICIAN ASSISTANT

## 2020-03-16 PROCEDURE — 36000093 ZZH SURGERY LEVEL 4 1ST 30 MIN: Performed by: ORTHOPAEDIC SURGERY

## 2020-03-16 PROCEDURE — 25000125 ZZHC RX 250: Performed by: ORTHOPAEDIC SURGERY

## 2020-03-16 DEVICE — IMPLANTABLE DEVICE
Type: IMPLANTABLE DEVICE | Site: KNEE | Status: FUNCTIONAL
Brand: NEXGEN®

## 2020-03-16 DEVICE — IMPLANTABLE DEVICE
Type: IMPLANTABLE DEVICE | Site: KNEE | Status: FUNCTIONAL
Brand: NEXGEN® LEGACY®

## 2020-03-16 DEVICE — BONE CEMENT SIMPLEX FULL DOSE 6191-1-001: Type: IMPLANTABLE DEVICE | Site: KNEE | Status: FUNCTIONAL

## 2020-03-16 DEVICE — IMP ART SURFACE ZIM NEXGEN LPS GH 5-6 10MM 00-5964-042-10: Type: IMPLANTABLE DEVICE | Site: KNEE | Status: FUNCTIONAL

## 2020-03-16 RX ORDER — CEFAZOLIN SODIUM 2 G/100ML
2 INJECTION, SOLUTION INTRAVENOUS
Status: COMPLETED | OUTPATIENT
Start: 2020-03-16 | End: 2020-03-16

## 2020-03-16 RX ORDER — PROPOFOL 10 MG/ML
INJECTION, EMULSION INTRAVENOUS CONTINUOUS PRN
Status: DISCONTINUED | OUTPATIENT
Start: 2020-03-16 | End: 2020-03-16

## 2020-03-16 RX ORDER — ONDANSETRON 2 MG/ML
4 INJECTION INTRAMUSCULAR; INTRAVENOUS EVERY 6 HOURS PRN
Status: DISCONTINUED | OUTPATIENT
Start: 2020-03-16 | End: 2020-03-18 | Stop reason: HOSPADM

## 2020-03-16 RX ORDER — KETOROLAC TROMETHAMINE 15 MG/ML
15 INJECTION, SOLUTION INTRAMUSCULAR; INTRAVENOUS EVERY 6 HOURS
Status: COMPLETED | OUTPATIENT
Start: 2020-03-16 | End: 2020-03-17

## 2020-03-16 RX ORDER — GLYCOPYRROLATE 0.2 MG/ML
INJECTION, SOLUTION INTRAMUSCULAR; INTRAVENOUS PRN
Status: DISCONTINUED | OUTPATIENT
Start: 2020-03-16 | End: 2020-03-16

## 2020-03-16 RX ORDER — KETOROLAC TROMETHAMINE 30 MG/ML
INJECTION, SOLUTION INTRAMUSCULAR; INTRAVENOUS PRN
Status: DISCONTINUED | OUTPATIENT
Start: 2020-03-16 | End: 2020-03-16

## 2020-03-16 RX ORDER — SODIUM CHLORIDE, SODIUM LACTATE, POTASSIUM CHLORIDE, CALCIUM CHLORIDE 600; 310; 30; 20 MG/100ML; MG/100ML; MG/100ML; MG/100ML
INJECTION, SOLUTION INTRAVENOUS CONTINUOUS
Status: DISCONTINUED | OUTPATIENT
Start: 2020-03-16 | End: 2020-03-16 | Stop reason: HOSPADM

## 2020-03-16 RX ORDER — IBUPROFEN 600 MG/1
600 TABLET, FILM COATED ORAL EVERY 8 HOURS
Qty: 90 TABLET | Refills: 0 | Status: SHIPPED | OUTPATIENT
Start: 2020-03-16 | End: 2020-03-17

## 2020-03-16 RX ORDER — HYDROXYZINE HYDROCHLORIDE 10 MG/1
10 TABLET, FILM COATED ORAL EVERY 6 HOURS PRN
Status: DISCONTINUED | OUTPATIENT
Start: 2020-03-16 | End: 2020-03-18 | Stop reason: HOSPADM

## 2020-03-16 RX ORDER — KETAMINE HYDROCHLORIDE 10 MG/ML
INJECTION, SOLUTION INTRAMUSCULAR; INTRAVENOUS PRN
Status: DISCONTINUED | OUTPATIENT
Start: 2020-03-16 | End: 2020-03-16

## 2020-03-16 RX ORDER — FLUTICASONE PROPIONATE 50 MCG
1 SPRAY, SUSPENSION (ML) NASAL DAILY
Status: DISCONTINUED | OUTPATIENT
Start: 2020-03-17 | End: 2020-03-18 | Stop reason: HOSPADM

## 2020-03-16 RX ORDER — OXYCODONE HYDROCHLORIDE 5 MG/1
5-10 TABLET ORAL
Status: DISCONTINUED | OUTPATIENT
Start: 2020-03-16 | End: 2020-03-17

## 2020-03-16 RX ORDER — ACETAMINOPHEN 500 MG
1000 TABLET ORAL ONCE
Status: COMPLETED | OUTPATIENT
Start: 2020-03-16 | End: 2020-03-16

## 2020-03-16 RX ORDER — FENTANYL CITRATE 50 UG/ML
25-50 INJECTION, SOLUTION INTRAMUSCULAR; INTRAVENOUS
Status: DISCONTINUED | OUTPATIENT
Start: 2020-03-16 | End: 2020-03-16 | Stop reason: HOSPADM

## 2020-03-16 RX ORDER — LIDOCAINE HYDROCHLORIDE 10 MG/ML
INJECTION, SOLUTION INFILTRATION; PERINEURAL PRN
Status: DISCONTINUED | OUTPATIENT
Start: 2020-03-16 | End: 2020-03-16

## 2020-03-16 RX ORDER — LIDOCAINE HCL/EPINEPHRINE/PF 2%-1:200K
VIAL (ML) INJECTION PRN
Status: DISCONTINUED | OUTPATIENT
Start: 2020-03-16 | End: 2020-03-16

## 2020-03-16 RX ORDER — LIDOCAINE 40 MG/G
CREAM TOPICAL
Status: DISCONTINUED | OUTPATIENT
Start: 2020-03-16 | End: 2020-03-18 | Stop reason: HOSPADM

## 2020-03-16 RX ORDER — DEXAMETHASONE SODIUM PHOSPHATE 4 MG/ML
INJECTION, SOLUTION INTRA-ARTICULAR; INTRALESIONAL; INTRAMUSCULAR; INTRAVENOUS; SOFT TISSUE PRN
Status: DISCONTINUED | OUTPATIENT
Start: 2020-03-16 | End: 2020-03-16

## 2020-03-16 RX ORDER — ACETAMINOPHEN 325 MG/1
975 TABLET ORAL EVERY 8 HOURS
Status: DISCONTINUED | OUTPATIENT
Start: 2020-03-16 | End: 2020-03-18 | Stop reason: HOSPADM

## 2020-03-16 RX ORDER — HYDROXYZINE HYDROCHLORIDE 10 MG/1
10 TABLET, FILM COATED ORAL EVERY 6 HOURS PRN
Qty: 30 TABLET | Refills: 11 | Status: SHIPPED | OUTPATIENT
Start: 2020-03-16 | End: 2020-07-27

## 2020-03-16 RX ORDER — CEFAZOLIN SODIUM 1 G/3ML
1 INJECTION, POWDER, FOR SOLUTION INTRAMUSCULAR; INTRAVENOUS SEE ADMIN INSTRUCTIONS
Status: DISCONTINUED | OUTPATIENT
Start: 2020-03-16 | End: 2020-03-16 | Stop reason: HOSPADM

## 2020-03-16 RX ORDER — ONDANSETRON 2 MG/ML
4 INJECTION INTRAMUSCULAR; INTRAVENOUS EVERY 30 MIN PRN
Status: DISCONTINUED | OUTPATIENT
Start: 2020-03-16 | End: 2020-03-16 | Stop reason: HOSPADM

## 2020-03-16 RX ORDER — ONDANSETRON 4 MG/1
4 TABLET, ORALLY DISINTEGRATING ORAL EVERY 30 MIN PRN
Status: DISCONTINUED | OUTPATIENT
Start: 2020-03-16 | End: 2020-03-16 | Stop reason: HOSPADM

## 2020-03-16 RX ORDER — NITROGLYCERIN 0.4 MG/1
0.4 TABLET SUBLINGUAL EVERY 5 MIN PRN
Status: DISCONTINUED | OUTPATIENT
Start: 2020-03-16 | End: 2020-03-18 | Stop reason: HOSPADM

## 2020-03-16 RX ORDER — ACETAMINOPHEN 325 MG/1
975 TABLET ORAL EVERY 8 HOURS
Qty: 270 TABLET | Refills: 0 | Status: SHIPPED | OUTPATIENT
Start: 2020-03-16 | End: 2021-11-29

## 2020-03-16 RX ORDER — HYDROMORPHONE HYDROCHLORIDE 1 MG/ML
0.2 INJECTION, SOLUTION INTRAMUSCULAR; INTRAVENOUS; SUBCUTANEOUS
Status: DISCONTINUED | OUTPATIENT
Start: 2020-03-16 | End: 2020-03-18 | Stop reason: HOSPADM

## 2020-03-16 RX ORDER — DOCUSATE SODIUM 100 MG/1
100 CAPSULE, LIQUID FILLED ORAL 2 TIMES DAILY PRN
Status: DISCONTINUED | OUTPATIENT
Start: 2020-03-16 | End: 2020-03-18 | Stop reason: HOSPADM

## 2020-03-16 RX ORDER — TAMSULOSIN HYDROCHLORIDE 0.4 MG/1
0.4 CAPSULE ORAL DAILY
Status: DISCONTINUED | OUTPATIENT
Start: 2020-03-16 | End: 2020-03-18 | Stop reason: HOSPADM

## 2020-03-16 RX ORDER — NALOXONE HYDROCHLORIDE 0.4 MG/ML
.1-.4 INJECTION, SOLUTION INTRAMUSCULAR; INTRAVENOUS; SUBCUTANEOUS
Status: DISCONTINUED | OUTPATIENT
Start: 2020-03-16 | End: 2020-03-16

## 2020-03-16 RX ORDER — ONDANSETRON 2 MG/ML
INJECTION INTRAMUSCULAR; INTRAVENOUS PRN
Status: DISCONTINUED | OUTPATIENT
Start: 2020-03-16 | End: 2020-03-16

## 2020-03-16 RX ORDER — BUPIVACAINE HYDROCHLORIDE AND EPINEPHRINE 2.5; 5 MG/ML; UG/ML
30 INJECTION, SOLUTION EPIDURAL; INFILTRATION; INTRACAUDAL; PERINEURAL ONCE
Status: DISCONTINUED | OUTPATIENT
Start: 2020-03-16 | End: 2020-03-16 | Stop reason: HOSPADM

## 2020-03-16 RX ORDER — OXYCODONE HYDROCHLORIDE 5 MG/1
5 TABLET ORAL
Qty: 50 TABLET | Refills: 0 | Status: SHIPPED | OUTPATIENT
Start: 2020-03-16 | End: 2020-03-17

## 2020-03-16 RX ORDER — LABETALOL 20 MG/4 ML (5 MG/ML) INTRAVENOUS SYRINGE
10
Status: DISCONTINUED | OUTPATIENT
Start: 2020-03-16 | End: 2020-03-16 | Stop reason: HOSPADM

## 2020-03-16 RX ORDER — LIDOCAINE 40 MG/G
CREAM TOPICAL
Status: DISCONTINUED | OUTPATIENT
Start: 2020-03-16 | End: 2020-03-16 | Stop reason: HOSPADM

## 2020-03-16 RX ORDER — ROPIVACAINE HYDROCHLORIDE 7.5 MG/ML
INJECTION, SOLUTION EPIDURAL; PERINEURAL PRN
Status: DISCONTINUED | OUTPATIENT
Start: 2020-03-16 | End: 2020-03-16

## 2020-03-16 RX ORDER — ONDANSETRON 4 MG/1
4 TABLET, ORALLY DISINTEGRATING ORAL EVERY 6 HOURS PRN
Status: DISCONTINUED | OUTPATIENT
Start: 2020-03-16 | End: 2020-03-18 | Stop reason: HOSPADM

## 2020-03-16 RX ORDER — CALCIUM CARBONATE 500 MG/1
1000 TABLET, CHEWABLE ORAL 4 TIMES DAILY PRN
Status: DISCONTINUED | OUTPATIENT
Start: 2020-03-16 | End: 2020-03-18 | Stop reason: HOSPADM

## 2020-03-16 RX ORDER — MULTIPLE VITAMINS W/ MINERALS TAB 9MG-400MCG
1 TAB ORAL DAILY
Status: DISCONTINUED | OUTPATIENT
Start: 2020-03-17 | End: 2020-03-18 | Stop reason: HOSPADM

## 2020-03-16 RX ORDER — LEVOTHYROXINE SODIUM 175 UG/1
175 TABLET ORAL DAILY
Status: DISCONTINUED | OUTPATIENT
Start: 2020-03-17 | End: 2020-03-18 | Stop reason: HOSPADM

## 2020-03-16 RX ORDER — BUPIVACAINE HYDROCHLORIDE 7.5 MG/ML
INJECTION, SOLUTION INTRASPINAL PRN
Status: DISCONTINUED | OUTPATIENT
Start: 2020-03-16 | End: 2020-03-16

## 2020-03-16 RX ORDER — FENTANYL CITRATE 50 UG/ML
INJECTION, SOLUTION INTRAMUSCULAR; INTRAVENOUS PRN
Status: DISCONTINUED | OUTPATIENT
Start: 2020-03-16 | End: 2020-03-16

## 2020-03-16 RX ORDER — NALOXONE HYDROCHLORIDE 0.4 MG/ML
.1-.4 INJECTION, SOLUTION INTRAMUSCULAR; INTRAVENOUS; SUBCUTANEOUS
Status: DISCONTINUED | OUTPATIENT
Start: 2020-03-16 | End: 2020-03-18 | Stop reason: HOSPADM

## 2020-03-16 RX ORDER — SODIUM CHLORIDE, SODIUM LACTATE, POTASSIUM CHLORIDE, CALCIUM CHLORIDE 600; 310; 30; 20 MG/100ML; MG/100ML; MG/100ML; MG/100ML
INJECTION, SOLUTION INTRAVENOUS CONTINUOUS
Status: DISCONTINUED | OUTPATIENT
Start: 2020-03-16 | End: 2020-03-18 | Stop reason: HOSPADM

## 2020-03-16 RX ORDER — SIMVASTATIN 20 MG
20 TABLET ORAL AT BEDTIME
Status: DISCONTINUED | OUTPATIENT
Start: 2020-03-16 | End: 2020-03-18 | Stop reason: HOSPADM

## 2020-03-16 RX ORDER — CEFAZOLIN SODIUM 2 G/100ML
2 INJECTION, SOLUTION INTRAVENOUS EVERY 8 HOURS
Status: COMPLETED | OUTPATIENT
Start: 2020-03-16 | End: 2020-03-17

## 2020-03-16 RX ORDER — AMOXICILLIN 250 MG
1-2 CAPSULE ORAL 2 TIMES DAILY
Qty: 56 TABLET | Refills: 0 | Status: SHIPPED | OUTPATIENT
Start: 2020-03-16 | End: 2020-07-27

## 2020-03-16 RX ADMIN — DOCUSATE SODIUM 100 MG: 100 CAPSULE, LIQUID FILLED ORAL at 22:01

## 2020-03-16 RX ADMIN — SODIUM CHLORIDE, POTASSIUM CHLORIDE, SODIUM LACTATE AND CALCIUM CHLORIDE: 600; 310; 30; 20 INJECTION, SOLUTION INTRAVENOUS at 19:10

## 2020-03-16 RX ADMIN — CEFAZOLIN SODIUM 2 G: 2 INJECTION, SOLUTION INTRAVENOUS at 18:25

## 2020-03-16 RX ADMIN — TAMSULOSIN HYDROCHLORIDE 0.4 MG: 0.4 CAPSULE ORAL at 18:23

## 2020-03-16 RX ADMIN — ONDANSETRON HYDROCHLORIDE 4 MG: 2 INJECTION, SOLUTION INTRAVENOUS at 10:40

## 2020-03-16 RX ADMIN — ONDANSETRON 4 MG: 4 TABLET, ORALLY DISINTEGRATING ORAL at 22:01

## 2020-03-16 RX ADMIN — FENTANYL CITRATE 50 MCG: 50 INJECTION, SOLUTION INTRAMUSCULAR; INTRAVENOUS at 10:12

## 2020-03-16 RX ADMIN — SIMVASTATIN 20 MG: 20 TABLET, FILM COATED ORAL at 22:00

## 2020-03-16 RX ADMIN — GLYCOPYRROLATE 0.2 MG: 0.2 INJECTION, SOLUTION INTRAMUSCULAR; INTRAVENOUS at 10:16

## 2020-03-16 RX ADMIN — SODIUM CHLORIDE, POTASSIUM CHLORIDE, SODIUM LACTATE AND CALCIUM CHLORIDE: 600; 310; 30; 20 INJECTION, SOLUTION INTRAVENOUS at 10:43

## 2020-03-16 RX ADMIN — ACETAMINOPHEN 1000 MG: 500 TABLET, FILM COATED ORAL at 08:46

## 2020-03-16 RX ADMIN — OXYCODONE HYDROCHLORIDE 5 MG: 5 TABLET ORAL at 22:45

## 2020-03-16 RX ADMIN — DEXTRAN 70 AND HYPROMELLOSE 2910 1 DROP: 1; 3 SOLUTION/ DROPS OPHTHALMIC at 22:16

## 2020-03-16 RX ADMIN — SODIUM CHLORIDE, POTASSIUM CHLORIDE, SODIUM LACTATE AND CALCIUM CHLORIDE: 600; 310; 30; 20 INJECTION, SOLUTION INTRAVENOUS at 12:13

## 2020-03-16 RX ADMIN — MIDAZOLAM 2 MG: 1 INJECTION INTRAMUSCULAR; INTRAVENOUS at 09:53

## 2020-03-16 RX ADMIN — KETOROLAC TROMETHAMINE 30 MG: 30 INJECTION, SOLUTION INTRAMUSCULAR at 10:41

## 2020-03-16 RX ADMIN — PROPOFOL 140 MCG/KG/MIN: 10 INJECTION, EMULSION INTRAVENOUS at 10:15

## 2020-03-16 RX ADMIN — TRANEXAMIC ACID 1 G: 100 INJECTION, SOLUTION INTRAVENOUS at 10:03

## 2020-03-16 RX ADMIN — ACETAMINOPHEN 975 MG: 325 TABLET, FILM COATED ORAL at 15:34

## 2020-03-16 RX ADMIN — KETOROLAC TROMETHAMINE 15 MG: 15 INJECTION, SOLUTION INTRAMUSCULAR; INTRAVENOUS at 22:02

## 2020-03-16 RX ADMIN — CALCIUM CARBONATE (ANTACID) CHEW TAB 500 MG 1000 MG: 500 CHEW TAB at 22:00

## 2020-03-16 RX ADMIN — HYDROMORPHONE HYDROCHLORIDE 0.5 MG: 1 INJECTION, SOLUTION INTRAMUSCULAR; INTRAVENOUS; SUBCUTANEOUS at 13:05

## 2020-03-16 RX ADMIN — KETOROLAC TROMETHAMINE 15 MG: 15 INJECTION, SOLUTION INTRAMUSCULAR; INTRAVENOUS at 15:34

## 2020-03-16 RX ADMIN — OXYCODONE HYDROCHLORIDE 5 MG: 5 TABLET ORAL at 18:23

## 2020-03-16 RX ADMIN — MIDAZOLAM 2 MG: 1 INJECTION INTRAMUSCULAR; INTRAVENOUS at 10:02

## 2020-03-16 RX ADMIN — LIDOCAINE HYDROCHLORIDE 40 MG: 10 INJECTION, SOLUTION INFILTRATION; PERINEURAL at 10:14

## 2020-03-16 RX ADMIN — LIDOCAINE HYDROCHLORIDE,EPINEPHRINE BITARTRATE 10 ML: 20; .005 INJECTION, SOLUTION EPIDURAL; INFILTRATION; INTRACAUDAL; PERINEURAL at 09:57

## 2020-03-16 RX ADMIN — DEXAMETHASONE SODIUM PHOSPHATE 4 MG: 4 INJECTION, SOLUTION INTRA-ARTICULAR; INTRALESIONAL; INTRAMUSCULAR; INTRAVENOUS; SOFT TISSUE at 10:20

## 2020-03-16 RX ADMIN — CEFAZOLIN SODIUM 2 G: 2 INJECTION, SOLUTION INTRAVENOUS at 10:09

## 2020-03-16 RX ADMIN — SODIUM CHLORIDE, POTASSIUM CHLORIDE, SODIUM LACTATE AND CALCIUM CHLORIDE: 600; 310; 30; 20 INJECTION, SOLUTION INTRAVENOUS at 09:38

## 2020-03-16 RX ADMIN — KETAMINE HYDROCHLORIDE 10 MG: 10 INJECTION, SOLUTION INTRAMUSCULAR; INTRAVENOUS at 10:22

## 2020-03-16 RX ADMIN — ASPIRIN 325 MG: 325 TABLET, COATED ORAL at 18:23

## 2020-03-16 RX ADMIN — ROPIVACAINE HYDROCHLORIDE 20 ML: 7.5 INJECTION, SOLUTION EPIDURAL; PERINEURAL at 09:57

## 2020-03-16 RX ADMIN — CALCIUM CARBONATE (ANTACID) CHEW TAB 500 MG 1000 MG: 500 CHEW TAB at 19:30

## 2020-03-16 RX ADMIN — GLYCOPYRROLATE 0.2 MG: 0.2 INJECTION, SOLUTION INTRAMUSCULAR; INTRAVENOUS at 10:33

## 2020-03-16 RX ADMIN — BUPIVACAINE HYDROCHLORIDE IN DEXTROSE 1.8 ML: 7.5 INJECTION, SOLUTION SUBARACHNOID at 10:06

## 2020-03-16 ASSESSMENT — ENCOUNTER SYMPTOMS: DYSRHYTHMIAS: 0

## 2020-03-16 ASSESSMENT — MIFFLIN-ST. JEOR: SCORE: 1766.9

## 2020-03-16 NOTE — ANESTHESIA POSTPROCEDURE EVALUATION
Patient: Sundar German    Procedure(s):  Right total knee arthroplasty (spinal with adductor canal block)    Diagnosis:DJD (degenerative joint disease) [M19.90]  Diagnosis Additional Information: No value filed.    Anesthesia Type:  Spinal, Peripheral Nerve Block    Note:  Anesthesia Post Evaluation    Patient location during evaluation: PACU  Patient participation: Able to fully participate in evaluation  Level of consciousness: awake  Pain management: adequate  Airway patency: patent  Cardiovascular status: acceptable  Respiratory status: acceptable  Hydration status: euvolemic  PONV: controlled     Anesthetic complications: None          Last vitals:  Vitals:    03/16/20 1210 03/16/20 1220 03/16/20 1230   BP: 130/71 126/74 136/79   Pulse: 53 60 50   Resp: 14 24 16   Temp:   97.6  F (36.4  C)   SpO2: 95% 95% 96%         Electronically Signed By: Satya Borges MD  March 16, 2020  1:07 PM

## 2020-03-16 NOTE — PLAN OF CARE
PT: Orders received. PT evaluation completed and treatment initiated. Pt reports living in a house with 2 steps to enter and 9 stairs to main living area. Pt lives with a spouse who is motivated (and anxious) to assist at discharge. Pt was indep with all mobility prior to surgery.     Patient plan: Home  Current status: Pt supine upon initiation, agreeable to session. Spouse anxious throughout, and asking multiple questions regarding if the pt will be able to get OPPT as planned due to COVID-19. Reassurance provided, will ensure pt has exercise program upon discharge from hospital. Pt able to SLR therefore no KI needed. Pt completes sit<>supine with SBA, increased time/effort. Upon sitting EOB pt reports lightheadedness and nausea. This improves over ~8minutes. Pt motivated to wait it out so that he can continue with the session. Pt completes sit<>stand with CGA and cues for technique. Upon standing pt denies worsening of lightheadedness. Pt ambulates forward/backward ~10' with use of FWW, CGA and cues for technique. Upon last 1' of ambulation pt reports increasing dizziness and appears pale in color. Pt returns to supine and vitals assessed. All WNL except HR 43. Lightheaded/dizziness improve greatly in supine. RN updated. Pt supine at end of session, all needs in reach.   Anticipated status at discharge: Anticipate pt to be SBA or less for all mobility, including stairs.

## 2020-03-16 NOTE — PROGRESS NOTES
03/16/20 1652   Quick Adds   Type of Visit Initial PT Evaluation   Living Environment   Lives With spouse   Living Arrangements house   Home Accessibility stairs to enter home;stairs within home   Number of Stairs, Main Entrance 2   Stair Railings, Main Entrance none   Number of Stairs, Within Home, Primary 9   Stair Railings, Within Home, Primary railing on left side (ascending)   Transportation Anticipated family or friend will provide   Self-Care   Usual Activity Tolerance good   Current Activity Tolerance moderate   Regular Exercise No   Equipment Currently Used at Home none   Functional Level Prior   Ambulation 0-->independent   Transferring 0-->independent   Toileting 0-->independent   Bathing 0-->independent   Fall history within last six months yes   Number of times patient has fallen within last six months 1   Which of the above functional risks had a recent onset or change? ambulation;transferring;toileting;bathing;dressing   Prior Functional Level Comment FAll was slip on ice   General Information   Onset of Illness/Injury or Date of Surgery - Date 03/16/20   Referring Physician  Froylan Dudley MD     Patient/Family Goals Statement Discharge home   Pertinent History of Current Problem (include personal factors and/or comorbidities that impact the POC) Pt is POD0 R TKA   Precautions/Limitations fall precautions   Weight-Bearing Status - LLE full weight-bearing   Weight-Bearing Status - RLE weight-bearing as tolerated   General Observations Pt supine upon initiation, agreeable to session.    Cognitive Status Examination   Orientation orientation to person, place and time   Level of Consciousness alert   Follows Commands and Answers Questions 100% of the time   Personal Safety and Judgment intact   Memory intact   Pain Assessment   Patient Currently in Pain Yes, see Vital Sign flowsheet   Integumentary/Edema   Integumentary/Edema Comments Ace wrap intact to R LE   Posture    Posture Forward head  "position;Protracted shoulders   Range of Motion (ROM)   ROM Comment R knee limited to ~45 degrees flexion   Strength   Strength Comments Able to SLR, good quad set   Bed Mobility   Bed Mobility Comments sit<>supine with SBA   Transfer Skills   Transfer Comments sit<>stand CGA   Gait   Gait Comments CGA with FWW   Balance   Balance Comments Requires B UE support for safe dynamic mobiltiy   Sensory Examination   Sensory Perception no deficits were identified   Coordination   Coordination no deficits were identified   Muscle Tone   Muscle Tone no deficits were identified   Modality Interventions   Planned Modality Interventions Cryotherapy   Planned Modality Interventions Comments Ice PRN   General Therapy Interventions   Planned Therapy Interventions balance training;bed mobility training;gait training;ROM;strengthening;stretching;transfer training;home program guidelines;progressive activity/exercise   Clinical Impression   Criteria for Skilled Therapeutic Intervention yes, treatment indicated   PT Diagnosis Impaired functional mobility   Influenced by the following impairments Pain, weakness, impaired R knee flexion   Functional limitations due to impairments Difficulty with bed mobility, transfers, ambulation, stairs   Clinical Presentation Stable/Uncomplicated   Clinical Presentation Rationale medically stable, clear POC   Clinical Decision Making (Complexity) Low complexity   Therapy Frequency 2x/day   Predicted Duration of Therapy Intervention (days/wks) 3 days   Anticipated Equipment Needs at Discharge walker   Anticipated Discharge Disposition Other (see comments)  (per ortho)   Risk & Benefits of therapy have been explained Yes   Patient, Family & other staff in agreement with plan of care Yes   [facility] AM-PAC TM \"6 Clicks\"   2016, Trustees of [facility], under license to [employer].  All rights reserved.   6 Clicks Short Forms Basic Mobility Inpatient Short Form   [facility] AM-PAC  " "\"6 Clicks\" V.2 Basic Mobility Inpatient Short Form   1. Turning from your back to your side while in a flat bed without using bedrails? 4 - None   2. Moving from lying on your back to sitting on the side of a flat bed without using bedrails? 3 - A Little   3. Moving to and from a bed to a chair (including a wheelchair)? 3 - A Little   4. Standing up from a chair using your arms (e.g., wheelchair, or bedside chair)? 3 - A Little   5. To walk in hospital room? 3 - A Little   6. Climbing 3-5 steps with a railing? 2 - A Lot   Basic Mobility Raw Score (Score out of 24.Lower scores equate to lower levels of function) 18   Total Evaluation Time   Total Evaluation Time (Minutes) 8     "

## 2020-03-16 NOTE — ANESTHESIA PREPROCEDURE EVALUATION
Anesthesia Pre-Procedure Evaluation    Patient: Sundar German   MRN: [id number] : 1950          Preoperative Diagnosis: DJD (degenerative joint disease) [M19.90]    Procedure(s):  Right total knee arthroplasty (spinal with adductor canal block)    Past Medical History:   Diagnosis Date     Abnormal glucose     A1C  =  6.3     Acquired hypothyroidism 10/11/2005     Problem list name updated by automated process. Provider to review     Allergic rhinitis, cause unspecified     mary spring time     Benign localized hyperplasia of prostate with urinary obstruction and other lower urinary tract symptoms (LUTS)(600.21)     slow stream, nocturia - Dr Malave     Chronic sinusitis 3/14/2014     Complication of anesthesia      Environmental allergies     AIT - Dr Weiss     History of blood transfusion      Hyperlipidemia LDL goal <130 10/31/2010     hypogonadism     low testosterone at times      RAFIQ (iron deficiency anemia)      Impaired memory 2011    ?     Melanoma (H) 2018    Melanoma 0.7 mm depth, Levar III, superficial spreading, stage T1a     OA (osteoarthritis) total L knee 3/09    knees bother;; has had bilat arthroscopic     PONV (postoperative nausea and vomiting)      Prediabetes      Sleep related leg cramps 2012     Past Surgical History:   Procedure Laterality Date     ARTHROPLASTY REVISION KNEE Left 10/26/2015     ARTHROPLASTY REVISION KNEE - Dr Dudley     COLONOSCOPY  10/2/2013    diverticulosis - due 10 yrs (10/2023), initial 10/2003     CYSTOSCOPY  2016     ENDOSCOPIC SINUS SURGERY  3/17/2014    Bilateral Endoscopic Sinus Surgery - Dr Johnson     ESOPHAGOSCOPY, GASTROSCOPY, DUODENOSCOPY (EGD), COMBINED N/A 2019    Non bleeding gastric ulcers and duodenal ulcer. Recheck 3 months. Dr Sinclair     HC REPAIR UMBILICAL KASI,5+Y/O,REDUC       LASER REVOLIX PHOTOSELECTIVE VAPORIZATION PROSTATE N/A 2016    LASER REVOLIX / QUANTA PHOTOSELECTIVE VAPORIZATION PROSTATE - Dr Malave      ROTATOR CUFF REPAIR RT/LT Left 03/2012    left shoulder     SURGICAL HISTORY OF -   1996    tonsils and adenoids     SURGICAL HISTORY OF -  Left 03/2009    Lt TKA     SURGICAL HISTORY OF -  Bilateral     Rt Knee x 1 (meniscus, 1995), Lt x 3 (last 2009)     UPPER GI ENDOSCOPY  05/2019    normal     VASECTOMY Bilateral 1986     Anesthesia Evaluation     . Pt has had prior anesthetic. Type: General    History of anesthetic complications   - PONV        ROS/MED HX    ENT/Pulmonary:     (+)allergic rhinitis, , . .    Neurologic:     (+)dementia,     Cardiovascular:     (+) Dyslipidemia, ----. : . . . :. .      (-) hypertension, CAD, CHF, arrhythmias and pulmonary hypertension   METS/Exercise Tolerance:     Hematologic:     (+) Anemia, -      Musculoskeletal:   (+) arthritis,  -       GI/Hepatic:        (-) GERD and hepatitis   Renal/Genitourinary:      (-) renal disease   Endo:     (+) thyroid problem hypothyroidism, .   (-) Type I DM, Type II DM and obesity   Psychiatric:         Infectious Disease:  - neg infectious disease ROS       Malignancy:      - no malignancy   Other:    - neg other ROS                      Physical Exam      Airway   Mallampati: II  TM distance: >3 FB  Neck ROM: full    Dental     Cardiovascular   Rhythm and rate: regular and normal  (-) no murmur    Pulmonary    breath sounds clear to auscultation    Other findings: Lab Test        02/28/20 09/03/19 05/07/19                       1116          1043          0800          WBC          5.0          6.1          6.0           HGB          13.8         13.3         13.7          MCV          102*         100          96            PLT          235          254          307            Lab Test        02/28/20 05/07/19 03/23/19                       1116          0800          1046          NA           139          139          142           POTASSIUM    3.9          4.2          4.0           CHLORIDE     110*         109        "   111*          CO2          26           29           26            BUN          13           14           17            CR           0.66         0.78         0.77          ANIONGAP     5            1*           5             VIKKI          9.3          9.6          9.5           GLC          112*         106*         111*                Lab Results   Component Value Date    WBC 5.0 02/28/2020    HGB 13.8 02/28/2020    HCT 41.6 02/28/2020     02/28/2020    CRP 5.6 06/09/2015    SED 9 06/09/2015     02/28/2020    POTASSIUM 3.9 02/28/2020    CHLORIDE 110 (H) 02/28/2020    CO2 26 02/28/2020    BUN 13 02/28/2020    CR 0.66 02/28/2020     (H) 02/28/2020    VIKKI 9.3 02/28/2020    ALBUMIN 3.9 02/28/2020    PROTTOTAL 7.8 02/28/2020    ALT 31 02/28/2020    AST 20 02/28/2020    GGT 37 03/06/2019    ALKPHOS 46 02/28/2020    BILITOTAL 0.4 02/28/2020    PTT 27 03/01/2009    INR 2.00 (H) 03/09/2009    TSH 0.32 (L) 02/11/2020    T4 1.24 02/11/2020    T3 90 01/25/2011       Preop Vitals  BP Readings from Last 3 Encounters:   03/16/20 137/81   02/28/20 122/76   06/19/19 116/62    Pulse Readings from Last 3 Encounters:   03/16/20 52   02/28/20 57   06/19/19 65      Resp Readings from Last 3 Encounters:   03/16/20 18   03/01/19 16   10/28/16 16    SpO2 Readings from Last 3 Encounters:   03/16/20 98%   02/28/20 97%   06/19/19 98%      Temp Readings from Last 1 Encounters:   03/16/20 97.7  F (36.5  C) (Temporal)    Ht Readings from Last 1 Encounters:   03/16/20 1.803 m (5' 11\")      Wt Readings from Last 1 Encounters:   03/16/20 98 kg (216 lb)    Estimated body mass index is 30.13 kg/m  as calculated from the following:    Height as of this encounter: 1.803 m (5' 11\").    Weight as of this encounter: 98 kg (216 lb).       Anesthesia Plan      History & Physical Review  History and physical reviewed and following examination; no interval change.    ASA Status:  2 .    NPO Status:  > 8 hours    Plan for Spinal and " Peripheral Nerve Block with Propofol induction.   PONV prophylaxis:  Ondansetron (or other 5HT-3)         Postoperative Care  Postoperative pain management:  IV analgesics, Oral pain medications and Peripheral nerve block (Single Shot).      Consents  Anesthetic plan, risks, benefits and alternatives discussed with:  Patient..                 Satya Borges MD                    .

## 2020-03-16 NOTE — PHARMACY-ADMISSION MEDICATION HISTORY
Medication reconciliation completed by pre-admitting.    Prior to Admission medications    Medication Sig Last Dose Taking? Auth Provider   cycloSPORINE (RESTASIS) 0.05 % ophthalmic emulsion Place 1 drop into the right eye 2 times daily 3/15/2020 at Unknown time Yes Unknown, Entered By History   fluticasone (FLONASE) 50 MCG/ACT nasal spray Spray 1 spray into both nostrils daily 3/15/2020 at Unknown time Yes iLu Nolasco MD   levothyroxine (SYNTHROID/LEVOTHROID) 175 MCG tablet TAKE 1 TABLET BY MOUTH DAILY 3/16/2020 at Unknown time Yes Liu Nolasco MD   multivitamin, therapeutic with minerals (MULTI-VITAMIN) TABS Take 1 tablet by mouth daily Past Week at Unknown time Yes Reported, Patient   psyllium (METAMUCIL/KONSYL) Packet Take 1 packet by mouth daily 3/15/2020 at Unknown time Yes Reported, Patient   simvastatin (ZOCOR) 20 MG tablet TAKE 1 TABLET(20 MG) BY MOUTH AT BEDTIME 3/15/2020 at Unknown time Yes Liu Nolasco MD   tamsulosin (FLOMAX) 0.4 MG capsule TAKE 1 CAPSULE(0.4 MG) BY MOUTH DAILY 3/15/2020 at Unknown time Yes Liu Nolasco MD   sildenafil (REVATIO) 20 MG tablet Take 1 tablet (20 mg) by mouth three daily as needed. More than a month at Unknown time  Sean Malave MD

## 2020-03-16 NOTE — ANESTHESIA CARE TRANSFER NOTE
Patient: Sundar German    Procedure(s):  Right total knee arthroplasty (spinal with adductor canal block)    Diagnosis: DJD (degenerative joint disease) [M19.90]  Diagnosis Additional Information: No value filed.    Anesthesia Type:   Spinal, Peripheral Nerve Block     Note:  Airway :Nasal Cannula  Patient transferred to:PACU  Comments: Pt to PACU, VSS, re(port to RNHandoff Report: Identifed the Patient, Identified the Reponsible Provider, Reviewed the pertinent medical history, Discussed the surgical course, Reviewed Intra-OP anesthesia mangement and issues during anesthesia, Set expectations for post-procedure period and Allowed opportunity for questions and acknowledgement of understanding      Vitals: (Last set prior to Anesthesia Care Transfer)    CRNA VITALS  3/16/2020 1106 - 3/16/2020 1143      3/16/2020             Pulse:  85    SpO2:  95 %                Electronically Signed By: LÁZARO Pratt CRNA  March 16, 2020  11:43 AM

## 2020-03-16 NOTE — ANESTHESIA PROCEDURE NOTES
Peripheral nerve/Neuraxial procedure note : intrathecal  Pre-Procedure  Performed by Satya Borges MD  Location: pre-op      Pre-Anesthestic Checklist: patient identified, IV checked, site marked, risks and benefits discussed, informed consent, monitors and equipment checked, pre-op evaluation, at physician/surgeon's request and post-op pain management    Timeout  Correct Patient: Yes   Correct Procedure: Yes   Correct Site: Yes   Correct Laterality: Yes   Correct Position: Yes   Site Marked: Yes   .   Procedure Documentation  ASA 2  .    Procedure: intrathecal, .   Patient Position:sitting  (midline approach)     Patient Prep/Sterile Barriers; mask, sterile gloves, povidone-iodine 7.5% surgical scrub.  .  Needle:  Spinal Needle (gauge): 25  Spinal/LP Needle Length (inches): 3 # of attempts: 3 and # of redirects:  Introducer used Introducer: 20 G .        Assessment/Narrative  Paresthesias: No.  .  .  0.3 mL of clear CSF fluid removed . Comments:  Patient vocalizing significant pain with introducer placement despite multiple injections of local anesthetic in skin and sub Q (10 ml total). SAB difficult due to patient movement.

## 2020-03-16 NOTE — BRIEF OP NOTE
TKR for Pre/Post OA knee  Luis Enrique  TT est 60 w EBL min (see dictation for full)  Spec none  No anticipated complications

## 2020-03-16 NOTE — CONSULTS
Consult Date:  03/16/2020      REQUESTING PHYSICIAN:  Froylan Dudley MD      REASON FOR CONSULTATION:  Management of medical issues.      HISTORY OF PRESENT ILLNESS:  This is a 70-year-old gentleman who is status post right total knee arthroplasty.  This was carried out with spinal and adductor block.  Estimated blood loss was 25 mL.  The patient denies any chest pain or pressure.      PAST MEDICAL HISTORY:  Significant for hyperlipidemia, hypothyroidism, prediabetes, hypogonadism.      PAST SURGICAL HISTORY:  Significant for vasectomy, left total knee arthroplasty, left rotator cuff repair, right knee meniscal repair, endoscopic sinus surgery, cystoscopy, left knee arthroplasty revision.      FAMILY HISTORY:  Significant for heart failure in his father.      SOCIAL HISTORY:  He is .  He used to smoke, however, has quit smoking.  He does admit to drinking 2 beers a day, however, denies any history of withdrawal.      ALLERGIES:  NO KNOWN DRUG ALLERGIES.      HOME MEDICATIONS:  Includes cyclosporine drops, Flonase, Synthroid, multivitamins, Zocor, Flomax, sildenafil p.r.n.      REVIEW OF SYSTEMS:  As mentioned in the HPI.  He denies any chest pain or shortness of breath.  He denies any lightheadedness or dizziness.  All other systems are reviewed and deemed unremarkable and negative.      PHYSICAL EXAMINATION:   VITAL SIGNS:  Temperature is 97.7, pulse 53, blood pressure 133/72, respiratory rate 13, O2 sat is 96% on room air.   GENERAL:  He is alert, awake, oriented, coherent, accompanied by his wife, in no acute distress.   HEENT:  Pupils equal, round, react to light.   LUNGS:  Clear to auscultation anteriorly bilaterally.   HEART:  Distant heart sounds, regular rate, S1, S2 normal.  No murmurs or gallops.   ABDOMEN:  Soft, nontender, with good bowel sounds.   EXTREMITIES:  Right lower extremity is in an Ace wrap, I did not uncover.  In left lower extremity, there is no edema.      LABORATORY:  Lab work  obtained over here -- None.      ASSESSMENT AND PLAN:   1.  Status post right total knee arthroplasty.  Cares and deep venous thrombosis prophylaxis per Orthopedics.   2.  Hypothyroidism.  We will resume his Synthroid.   3.  Hyperlipidemia.  We will resume his statin.   4.  Sinus bradycardia.  He is asymptomatic.  We will monitor him.  It seems like he has had longstanding bradycardia on his prior EKGs.  He is asymptomatic.  We will monitor.   5.  History of ethanol use.  We will monitor him.  I doubt he will have any withdrawal symptoms.      CODE STATUS:  Full code.      Thank you for this consultation.  We will follow him along.  If there are any questions, do not hesitate to contact us.         ADARSH MOORE MD             D: 2020   T: 2020   MT: NEYMAR      Name:     ZAID DALTON   MRN:      [id number]        Account:       [id number]   :      1950           Consult Date:  2020      Document: [id number]       cc: Froylan Dudley MD

## 2020-03-16 NOTE — PLAN OF CARE
Patient vital signs are at baseline: No,  Reason:  HR bradycardic (38-50s)  Patient able to ambulate as they were prior to admission or with assist devices provided by therapies during their stay:  No,  Reason:  1st PT session this afternoon  Patient MUST void prior to discharge:  No,  Reason:  due to void  Patient able to tolerate oral intake:  Yes  Pain has adequate pain control using Oral analgesics:  No,  Reason:  IV pain medication thus far

## 2020-03-17 ENCOUNTER — APPOINTMENT (OUTPATIENT)
Dept: OCCUPATIONAL THERAPY | Facility: CLINIC | Age: 70
End: 2020-03-17
Attending: ORTHOPAEDIC SURGERY
Payer: COMMERCIAL

## 2020-03-17 ENCOUNTER — APPOINTMENT (OUTPATIENT)
Dept: PHYSICAL THERAPY | Facility: CLINIC | Age: 70
End: 2020-03-17
Attending: ORTHOPAEDIC SURGERY
Payer: COMMERCIAL

## 2020-03-17 LAB
GLUCOSE SERPL-MCNC: 134 MG/DL (ref 70–99)
HGB BLD-MCNC: 10.2 G/DL (ref 13.3–17.7)

## 2020-03-17 PROCEDURE — 97530 THERAPEUTIC ACTIVITIES: CPT | Mod: GP | Performed by: PHYSICAL THERAPIST

## 2020-03-17 PROCEDURE — 97110 THERAPEUTIC EXERCISES: CPT | Mod: GP | Performed by: PHYSICAL THERAPIST

## 2020-03-17 PROCEDURE — 25000132 ZZH RX MED GY IP 250 OP 250 PS 637: Performed by: PHYSICIAN ASSISTANT

## 2020-03-17 PROCEDURE — 99213 OFFICE O/P EST LOW 20 MIN: CPT | Performed by: INTERNAL MEDICINE

## 2020-03-17 PROCEDURE — 97530 THERAPEUTIC ACTIVITIES: CPT | Mod: GP

## 2020-03-17 PROCEDURE — 82947 ASSAY GLUCOSE BLOOD QUANT: CPT | Performed by: ORTHOPAEDIC SURGERY

## 2020-03-17 PROCEDURE — 97165 OT EVAL LOW COMPLEX 30 MIN: CPT | Mod: GO

## 2020-03-17 PROCEDURE — 97535 SELF CARE MNGMENT TRAINING: CPT | Mod: GO

## 2020-03-17 PROCEDURE — 97116 GAIT TRAINING THERAPY: CPT | Mod: GP | Performed by: PHYSICAL THERAPIST

## 2020-03-17 PROCEDURE — 85018 HEMOGLOBIN: CPT | Performed by: ORTHOPAEDIC SURGERY

## 2020-03-17 PROCEDURE — 36415 COLL VENOUS BLD VENIPUNCTURE: CPT | Performed by: ORTHOPAEDIC SURGERY

## 2020-03-17 PROCEDURE — 25000132 ZZH RX MED GY IP 250 OP 250 PS 637: Performed by: INTERNAL MEDICINE

## 2020-03-17 PROCEDURE — 99207 ZZC CDG-CODE CATEGORY CHANGED: CPT | Performed by: INTERNAL MEDICINE

## 2020-03-17 PROCEDURE — 25000125 ZZHC RX 250: Performed by: INTERNAL MEDICINE

## 2020-03-17 PROCEDURE — 25000132 ZZH RX MED GY IP 250 OP 250 PS 637: Performed by: ORTHOPAEDIC SURGERY

## 2020-03-17 PROCEDURE — 25000128 H RX IP 250 OP 636: Performed by: ORTHOPAEDIC SURGERY

## 2020-03-17 PROCEDURE — 97116 GAIT TRAINING THERAPY: CPT | Mod: GP,59

## 2020-03-17 PROCEDURE — 25000125 ZZHC RX 250: Performed by: ORTHOPAEDIC SURGERY

## 2020-03-17 RX ORDER — HYDROMORPHONE HYDROCHLORIDE 2 MG/1
2-4 TABLET ORAL
Status: DISCONTINUED | OUTPATIENT
Start: 2020-03-17 | End: 2020-03-18 | Stop reason: HOSPADM

## 2020-03-17 RX ORDER — LIDOCAINE HYDROCHLORIDE 20 MG/ML
JELLY TOPICAL ONCE
Status: COMPLETED | OUTPATIENT
Start: 2020-03-17 | End: 2020-03-17

## 2020-03-17 RX ORDER — HYDROMORPHONE HYDROCHLORIDE 2 MG/1
TABLET ORAL
Qty: 50 TABLET | Refills: 0 | Status: SHIPPED | OUTPATIENT
Start: 2020-03-17 | End: 2020-07-27

## 2020-03-17 RX ORDER — LIDOCAINE HYDROCHLORIDE 20 MG/ML
JELLY TOPICAL ONCE
Status: DISCONTINUED | OUTPATIENT
Start: 2020-03-17 | End: 2020-03-17

## 2020-03-17 RX ADMIN — ACETAMINOPHEN 975 MG: 325 TABLET, FILM COATED ORAL at 15:45

## 2020-03-17 RX ADMIN — DOCUSATE SODIUM 100 MG: 100 CAPSULE, LIQUID FILLED ORAL at 20:41

## 2020-03-17 RX ADMIN — KETOROLAC TROMETHAMINE 15 MG: 15 INJECTION, SOLUTION INTRAMUSCULAR; INTRAVENOUS at 04:03

## 2020-03-17 RX ADMIN — HYDROMORPHONE HYDROCHLORIDE 2 MG: 2 TABLET ORAL at 08:42

## 2020-03-17 RX ADMIN — MULTIPLE VITAMINS W/ MINERALS TAB 1 TABLET: TAB at 09:22

## 2020-03-17 RX ADMIN — TAMSULOSIN HYDROCHLORIDE 0.4 MG: 0.4 CAPSULE ORAL at 09:22

## 2020-03-17 RX ADMIN — HYDROMORPHONE HYDROCHLORIDE 2 MG: 2 TABLET ORAL at 20:40

## 2020-03-17 RX ADMIN — LIDOCAINE HYDROCHLORIDE: 20 JELLY TOPICAL at 16:33

## 2020-03-17 RX ADMIN — HYDROMORPHONE HYDROCHLORIDE 4 MG: 2 TABLET ORAL at 12:07

## 2020-03-17 RX ADMIN — OXYCODONE HYDROCHLORIDE 10 MG: 5 TABLET ORAL at 05:39

## 2020-03-17 RX ADMIN — KETOROLAC TROMETHAMINE 15 MG: 15 INJECTION, SOLUTION INTRAMUSCULAR; INTRAVENOUS at 10:47

## 2020-03-17 RX ADMIN — ACETAMINOPHEN 975 MG: 325 TABLET, FILM COATED ORAL at 01:02

## 2020-03-17 RX ADMIN — HYDROXYZINE HYDROCHLORIDE 10 MG: 10 TABLET, FILM COATED ORAL at 01:02

## 2020-03-17 RX ADMIN — HYDROMORPHONE HYDROCHLORIDE 2 MG: 2 TABLET ORAL at 09:30

## 2020-03-17 RX ADMIN — LEVOTHYROXINE SODIUM 175 MCG: 175 TABLET ORAL at 08:16

## 2020-03-17 RX ADMIN — DEXTRAN 70 AND HYPROMELLOSE 2910 1 DROP: 1; 3 SOLUTION/ DROPS OPHTHALMIC at 20:05

## 2020-03-17 RX ADMIN — HYDROMORPHONE HYDROCHLORIDE 0.2 MG: 1 INJECTION, SOLUTION INTRAMUSCULAR; INTRAVENOUS; SUBCUTANEOUS at 05:18

## 2020-03-17 RX ADMIN — SIMVASTATIN 20 MG: 20 TABLET, FILM COATED ORAL at 20:40

## 2020-03-17 RX ADMIN — CEFAZOLIN SODIUM 2 G: 2 INJECTION, SOLUTION INTRAVENOUS at 02:18

## 2020-03-17 RX ADMIN — ASPIRIN 325 MG: 325 TABLET, COATED ORAL at 09:22

## 2020-03-17 RX ADMIN — LIDOCAINE HYDROCHLORIDE: 20 JELLY TOPICAL at 21:07

## 2020-03-17 RX ADMIN — ACETAMINOPHEN 975 MG: 325 TABLET, FILM COATED ORAL at 09:22

## 2020-03-17 RX ADMIN — OXYCODONE HYDROCHLORIDE 5 MG: 5 TABLET ORAL at 02:18

## 2020-03-17 RX ADMIN — OXYCODONE HYDROCHLORIDE 5 MG: 5 TABLET ORAL at 02:23

## 2020-03-17 RX ADMIN — HYDROXYZINE HYDROCHLORIDE 10 MG: 10 TABLET, FILM COATED ORAL at 20:42

## 2020-03-17 RX ADMIN — ACETAMINOPHEN 975 MG: 325 TABLET, FILM COATED ORAL at 23:59

## 2020-03-17 NOTE — PROGRESS NOTES
Evening RN (1900 - 2200)    Patient vital signs are at baseline: Yes  Patient able to ambulate as they were prior to admission or with assist devices provided by therapies during their stay:  No,  Reason:  Pt OOB for second time this shift.  Tolerated standing at bedside well.  Patient MUST void prior to discharge:  No,  Reason:  Due to void on own.  Pt required straight cath at 2024.  Patient able to tolerate oral intake:  Yes  Pain has adequate pain control using Oral analgesics:  No,  Reason:  Seeking oral medication scheduling that manages pain adequately with minimal nausea.  Doing ok on 5mg oxycodone at this time though not ideal pain relief.    Pt A/O x4.  VSS and afebrile - HR tends towards bradycardia (telemetry newly in place this shift.).  Pain managed adequately with scheduled IV Toradol and PRN PO Oxycodone (5mg).  Pt would like to try 10mg oxycodone but is afraid of severe nausea repercussions of doing so.  CMS intact.  Hemovac in place, compressed, 125ml output, WDL.  ACE dressing CDI.  Up A2 with walker and gait belt.  Due to void on own, was straight cathed for 550 at 2024.  Colace given as pt is concerned about having a bowel movement.  Tolerating regular diet well.  Plan is home on discharge, expressed interest in Wednesday discharge.  Will continue to monitor.

## 2020-03-17 NOTE — PROVIDER NOTIFICATION
Page to Dr. Bernard  Pt had PVR of 660 ML. Straight cath just had 700 ML out. Pt is hoping to still be discharged but doesn't want to end up coming back to ED. please advise.    Cathy Simon PA-C aware of pt not able to void also.     1725 Dr. Bernard called and  said if patient is not able to void or has high PVR he can stay and address in the morning or pt can have catheter placed and go home with it.  Page hospitalist later to inform how patient is doing.

## 2020-03-17 NOTE — PLAN OF CARE
"[id number]    Patient vital signs are at baseline: No,  Reason:  bradycardic, telemetry applied  Patient able to ambulate as they were prior to admission or with assist devices provided by therapies during their stay:  No,  Reason:  1 PT session completed since surgery, able to stand at edge of bed  Patient MUST void prior to discharge:  No,  Reason:  due to void  Patient able to tolerate oral intake:  Yes  Pain has adequate pain control using Oral analgesics:  Yes, oxycodone given PRN    VS /66   Pulse 53   Temp 97.1  F (36.2  C) (Temporal)   Resp 16   Ht 1.803 m (5' 11\")   Wt 98 kg (216 lb)   SpO2 97%   BMI 30.13 kg/m    Lung sounds clear  O2 room air  Tele SB  Bowel sounds passing flatus  Tolerating regular diet  IVF Maintenance fluids infusing  Dressings clean, dry and intact  CMS intact  Drains hemovac CDI and in place  Activity up with PT, experienced dizziness upon standing  Pain oxycodone given PRN   Patient/family centered care wife, Ingrid, present after surgery; attentive to patient and his needs, asking appropriate questions        "

## 2020-03-17 NOTE — PLAN OF CARE
POD1 Rt TKA    Patient vital signs are at baseline: Yes  Patient able to ambulate as they were prior to admission or with assist devices provided by therapies during their stay:  No,  Reason:  1 ast ambulating with PT, SBA standing at bedside x4  Patient MUST void prior to discharge:  No,  Reason:   able to void ~100mL at a time, PVRs ~150-250  Patient able to tolerate oral intake:  Yes  Pain has adequate pain control using Oral analgesics:  No,  Reason:   Pt reports 9/10 pain with Rt knee and wraps to leg. Given Atarax, Oxy 10mg, tylenol and Toradol with minimal relief.  DREA Cisneros aware, taking off ACE wrap to see if it helps      Vitals:   Temp: 96.6  F (35.9  C) Temp src: Oral BP: 135/66 Pulse: 57 Heart Rate: 57 Resp: 18 SpO2: 96 % O2 Device: None (Room air)     Neuro: A&Ox4  CMS: Intact  Dressing: island dressing CDI, Tegaderm to knee intact  Drains: Hemovac removed at 0530  Diet: Reg  : Ambulate with PT, pain control, Monitor PVRs, possible discharge today

## 2020-03-17 NOTE — PLAN OF CARE
Patient plan: home today with wife  Current status: Patient supine upon arrival.  Patient is independent with bed mobility.  Patient participated in seated knee flexion.  Patient independent with sit to stand transfers with 2WW.  Patient amb 150 feet with 2WW with SBA.  Patient with step to gait pattern, heavy reliance on UE support at walker.  Patient and wife educated in stair negotiation.  Patient negotiated 8 steps with single rail and cane with SBA and intermittent verbal cueing for technique.  Patient issued walker for discharge.  Anticipated status at discharge: Patient has met physical therapy goals; requires SBA or less for bed mobility, ambulation and stair negotiation.    Physical Therapy Discharge Summary    Reason for therapy discharge:    All goals and outcomes met, no further needs identified.    Progress towards therapy goal(s). See goals on Care Plan in [facility] electronic health record for goal details.  Goals met    Therapy recommendation(s):    Continue home therapy exercise plan, OP PT

## 2020-03-17 NOTE — PLAN OF CARE
Patient plan: Home  Current status: Pt supine upon initiation, agreeable to session. Pt completes sit<>supine SBA. Pt completes sit<>stand with SBA and cues for technique. Pt ambulates 2 x 100' with 8 minute seated rest break while discussing plan for stair management at home. While ambulating, pt cued for posture, step-through gait pattern, and pacing. Pt notes slight dizziness/lightheadedness while ambulating, therefore wheelchair follow utilized. Discussed multiple options for stair management, however unable to practice at this time due to pt's dizziness/lightheadedness. Significant time spent educating pt on home exercise program, issued handout, and discussed the importance of continued mobility for functional strengthening. Pt supine at end of session, all needs in reach.   Anticipated status at discharge: Anticipate pt to be SBA or less for all mobility, including stairs.

## 2020-03-17 NOTE — PROGRESS NOTES
03/17/20 0945   Quick Adds   Type of Visit Initial Occupational Therapy Evaluation   Living Environment   Lives With spouse   Living Arrangements house   Home Accessibility stairs to enter home;stairs within home   Number of Stairs, Main Entrance 2   Living Environment Comment Pt resides with his spouse in a one level home, all needs met on one level. Walkin shower, CHTS with seat riser and sink side vanity. Has reacher from pervious surgeries   Self-Care   Usual Activity Tolerance good   Current Activity Tolerance moderate   Functional Level   Ambulation 0-->independent   Transferring 0-->independent   Toileting 0-->independent   Bathing 0-->independent   Dressing 0-->independent   Eating 0-->independent   Communication 0-->understands/communicates without difficulty   Swallowing 0-->swallows foods/liquids without difficulty   Cognition 0 - no cognition issues reported   Fall history within last six months yes   Number of times patient has fallen within last six months 1   Which of the above functional risks had a recent onset or change? ambulation;transferring;toileting;bathing;dressing   Prior Functional Level Comment Pt was ind with ADL's/IADLs   General Information   Onset of Illness/Injury or Date of Surgery - Date 03/16/20   Referring Physician Dr. Dudley   Patient/Family Goals Statement to return home   Additional Occupational Profile Info/Pertinent History of Current Problem Pt is POD #1 TKA   Precautions/Limitations fall precautions   Weight-Bearing Status - RLE weight-bearing as tolerated   Cognitive Status Examination   Orientation orientation to person, place and time   Cognitive Comment denies concerns   Visual Perception   Visual Perception Wears glasses   Visual Perception Comments denies changes   Sensory Examination   Sensory Comments denies changes   Range of Motion (ROM)   ROM Comment BUS[place]LETT WFL- had previous TSA   Strength   Strength Comments BUE WNL   Mobility   Bed Mobility Comments ind    Transfer Skill: Bed to Chair/Chair to Bed   Level of [place]: Bed to Chair stand-by assist   Physical Assist/Nonphysical Assist: Bed to Chair supervision   Weight-Bearing Restrictions weight-bearing as tolerated   Assistive Device - Transfer Skill Bed to Chair Chair to Bed Rehab Eval standard walker   Transfer Skill: Sit to Stand   Level of [place]: Sit/Stand stand-by assist   Physical Assist/Nonphysical Assist: Sit/Stand supervision;verbal cues   Transfer Skill: Sit to Stand weight-bearing as tolerated   Assistive Device for Transfer: Sit/Stand standard walker   Transfer Skill: Toilet Transfer   Level of [place]: Toilet stand-by assist   Physical Assist/Nonphysical Assist: Toilet supervision   Weight-Bearing Restrictions: Toilet weight-bearing as tolerated   Assistive Device standard walker   Upper Body Dressing   Level of [place]: Dress Upper Body independent   Lower Body Dressing   Level of [place]: Dress Lower Body minimum assist (75% patients effort)   Toileting   Level of [place]: Toilet stand-by assist   Physical Assist/Nonphysical Assist: Toilet supervision;verbal cues   Activities of Daily Living Analysis   Impairments Contributing to Impaired Activities of Daily Living pain;strength decreased   General Therapy Interventions   Planned Therapy Interventions ADL retraining;transfer training   Clinical Impression   Criteria for Skilled Therapeutic Interventions Met yes, treatment indicated   OT Diagnosis decreased ind in ADLs   Influenced by the following impairments POD #1 TKA   Assessment of Occupational Performance 3-5 Performance Deficits   Identified Performance Deficits decrased strength and balance   Clinical Decision Making (Complexity) Low complexity   Therapy Frequency Daily   Predicted Duration of Therapy Intervention (days/wks) 1   Anticipated Discharge Disposition Other (see comments)  (per ortho recommendation)   Risks and Benefits of Treatment have been explained.  "Yes   Patient, Family & other staff in agreement with plan of care Yes   [facility] AM-PAC  \"6 Clicks\" Daily Activity Inpatient Short Form   1. Putting on and taking off regular lower body clothing? 3 - A Little   2. Bathing (including washing, rinsing, drying)? 4 - None   3. Toileting, which includes using toilet, bedpan or urinal? 3 - A Little   4. Putting on and taking off regular upper body clothing? 4 - None   5. Taking care of personal grooming such as brushing teeth? 4 - None   6. Eating meals? 4 - None   Daily Activity Raw Score (Score out of 24.Lower scores equate to lower levels of function) 22   Total Evaluation Time   Total Evaluation Time (Minutes) 8     "

## 2020-03-17 NOTE — PROGRESS NOTES
[facility]    Medicine Progress Note - Hospitalist Service       Date of Admission:  3/16/2020  Assessment & Plan      This is a 70-year-old gentleman who is status post right total knee arthroplasty.  This was carried out with spinal and adductor block.  Estimated blood loss was 25 mL.  The patient denies any chest pain or pressure.     1.  Status post right total knee arthroplasty POD #1.  Cares and deep venous thrombosis prophylaxis per Orthopedics.   2.  Hypothyroidism.  We will resume his Synthroid.   3.  Hyperlipidemia.  We will resume his statin.   4.  Sinus bradycardia.  He is asymptomatic.  We will monitor him.  It seems like he has had longstanding bradycardia on his prior EKGs.  He is asymptomatic.  We will monitor.   5.  History of ethanol use.  We will monitor him.  I doubt he will have any withdrawal symptoms.       Diet: Advance Diet as Tolerated: Regular Diet Adult    DVT Prophylaxis: Defer to primary service  Lang Catheter: in place, indication: Anesthesia  Code Status: Full Code      Disposition Plan   Expected discharge: Today, recommended to prior living arrangement once adequate pain management/ tolerating PO medications.  Entered: Vicki Bernard MD 03/17/2020, 11:16 AM       The patient's care was discussed with the Patient.    Vicki Bernard MD  Hospitalist Service  [facility]    ______________________________________________________________________    Interval History     No fevers/chills/chest pain/SOB.    Data reviewed today: I reviewed all medications, new labs and imaging results over the last 24 hours. I personally reviewed no images or EKG's today.    Physical Exam   Vital Signs: Temp: 98  F (36.7  C) Temp src: Temporal BP: 128/66 Pulse: 57 Heart Rate: 55 Resp: 18 SpO2: 95 % O2 Device: None (Room air)    Weight: 216 lbs 0 oz    GENERAL:  He is alert, awake, oriented, coherent, in no acute distress.   HEENT:  Pupils equal, round, react to light.   LUNGS:   Clear to auscultation anteriorly bilaterally.   HEART:  Distant heart sounds, regular rate, S1, S2 normal.  No murmurs or gallops.   ABDOMEN:  Soft, nontender, with good bowel sounds.     Data   Recent Labs   Lab 03/17/20  0756   HGB 10.2*   *     Recent Results (from the past 24 hour(s))   XR Knee Port Right 1/2 Views    Narrative    RIGHT KNEE PORTABLE ONE TO TWO VIEWS  3/16/2020 12:13 PM     HISTORY: Postop total knee. Evaluate for complications.    COMPARISON: None.      Impression    IMPRESSION: Total knee arthroplasty demonstrates normal alignment of  knee components. No periprosthetic fracture. Overlying surgical skin  staples and drain.    SUNDAR LERMA MD     Medications     lactated ringers Stopped (03/17/20 0540)       acetaminophen  975 mg Oral Q8H     aspirin  325 mg Oral Daily     fluticasone  1 spray Both Nostrils Daily     hypromellose-dextran  1 drop Right Eye BID     levothyroxine  175 mcg Oral Daily     multivitamin w/minerals  1 tablet Oral Daily     simvastatin  20 mg Oral At Bedtime     sodium chloride (PF)  3 mL Intracatheter Q8H     sodium chloride (PF)  3 mL Intracatheter Q8H     tamsulosin  0.4 mg Oral Daily

## 2020-03-17 NOTE — PLAN OF CARE
POD1 Rt TKA    Patient vital signs are at baseline: Yes  Patient able to ambulate as they were prior to admission or with assist devices provided by therapies during their stay:  No,  Reason:  1 ast ambulating with PT, SBA standing at bedside x3  Patient MUST void prior to discharge:  No,  Reason:   able to void ~100mL at a time, PVRs ~250  Patient able to tolerate oral intake:  Yes  Pain has adequate pain control using Oral analgesics:  No,  Reason:   Pt reports 9/10 pain with Rt knee and wraps to leg. Given Atarax, Oxy 10mg, tylenol and Toradol with minimal relief.

## 2020-03-17 NOTE — PROGRESS NOTES
"Orthopedic Surgery  3/17/2020  POD 1    S: Patient voices no unexpected ortho complaints today. Denies chest pain or shortness of breath. Pain control issues.    O: Blood pressure 135/66, pulse 57, temperature 96.6  F (35.9  C), temperature source Oral, resp. rate 18, height 1.803 m (5' 11\"), weight 98 kg (216 lb), SpO2 96 %.  Lab Results   Component Value Date    HGB 13.8 02/28/2020     Lab Results   Component Value Date    INR 2.00 03/09/2009     I/O last 3 completed shifts:  In: 3495 [P.O.:180; I.V.:3315]  Out: 2580 [Urine:2330; Drains:225; Blood:25]  Distal extremity CMSI bilaterally.  Calves are negative bilaterally, both soft and nontender.  The dressing is C/D/I.      A: Mr. German is doing well status post Procedure(s):  Right total knee arthroplasty (spinal with adductor canal block).    P: Continue physical therapy. Continue DVT pphx with ASA due to high mobility and low risk factors for DVT. Will not send with Naproxen due to history of GI bleed in distant past with this. Anticipate discharge to home later tonight. Will change oxycodone to dilaudid.    Cathy Simon PA-C  [phone]  "

## 2020-03-17 NOTE — PLAN OF CARE
OT orders received, chart reviewed and eval completed. Pt resides with his spouse in a one level home where he has a walk-in shower,  CHTS with a seat riser. He has a reacher from a previous knee replacement. Was previously ind with all ADLs/IADLs.   Patient plan: home with assist  Current status: Pt completes bed mobility independent. Pt educated in car transfer technique. Pt is SBA and vc's for safe hand placement during tub transfer. Pt requires SBA and cues for foot placement for shower transfer simulated to home setup. Spouse present for session.  Anticipated status at discharge: Pt will require min A for LE dressing of surgical leg. SBA and supervision for shower transfer initially. A for higher level household management chores.     Occupational Therapy Discharge Summary    Reason for therapy discharge:    Discharged to home.    Progress towards therapy goal(s). See goals on Care Plan in [facility] electronic health record for goal details.  Goals met    Therapy recommendation(s):    No further therapy is recommended.

## 2020-03-18 VITALS
RESPIRATION RATE: 16 BRPM | TEMPERATURE: 98.5 F | SYSTOLIC BLOOD PRESSURE: 132 MMHG | OXYGEN SATURATION: 97 % | HEIGHT: 71 IN | HEART RATE: 57 BPM | WEIGHT: 216 LBS | DIASTOLIC BLOOD PRESSURE: 57 MMHG | BODY MASS INDEX: 30.24 KG/M2

## 2020-03-18 LAB — GLUCOSE SERPL-MCNC: 115 MG/DL (ref 70–99)

## 2020-03-18 PROCEDURE — 25000132 ZZH RX MED GY IP 250 OP 250 PS 637: Performed by: INTERNAL MEDICINE

## 2020-03-18 PROCEDURE — 25000132 ZZH RX MED GY IP 250 OP 250 PS 637: Performed by: ORTHOPAEDIC SURGERY

## 2020-03-18 PROCEDURE — 82947 ASSAY GLUCOSE BLOOD QUANT: CPT | Performed by: ORTHOPAEDIC SURGERY

## 2020-03-18 PROCEDURE — 25000132 ZZH RX MED GY IP 250 OP 250 PS 637: Performed by: PHYSICIAN ASSISTANT

## 2020-03-18 PROCEDURE — 36415 COLL VENOUS BLD VENIPUNCTURE: CPT | Performed by: ORTHOPAEDIC SURGERY

## 2020-03-18 RX ADMIN — ACETAMINOPHEN 975 MG: 325 TABLET, FILM COATED ORAL at 07:45

## 2020-03-18 RX ADMIN — HYDROMORPHONE HYDROCHLORIDE 2 MG: 2 TABLET ORAL at 00:29

## 2020-03-18 RX ADMIN — MULTIPLE VITAMINS W/ MINERALS TAB 1 TABLET: TAB at 07:45

## 2020-03-18 RX ADMIN — TAMSULOSIN HYDROCHLORIDE 0.4 MG: 0.4 CAPSULE ORAL at 07:45

## 2020-03-18 RX ADMIN — ASPIRIN 325 MG: 325 TABLET, COATED ORAL at 07:45

## 2020-03-18 RX ADMIN — LEVOTHYROXINE SODIUM 175 MCG: 175 TABLET ORAL at 07:45

## 2020-03-18 NOTE — PLAN OF CARE
A&Ox4. VSS. Afebrile RA. Pt was unable to void X2. Straight cath done X2 for 700 ml and 500 ml out. MD notified. Pt will not discharge tonight. Up with SBA gait belt and walker. Aquacel dressing old drainage noted. LS clear. +BS. Colace given. Tolerated regular diet. Walked in halls. Pain managed with Dilaudid, Atarax and scheduled Tylenol. Will continue to monitor voiding status and MD will assess in morning to have patient discharge tomorrow.

## 2020-03-18 NOTE — PLAN OF CARE
Patient A/O x4, VSS, temp 100.4, pain in right knee controlled with oral dilaudid x1.  Patient voiding adequate amounts.  Patient up with SBA and the walker.  Remote tele in place.  Due to discharge today.

## 2020-03-18 NOTE — PROGRESS NOTES
Cross cover notified of patient unable to void.  He is on Flomax.  Required straight cath earlier for 700 mL and cannot go now despite full bladder.  Previously refusing to go home with catheter in place therefore unable to discharge this evening as he would be at significant risk for bladder and kidney injury, continue with intermittent straight cath.  Hospitalist to follow-up tomorrow..

## 2020-03-18 NOTE — PROVIDER NOTIFICATION
Page to Admit Hospitalist  Pt is still unable to void. I will straight cath. pt was suppose to discharge earlier. Dr. Bernard said he would have to stay if he would not go home with a suggs catheter.

## 2020-03-18 NOTE — PROGRESS NOTES
"Orthopedic Surgery  3/18/2020  POD 2    S: Patient voices no unexpected ortho complaints today. Denies chest pain or shortness of breath. States he voided this am.    O: Blood pressure (!) 146/61, pulse 57, temperature 100.4  F (38  C), temperature source Temporal, resp. rate 16, height 1.803 m (5' 11\"), weight 98 kg (216 lb), SpO2 95 %.  Lab Results   Component Value Date    HGB 10.2 03/17/2020     Lab Results   Component Value Date    INR 2.00 03/09/2009     I/O last 3 completed shifts:  In: 500 [P.O.:500]  Out: 1780 [Urine:1750; Drains:30]  Distal extremity CMSI bilaterally.  Calves are negative bilaterally, both soft and nontender.  The dressing is C/D/I.      A: Mr. German is doing well status post Procedure(s):  Right total knee arthroplasty.    P: Noted concern for bladder and kidney injury. Continue physical therapy. Continue DVT pphx with ASA due to high mobility and low risk factors for DVT. Anticipate discharge to home when medically cleared.    Cathy Simon PA-C  [phone]  "

## 2020-03-18 NOTE — PLAN OF CARE
Reviewed discharge instructions and meds with pt, no further questions. Pt will discharge to home.

## 2020-03-19 ENCOUNTER — TELEPHONE (OUTPATIENT)
Dept: FAMILY MEDICINE | Facility: CLINIC | Age: 70
End: 2020-03-19

## 2020-03-19 NOTE — TELEPHONE ENCOUNTER
Reason for call:  Patient reporting a symptom    Symptom or request: Andi's wife Ingrid is calling to talk with a nurse about Andi's hemoglobin. She said Dr. Nolasco checked it for his preop and it was 13.8. He said now after surgery it is 10.2. She is wondering if that's a problem. Also, they have to go to [facility] tomorrow and she is worried with this hemoglobin being that low if he should even be out and about with all that is going on. She is very worried. She would like a call back.    Phone Number patient can be reached at:  Home number on file [phone] (home)    Best Time:  Anytime    Can we leave a detailed message on this number:  YES    Call taken on 3/19/2020 at 9:34 AM by Amanda Valentino

## 2020-03-19 NOTE — TELEPHONE ENCOUNTER
Called # [phone] (home)   Pt stated that he gives Rn permission to discuss with Ingrid his wife his labs     Rn advised that this is normal, blood loss is common for surgery his hgb will not effect his immune system. If pt were to go to therapy encourage good hand washing and try not to go out and about just go to therapy and right home    Patient's wife stated an understanding and agreed with plan.      Neris Laguerre RN, BSN  [facility]

## 2020-03-26 ENCOUNTER — TRANSFERRED RECORDS (OUTPATIENT)
Dept: HEALTH INFORMATION MANAGEMENT | Facility: CLINIC | Age: 70
End: 2020-03-26

## 2020-04-08 NOTE — ANESTHESIA PROCEDURE NOTES
Procedure note : Saphenous    Pre-Procedure    Location: pre-op      Pre-Anesthestic Checklist: patient identified, IV checked, site marked, risks and benefits discussed, informed consent, monitors and equipment checked, pre-op evaluation, at physician/surgeon's request and post-op pain management    Timeout  Correct Patient: Yes   Correct Procedure: Yes   Correct Site: Yes   Correct Laterality: Yes   Correct Position: Yes   Site Marked: Yes   .   Procedure Documentation    .    Procedure: Saphenous, .   Patient Position:supine   Ultrasound used to identify targeted nerve, plexus, or vascular marker and placed a needle adjacent to it., Ultrasound was used to visualize the spread of the anesthetic in close proximity to the above stated nerve.   Patient Prep/Sterile Barriers; mask, sterile gloves, povidone-iodine 7.5% surgical scrub.  .  Needle: insulated, short bevel   Needle Gauge: 21.    Needle Length (Inches) 4   Insertion Method: Single Shot.        Assessment/Narrative  Paresthesias: No.  Injection made incrementally with aspirations every 3 mL..  The placement was negative for: blood aspirated and painful injection.  Bolus given via needle..   Secured via.   Complications: none. Test dose of mL at. Test dose negative for signs of intravascular, subdural or intrathecal injection. Comments:  The surgeon has given a verbal order transferring care of this patient to me for the performance of a regional analgesia block for post-op pain control. It is requested of me because I am uniquely trained and qualified to perform this block and the surgeon is neither trained nor qualified to perform this procedure.

## 2020-04-08 NOTE — ADDENDUM NOTE
Addendum  created 04/08/20 0928 by Satya Borges MD    Child order released for a procedure order, Clinical Note Signed, Intraprocedure Blocks edited

## 2020-05-01 DIAGNOSIS — E03.9 ACQUIRED HYPOTHYROIDISM: ICD-10-CM

## 2020-05-04 RX ORDER — LEVOTHYROXINE SODIUM 175 UG/1
TABLET ORAL
Qty: 90 TABLET | Refills: 0 | Status: SHIPPED | OUTPATIENT
Start: 2020-05-04 | End: 2020-07-27

## 2020-05-06 ENCOUNTER — TRANSFERRED RECORDS (OUTPATIENT)
Dept: HEALTH INFORMATION MANAGEMENT | Facility: CLINIC | Age: 70
End: 2020-05-06

## 2020-05-22 ENCOUNTER — TELEPHONE (OUTPATIENT)
Dept: FAMILY MEDICINE | Facility: CLINIC | Age: 70
End: 2020-05-22

## 2020-05-22 NOTE — TELEPHONE ENCOUNTER
Reason for Call:  Other appointment    Detailed comments: Patient's wife is calling to see if he really needs to come in since this post op has been cancelled 5 times & needs to know if he should have labs too?  Please call them back to let him know.      Phone Number Patient can be reached at: Home number on file (home) or Cell number on file:    Telephone Information:   Mobile [phone], Wife       Best Time: any    Can we leave a detailed message on this number? YES    Call taken on 5/22/2020 at 4:02 PM by Jamia Lim

## 2020-05-26 NOTE — TELEPHONE ENCOUNTER
Called # [phone]     Pt wife called and advised to follow up here in clinic. Per [facility] visit.        Writer advised of this note and Pt wife acknowledged information. Pt wife will pass along information to Pt and if further questions will call back.    Marcos Dillon RN   [facility] [place]

## 2020-05-27 ENCOUNTER — TRANSFERRED RECORDS (OUTPATIENT)
Dept: HEALTH INFORMATION MANAGEMENT | Facility: CLINIC | Age: 70
End: 2020-05-27

## 2020-05-27 ENCOUNTER — HOSPITAL ENCOUNTER (OUTPATIENT)
Dept: LAB | Facility: CLINIC | Age: 70
Discharge: HOME | End: 2020-05-27
Attending: PHYSICIAN ASSISTANT | Admitting: PHYSICIAN ASSISTANT
Payer: COMMERCIAL

## 2020-05-27 DIAGNOSIS — Z47.1 AFTERCARE FOLLOWING JOINT REPLACEMENT: Primary | ICD-10-CM

## 2020-05-27 LAB
CRP SERPL-MCNC: 4.6 MG/L (ref 0–8)
ERYTHROCYTE [SEDIMENTATION RATE] IN BLOOD BY WESTERGREN METHOD: 10 MM/H (ref 0–20)

## 2020-05-27 PROCEDURE — 85652 RBC SED RATE AUTOMATED: CPT | Performed by: PHYSICIAN ASSISTANT

## 2020-05-27 PROCEDURE — 86140 C-REACTIVE PROTEIN: CPT | Performed by: PHYSICIAN ASSISTANT

## 2020-07-08 ENCOUNTER — TELEPHONE (OUTPATIENT)
Dept: UROLOGY | Facility: CLINIC | Age: 70
End: 2020-07-08

## 2020-07-08 NOTE — TELEPHONE ENCOUNTER
[facility] Call Center    Phone Message    May a detailed message be left on voicemail: no     Reason for Call: Other: Pt Andi is interested in Rezum Procedure- 1st avail appt is 9/8/2020, would like to discuss and earlier appt if possible.  Call Andi at: [phone]     Action Taken: Message routed to:  [facility] Urology    Travel Screening: Not Applicable

## 2020-07-08 NOTE — TELEPHONE ENCOUNTER
Hi all,      Patient's states that Dr. Moreno does not perform the rezum procedure for BPH.    Please call patient to reschedule his follow up-consult with Dr. Sundar Gandhi on 9/8/2020 to discuss the rezum procedure to a sooner virtual visit. I cannot schedule this appointment due to red flag financial counselor alert.    Thanks, Isai Aponte MA

## 2020-07-10 NOTE — TELEPHONE ENCOUNTER
----- Message from Sharonda Hill CMA sent at 7/10/2020 11:38 AM CDT -----  Regarding: earlier appt  Feng hinojosa- this patient is scheduled with Hiram in September for a Rezum consult.  He should be rescheduled to a video visit with Dr. Gandhi- there are some openings in August he can use.  Thanks!    Ingrid        Call Back (Pt, Andi is interested in Rezum Procedure- 1st avail appt is 9/8/2020, would like to discuss and earlier appt if possible.  Call Andi at: [phone]), Call Back (Returning missed call on 7/8/2020 at 2:56.  Please call him back at: [phone])

## 2020-07-10 NOTE — TELEPHONE ENCOUNTER
Patient scheduled a new virtual consult on 8/18/2020 with Dr. Sundar Gandhi. Patient agreed with the plan.    Isai Aponte MA

## 2020-07-10 NOTE — TELEPHONE ENCOUNTER
[facility] Call Center    Phone Message    May a detailed message be left on voicemail: no     Reason for Call: Other: Returning missed call.  Please call Andi at: [phone].  Yes, he was scheduled, wanted sooner appt.  Call him.     Action Taken: Message routed to:  [facility] Urology    Travel Screening: Not Applicable

## 2020-07-10 NOTE — TELEPHONE ENCOUNTER
Ingrid called and wanted to know if the doctor will be doing a physical. If so, they would like to come in person. Please call the pt. Thanks

## 2020-07-10 NOTE — TELEPHONE ENCOUNTER
----- Message from Sharonda Hill CMA sent at 7/10/2020 11:38 AM CDT -----  Regarding: earlier appt  Feng hinojosa- this patient is scheduled with Hiram in September for a Rezum consult.  He should be rescheduled to a video visit with Dr. Gandhi- there are some openings in August he can use.  Thanks!    Ingrid        Call Back (Pt, Andi is interested in Rezum Procedure- 1st avail appt is 9/8/2020, would like to discuss and earlier appt if possible.  Call Andi at: [phone]), Call Back (Returning missed call on 7/8/2020 at 2:56.  Please call him back at: [phone])

## 2020-07-13 ENCOUNTER — DOCUMENTATION ONLY (OUTPATIENT)
Dept: CARE COORDINATION | Facility: CLINIC | Age: 70
End: 2020-07-13

## 2020-07-13 NOTE — TELEPHONE ENCOUNTER
MEDICAL RECORDS REQUEST   [facility]  Urology Clinic  [address] [place] [address]  PHONE: [phone]  Fax: [phone]        FUTURE VISIT INFORMATION                                                   Sundar German, : 1950 scheduled for future visit at [facility]    APPOINTMENT INFORMATION:    Date: 20 2Pm    Provider:  Sundar Gandhi MD    Reason for Visit/Diagnosis: Rezum procedure     REFERRAL INFORMATION:    Referring provider:  Self    Specialty: N/A    Referring providers clinic:  N/A    Clinic contact number:  N/A    RECORDS REQUESTED FOR VISIT                                                     NOTES  STATUS/DETAILS   OFFICE NOTE from referring provider  no   OFFICE NOTE from other specialist  yes   DISCHARGE SUMMARY from hospital  no   DISCHARGE REPORT from the ER  no   OPERATIVE REPORT  yes   MEDICATION LIST  yes   LABS     URINALYSIS (UA)  yes     PRE-VISIT CHECKLIST      Record collection complete Yes- Internal recs in epic    Appointment appropriately scheduled           (right time/right provider) Yes   MyChart activation Yes   Questionnaire complete If no, please explain: In process      Completed by: Geri Cote

## 2020-07-20 DIAGNOSIS — E78.5 HYPERLIPIDEMIA LDL GOAL <130: ICD-10-CM

## 2020-07-20 NOTE — TELEPHONE ENCOUNTER
Routing refill request to provider for review/approval because:  Labs not current:  LDL last done on 5/7/19    Tresa JALLOH RN  EP Triage

## 2020-07-21 RX ORDER — SIMVASTATIN 20 MG
TABLET ORAL
Qty: 90 TABLET | Refills: 3 | Status: SHIPPED | OUTPATIENT
Start: 2020-07-21 | End: 2020-07-27

## 2020-07-27 ENCOUNTER — VIRTUAL VISIT (OUTPATIENT)
Dept: FAMILY MEDICINE | Facility: CLINIC | Age: 70
End: 2020-07-27
Payer: COMMERCIAL

## 2020-07-27 DIAGNOSIS — Z12.11 SCREEN FOR COLON CANCER: ICD-10-CM

## 2020-07-27 DIAGNOSIS — Z96.651 HISTORY OF TOTAL KNEE ARTHROPLASTY, RIGHT: ICD-10-CM

## 2020-07-27 DIAGNOSIS — E78.5 HYPERLIPIDEMIA LDL GOAL <100: ICD-10-CM

## 2020-07-27 DIAGNOSIS — Z12.5 SCREENING FOR PROSTATE CANCER: ICD-10-CM

## 2020-07-27 DIAGNOSIS — Z98.890 POSTOPERATIVE STATE: ICD-10-CM

## 2020-07-27 DIAGNOSIS — R73.03 PREDIABETES: ICD-10-CM

## 2020-07-27 DIAGNOSIS — N40.1 BENIGN PROSTATIC HYPERPLASIA WITH LOWER URINARY TRACT SYMPTOMS, SYMPTOM DETAILS UNSPECIFIED: ICD-10-CM

## 2020-07-27 DIAGNOSIS — Z51.81 MEDICATION MONITORING ENCOUNTER: ICD-10-CM

## 2020-07-27 DIAGNOSIS — Z91.09 ENVIRONMENTAL ALLERGIES: ICD-10-CM

## 2020-07-27 DIAGNOSIS — R33.9 URINARY RETENTION: ICD-10-CM

## 2020-07-27 DIAGNOSIS — Z96.652 S/P REVISION OF TOTAL KNEE, LEFT: ICD-10-CM

## 2020-07-27 DIAGNOSIS — E03.9 ACQUIRED HYPOTHYROIDISM: ICD-10-CM

## 2020-07-27 DIAGNOSIS — Z87.19 HISTORY OF GI BLEED: ICD-10-CM

## 2020-07-27 DIAGNOSIS — Z96.651 STATUS POST TOTAL RIGHT KNEE REPLACEMENT: ICD-10-CM

## 2020-07-27 DIAGNOSIS — Z96.652 HISTORY OF TOTAL KNEE ARTHROPLASTY, LEFT: Primary | ICD-10-CM

## 2020-07-27 PROCEDURE — 99214 OFFICE O/P EST MOD 30 MIN: CPT | Mod: 95 | Performed by: FAMILY MEDICINE

## 2020-07-27 RX ORDER — LEVOTHYROXINE SODIUM 175 UG/1
175 TABLET ORAL DAILY
Qty: 90 TABLET | Refills: 1 | Status: SHIPPED | OUTPATIENT
Start: 2020-07-27 | End: 2020-11-04

## 2020-07-27 RX ORDER — FLUTICASONE PROPIONATE 50 MCG
1 SPRAY, SUSPENSION (ML) NASAL DAILY
Qty: 48 G | Refills: 3 | Status: SHIPPED | OUTPATIENT
Start: 2020-07-27 | End: 2021-11-29

## 2020-07-27 RX ORDER — SIMVASTATIN 20 MG
20 TABLET ORAL AT BEDTIME
Qty: 90 TABLET | Refills: 1 | Status: SHIPPED | OUTPATIENT
Start: 2020-07-27 | End: 2020-11-04

## 2020-07-27 RX ORDER — TAMSULOSIN HYDROCHLORIDE 0.4 MG/1
0.4 CAPSULE ORAL DAILY
Qty: 90 CAPSULE | Refills: 1 | Status: SHIPPED | OUTPATIENT
Start: 2020-07-27 | End: 2020-11-04

## 2020-07-27 NOTE — PROGRESS NOTES
[facility]    Telephone visit  - [phone]    Subjective    Sundar German is a 70 year old male who is being evaluated via a billable telephone visit.      Chief Complaint   Patient presents with     Surgical Followup     Right total knee replacement 3/16/20 - Dr Dudley - some stiffness and swollen - going overall - healing well    Having rezum procedure - would like to talk about that - Dr Gandhi appt in 8/2020    Prediabetes    Lab Results   Component Value Date    A1C 5.3 02/28/2020    A1C 5.5 05/07/2019    A1C 5.6 03/02/2018    A1C 5.6 02/09/2017    A1C 5.8 11/20/2009     Glucose   Date Value Ref Range Status   03/18/2020 115 (H) 70 - 99 mg/dL Final   03/17/2020 134 (H) 70 - 99 mg/dL Final   02/28/2020 112 (H) 70 - 99 mg/dL Final     Comment:     Fasting specimen   05/07/2019 106 (H) 70 - 99 mg/dL Final     Comment:     Fasting specimen   03/23/2019 111 (H) 70 - 99 mg/dL Final     Comment:     Fasting specimen     Recent Labs   Lab Test 05/07/19  0800 03/02/18  1016  06/02/15  0924 11/19/13  0837   CHOL 157 138   < > 152 189   HDL 51 58   < > 55 60   LDL 94 60   < > 81 97   TRIG 61 100   < > 82 165*   CHOLHDLRATIO  --   --   --  2.8 3.2    < > = values in this interval not displayed.         Wants thyroid labs checked - running out in 2 weeks    TSH   Date Value Ref Range Status   02/11/2020 0.32 (L) 0.40 - 4.00 mU/L Final     PMH    Past Medical History:   Diagnosis Date     Abnormal glucose     A1C 1/06 =  6.3     Acquired hypothyroidism 10/11/2005     Problem list name updated by automated process. Provider to review     Allergic rhinitis, cause unspecified     mary spring time     Benign localized hyperplasia of prostate with urinary obstruction and other lower urinary tract symptoms (LUTS)(600.21)     slow stream, nocturia - Dr Malave     Chronic sinusitis 3/14/2014     Complication of anesthesia      Environmental allergies     AIT - Dr Weiss     History of blood  transfusion      Hyperlipidemia LDL goal <130 10/31/2010     hypogonadism     low testosterone at times      RAFIQ (iron deficiency anemia)      Impaired memory 02/11/2011    ?     Melanoma (H) 04/2018    Melanoma 0.7 mm depth, Levar III, superficial spreading, stage T1a     OA (osteoarthritis) total L knee 3/09    knees bother;; has had bilat arthroscopic     PONV (postoperative nausea and vomiting)      Prediabetes      Sleep related leg cramps 06/2012       PSH    Past Surgical History:   Procedure Laterality Date     ARTHROPLASTY KNEE Right 3/16/2020    Rt TKA - Dr DUY Dudley     ARTHROPLASTY REVISION KNEE Left 10/26/2015     ARTHROPLASTY REVISION KNEE - Dr Dudley     COLONOSCOPY  10/2/2013    diverticulosis - due 10 yrs (10/2023), initial 10/2003     CYSTOSCOPY  04/2016     ENDOSCOPIC SINUS SURGERY  3/17/2014    Bilateral Endoscopic Sinus Surgery - Dr Johnson     ESOPHAGOSCOPY, GASTROSCOPY, DUODENOSCOPY (EGD), COMBINED N/A 2/28/2019    Non bleeding gastric ulcers and duodenal ulcer. Recheck 3 months. Dr Sinclair     HC REPAIR UMBILICAL KASI,5+Y/O,REDUC  2002     LASER REVOLIX PHOTOSELECTIVE VAPORIZATION PROSTATE N/A 4/6/2016    LASER REVOLIX / QUANTA PHOTOSELECTIVE VAPORIZATION PROSTATE - Dr Malave     ROTATOR CUFF REPAIR RT/LT Left 03/2012    left shoulder     SURGICAL HISTORY OF -   1996    tonsils and adenoids     SURGICAL HISTORY OF -  Left 03/2009    Lt TKA     SURGICAL HISTORY OF -  Bilateral     Rt Knee x 1 (meniscus, 1995), Lt x 3 (last 2009)     UPPER GI ENDOSCOPY  05/2019    normal     VASECTOMY Bilateral 1986       Medications    Current Outpatient Medications   Medication Sig Dispense Refill     acetaminophen (TYLENOL) 325 MG tablet Take 3 tablets (975 mg) by mouth every 8 hours 270 tablet 0     fluticasone (FLONASE) 50 MCG/ACT nasal spray Spray 1 spray into both nostrils daily 48 g 3     levothyroxine (SYNTHROID/LEVOTHROID) 175 MCG tablet Take 1 tablet (175 mcg) by mouth daily 90 tablet 1     multivitamin,  therapeutic with minerals (MULTI-VITAMIN) TABS Take 1 tablet by mouth daily       psyllium (METAMUCIL/KONSYL) Packet Take 1 packet by mouth daily       sildenafil (REVATIO) 20 MG tablet Take 1 tablet (20 mg) by mouth three daily as needed. 90 tablet 0     simvastatin (ZOCOR) 20 MG tablet Take 1 tablet (20 mg) by mouth At Bedtime 90 tablet 1     tamsulosin (FLOMAX) 0.4 MG capsule Take 1 capsule (0.4 mg) by mouth daily 90 capsule 1       Allergies    Patient has no known allergies.    Family History    Family History   Problem Relation Age of Onset     C.A.D. Father         CHF     Heart Failure Father      Chronic Obstructive Pulmonary Disease Father      Cerebrovascular Disease Brother 61     Gastrointestinal Disease Brother         GI Bleed - colectomy     Hypertension Mother      Cardiovascular Mother 84        MI     Lipids Mother      Chronic Obstructive Pulmonary Disease Mother      No Known Problems Sister      No Known Problems Brother        Social History    Social History     Socioeconomic History     Marital status:      Spouse name: Ingrid     Number of children: 4     Years of education: Not on file     Highest education level: Not on file   Occupational History     Employer: [employer]   Social Needs     Financial resource strain: Not on file     Food insecurity     Worry: Not on file     Inability: Not on file     Transportation needs     Medical: Not on file     Non-medical: Not on file   Tobacco Use     Smoking status: Former Smoker     Packs/day: 1.00     Years: 20.00     Pack years: 20.00     Last attempt to quit: 1981     Years since quittin.5     Smokeless tobacco: Former User     Types: Chew     Quit date: 1992     Tobacco comment: 1 tin per week   Substance and Sexual Activity     Alcohol use: Yes     Alcohol/week: 7.0 - 8.0 standard drinks     Types: 7 - 8 Standard drinks or equivalent per week     Comment: 1 beer daily     Drug use: No     Sexual activity: Yes      Partners: Female   Lifestyle     Physical activity     Days per week: Not on file     Minutes per session: Not on file     Stress: Not on file   Relationships     Social connections     Talks on phone: Not on file     Gets together: Not on file     Attends Caodaism service: Not on file     Active member of club or organization: Not on file     Attends meetings of clubs or organizations: Not on file     Relationship status: Not on file     Intimate partner violence     Fear of current or ex partner: Not on file     Emotionally abused: Not on file     Physically abused: Not on file     Forced sexual activity: Not on file   Other Topics Concern     Parent/sibling w/ CABG, MI or angioplasty before 65F 55M? Not Asked   Social History Narrative     Not on file       Reviewed and updated as needed this visit by Provider           Review of Systems     CONSTITUTIONAL: NEGATIVE for fever, chills, change in weight  INTEGUMENTARY/SKIN: NEGATIVE for worrisome rashes, moles or lesions  EYES: NEGATIVE for vision changes or irritation  ENT/MOUTH: NEGATIVE for ear, mouth and throat problems  RESP: NEGATIVE for significant cough or SOB  CV: NEGATIVE for chest pain, palpitations or peripheral edema  GI: NEGATIVE for nausea, abdominal pain, heartburn, or change in bowel habits  : NEGATIVE for frequency, dysuria, or hematuria  MUSCULOSKELETAL: NEGATIVE for significant arthralgias or myalgia  NEURO: NEGATIVE for weakness, dizziness or paresthesias  ENDOCRINE: NEGATIVE for temperature intolerance, skin/hair changes  HEME: NEGATIVE for bleeding problems  PSYCHIATRIC: NEGATIVE for changes in mood or affect    Objective    Reported vitals:  There were no vitals taken for this visit.     healthy, alert and no distress  Psych: Alert and oriented times 3; coherent speech, normal   rate and volume, able to articulate logical thoughts, able   to abstract reason, no tangential thoughts, no hallucinations   or delusions  His affect is normal      Diagnostic Test Results:  Labs reviewed in Epic    Assessment/Plan:      ICD-10-CM    1. History of total knee arthroplasty, left  Z96.652 Comprehensive metabolic panel     CBC with platelets   2. History of total knee arthroplasty, right  Z96.651 Comprehensive metabolic panel     CBC with platelets   3. Status post total right knee replacement  Z96.651 Comprehensive metabolic panel     CBC with platelets   4. S/P revision of total knee, left  Z96.652 Comprehensive metabolic panel     CBC with platelets   5. Prediabetes  R73.03 Comprehensive metabolic panel     Lipid panel reflex to direct LDL Fasting     CBC with platelets     UA reflex to Microscopic and Culture     Albumin Random Urine Quantitative with Creat Ratio     Hemoglobin A1c   6. Hyperlipidemia LDL goal <100  E78.5 Comprehensive metabolic panel     Lipid panel reflex to direct LDL Fasting     CK total     simvastatin (ZOCOR) 20 MG tablet   7. History of GI bleed  Z87.19 CBC with platelets     Iron and iron binding capacity     Ferritin   8. Environmental allergies  Z91.09 fluticasone (FLONASE) 50 MCG/ACT nasal spray   9. Acquired hypothyroidism  E03.9 T4, free     levothyroxine (SYNTHROID/LEVOTHROID) 175 MCG tablet     TSH   10. Benign prostatic hyperplasia with lower urinary tract symptoms, symptom details unspecified  N40.1 Prostate spec antigen screen     tamsulosin (FLOMAX) 0.4 MG capsule   11. Urinary retention  R33.9 Prostate spec antigen screen     tamsulosin (FLOMAX) 0.4 MG capsule   12. Postoperative state  Z98.890 Comprehensive metabolic panel     CBC with platelets     Iron and iron binding capacity     Ferritin   13. Medication monitoring encounter  Z51.81 Comprehensive metabolic panel     Lipid panel reflex to direct LDL Fasting     CK total     CBC with platelets     UA reflex to Microscopic and Culture     Albumin Random Urine Quantitative with Creat Ratio     Prostate spec antigen screen     Fecal colorectal cancer screen FIT     T4,  "free     Iron and iron binding capacity     Ferritin     TSH   14. Screening for prostate cancer  Z12.5 Prostate spec antigen screen   15. Screen for colon cancer  Z12.11 Fecal colorectal cancer screen FIT     Fasting labs soon.  Home PT for knee, good rom  Dr Gandhi  Med refills  CPX when able    No follow-ups on file.    Phone call duration:  18 minutes    The patient has been notified of following:     \"This telephone visit will be conducted via a call between you and your physician/provider. We have found that certain health care needs can be provided without the need for a physical exam.  This service lets us provide the care you need with a short phone conversation.  If a prescription is necessary we can send it directly to your pharmacy.  If lab work is needed we can place an order for that and you can then stop by our lab to have the test done at a later time.    Telephone visits are billed at different rates depending on your insurance coverage. During this emergency period, for some insurers they may be billed the same as an in-person visit.  Please reach out to your insurance provider with any questions.    If during the course of the call the physician/provider feels a telephone visit is not appropriate, you will not be charged for this service.\"    Patient has given verbal consent for Telephone visit?  Yes    How would you like to obtain your AVS? Cheryle Nolasco MD, FAAFP     [facility]  [address] [place] [id number]  [email or web address]   Office: [phone]  Fax: [phone]  Pager: [phone]     "

## 2020-07-30 DIAGNOSIS — Z12.5 SCREENING FOR PROSTATE CANCER: ICD-10-CM

## 2020-07-30 DIAGNOSIS — E03.9 ACQUIRED HYPOTHYROIDISM: ICD-10-CM

## 2020-07-30 DIAGNOSIS — Z51.81 MEDICATION MONITORING ENCOUNTER: ICD-10-CM

## 2020-07-30 DIAGNOSIS — E78.5 HYPERLIPIDEMIA LDL GOAL <100: ICD-10-CM

## 2020-07-30 DIAGNOSIS — N40.1 BENIGN PROSTATIC HYPERPLASIA WITH LOWER URINARY TRACT SYMPTOMS, SYMPTOM DETAILS UNSPECIFIED: ICD-10-CM

## 2020-07-30 DIAGNOSIS — Z96.651 STATUS POST TOTAL RIGHT KNEE REPLACEMENT: ICD-10-CM

## 2020-07-30 DIAGNOSIS — Z96.652 S/P REVISION OF TOTAL KNEE, LEFT: ICD-10-CM

## 2020-07-30 DIAGNOSIS — R73.03 PREDIABETES: ICD-10-CM

## 2020-07-30 DIAGNOSIS — Z87.19 HISTORY OF GI BLEED: ICD-10-CM

## 2020-07-30 DIAGNOSIS — R33.9 URINARY RETENTION: ICD-10-CM

## 2020-07-30 DIAGNOSIS — Z98.890 POSTOPERATIVE STATE: ICD-10-CM

## 2020-07-30 DIAGNOSIS — Z96.652 HISTORY OF TOTAL KNEE ARTHROPLASTY, LEFT: ICD-10-CM

## 2020-07-30 DIAGNOSIS — Z96.651 HISTORY OF TOTAL KNEE ARTHROPLASTY, RIGHT: ICD-10-CM

## 2020-07-30 LAB
ERYTHROCYTE [DISTWIDTH] IN BLOOD BY AUTOMATED COUNT: 14.6 % (ref 10–15)
HBA1C MFR BLD: 5.3 % (ref 0–5.6)
HCT VFR BLD AUTO: 42.5 % (ref 40–53)
HGB BLD-MCNC: 14 G/DL (ref 13.3–17.7)
MCH RBC QN AUTO: 32.8 PG (ref 26.5–33)
MCHC RBC AUTO-ENTMCNC: 32.9 G/DL (ref 31.5–36.5)
MCV RBC AUTO: 100 FL (ref 78–100)
PLATELET # BLD AUTO: 255 10E9/L (ref 150–450)
RBC # BLD AUTO: 4.27 10E12/L (ref 4.4–5.9)
WBC # BLD AUTO: 5.9 10E9/L (ref 4–11)

## 2020-07-30 PROCEDURE — G0103 PSA SCREENING: HCPCS | Performed by: FAMILY MEDICINE

## 2020-07-30 PROCEDURE — 80053 COMPREHEN METABOLIC PANEL: CPT | Performed by: FAMILY MEDICINE

## 2020-07-30 PROCEDURE — 84443 ASSAY THYROID STIM HORMONE: CPT | Performed by: FAMILY MEDICINE

## 2020-07-30 PROCEDURE — 83036 HEMOGLOBIN GLYCOSYLATED A1C: CPT | Performed by: FAMILY MEDICINE

## 2020-07-30 PROCEDURE — 83540 ASSAY OF IRON: CPT | Performed by: FAMILY MEDICINE

## 2020-07-30 PROCEDURE — 36415 COLL VENOUS BLD VENIPUNCTURE: CPT | Performed by: FAMILY MEDICINE

## 2020-07-30 PROCEDURE — 85027 COMPLETE CBC AUTOMATED: CPT | Performed by: FAMILY MEDICINE

## 2020-07-30 PROCEDURE — 82550 ASSAY OF CK (CPK): CPT | Performed by: FAMILY MEDICINE

## 2020-07-30 PROCEDURE — 84439 ASSAY OF FREE THYROXINE: CPT | Performed by: FAMILY MEDICINE

## 2020-07-30 PROCEDURE — 83550 IRON BINDING TEST: CPT | Performed by: FAMILY MEDICINE

## 2020-07-30 PROCEDURE — 80061 LIPID PANEL: CPT | Performed by: FAMILY MEDICINE

## 2020-07-30 PROCEDURE — 82728 ASSAY OF FERRITIN: CPT | Performed by: FAMILY MEDICINE

## 2020-07-31 LAB
ALBUMIN SERPL-MCNC: 3.9 G/DL (ref 3.4–5)
ALP SERPL-CCNC: 56 U/L (ref 40–150)
ALT SERPL W P-5'-P-CCNC: 25 U/L (ref 0–70)
ANION GAP SERPL CALCULATED.3IONS-SCNC: 7 MMOL/L (ref 3–14)
AST SERPL W P-5'-P-CCNC: 22 U/L (ref 0–45)
BILIRUB SERPL-MCNC: 0.4 MG/DL (ref 0.2–1.3)
BUN SERPL-MCNC: 13 MG/DL (ref 7–30)
CALCIUM SERPL-MCNC: 9.3 MG/DL (ref 8.5–10.1)
CHLORIDE SERPL-SCNC: 108 MMOL/L (ref 94–109)
CHOLEST SERPL-MCNC: 167 MG/DL
CK SERPL-CCNC: 106 U/L (ref 30–300)
CO2 SERPL-SCNC: 24 MMOL/L (ref 20–32)
CREAT SERPL-MCNC: 0.75 MG/DL (ref 0.66–1.25)
FERRITIN SERPL-MCNC: 91 NG/ML (ref 26–388)
GFR SERPL CREATININE-BSD FRML MDRD: >90 ML/MIN/{1.73_M2}
GLUCOSE SERPL-MCNC: 110 MG/DL (ref 70–99)
HDLC SERPL-MCNC: 73 MG/DL
IRON SATN MFR SERPL: 18 % (ref 15–46)
IRON SERPL-MCNC: 65 UG/DL (ref 35–180)
LDLC SERPL CALC-MCNC: 81 MG/DL
NONHDLC SERPL-MCNC: 94 MG/DL
POTASSIUM SERPL-SCNC: 4.3 MMOL/L (ref 3.4–5.3)
PROT SERPL-MCNC: 7.7 G/DL (ref 6.8–8.8)
PSA SERPL-ACNC: 0.69 UG/L (ref 0–4)
SODIUM SERPL-SCNC: 139 MMOL/L (ref 133–144)
T4 FREE SERPL-MCNC: 1.03 NG/DL (ref 0.76–1.46)
TIBC SERPL-MCNC: 360 UG/DL (ref 240–430)
TRIGL SERPL-MCNC: 65 MG/DL
TSH SERPL DL<=0.005 MIU/L-ACNC: 1.18 MU/L (ref 0.4–4)

## 2020-08-10 ENCOUNTER — PRE VISIT (OUTPATIENT)
Dept: UROLOGY | Facility: CLINIC | Age: 70
End: 2020-08-10

## 2020-08-10 NOTE — TELEPHONE ENCOUNTER
Reason for Disposition    Fever > 100.5 F (38.1 C)    Additional Information    Negative: Sounds like a life-threatening emergency to the triager    Negative: Chest pain    Negative: Difficulty breathing    Negative: Surgical incision symptoms and questions    Negative: [1] Discomfort (pain, burning or stinging) when passing urine AND [2] male    Negative: [1] Discomfort (pain, burning or stinging) when passing urine AND [2] female    Negative: Constipation    Negative: Calf pain    Negative: Dizziness is severe, or persists > 24 hours after surgery    Negative: Pain, redness, swelling, or pus at IV Site    Negative: Symptoms arising from use of a urinary catheter (Lang or Coude)    Negative: Cast problems or questions    Negative: Medication question    Negative: [1] Widespread rash AND [2] bright red, sunburn-like    Negative: [1] SEVERE headache AND [2] after spinal (epidural) anesthesia    Negative: [1] Vomiting AND [2] persists > 4 hours    Negative: [1] Vomiting AND [2] abdomen looks much more swollen than usual    Negative: [1] Drinking very little AND [2] dehydration suspected (e.g., no urine > 12 hours, very dry mouth, very lightheaded)    Negative: Patient sounds very sick or weak to the triager    Negative: Sounds like a serious complication to the triager    Protocols used: POST-OP SYMPTOMS AND [facility]STIONS[facility]     No

## 2020-08-18 ENCOUNTER — PRE VISIT (OUTPATIENT)
Dept: UROLOGY | Facility: CLINIC | Age: 70
End: 2020-08-18

## 2020-08-18 ENCOUNTER — VIRTUAL VISIT (OUTPATIENT)
Dept: UROLOGY | Facility: CLINIC | Age: 70
End: 2020-08-18
Payer: COMMERCIAL

## 2020-08-18 DIAGNOSIS — N40.1 BENIGN LOCALIZED PROSTATIC HYPERPLASIA WITH LOWER URINARY TRACT SYMPTOMS (LUTS): Primary | ICD-10-CM

## 2020-08-18 ASSESSMENT — PAIN SCALES - GENERAL: PAINLEVEL: NO PAIN (0)

## 2020-08-18 NOTE — LETTER
8/18/2020       RE: Sundar German  [address]  [place] [address]     Dear Colleague,    Thank you for referring your patient, Sundar German, to the [facility] UROLOG[facility] AND INST FOR PROSTATE AND UROLOGIC CANCERS at [facility]. Please see a copy of my visit note below.    Video Visit Technology for this patient: Kaesu Video Visit- Patient was left in waiting room   Visit changed to telephone ultimately for connectivity reasons.    Sundar German is a 70 year old male who is being evaluated via a billable video visit.      I spoke with: Sundar German    The reason for the telephone visit was: Follow-up LUTS    Pertinent history and review of systems: 71 yo M with hx of bothersome LUTS.  He underwent laser TURP with Dr. Moreno in 2016.  Initially had some improvement in symptoms but LUTS have since returned and become bothersome again.  Main issues are slow stream, frequency, incomplete emptying and nocturia.  Denies hematuria, UTI.  Has recently learned of some of the MIST procedures and expresses interest.  He is on flomax, minimally effective.  No personal or fmaily hx of prostate cancer.  Recent PSA low    PSA   Date Value Ref Range Status   07/30/2020 0.69 0 - 4 ug/L Final     Comment:     Assay Method:  Chemiluminescence using Siemens Vista analyzer   05/07/2019 0.77 0 - 4 ug/L Final     Comment:     Assay Method:  Chemiluminescence using Siemens Vista analyzer   03/02/2018 1.07 0 - 4 ug/L Final     Comment:     Assay Method:  Chemiluminescence using Siemens Vista analyzer   01/12/2016 0.62 0 - 4 ug/L Final   06/02/2015 1.45 0 - 4 ug/L Final           Labs and Pathology:    I personally reviewed all applicable laboratory data and went over findings with patient  Significant for:    CBC RESULTS:  Recent Labs   Lab Test 07/30/20  0956 03/17/20  0756 02/28/20  1116 09/03/19  1043 05/07/19  0800   WBC 5.9  --  5.0 6.1 6.0   HGB 14.0 10.2* 13.8 13.3 13.7      --  235 254 307        BMP RESULTS:  Recent Labs   Lab Test 07/30/20  0956 03/18/20  0620 03/17/20  0756 02/28/20  1116 05/07/19  0800 03/23/19  1046     --   --  139 139 142   POTASSIUM 4.3  --   --  3.9 4.2 4.0   CHLORIDE 108  --   --  110* 109 111*   CO2 24  --   --  26 29 26   ANIONGAP 7  --   --  5 1* 5   * 115* 134* 112* 106* 111*   BUN 13  --   --  13 14 17   CR 0.75  --   --  0.66 0.78 0.77   GFRESTIMATED >90  --   --  >90 >90 >90   GFRESTBLACK >90  --   --  >90 >90 >90   VIKKI 9.3  --   --  9.3 9.6 9.5       UA RESULTS:   Recent Labs   Lab Test 02/28/20  1120 09/05/19  1100 10/24/18  1103 03/02/18  1017   SG 1.025 1.015 1.020 >1.030   URINEPH 6.0 7.0 5.5 5.5   NITRITE Negative Negative Negative Negative   RBCU 2-5* O - 2  --  10-25*   WBCU 0 - 5 0 - 5  --  10-25*       PSA RESULTS  PSA   Date Value Ref Range Status   07/30/2020 0.69 0 - 4 ug/L Final     Comment:     Assay Method:  Chemiluminescence using Siemens Vista analyzer   05/07/2019 0.77 0 - 4 ug/L Final     Comment:     Assay Method:  Chemiluminescence using Siemens Vista analyzer   03/02/2018 1.07 0 - 4 ug/L Final     Comment:     Assay Method:  Chemiluminescence using Siemens Vista analyzer   01/12/2016 0.62 0 - 4 ug/L Final   06/02/2015 1.45 0 - 4 ug/L Final   11/19/2013 0.90 0 - 4 ug/L Final   11/14/2012 1.23 0 - 4 ug/L Final   03/21/2012 1.38 0 - 4 ug/L Final   01/25/2011 1.66 0 - 4 ug/L Final   11/20/2009 1.18 0 - 4 ug/L Final              Assessment/Plan   70 year old male with LUTS and prior surgery  -Briefly reviewed the nature of recurrent symptoms after transurethral surgery  -Highlighted importance of distinguishing stricture from BPH and need for cystoscopy to assess for obstruction, residual tissue, next steps in management  -Will coordinate cysto, uroflow, and exam in near future to guide next steps in care         Past Medical History:     Past Medical History:   Diagnosis Date     Abnormal glucose     A1C 1/06  =  6.3     Acquired hypothyroidism 10/11/2005     Problem list name updated by automated process. Provider to review     Allergic rhinitis, cause unspecified     mary spring time     Benign localized hyperplasia of prostate with urinary obstruction and other lower urinary tract symptoms (LUTS)(600.21)     slow stream, nocturia - Dr Malave     Chronic sinusitis 3/14/2014     Complication of anesthesia      Environmental allergies     AIT - Dr Weiss     History of blood transfusion      Hyperlipidemia LDL goal <130 10/31/2010     hypogonadism     low testosterone at times      RAFIQ (iron deficiency anemia)      Impaired memory 02/11/2011    ?     Melanoma (H) 04/2018    Melanoma 0.7 mm depth, Levar III, superficial spreading, stage T1a     OA (osteoarthritis) total L knee 3/09    knees bother;; has had bilat arthroscopic     PONV (postoperative nausea and vomiting)      Prediabetes      Sleep related leg cramps 06/2012            Past Surgical History:     Past Surgical History:   Procedure Laterality Date     ARTHROPLASTY KNEE Right 3/16/2020    Rt TKA - Dr DUY Dudley     ARTHROPLASTY REVISION KNEE Left 10/26/2015     ARTHROPLASTY REVISION KNEE - Dr Dudley     COLONOSCOPY  10/2/2013    diverticulosis - due 10 yrs (10/2023), initial 10/2003     CYSTOSCOPY  04/2016     ENDOSCOPIC SINUS SURGERY  3/17/2014    Bilateral Endoscopic Sinus Surgery - Dr Johnson     ESOPHAGOSCOPY, GASTROSCOPY, DUODENOSCOPY (EGD), COMBINED N/A 2/28/2019    Non bleeding gastric ulcers and duodenal ulcer. Recheck 3 months. Dr Sinclair     HC REPAIR UMBILICAL KASI,5+Y/O,REDUC  2002     LASER REVOLIX PHOTOSELECTIVE VAPORIZATION PROSTATE N/A 4/6/2016    LASER REVOLIX / QUANTA PHOTOSELECTIVE VAPORIZATION PROSTATE - Dr Malave     ROTATOR CUFF REPAIR RT/LT Left 03/2012    left shoulder     SURGICAL HISTORY OF -   1996    tonsils and adenoids     SURGICAL HISTORY OF -  Left 03/2009    Lt TKA     SURGICAL HISTORY OF -  Bilateral     Rt Knee x 1 (meniscus,  ), Lt x 3 (last )     UPPER GI ENDOSCOPY  2019    normal     VASECTOMY Bilateral 1986            Medications     Current Outpatient Medications   Medication     acetaminophen (TYLENOL) 325 MG tablet     fluticasone (FLONASE) 50 MCG/ACT nasal spray     levothyroxine (SYNTHROID/LEVOTHROID) 175 MCG tablet     multivitamin, therapeutic with minerals (MULTI-VITAMIN) TABS     psyllium (METAMUCIL/KONSYL) Packet     sildenafil (REVATIO) 20 MG tablet     simvastatin (ZOCOR) 20 MG tablet     tamsulosin (FLOMAX) 0.4 MG capsule     No current facility-administered medications for this visit.             Family History:     Family History   Problem Relation Age of Onset     C.A.D. Father         CHF     Heart Failure Father      Chronic Obstructive Pulmonary Disease Father      Cerebrovascular Disease Brother 61     Gastrointestinal Disease Brother         GI Bleed - colectomy     Hypertension Mother      Cardiovascular Mother 84        MI     Lipids Mother      Chronic Obstructive Pulmonary Disease Mother      No Known Problems Sister      No Known Problems Brother             Social History:     Social History     Socioeconomic History     Marital status:      Spouse name: Ingrid     Number of children: 4     Years of education: Not on file     Highest education level: Not on file   Occupational History     Employer: [employer]   Social Needs     Financial resource strain: Not on file     Food insecurity     Worry: Not on file     Inability: Not on file     Transportation needs     Medical: Not on file     Non-medical: Not on file   Tobacco Use     Smoking status: Former Smoker     Packs/day: 1.00     Years: 20.00     Pack years: 20.00     Last attempt to quit: 1981     Years since quittin.6     Smokeless tobacco: Former User     Types: Chew     Quit date: 1992     Tobacco comment: 1 tin per week   Substance and Sexual Activity     Alcohol use: Yes     Alcohol/week: 7.0 - 8.0 standard drinks  "    Types: 7 - 8 Standard drinks or equivalent per week     Comment: 1 beer daily     Drug use: No     Sexual activity: Yes     Partners: Female   Lifestyle     Physical activity     Days per week: Not on file     Minutes per session: Not on file     Stress: Not on file   Relationships     Social connections     Talks on phone: Not on file     Gets together: Not on file     Attends Islam service: Not on file     Active member of club or organization: Not on file     Attends meetings of clubs or organizations: Not on file     Relationship status: Not on file     Intimate partner violence     Fear of current or ex partner: Not on file     Emotionally abused: Not on file     Physically abused: Not on file     Forced sexual activity: Not on file   Other Topics Concern     Parent/sibling w/ CABG, MI or angioplasty before 65F 55M? Not Asked   Social History Narrative     Not on file            Allergies:   Patient has no known allergies.         Review of Systems:  From intake questionnaire   Negative 14 system review except as noted on HPI, nurse's note.        CC:  Liu Nolasco      Phone call contact time 12 minutes     The patient has been notified of following:     \"This telephone visit will be conducted via a call between you and your physician/provider. We have found that certain health care needs can be provided without the need for a physical exam. This service lets us provide the care you need with a short phone conversation. If a prescription is necessary we can send it directly to your pharmacy. If lab work is needed we can place an order for that and you can then stop by our lab to have the test done at a later time.   If during the course of the call the physician/provider feels a telephone visit is not appropriate, you will not be charged for this service.\"      Again, thank you for allowing me to participate in the care of your patient.      Sincerely,    Sundar Gandhi MD      "

## 2020-08-18 NOTE — PROGRESS NOTES
Video Visit Technology for this patient: [employer] Video Visit- Patient was left in waiting room   Visit changed to telephone ultimately for connectivity reasons.    Sundar German is a 70 year old male who is being evaluated via a billable video visit.      I spoke with: Sundar German    The reason for the telephone visit was: Follow-up LUTS    Pertinent history and review of systems: 71 yo M with hx of bothersome LUTS.  He underwent laser TURP with Dr. Moreno in 2016.  Initially had some improvement in symptoms but LUTS have since returned and become bothersome again.  Main issues are slow stream, frequency, incomplete emptying and nocturia.  Denies hematuria, UTI.  Has recently learned of some of the MIST procedures and expresses interest.  He is on flomax, minimally effective.  No personal or fmaily hx of prostate cancer.  Recent PSA low    PSA   Date Value Ref Range Status   07/30/2020 0.69 0 - 4 ug/L Final     Comment:     Assay Method:  Chemiluminescence using Siemens Vista analyzer   05/07/2019 0.77 0 - 4 ug/L Final     Comment:     Assay Method:  Chemiluminescence using Siemens Vista analyzer   03/02/2018 1.07 0 - 4 ug/L Final     Comment:     Assay Method:  Chemiluminescence using Siemens Vista analyzer   01/12/2016 0.62 0 - 4 ug/L Final   06/02/2015 1.45 0 - 4 ug/L Final           Labs and Pathology:    I personally reviewed all applicable laboratory data and went over findings with patient  Significant for:    CBC RESULTS:  Recent Labs   Lab Test 07/30/20  0956 03/17/20  0756 02/28/20  1116 09/03/19  1043 05/07/19  0800   WBC 5.9  --  5.0 6.1 6.0   HGB 14.0 10.2* 13.8 13.3 13.7     --  235 254 307        BMP RESULTS:  Recent Labs   Lab Test 07/30/20  0956 03/18/20  0620 03/17/20  0756 02/28/20  1116 05/07/19  0800 03/23/19  1046     --   --  139 139 142   POTASSIUM 4.3  --   --  3.9 4.2 4.0   CHLORIDE 108  --   --  110* 109 111*   CO2 24  --   --  26 29 26   ANIONGAP 7  --   --  5 1* 5   GLC  110* 115* 134* 112* 106* 111*   BUN 13  --   --  13 14 17   CR 0.75  --   --  0.66 0.78 0.77   GFRESTIMATED >90  --   --  >90 >90 >90   GFRESTBLACK >90  --   --  >90 >90 >90   VIKKI 9.3  --   --  9.3 9.6 9.5       UA RESULTS:   Recent Labs   Lab Test 02/28/20  1120 09/05/19  1100 10/24/18  1103 03/02/18  1017   SG 1.025 1.015 1.020 >1.030   URINEPH 6.0 7.0 5.5 5.5   NITRITE Negative Negative Negative Negative   RBCU 2-5* O - 2  --  10-25*   WBCU 0 - 5 0 - 5  --  10-25*       PSA RESULTS  PSA   Date Value Ref Range Status   07/30/2020 0.69 0 - 4 ug/L Final     Comment:     Assay Method:  Chemiluminescence using Siemens Vista analyzer   05/07/2019 0.77 0 - 4 ug/L Final     Comment:     Assay Method:  Chemiluminescence using Siemens Vista analyzer   03/02/2018 1.07 0 - 4 ug/L Final     Comment:     Assay Method:  Chemiluminescence using Siemens Vista analyzer   01/12/2016 0.62 0 - 4 ug/L Final   06/02/2015 1.45 0 - 4 ug/L Final   11/19/2013 0.90 0 - 4 ug/L Final   11/14/2012 1.23 0 - 4 ug/L Final   03/21/2012 1.38 0 - 4 ug/L Final   01/25/2011 1.66 0 - 4 ug/L Final   11/20/2009 1.18 0 - 4 ug/L Final              Assessment/Plan   70 year old male with LUTS and prior surgery  -Briefly reviewed the nature of recurrent symptoms after transurethral surgery  -Highlighted importance of distinguishing stricture from BPH and need for cystoscopy to assess for obstruction, residual tissue, next steps in management  -Will coordinate cysto, uroflow, and exam in near future to guide next steps in care         Past Medical History:     Past Medical History:   Diagnosis Date     Abnormal glucose     A1C 1/06 =  6.3     Acquired hypothyroidism 10/11/2005     Problem list name updated by automated process. Provider to review     Allergic rhinitis, cause unspecified     mary spring time     Benign localized hyperplasia of prostate with urinary obstruction and other lower urinary tract symptoms (LUTS)(600.21)     slow stream, nocturia -   Ananda     Chronic sinusitis 3/14/2014     Complication of anesthesia      Environmental allergies     AIT - Dr Weiss     History of blood transfusion      Hyperlipidemia LDL goal <130 10/31/2010     hypogonadism     low testosterone at times      RAFIQ (iron deficiency anemia)      Impaired memory 02/11/2011    ?     Melanoma (H) 04/2018    Melanoma 0.7 mm depth, Levar III, superficial spreading, stage T1a     OA (osteoarthritis) total L knee 3/09    knees bother;; has had bilat arthroscopic     PONV (postoperative nausea and vomiting)      Prediabetes      Sleep related leg cramps 06/2012            Past Surgical History:     Past Surgical History:   Procedure Laterality Date     ARTHROPLASTY KNEE Right 3/16/2020    Rt TKA - Dr DUY Dudley     ARTHROPLASTY REVISION KNEE Left 10/26/2015     ARTHROPLASTY REVISION KNEE - Dr Dudley     COLONOSCOPY  10/2/2013    diverticulosis - due 10 yrs (10/2023), initial 10/2003     CYSTOSCOPY  04/2016     ENDOSCOPIC SINUS SURGERY  3/17/2014    Bilateral Endoscopic Sinus Surgery - Dr Johnson     ESOPHAGOSCOPY, GASTROSCOPY, DUODENOSCOPY (EGD), COMBINED N/A 2/28/2019    Non bleeding gastric ulcers and duodenal ulcer. Recheck 3 months. Dr Sinclair     HC REPAIR UMBILICAL KASI,5+Y/O,REDUC  2002     LASER REVOLIX PHOTOSELECTIVE VAPORIZATION PROSTATE N/A 4/6/2016    LASER REVOLIX / QUANTA PHOTOSELECTIVE VAPORIZATION PROSTATE - Dr Malave     ROTATOR CUFF REPAIR RT/LT Left 03/2012    left shoulder     SURGICAL HISTORY OF -   1996    tonsils and adenoids     SURGICAL HISTORY OF -  Left 03/2009    Lt TKA     SURGICAL HISTORY OF -  Bilateral     Rt Knee x 1 (meniscus, 1995), Lt x 3 (last 2009)     UPPER GI ENDOSCOPY  05/2019    normal     VASECTOMY Bilateral 1986            Medications     Current Outpatient Medications   Medication     acetaminophen (TYLENOL) 325 MG tablet     fluticasone (FLONASE) 50 MCG/ACT nasal spray     levothyroxine (SYNTHROID/LEVOTHROID) 175 MCG tablet     multivitamin,  therapeutic with minerals (MULTI-VITAMIN) TABS     psyllium (METAMUCIL/KONSYL) Packet     sildenafil (REVATIO) 20 MG tablet     simvastatin (ZOCOR) 20 MG tablet     tamsulosin (FLOMAX) 0.4 MG capsule     No current facility-administered medications for this visit.             Family History:     Family History   Problem Relation Age of Onset     C.A.D. Father         CHF     Heart Failure Father      Chronic Obstructive Pulmonary Disease Father      Cerebrovascular Disease Brother 61     Gastrointestinal Disease Brother         GI Bleed - colectomy     Hypertension Mother      Cardiovascular Mother 84        MI     Lipids Mother      Chronic Obstructive Pulmonary Disease Mother      No Known Problems Sister      No Known Problems Brother             Social History:     Social History     Socioeconomic History     Marital status:      Spouse name: Ingrid     Number of children: 4     Years of education: Not on file     Highest education level: Not on file   Occupational History     Employer: [employer]   Social Needs     Financial resource strain: Not on file     Food insecurity     Worry: Not on file     Inability: Not on file     Transportation needs     Medical: Not on file     Non-medical: Not on file   Tobacco Use     Smoking status: Former Smoker     Packs/day: 1.00     Years: 20.00     Pack years: 20.00     Last attempt to quit: 1981     Years since quittin.6     Smokeless tobacco: Former User     Types: Chew     Quit date: 1992     Tobacco comment: 1 tin per week   Substance and Sexual Activity     Alcohol use: Yes     Alcohol/week: 7.0 - 8.0 standard drinks     Types: 7 - 8 Standard drinks or equivalent per week     Comment: 1 beer daily     Drug use: No     Sexual activity: Yes     Partners: Female   Lifestyle     Physical activity     Days per week: Not on file     Minutes per session: Not on file     Stress: Not on file   Relationships     Social connections     Talks on phone:  "Not on file     Gets together: Not on file     Attends Hoahaoism service: Not on file     Active member of club or organization: Not on file     Attends meetings of clubs or organizations: Not on file     Relationship status: Not on file     Intimate partner violence     Fear of current or ex partner: Not on file     Emotionally abused: Not on file     Physically abused: Not on file     Forced sexual activity: Not on file   Other Topics Concern     Parent/sibling w/ CABG, MI or angioplasty before 65F 55M? Not Asked   Social History Narrative     Not on file            Allergies:   Patient has no known allergies.         Review of Systems:  From intake questionnaire   Negative 14 system review except as noted on HPI, nurse's note.        CC:  Liu Nolasco      Phone call contact time 12 minutes     The patient has been notified of following:     \"This telephone visit will be conducted via a call between you and your physician/provider. We have found that certain health care needs can be provided without the need for a physical exam. This service lets us provide the care you need with a short phone conversation. If a prescription is necessary we can send it directly to your pharmacy. If lab work is needed we can place an order for that and you can then stop by our lab to have the test done at a later time.   If during the course of the call the physician/provider feels a telephone visit is not appropriate, you will not be charged for this service.\"        "

## 2020-08-18 NOTE — NURSING NOTE
Chief Complaint   Patient presents with     Consult     New patient consult for BPH/LUTS with urinary retention     Sharonda Hill, CMA

## 2020-08-20 ENCOUNTER — TRANSFERRED RECORDS (OUTPATIENT)
Dept: HEALTH INFORMATION MANAGEMENT | Facility: CLINIC | Age: 70
End: 2020-08-20

## 2020-08-21 ENCOUNTER — TELEPHONE (OUTPATIENT)
Dept: UROLOGY | Facility: CLINIC | Age: 70
End: 2020-08-21

## 2020-08-21 NOTE — TELEPHONE ENCOUNTER
[facility] Call Center    Phone Message    May a detailed message be left on voicemail: yes     Reason for Call: Other: Pt says Dr Gandhi decided he needs to be seen in person and was going to have a nurse call to schedule.  I did not see any info regarding scheduling or Dx.  Please confirm he needs scheduling and have someone call him please     Action Taken: Message routed to:  [facility] urology    Travel Screening: Not Applicable

## 2020-09-01 NOTE — TELEPHONE ENCOUNTER
[facility] Call Center    Phone Message    May a detailed message be left on voicemail: yes     Reason for Call: Other: Andi calling in again to get a call back from Dr. Gandhi's nurse Sabrina Milan. Andi had an appointment with Dr. Gandhi on 8/17/20, and he was told that he would receive a call the next day to schedule an in clinic appointment with Dr. Gandhi. Andi says he has tried calling Sabrina multiple times, but has not had any of his calls returned. Andi would very much like to schedule this appointment. There was no template available for the writer to assist. Andi requests that his home phone is tried first, an if there is no answer to try [phone]. Please give Andi a call back at your earliest convenience to discuss.     Action Taken: Message routed to:  [facility]  Uro    Travel Screening: Not Applicable

## 2020-09-02 ENCOUNTER — TELEPHONE (OUTPATIENT)
Dept: UROLOGY | Facility: CLINIC | Age: 70
End: 2020-09-02

## 2020-09-02 NOTE — TELEPHONE ENCOUNTER
----- Message from Sabrina Milan RN sent at 9/2/2020 12:54 PM CDT -----  Hi,    Can someone please call this patient today and help him get set up for a cysto with Dr. Gandhi?  Right now please take the next available but PLEASE let the patient know that we are working very hard with Dr. Gandhi to get him in within a couple of weeks.     Thank you so much!  Sabrina

## 2020-09-02 NOTE — TELEPHONE ENCOUNTER
Left message for pt to call and schedule a CYSTO next available (NOV). Please let the pt know that Dr. Gandhi and his nurse Sabrina are trying to find a sooner time in the next few weeks.

## 2020-09-02 NOTE — TELEPHONE ENCOUNTER
----- Message from Sabrina Milan RN sent at 8/31/2020  1:32 PM CDT -----  Can you please call this patient and help him get set up for a cysto with Dr. Gandhi. Thanks! Sabrina  ----- Message -----  From: Sundar Gandhi MD  Sent: 8/31/2020   8:54 AM CDT  To: Sabrina Milan RN    Yes - Im not sure what happened, very possible I did not appropriately send the message but if we can get him on for a cysto [facility] eval that would be great thanks  ----- Message -----  From: Sabrina Milan RN  Sent: 8/28/2020   7:49 PM CDT  To: MD SUSAN Chen    This patient left me 2 messages on my voicemail today saying you talked to him on August 18th and wanted to physically see him in clinic. I'm not sure what happened? I just want to know what the plan is before I call him back.  If you do want to see him in clinic. When?     THX  GJ

## 2020-09-02 NOTE — TELEPHONE ENCOUNTER
Left a detailed VM that patient is scheduled for 11/10 but he would be called in the next few days for much sooner appt by Sabrina or clinic staff. Sabrina is working on a sooner date

## 2020-09-08 ENCOUNTER — OFFICE VISIT (OUTPATIENT)
Dept: UROLOGY | Facility: CLINIC | Age: 70
End: 2020-09-08
Payer: COMMERCIAL

## 2020-09-08 VITALS — BODY MASS INDEX: 28.7 KG/M2 | HEIGHT: 71 IN | WEIGHT: 205 LBS

## 2020-09-08 DIAGNOSIS — R33.9 INCOMPLETE BLADDER EMPTYING: ICD-10-CM

## 2020-09-08 DIAGNOSIS — N40.1 BENIGN LOCALIZED PROSTATIC HYPERPLASIA WITH LOWER URINARY TRACT SYMPTOMS (LUTS): Primary | ICD-10-CM

## 2020-09-08 DIAGNOSIS — N40.1 BENIGN PROSTATIC HYPERPLASIA WITH LOWER URINARY TRACT SYMPTOMS, SYMPTOM DETAILS UNSPECIFIED: ICD-10-CM

## 2020-09-08 RX ORDER — LIDOCAINE HYDROCHLORIDE 20 MG/ML
JELLY TOPICAL ONCE
Status: COMPLETED | OUTPATIENT
Start: 2020-09-08 | End: 2020-09-08

## 2020-09-08 RX ADMIN — LIDOCAINE HYDROCHLORIDE: 20 JELLY TOPICAL at 08:20

## 2020-09-08 ASSESSMENT — PAIN SCALES - GENERAL: PAINLEVEL: NO PAIN (0)

## 2020-09-08 ASSESSMENT — MIFFLIN-ST. JEOR: SCORE: 1717

## 2020-09-08 NOTE — PROGRESS NOTES
CYSTOSCOPY PROCEDURE NOTE    Reason for cystoscopy: Incomplete emptying    Brief History: 69 yo M with hx of incomplete emptying and progressive LUTS.  Has hx of prior laser TUR 4 years ago.  Has bothersome nocturia q2-3 hours    CYSTOSCOPY  After obtaining informed consent, the patient was prepped and draped in the standard sterile fashion.  The 15 Somali flexible cystoscope was inserted through the urethral meatus.      The anterior urethra was:  normal without stricture.    The external sphincter was  appropriately coapted.   The prostatic urethra demonstrated an open TUR channel without hypertrophy.    The bladder neck was  nonocclusive.    The bladder was  unremarkable for tumors, erythema or stones.    The ureteral orifices  were identified on each side in orthotopic position with efflux of clear urine.   There were minimal trabeculations.    On retroflexion there was the usual bladder neck hyperemia.    There was not intravesical protrusion of the prostate.      The patient tolerated the procedure well without complication.      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  I personally performed all listed procedures.

## 2020-09-08 NOTE — LETTER
9/8/2020       RE: Sundar German  [address]  [place] [address]     Dear Colleague,    Thank you for referring your patient, Sundar German, to the [facility] UROLOGY AND INST FOR PROSTATE AND UROLOGIC CANCERS at [facility]. Please see a copy of my visit note below.    CYSTOSCOPY PROCEDURE NOTE    Reason for cystoscopy: Incomplete emptying    Brief History: 71 yo M with hx of incomplete emptying and progressive LUTS.  Has hx of prior laser TUR 4 years ago.  Has bothersome nocturia q2-3 hours    CYSTOSCOPY  After obtaining informed consent, the patient was prepped and draped in the standard sterile fashion.  The 15 Irish flexible cystoscope was inserted through the urethral meatus.      The anterior urethra was:  normal without stricture.    The external sphincter was  appropriately coapted.   The prostatic urethra demonstrated an open TUR channel without hypertrophy.    The bladder neck was  nonocclusive.    The bladder was  unremarkable for tumors, erythema or stones.    The ureteral orifices  were identified on each side in orthotopic position with efflux of clear urine.   There were minimal trabeculations.    On retroflexion there was the usual bladder neck hyperemia.    There was not intravesical protrusion of the prostate.      The patient tolerated the procedure well without complication.      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  I personally performed all listed procedures.

## 2020-09-08 NOTE — NURSING NOTE
Cysto-  BPH w/ LUTS on Flomax.  He complains of frequency with small voids, and trouble starting to void about 3-4 times per night at least.    Chief Complaint   Patient presents with     Cystoscopy     BPH w/ retention        There were no vitals taken for this visit. There is no height or weight on file to calculate BMI.    Patient Active Problem List   Diagnosis     Allergic rhinitis     Acquired hypothyroidism     Hyperlipidemia LDL goal <100     Impaired memory     Sleep related leg cramps     Chronic sinusitis     S/P revision of total knee, left     BPH (benign prostatic hyperplasia)     OA (osteoarthritis)     Prediabetes     Class 1 obesity with serious comorbidity and body mass index (BMI) of 33.0 to 33.9 in adult, unspecified obesity type     h/o Malignant melanoma of torso excluding breast (H)     GI bleed     * * * SBE PROPHYLAXIS * * *     S/P total knee arthroplasty       No Known Allergies    Current Outpatient Medications   Medication Sig Dispense Refill     acetaminophen (TYLENOL) 325 MG tablet Take 3 tablets (975 mg) by mouth every 8 hours 270 tablet 0     fluticasone (FLONASE) 50 MCG/ACT nasal spray Spray 1 spray into both nostrils daily 48 g 3     levothyroxine (SYNTHROID/LEVOTHROID) 175 MCG tablet Take 1 tablet (175 mcg) by mouth daily 90 tablet 1     multivitamin, therapeutic with minerals (MULTI-VITAMIN) TABS Take 1 tablet by mouth daily       psyllium (METAMUCIL/KONSYL) Packet Take 1 packet by mouth daily       sildenafil (REVATIO) 20 MG tablet Take 1 tablet (20 mg) by mouth three daily as needed. 90 tablet 0     simvastatin (ZOCOR) 20 MG tablet Take 1 tablet (20 mg) by mouth At Bedtime 90 tablet 1     tamsulosin (FLOMAX) 0.4 MG capsule Take 1 capsule (0.4 mg) by mouth daily 90 capsule 1       Social History     Tobacco Use     Smoking status: Former Smoker     Packs/day: 1.00     Years: 20.00     Pack years: 20.00     Last attempt to quit: 1981     Years since quittin.7      Smokeless tobacco: Former User     Types: Chew     Quit date: 1992     Tobacco comment: 1 tin per week   Substance Use Topics     Alcohol use: Yes     Alcohol/week: 7.0 - 8.0 standard drinks     Types: 7 - 8 Standard drinks or equivalent per week     Comment: 1 beer daily     Drug use: No       Invasive Procedure Safety Checklist:    Procedure: Cystoscopy    Action: Complete sections and checkboxes as appropriate.    Pre-procedure:  1. Patient ID Verified with 2 identifiers (Magalis and  or MRN) : YES    2. Procedure and site verified with patient/designee (when able) : YES    3. Accurate consent documentation in medical record : YES    4. H&P (or appropriate assessment) documented in medical record : N/A  H&P must be up to 30 days prior to procedure an updated within 24 hours of                 Procedure as applicable.     5. Relevant diagnostic and radiology test results appropriately labeled and displayed as applicable : YES    6. Blood products, implants, devices, and/or special equipment available for the procedure as applicable : YES    7. Procedure site(s) marked with provider initials [Exclusions: none] : NO    8. Marking not required. Reason : Yes  Procedure does not require site marking    Time Out:     Time-Out performed immediately prior to starting procedure, including verbal and active participation of all team members addressing: YES    1. Correct patient identity.  2. Confirmed that the correct side and site are marked.  3. An accurate procedure to be done.  4. Agreement on the procedure to be done.  5. Correct patient position.  6. Relevant images and results are properly labeled and appropriately displayed.  7. The need to administer antibiotics or fluids for irrigation purposes during the procedure as applicable.  8. Safety precautions based on patient history or medication use.    During Procedure: Verification of correct person, site, and procedure occurs any time the responsibility for care  of the patient is transferred to another member of the care team.    The following medication was given:     MEDICATION: Lidocaine Uro-Jet 2% 200mg (20mg/mL)  ROUTE: Urethral   SITE: Urethra   DOSE: 10mL  LOT #: [id number]  : [employer]   EXPIRATION DATE: 5-22  NDC#: [address]-03   Was there drug waste? No    Prior to injection, verified patient identity using patient's name and date of birth.  Due to injection administration, patient instructed to remain in clinic for 15 minutes  afterwards, and to report any adverse reaction to me immediately.    Drug Amount Wasted:  None.  Vial/Syringe: Single dose vial      RANDEE Leija  9/8/2020  8:17 AM

## 2020-09-08 NOTE — PATIENT INSTRUCTIONS
Follow up for Video UDS and virtual apt about ~2 weeks after UDS for results (It will take about 2 weeks to get the results in your chart)    RANDEE Leija    It was a pleasure meeting with you today.  Thank you for allowing me and my team the privilege of caring for you today.  YOU are the reason we are here, and I truly hope we provided you with the excellent service you deserve.  Please let us know if there is anything else we can do for you so that we can be sure you are leaving completely satisfied with your care experience.

## 2020-09-22 ENCOUNTER — TELEPHONE (OUTPATIENT)
Dept: UROLOGY | Facility: CLINIC | Age: 70
End: 2020-09-22

## 2020-10-08 ENCOUNTER — PRE VISIT (OUTPATIENT)
Dept: UROLOGY | Facility: CLINIC | Age: 70
End: 2020-10-08

## 2020-10-15 DIAGNOSIS — R33.9 INCOMPLETE BLADDER EMPTYING: Primary | ICD-10-CM

## 2020-10-15 RX ORDER — SULFAMETHOXAZOLE/TRIMETHOPRIM 800-160 MG
TABLET ORAL
Qty: 6 TABLET | Refills: 0 | Status: SHIPPED | OUTPATIENT
Start: 2020-10-15 | End: 2020-11-04

## 2020-10-15 NOTE — PROGRESS NOTES
Spoke with patient today.  Per Cate Colindres PA-C, patient should be started on Bactrim DS BID x 3 days starting the day before his Urodynamics procedure.  He is unable to do a urine sample this week.  Orders placed.  OK to proceed with UDS next Wednesday.    Sharonda Hill, CMA

## 2020-10-21 ENCOUNTER — OFFICE VISIT (OUTPATIENT)
Dept: UROLOGY | Facility: CLINIC | Age: 70
End: 2020-10-21
Payer: COMMERCIAL

## 2020-10-21 ENCOUNTER — ANCILLARY PROCEDURE (OUTPATIENT)
Dept: RADIOLOGY | Facility: AMBULATORY SURGERY CENTER | Age: 70
End: 2020-10-21
Attending: PHYSICIAN ASSISTANT
Payer: COMMERCIAL

## 2020-10-21 VITALS — DIASTOLIC BLOOD PRESSURE: 80 MMHG | HEART RATE: 52 BPM | SYSTOLIC BLOOD PRESSURE: 126 MMHG

## 2020-10-21 DIAGNOSIS — R33.9 INCOMPLETE BLADDER EMPTYING: Primary | ICD-10-CM

## 2020-10-21 DIAGNOSIS — R35.1 NOCTURIA: ICD-10-CM

## 2020-10-21 DIAGNOSIS — R39.9 LOWER URINARY TRACT SYMPTOMS (LUTS): ICD-10-CM

## 2020-10-21 LAB
APPEARANCE UR: CLEAR
BILIRUB UR QL: NORMAL
COLOR UR: YELLOW
GLUCOSE URINE: NORMAL MG/DL
HGB UR QL: NORMAL
KETONES UR QL: NORMAL MG/DL
LEUKOCYTE ESTERASE URINE: NORMAL
NITRITE UR QL STRIP: NORMAL
PH UR STRIP: 7 PH (ref 5–7)
PROTEIN ALBUMIN URINE: NORMAL MG/DL
SOURCE: NORMAL
SP GR UR STRIP: 1.01 (ref 1–1.03)
UROBILINOGEN UR QL STRIP: 0.2 EU/DL (ref 0.2–1)

## 2020-10-21 PROCEDURE — 81003 URINALYSIS AUTO W/O SCOPE: CPT | Performed by: PHYSICIAN ASSISTANT

## 2020-10-21 PROCEDURE — 51728 CYSTOMETROGRAM W/VP: CPT | Performed by: PHYSICIAN ASSISTANT

## 2020-10-21 PROCEDURE — 51784 ANAL/URINARY MUSCLE STUDY: CPT | Mod: 51 | Performed by: PHYSICIAN ASSISTANT

## 2020-10-21 PROCEDURE — 51797 INTRAABDOMINAL PRESSURE TEST: CPT | Performed by: PHYSICIAN ASSISTANT

## 2020-10-21 PROCEDURE — 51741 ELECTRO-UROFLOWMETRY FIRST: CPT | Mod: 51 | Performed by: PHYSICIAN ASSISTANT

## 2020-10-21 PROCEDURE — 74455 X-RAY URETHRA/BLADDER: CPT | Performed by: PHYSICIAN ASSISTANT

## 2020-10-21 PROCEDURE — 51600 INJECTION FOR BLADDER X-RAY: CPT | Mod: 51 | Performed by: PHYSICIAN ASSISTANT

## 2020-10-21 ASSESSMENT — PAIN SCALES - GENERAL: PAINLEVEL: NO PAIN (0)

## 2020-10-21 NOTE — NURSING NOTE
Chief Complaint   Patient presents with     Urodynamics Study       Patient Active Problem List   Diagnosis     Allergic rhinitis     Acquired hypothyroidism     Hyperlipidemia LDL goal <100     Impaired memory     Sleep related leg cramps     Chronic sinusitis     S/P revision of total knee, left     BPH (benign prostatic hyperplasia)     OA (osteoarthritis)     Prediabetes     Class 1 obesity with serious comorbidity and body mass index (BMI) of 33.0 to 33.9 in adult, unspecified obesity type     h/o Malignant melanoma of torso excluding breast (H)     GI bleed     * * * SBE PROPHYLAXIS * * *     S/P total knee arthroplasty       No Known Allergies    Current Outpatient Medications   Medication Sig Dispense Refill     acetaminophen (TYLENOL) 325 MG tablet Take 3 tablets (975 mg) by mouth every 8 hours 270 tablet 0     fluticasone (FLONASE) 50 MCG/ACT nasal spray Spray 1 spray into both nostrils daily 48 g 3     levothyroxine (SYNTHROID/LEVOTHROID) 175 MCG tablet Take 1 tablet (175 mcg) by mouth daily 90 tablet 1     multivitamin, therapeutic with minerals (MULTI-VITAMIN) TABS Take 1 tablet by mouth daily       psyllium (METAMUCIL/KONSYL) Packet Take 1 packet by mouth daily       sildenafil (REVATIO) 20 MG tablet Take 1 tablet (20 mg) by mouth three daily as needed. 90 tablet 0     simvastatin (ZOCOR) 20 MG tablet Take 1 tablet (20 mg) by mouth At Bedtime 90 tablet 1     sulfamethoxazole-trimethoprim (BACTRIM DS) 800-160 MG tablet Take one tablet twice a day for three days, starting the day before your Urodynamics Study 6 tablet 0     tamsulosin (FLOMAX) 0.4 MG capsule Take 1 capsule (0.4 mg) by mouth daily 90 capsule 1       Social History     Tobacco Use     Smoking status: Former Smoker     Packs/day: 1.00     Years: 20.00     Pack years: 20.00     Quit date: 1981     Years since quittin.8     Smokeless tobacco: Former User     Types: Chew     Quit date: 1992     Tobacco comment: 1 tin per week    Substance Use Topics     Alcohol use: Yes     Alcohol/week: 7.0 - 8.0 standard drinks     Types: 7 - 8 Standard drinks or equivalent per week     Comment: 1 beer daily     Drug use: No       Invasive Procedure Safety Checklist:    Procedure: Urodynamics    Action: Complete sections and checkboxes as appropriate.  Pre-procedure:  1. Patient ID Verified with 2 identifiers ([facility] and  or MRN) : YES    2. Procedure and site verified with patient/designee (when able) : YES    3. Accurate consent documentation in medical record : YES    4. H&P (or appropriate assessment) documented in medical record : N/A  H&P must be up to 30 days prior to procedure an updated within 24 hours of Procedure as applicable.     5. Relevant diagnostic and radiology test results appropriately labeled and displayed as applicable : YES    6. Blood products, implants, devices, and/or special equipment available for the procedure as applicable : YES    7. Procedure site(s) marked with provider initials [Exclusions: none] : NO    8. Marking not required. Reason : Yes  Procedure does not require site marking    Time Out:     Time-Out performed immediately prior to starting procedure, including verbal and active participation of all team members addressing: YES    1. Correct patient identity.  2. Confirmed that the correct side and site are marked.  3. An accurate procedure to be done.  4. Agreement on the procedure to be done.  5. Correct patient position.  6. Relevant images and results are properly labeled and appropriately displayed.  7. The need to administer antibiotics or fluids for irrigation purposes during the procedure as applicable.  8. Safety precautions based on patient history or medication use.    During Procedure: Verification of correct person, site, and procedure occurs any time the responsibility for care of the patient is transferred to another member of the care team.      The following medication was given:     MEDICATION:   Omnipaque (Iohexol Injection) (240mgI/mL)  ROUTE: Provider Administered  SITE: Provider Administered via catheter  DOSE: 200mL  LOT #: [id number]  : [employer]  EXPIRATION DATE: 2/15/2023  NDC#: [address]-04   Was there drug waste? No        Sharonda Hill CMA  10/21/2020  10:45 AM

## 2020-10-21 NOTE — PROGRESS NOTES
PREPROCEDURE DIAGNOSES:    1. Incomplete bladder emptying  2. LUTS (primarily nocturia)  3. History of TUR 4 years ago    POSTPROCEDURE DIAGNOSES:  -Normal bladder capacity (465 mL) with normal filling sensations.  -Normal bladder compliance.  -Questionable phasic DO without incontinence throughout filling, though he does not sense these pressure changes as urgency and does not report symptoms of DO at baseline. Question whether this may relate to technical artifact vs. patient movement? Seems unlikely to be clinically significant. Prior studies in the day had similar technical issues related to the Pves catheter.  -No obvious detrusor contraction during voiding. He voids primary by Valsalva effort which he states is normal for him. Notably, there is a questionable supplemental detrusor contraction to ~30 cm H2O AFTER the flow stops, though actually favor this to represent non-significant pressure change from mild displacement of one of the catheters during significant Valsalva voiding.  -Flow rate is mildly reduced (Qmax 13 ml/s) and interrupted, consistent with Valsalva voiding. He empties satisfactorily (PVR 75 ml).   -Increased EMG activity during voiding which correlates with Valsalva voiding.  -Fluoroscopy reveals a mildly trabeculated bladder wall without diverticuli or VUR. TUR defect noted. No voiding images available as patient required all staff (including radiology tech) to leave the room in order to void.    PROCEDURE:    1. Uroflowmetry.  2. Sterile urethral catheterization for measurement of postvoid residual urine volume.  3. Complex filling cystometrogram with measurement of bladder and rectal pressures.  4. Complex voiding cystometrogram with measurement of bladder and rectal pressures.  5. Electromyography of the pelvic floor during urodynamics.  6. Fluoroscopic imaging of the bladder during urodynamics, at least 3 views.    7. Interpretation of urodynamics and flouroscopic imaging.       INDICATIONS FOR PROCEDURE:  Mr. Sundar German is a pleasant 70 year old male with incomplete bladder emptying and LUTS (primarily nocturia) who is s/p TUR 4 years ago. Recent cystoscopy with Dr. Hardy on 9/8/2020 revealed an open, non-obstructive prostate. He was recommended to perform CIC once daily before bedtime but has not done this. Baseline video urodynamic assessment is requested today by Dr. Gandhi to better characterize Mr. Sundar German's voiding dysfunction and incomplete bladder emptying.     VOIDING DIARY:  Voids every 2-3 hours during the day, nocturia x 3.  Episodes of incontinence: none.  Incontinence associated with: n/a.  Total Volume Intake: 3190 mL; lots of coffee, few beers, and some water.  Total Volume Output: 3280 mL; average voided volume 300 mL, largest voided volume 485 mL.    DESCRIPTION OF PROCEDURE:  Risks, benefits, and alternatives to urodynamics were discussed with the patient and he wished to proceed.  Urodynamics are planned to better assess the primary etiology for Mr. German's urologic dysfunction.  The patient does not currently take anticholinergic medications for his bladder.  After informed consent was obtained, the patient was taken to the procedure room where uroflowmetry was performed. Findings below.     PRE-STUDY UROFLOWMETRY:  Voided volume: 40 mL.  Maximum flow rate: 8.3 mL/sec.  Average flow rate: 3.6 mL/sec.  Character of the curve: intermittent.  Postvoid residual by catheter: 200 mL.  Pretest urine dipstick was negative for leukocytes and nitrites.    Next a 7F double-lumen urodynamics catheter was inserted into the bladder under sterile technique via urethra.  A 7F abdominal manometry catheter was placed in the rectum.  EMG pads were placed on both sides of the anal verge.  The bladder was filled with 200 mL of Iohexol at 50 mL/minute and serial pressures were recorded.  With coughing there was an appropriate rise in vesical and abdominal pressures with  no change in detrusor pressure, confirming good study catheter placement.    DURING THE FILLING PHASE:  First sensation: 153 mL.  First Desire: 164 mL.  Strong Desire: 300 mL.  Maximum Capacity: 466 mL.    Uninhibited detrusor contractions: possible DO without incontinence throughout filling, though he does not sense these pressure changes as urgency (possible technical artifact or related to patient movement? Prior studies today had similar issues related to the Pves catheter).  Compliance: normal. PDet baseline remains stead and close to 0 cm H2O throughout filling.  Continence: no DOI or ALEXSANDRA.  EMG: mostly concordant during filling.    DURING THE VOIDING PHASE:  Maximum detrusor contraction with void: He voids primarily by Valsalva effort which he states is normal for him. There is a questionable supplemental detrusor contraction to ~30 cm H2O AFTER the flow stops, though favor this to actually represent pressure change from mild displacement of one of the catheters during significant Valsalva voiding.  Voided volume: 392 mL.  Maximum flow rate: 13 mL/sec.  Average flow rate: 5.9 mL/sec.  Postvoid Residual: 75 mL.  EMG activity: increased - likely correlates with Valsalva voiding.  Character of voiding curve: intermittent.  BOOI: -13.6 (suggesting no obstruction - see key below)  [obstructed (ROMANO index [BOOI] ? 40); equivocal (no definite   obstruction; BOOI 20-40); and no obstruction (BOOI ? 20)]    FLUOROSCOPIC IMAGING OF THE BLADDER DURING URODYNAMICS:  Please note, image numbers on UDS tracings correlate with iSite series numbers on PACS images. Fluoroscopy during today's procedure demonstrated a mildly trabeculated bladder wall without diverticulae or cellules.  No vesicoureteral reflux was observed. TUR defect was evident. No voiding images available as patient required all staff leaving the room (include radiology tech) in order to void.  After voiding to completion, all catheters were removed and the  patient was brought back into the consultation room to further discuss today's study results.      ASSESSMENT/PLAN:  Mr. Sundar German is a pleasant 70 year old male with incomplete bladder emptying and LUTS who demonstrated the following findings today on urodynamic evaluation:    -Normal bladder capacity (465 mL) with normal filling sensations.  -Normal bladder compliance.  -Questionable phasic DO without incontinence throughout filling, though he does not sense these pressure changes as urgency and does not report symptoms of DO at baseline. Question whether this may relate to technical artifact vs. patient movement? Seems unlikely to be clinically significant. Prior studies in the day had similar technical issues related to the Pves catheter.  -No obvious detrusor contraction during voiding. He voids primary by Valsalva effort which he states is normal for him. Notably, there is a questionable supplemental detrusor contraction to ~30 cm H2O AFTER the flow stops, though actually favor this to represent non-significant pressure change from mild displacement of one of the catheters during significant Valsalva voiding.  -Flow rate is mildly reduced (Qmax 13 ml/s) and interrupted, consistent with Valsalva voiding. He empties satisfactorily (PVR 75 ml).   -Increased EMG activity during voiding which correlates with Valsalva voiding.  -Fluoroscopy reveals a mildly trabeculated bladder wall without diverticuli or VUR. TUR defect noted. No voiding images available as patient required all staff (including radiology tech) to leave the room in order to void.    The patient will follow up as scheduled with Dr. Gandhi to further discuss today's study results and make plans for how best to proceed.      - The patient is currently taking Bactrim as prophylaxis for today's urodynamics study. Therefore, additional antibiotic prophylaxis was not administered today. The risk of UTI with VUDS is low at ~2.5-3%.      Thank you  for allowing me to participate in the care of Mr. Sundar German and please don't hesitate to contact me with any questions or concerns.      Cate Colindres PA-C  Urology Physician Assistant

## 2020-10-21 NOTE — LETTER
10/21/2020       RE: Sundar German  [address]  [place] [id number]     Dear Colleague,    Thank you for referring your patient, Sundar German, to the [facility] [place] at [facility]. Please see a copy of my visit note below.    PREPROCEDURE DIAGNOSES:    1. Incomplete bladder emptying  2. LUTS (primarily nocturia)  3. History of TUR 4 years ago    POSTPROCEDURE DIAGNOSES:  -Normal bladder capacity (465 mL) with normal filling sensations.  -Normal bladder compliance.  -Questionable phasic DO without incontinence throughout filling, though he does not sense these pressure changes as urgency and does not report symptoms of DO at baseline. Question whether this may relate to technical artifact vs. patient movement? Seems unlikely to be clinically significant. Prior studies in the day had similar technical issues related to the Pves catheter.  -No obvious detrusor contraction during voiding. He voids primary by Valsalva effort which he states is normal for him. Notably, there is a questionable supplemental detrusor contraction to ~30 cm H2O AFTER the flow stops, though actually favor this to represent non-significant pressure change from mild displacement of one of the catheters during significant Valsalva voiding.  -Flow rate is mildly reduced (Qmax 13 ml/s) and interrupted, consistent with Valsalva voiding. He empties satisfactorily (PVR 75 ml).   -Increased EMG activity during voiding which correlates with Valsalva voiding.  -Fluoroscopy reveals a mildly trabeculated bladder wall without diverticuli or VUR. TUR defect noted. No voiding images available as patient required all staff (including radiology tech) to leave the room in order to void.    PROCEDURE:    1. Uroflowmetry.  2. Sterile urethral catheterization for measurement of postvoid residual urine volume.  3. Complex filling cystometrogram with measurement of bladder and  rectal pressures.  4. Complex voiding cystometrogram with measurement of bladder and rectal pressures.  5. Electromyography of the pelvic floor during urodynamics.  6. Fluoroscopic imaging of the bladder during urodynamics, at least 3 views.    7. Interpretation of urodynamics and flouroscopic imaging.      INDICATIONS FOR PROCEDURE:  Mr. Sundar German is a pleasant 70 year old male with incomplete bladder emptying and LUTS (primarily nocturia) who is s/p TUR 4 years ago. Recent cystoscopy with Dr. Hardy on 9/8/2020 revealed an open, non-obstructive prostate. He was recommended to perform CIC once daily before bedtime but has not done this. Baseline video urodynamic assessment is requested today by Dr. Gandhi to better characterize Mr. Sundar German's voiding dysfunction and incomplete bladder emptying.     VOIDING DIARY:  Voids every 2-3 hours during the day, nocturia x 3.  Episodes of incontinence: none.  Incontinence associated with: n/a.  Total Volume Intake: 3190 mL; lots of coffee, few beers, and some water.  Total Volume Output: 3280 mL; average voided volume 300 mL, largest voided volume 485 mL.    DESCRIPTION OF PROCEDURE:  Risks, benefits, and alternatives to urodynamics were discussed with the patient and he wished to proceed.  Urodynamics are planned to better assess the primary etiology for Mr. German's urologic dysfunction.  The patient does not currently take anticholinergic medications for his bladder.  After informed consent was obtained, the patient was taken to the procedure room where uroflowmetry was performed. Findings below.     PRE-STUDY UROFLOWMETRY:  Voided volume: 40 mL.  Maximum flow rate: 8.3 mL/sec.  Average flow rate: 3.6 mL/sec.  Character of the curve: intermittent.  Postvoid residual by catheter: 200 mL.  Pretest urine dipstick was negative for leukocytes and nitrites.    Next a 7F double-lumen urodynamics catheter was inserted into the bladder under sterile technique via  urethra.  A 7F abdominal manometry catheter was placed in the rectum.  EMG pads were placed on both sides of the anal verge.  The bladder was filled with 200 mL of Iohexol at 50 mL/minute and serial pressures were recorded.  With coughing there was an appropriate rise in vesical and abdominal pressures with no change in detrusor pressure, confirming good study catheter placement.    DURING THE FILLING PHASE:  First sensation: 153 mL.  First Desire: 164 mL.  Strong Desire: 300 mL.  Maximum Capacity: 466 mL.    Uninhibited detrusor contractions: possible DO without incontinence throughout filling, though he does not sense these pressure changes as urgency (possible technical artifact or related to patient movement? Prior studies today had similar issues related to the Pves catheter).  Compliance: normal. PDet baseline remains stead and close to 0 cm H2O throughout filling.  Continence: no DOI or ALEXSANDRA.  EMG: mostly concordant during filling.    DURING THE VOIDING PHASE:  Maximum detrusor contraction with void: He voids primarily by Valsalva effort which he states is normal for him. There is a questionable supplemental detrusor contraction to ~30 cm H2O AFTER the flow stops, though favor this to actually represent pressure change from mild displacement of one of the catheters during significant Valsalva voiding.  Voided volume: 392 mL.  Maximum flow rate: 13 mL/sec.  Average flow rate: 5.9 mL/sec.  Postvoid Residual: 75 mL.  EMG activity: increased - likely correlates with Valsalva voiding.  Character of voiding curve: intermittent.  BOOI: -13.6 (suggesting no obstruction - see key below)  [obstructed (ROMANO index [BOOI] ? 40); equivocal (no definite   obstruction; BOOI 20-40); and no obstruction (BOOI ? 20)]    FLUOROSCOPIC IMAGING OF THE BLADDER DURING URODYNAMICS:  Please note, image numbers on UDS tracings correlate with iSite series numbers on PACS images. Fluoroscopy during today's procedure demonstrated a mildly  trabeculated bladder wall without diverticulae or cellules.  No vesicoureteral reflux was observed. TUR defect was evident. No voiding images available as patient required all staff leaving the room (include radiology tech) in order to void.  After voiding to completion, all catheters were removed and the patient was brought back into the consultation room to further discuss today's study results.      ASSESSMENT/PLAN:  Mr. Sundar German is a pleasant 70 year old male with incomplete bladder emptying and LUTS who demonstrated the following findings today on urodynamic evaluation:    -Normal bladder capacity (465 mL) with normal filling sensations.  -Normal bladder compliance.  -Questionable phasic DO without incontinence throughout filling, though he does not sense these pressure changes as urgency and does not report symptoms of DO at baseline. Question whether this may relate to technical artifact vs. patient movement? Seems unlikely to be clinically significant. Prior studies in the day had similar technical issues related to the Pves catheter.  -No obvious detrusor contraction during voiding. He voids primary by Valsalva effort which he states is normal for him. Notably, there is a questionable supplemental detrusor contraction to ~30 cm H2O AFTER the flow stops, though actually favor this to represent non-significant pressure change from mild displacement of one of the catheters during significant Valsalva voiding.  -Flow rate is mildly reduced (Qmax 13 ml/s) and interrupted, consistent with Valsalva voiding. He empties satisfactorily (PVR 75 ml).   -Increased EMG activity during voiding which correlates with Valsalva voiding.  -Fluoroscopy reveals a mildly trabeculated bladder wall without diverticuli or VUR. TUR defect noted. No voiding images available as patient required all staff (including radiology tech) to leave the room in order to void.    The patient will follow up as scheduled with Dr. Gandhi to  further discuss today's study results and make plans for how best to proceed.      - The patient is currently taking Bactrim as prophylaxis for today's urodynamics study. Therefore, additional antibiotic prophylaxis was not administered today. The risk of UTI with VUDS is low at ~2.5-3%.      Thank you for allowing me to participate in the care of Mr. Sundar German and please don't hesitate to contact me with any questions or concerns.      Cate Colindres PA-C  Urology Physician Assistant

## 2020-10-21 NOTE — PATIENT INSTRUCTIONS
Follow up with Dr. Gandhi to discuss the results of your Urodynamics Study, and further treatment options.    It was a pleasure meeting with you today.  Thank you for allowing me and my team the privilege of caring for you today.  YOU are the reason we are here, and I truly hope we provided you with the excellent service you deserve.  Please let us know if there is anything else we can do for you so that we can be sure you are leaving completely satisfied with your care experience.          Sharonda Hill, CMA

## 2020-10-26 ENCOUNTER — PRE VISIT (OUTPATIENT)
Dept: UROLOGY | Facility: CLINIC | Age: 70
End: 2020-10-26

## 2020-11-03 ENCOUNTER — VIRTUAL VISIT (OUTPATIENT)
Dept: UROLOGY | Facility: CLINIC | Age: 70
End: 2020-11-03
Payer: COMMERCIAL

## 2020-11-03 DIAGNOSIS — N52.9 ERECTILE DYSFUNCTION, UNSPECIFIED ERECTILE DYSFUNCTION TYPE: Primary | ICD-10-CM

## 2020-11-03 PROCEDURE — 99213 OFFICE O/P EST LOW 20 MIN: CPT | Mod: 95 | Performed by: UROLOGY

## 2020-11-03 ASSESSMENT — PAIN SCALES - GENERAL: PAINLEVEL: NO PAIN (0)

## 2020-11-03 NOTE — NURSING NOTE
Chief Complaint   Patient presents with     RECHECK     LUTS follow up- discuss results of UDS     Sharonda Hill, CMA

## 2020-11-03 NOTE — PATIENT INSTRUCTIONS
Schedule lab appointment for testosterone.    Schedule follow up with Dr. Joe.    It was a pleasure meeting with you today.  Thank you for allowing me and my team the privilege of caring for you today.  YOU are the reason we are here, and I truly hope we provided you with the excellent service you deserve.  Please let us know if there is anything else we can do for you so that we can be sure you are leaving completely satisfied with your care experience.        Sharonda Hill, CMA

## 2020-11-03 NOTE — PROGRESS NOTES
Video Visit Technology for this patient: [employer] Video Visit- Please send an invite to patient's email at [email or web address]      Sundar German is a 70 year old male who is being evaluated via a billable video visit.                UROLOGY TELEPHONE FOLLOW-UP NOTE           Chief Complaint:   Nocturia         Interval Update    Sundar German is a very pleasant 70-year-old gentleman with a history of lower urinary tract symptoms.  He underwent a laser ablation of his prostate approximately 2 years ago at [facility].  Since surgery he has continued to have hesitancy, slow stream, and nocturia 3 times per night.  We initially believed this was stricture or recurrent BPH related.  However recently underwent cystoscopy showing a wide open prostatic fossa.  More recently he underwent urodynamics which showed essentially a hypocontractile bladder with a good prostatic channel and demonstration of near complete Valsalva voiding.    Today he notes that he continues to have mild bother from his symptoms.  His main issue is nocturia which is 3 times per night where he voids approximately 300 cc each time.  We previously taught him how to self cath however he has declined doing this to see whether or not it may help with his nighttime symptoms.  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    Has a secondary concern Mr. German did note trouble with erectile dysfunction.  He does get morning erections but finds that these are weak and insufficient for intercourse.  He has a history of hypogonadism though has not had his testosterone checked in several years.  He was previously on TRT.  He also has a good response to PDE 5 inhibitors though finds them cost prohibitive.  He does have a prescription for generic sildenafil which he has not used.        Labs and Pathology:    I personally reviewed all applicable laboratory data and went  over findings with patient  Significant for:    CBC RESULTS:  Recent Labs   Lab Test 07/30/20  0956 03/17/20  0756 02/28/20  1116 09/03/19  1043 05/07/19  0800   WBC 5.9  --  5.0 6.1 6.0   HGB 14.0 10.2* 13.8 13.3 13.7     --  235 254 307        BMP RESULTS:  Recent Labs   Lab Test 07/30/20  0956 03/18/20  0620 03/17/20  0756 02/28/20  1116 05/07/19  0800 03/23/19  1046     --   --  139 139 142   POTASSIUM 4.3  --   --  3.9 4.2 4.0   CHLORIDE 108  --   --  110* 109 111*   CO2 24  --   --  26 29 26   ANIONGAP 7  --   --  5 1* 5   * 115* 134* 112* 106* 111*   BUN 13  --   --  13 14 17   CR 0.75  --   --  0.66 0.78 0.77   GFRESTIMATED >90  --   --  >90 >90 >90   GFRESTBLACK >90  --   --  >90 >90 >90   VIKKI 9.3  --   --  9.3 9.6 9.5       UA RESULTS:   Recent Labs   Lab Test 10/21/20 02/28/20  1120 09/05/19  1100 03/02/18  1017 03/02/18  1017   SG 1.010 1.025 1.015   < > >1.030   URINEPH 7.0 6.0 7.0   < > 5.5   NITRITE Neg Negative Negative   < > Negative   RBCU  --  2-5* O - 2  --  10-25*   WBCU  --  0 - 5 0 - 5  --  10-25*    < > = values in this interval not displayed.       PSA RESULTS  PSA   Date Value Ref Range Status   07/30/2020 0.69 0 - 4 ug/L Final     Comment:     Assay Method:  Chemiluminescence using Siemens Vista analyzer   05/07/2019 0.77 0 - 4 ug/L Final     Comment:     Assay Method:  Chemiluminescence using Siemens Vista analyzer   03/02/2018 1.07 0 - 4 ug/L Final     Comment:     Assay Method:  Chemiluminescence using Siemens Vista analyzer   01/12/2016 0.62 0 - 4 ug/L Final   06/02/2015 1.45 0 - 4 ug/L Final   11/19/2013 0.90 0 - 4 ug/L Final   11/14/2012 1.23 0 - 4 ug/L Final   03/21/2012 1.38 0 - 4 ug/L Final   01/25/2011 1.66 0 - 4 ug/L Final   11/20/2009 1.18 0 - 4 ug/L Final           Imaging:    I personally reviewed all applicable imaging and went over the below findings with patient.    Results for orders placed or performed in visit on 10/21/20   XR Surgery LAQUITA Fluoro L/T  5 Min    Narrative    This exam was marked as non-reportable because it will not be read by a   radiologist or a [place] non-radiologist provider.                    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  We discussed trial of nighttime catheterization though he declines this at this time.    2) erectile dysfunction  -Instructed how to appropriately use sildenafil 20 mg tablets  -We will update testosterone given history of hypogonadism  -Referral provided to meet with my colleague Dr. Joe to talk about secondary management of his erectile condition.               Past Medical History:     Past Medical History:   Diagnosis Date     Abnormal glucose     A1C 1/06 =  6.3     Acquired hypothyroidism 10/11/2005     Problem list name updated by automated process. Provider to review     Allergic rhinitis, cause unspecified     mary spring time     Benign localized hyperplasia of prostate with urinary obstruction and other lower urinary tract symptoms (LUTS)(600.21)     slow stream, nocturia - Dr Malave     Chronic sinusitis 3/14/2014     Complication of anesthesia      Environmental allergies     AIT - Dr Weiss     History of blood transfusion      Hyperlipidemia LDL goal <130 10/31/2010     hypogonadism     low testosterone at times      RAFIQ (iron deficiency anemia)      Impaired memory 02/11/2011    ?     Melanoma (H) 04/2018    Melanoma 0.7 mm depth, Levar III, superficial spreading, stage T1a     OA (osteoarthritis) total L knee 3/09    knees bother;; has had bilat arthroscopic     PONV (postoperative nausea and vomiting)      Prediabetes      Sleep related leg cramps 06/2012            Past Surgical History:     Past Surgical History:   Procedure Laterality  Date     ARTHROPLASTY KNEE Right 3/16/2020    Rt TKA - Dr DUY Dudley     ARTHROPLASTY REVISION KNEE Left 10/26/2015     ARTHROPLASTY REVISION KNEE - Dr Dudley     COLONOSCOPY  10/2/2013    diverticulosis - due 10 yrs (10/2023), initial 10/2003     CYSTOSCOPY  04/2016     ENDOSCOPIC SINUS SURGERY  3/17/2014    Bilateral Endoscopic Sinus Surgery - Dr Johnson     ESOPHAGOSCOPY, GASTROSCOPY, DUODENOSCOPY (EGD), COMBINED N/A 2/28/2019    Non bleeding gastric ulcers and duodenal ulcer. Recheck 3 months. Dr Sinclair     HC REPAIR UMBILICAL KASI,5+Y/O,REDUC  2002     LASER REVOLIX PHOTOSELECTIVE VAPORIZATION PROSTATE N/A 4/6/2016    LASER REVOLIX / QUANTA PHOTOSELECTIVE VAPORIZATION PROSTATE - Dr Malave     ROTATOR CUFF REPAIR RT/LT Left 03/2012    left shoulder     SURGICAL HISTORY OF -   1996    tonsils and adenoids     SURGICAL HISTORY OF -  Left 03/2009    Lt TKA     SURGICAL HISTORY OF -  Bilateral     Rt Knee x 1 (meniscus, 1995), Lt x 3 (last 2009)     UPPER GI ENDOSCOPY  05/2019    normal     VASECTOMY Bilateral 1986            Medications     Current Outpatient Medications   Medication     acetaminophen (TYLENOL) 325 MG tablet     fluticasone (FLONASE) 50 MCG/ACT nasal spray     levothyroxine (SYNTHROID/LEVOTHROID) 175 MCG tablet     multivitamin, therapeutic with minerals (MULTI-VITAMIN) TABS     psyllium (METAMUCIL/KONSYL) Packet     sildenafil (REVATIO) 20 MG tablet     simvastatin (ZOCOR) 20 MG tablet     sulfamethoxazole-trimethoprim (BACTRIM DS) 800-160 MG tablet     tamsulosin (FLOMAX) 0.4 MG capsule     No current facility-administered medications for this visit.             Family History:     Family History   Problem Relation Age of Onset     C.A.D. Father         CHF     Heart Failure Father      Chronic Obstructive Pulmonary Disease Father      Cerebrovascular Disease Brother 61     Gastrointestinal Disease Brother         GI Bleed - colectomy     Hypertension Mother      Cardiovascular Mother 84        MI      Lipids Mother      Chronic Obstructive Pulmonary Disease Mother      No Known Problems Sister      No Known Problems Brother             Social History:     Social History     Socioeconomic History     Marital status:      Spouse name: Ingrid     Number of children: 4     Years of education: Not on file     Highest education level: Not on file   Occupational History     Employer: [employer]   Social Needs     Financial resource strain: Not on file     Food insecurity     Worry: Not on file     Inability: Not on file     Transportation needs     Medical: Not on file     Non-medical: Not on file   Tobacco Use     Smoking status: Former Smoker     Packs/day: 1.00     Years: 20.00     Pack years: 20.00     Quit date: 1981     Years since quittin.8     Smokeless tobacco: Former User     Types: Chew     Quit date: 1992     Tobacco comment: 1 tin per week   Substance and Sexual Activity     Alcohol use: Yes     Alcohol/week: 7.0 - 8.0 standard drinks     Types: 7 - 8 Standard drinks or equivalent per week     Comment: 1 beer daily     Drug use: No     Sexual activity: Yes     Partners: Female   Lifestyle     Physical activity     Days per week: Not on file     Minutes per session: Not on file     Stress: Not on file   Relationships     Social connections     Talks on phone: Not on file     Gets together: Not on file     Attends Druze service: Not on file     Active member of club or organization: Not on file     Attends meetings of clubs or organizations: Not on file     Relationship status: Not on file     Intimate partner violence     Fear of current or ex partner: Not on file     Emotionally abused: Not on file     Physically abused: Not on file     Forced sexual activity: Not on file   Other Topics Concern     Parent/sibling w/ CABG, MI or angioplasty before 65F 55M? Not Asked   Social History Narrative     Not on file            Allergies:   Patient has no known allergies.         Review  "of Systems:  From intake questionnaire   Negative 14 system review except as noted on HPI, nurse's note.        CC:  Liu Nolasco      The patient has been notified of following:     \"This video visit will be conducted via a call between you and your physician/provider. We have found that certain health care needs can be provided without the need for an in-person physical exam.  This service lets us provide the care you need with a video conversation.  If a prescription is necessary we can send it directly to your pharmacy.  If lab work is needed we can place an order for that and you can then stop by our lab to have the test done at a later time.    Video visits are billed at different rates depending on your insurance coverage.  Please reach out to your insurance provider with any questions.    If during the course of the call the physician/provider feels a video visit is not appropriate, you will not be charged for this service.\"    Patient has given verbal consent for Video visit? Yes  How would you like to obtain your AVS? MyChart  If you are dropped from the video visit, the video invite should be resent to: Send to e-mail at: [email or web address]  Will anyone else be joining your video visit? No        Video-Visit Details    Type of service:  Video Visit    Video Start Time: 10:47  Video End Time: 11:02    Originating Location (pt. Location): Home    Distant Location (provider location):  [facility] [place]     Platform used for Video Visit: Koby Gandhi MD        "

## 2020-11-03 NOTE — LETTER
11/3/2020       RE: Sundar German  [address] [address]     Dear Colleague,    Thank you for referring your patient, Sundar German, to the [facility] [place] [facility] Please see a copy of my visit note below.    Video Visit Technology for this patient: [employer] Video Visit- Please send an invite to patient's email at [email or web address]      Sundar German is a 70 year old male who is being evaluated via a billable video visit.                UROLOGY TELEPHONE FOLLOW-UP NOTE           Chief Complaint:   Nocturia         Interval Update    Sundar German is a very pleasant 70-year-old gentleman with a history of lower urinary tract symptoms.  He underwent a laser ablation of his prostate approximately 2 years ago at [facility].  Since surgery he has continued to have hesitancy, slow stream, and nocturia 3 times per night.  We initially believed this was stricture or recurrent BPH related.  However recently underwent cystoscopy showing a wide open prostatic fossa.  More recently he underwent urodynamics which showed essentially a hypocontractile bladder with a good prostatic channel and demonstration of near complete Valsalva voiding.    Today he notes that he continues to have mild bother from his symptoms.  His main issue is nocturia which is 3 times per night where he voids approximately 300 cc each time.  We previously taught him how to self cath however he has declined doing this to see whether or not it may help with his nighttime symptoms.  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    Has a secondary concern Mr. German did note trouble with erectile dysfunction.  He does get morning erections but finds that these are weak and insufficient for intercourse.  He has a history of hypogonadism though has not had his  testosterone checked in several years.  He was previously on TRT.  He also has a good response to PDE 5 inhibitors though finds them cost prohibitive.  He does have a prescription for generic sildenafil which he has not used.        Labs and Pathology:    I personally reviewed all applicable laboratory data and went over findings with patient  Significant for:    CBC RESULTS:  Recent Labs   Lab Test 07/30/20  0956 03/17/20  0756 02/28/20  1116 09/03/19  1043 05/07/19  0800   WBC 5.9  --  5.0 6.1 6.0   HGB 14.0 10.2* 13.8 13.3 13.7     --  235 254 307        BMP RESULTS:  Recent Labs   Lab Test 07/30/20  0956 03/18/20  0620 03/17/20  0756 02/28/20  1116 05/07/19  0800 03/23/19  1046     --   --  139 139 142   POTASSIUM 4.3  --   --  3.9 4.2 4.0   CHLORIDE 108  --   --  110* 109 111*   CO2 24  --   --  26 29 26   ANIONGAP 7  --   --  5 1* 5   * 115* 134* 112* 106* 111*   BUN 13  --   --  13 14 17   CR 0.75  --   --  0.66 0.78 0.77   GFRESTIMATED >90  --   --  >90 >90 >90   GFRESTBLACK >90  --   --  >90 >90 >90   VIKKI 9.3  --   --  9.3 9.6 9.5       UA RESULTS:   Recent Labs   Lab Test 10/21/20 02/28/20  1120 09/05/19  1100 03/02/18  1017 03/02/18  1017   SG 1.010 1.025 1.015   < > >1.030   URINEPH 7.0 6.0 7.0   < > 5.5   NITRITE Neg Negative Negative   < > Negative   RBCU  --  2-5* O - 2  --  10-25*   WBCU  --  0 - 5 0 - 5  --  10-25*    < > = values in this interval not displayed.       PSA RESULTS  PSA   Date Value Ref Range Status   07/30/2020 0.69 0 - 4 ug/L Final     Comment:     Assay Method:  Chemiluminescence using Siemens Vista analyzer   05/07/2019 0.77 0 - 4 ug/L Final     Comment:     Assay Method:  Chemiluminescence using Siemens Vista analyzer   03/02/2018 1.07 0 - 4 ug/L Final     Comment:     Assay Method:  Chemiluminescence using Siemens Vista analyzer   01/12/2016 0.62 0 - 4 ug/L Final   06/02/2015 1.45 0 - 4 ug/L Final   11/19/2013 0.90 0 - 4 ug/L Final   11/14/2012 1.23 0 - 4  ug/L Final   03/21/2012 1.38 0 - 4 ug/L Final   01/25/2011 1.66 0 - 4 ug/L Final   11/20/2009 1.18 0 - 4 ug/L Final           Imaging:    I personally reviewed all applicable imaging and went over the below findings with patient.    Results for orders placed or performed in visit on 10/21/20   XR Surgery LAQUITA Fluoro L/T 5 Min    Narrative    This exam was marked as non-reportable because it will not be read by a   radiologist or a [place] [facility] non-radiologist provider.                    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  We discussed trial of nighttime catheterization though he declines this at this time.    2) erectile dysfunction  -Instructed how to appropriately use sildenafil 20 mg tablets  -We will update testosterone given history of hypogonadism  -Referral provided to meet with my colleague Dr. Joe to talk about secondary management of his erectile condition.               Past Medical History:     Past Medical History:   Diagnosis Date     Abnormal glucose     A1C 1/06 =  6.3     Acquired hypothyroidism 10/11/2005     Problem list name updated by automated process. Provider to review     Allergic rhinitis, cause unspecified     mary spring time     Benign localized hyperplasia of prostate with urinary obstruction and other lower urinary tract symptoms (LUTS)(600.21)     slow stream, nocturia - Dr Malave     Chronic sinusitis 3/14/2014     Complication of anesthesia      Environmental allergies     AIT - Dr Weiss     History of blood transfusion      Hyperlipidemia LDL goal <130 10/31/2010     hypogonadism     low testosterone at times      RAFIQ (iron deficiency anemia)      Impaired memory 02/11/2011    ?     Melanoma (H) 04/2018    Melanoma 0.7 mm  depth, Levar III, superficial spreading, stage T1a     OA (osteoarthritis) total L knee 3/09    knees bother;; has had bilat arthroscopic     PONV (postoperative nausea and vomiting)      Prediabetes      Sleep related leg cramps 06/2012            Past Surgical History:     Past Surgical History:   Procedure Laterality Date     ARTHROPLASTY KNEE Right 3/16/2020    Rt TKA - Dr DUY Dudley     ARTHROPLASTY REVISION KNEE Left 10/26/2015     ARTHROPLASTY REVISION KNEE - Dr Dudley     COLONOSCOPY  10/2/2013    diverticulosis - due 10 yrs (10/2023), initial 10/2003     CYSTOSCOPY  04/2016     ENDOSCOPIC SINUS SURGERY  3/17/2014    Bilateral Endoscopic Sinus Surgery - Dr Johnson     ESOPHAGOSCOPY, GASTROSCOPY, DUODENOSCOPY (EGD), COMBINED N/A 2/28/2019    Non bleeding gastric ulcers and duodenal ulcer. Recheck 3 months. Dr Sinclair     HC REPAIR UMBILICAL KASI,5+Y/O,REDUC  2002     LASER REVOLIX PHOTOSELECTIVE VAPORIZATION PROSTATE N/A 4/6/2016    LASER REVOLIX / QUANTA PHOTOSELECTIVE VAPORIZATION PROSTATE - Dr Malave     ROTATOR CUFF REPAIR RT/LT Left 03/2012    left shoulder     SURGICAL HISTORY OF -   1996    tonsils and adenoids     SURGICAL HISTORY OF -  Left 03/2009    Lt TKA     SURGICAL HISTORY OF -  Bilateral     Rt Knee x 1 (meniscus, 1995), Lt x 3 (last 2009)     UPPER GI ENDOSCOPY  05/2019    normal     VASECTOMY Bilateral 1986            Medications     Current Outpatient Medications   Medication     acetaminophen (TYLENOL) 325 MG tablet     fluticasone (FLONASE) 50 MCG/ACT nasal spray     levothyroxine (SYNTHROID/LEVOTHROID) 175 MCG tablet     multivitamin, therapeutic with minerals (MULTI-VITAMIN) TABS     psyllium (METAMUCIL/KONSYL) Packet     sildenafil (REVATIO) 20 MG tablet     simvastatin (ZOCOR) 20 MG tablet     sulfamethoxazole-trimethoprim (BACTRIM DS) 800-160 MG tablet     tamsulosin (FLOMAX) 0.4 MG capsule     No current facility-administered medications for this visit.             Family History:      Family History   Problem Relation Age of Onset     C.A.D. Father         CHF     Heart Failure Father      Chronic Obstructive Pulmonary Disease Father      Cerebrovascular Disease Brother 61     Gastrointestinal Disease Brother         GI Bleed - colectomy     Hypertension Mother      Cardiovascular Mother 84        MI     Lipids Mother      Chronic Obstructive Pulmonary Disease Mother      No Known Problems Sister      No Known Problems Brother             Social History:     Social History     Socioeconomic History     Marital status:      Spouse name: Ingrid     Number of children: 4     Years of education: Not on file     Highest education level: Not on file   Occupational History     Employer: [employer]   Social Needs     Financial resource strain: Not on file     Food insecurity     Worry: Not on file     Inability: Not on file     Transportation needs     Medical: Not on file     Non-medical: Not on file   Tobacco Use     Smoking status: Former Smoker     Packs/day: 1.00     Years: 20.00     Pack years: 20.00     Quit date: 1981     Years since quittin.8     Smokeless tobacco: Former User     Types: Chew     Quit date: 1992     Tobacco comment: 1 tin per week   Substance and Sexual Activity     Alcohol use: Yes     Alcohol/week: 7.0 - 8.0 standard drinks     Types: 7 - 8 Standard drinks or equivalent per week     Comment: 1 beer daily     Drug use: No     Sexual activity: Yes     Partners: Female   Lifestyle     Physical activity     Days per week: Not on file     Minutes per session: Not on file     Stress: Not on file   Relationships     Social connections     Talks on phone: Not on file     Gets together: Not on file     Attends Christian service: Not on file     Active member of club or organization: Not on file     Attends meetings of clubs or organizations: Not on file     Relationship status: Not on file     Intimate partner violence     Fear of current or ex partner: Not  "on file     Emotionally abused: Not on file     Physically abused: Not on file     Forced sexual activity: Not on file   Other Topics Concern     Parent/sibling w/ CABG, MI or angioplasty before 65F 55M? Not Asked   Social History Narrative     Not on file            Allergies:   Patient has no known allergies.         Review of Systems:  From intake questionnaire   Negative 14 system review except as noted on HPI, nurse's note.        CC:  Liu Nolasco      The patient has been notified of following:     \"This video visit will be conducted via a call between you and your physician/provider. We have found that certain health care needs can be provided without the need for an in-person physical exam.  This service lets us provide the care you need with a video conversation.  If a prescription is necessary we can send it directly to your pharmacy.  If lab work is needed we can place an order for that and you can then stop by our lab to have the test done at a later time.    Video visits are billed at different rates depending on your insurance coverage.  Please reach out to your insurance provider with any questions.    If during the course of the call the physician/provider feels a video visit is not appropriate, you will not be charged for this service.\"    Patient has given verbal consent for Video visit? Yes  How would you like to obtain your AVS? MyChart  If you are dropped from the video visit, the video invite should be resent to: Send to e-mail at: [email or web address]  Will anyone else be joining your video visit? No        Video-Visit Details    Type of service:  Video Visit    Video Start Time: 10:47  Video End Time: 11:02    Originating Location (pt. Location): Home    Distant Location (provider location):  [facility]     Platform used for Video Visit: Koby Gandhi MD          "

## 2020-11-04 ENCOUNTER — OFFICE VISIT (OUTPATIENT)
Dept: FAMILY MEDICINE | Facility: CLINIC | Age: 70
End: 2020-11-04
Payer: COMMERCIAL

## 2020-11-04 VITALS
OXYGEN SATURATION: 99 % | HEART RATE: 54 BPM | HEIGHT: 71 IN | TEMPERATURE: 97.4 F | BODY MASS INDEX: 29.96 KG/M2 | SYSTOLIC BLOOD PRESSURE: 114 MMHG | WEIGHT: 214 LBS | DIASTOLIC BLOOD PRESSURE: 72 MMHG

## 2020-11-04 DIAGNOSIS — D50.9 IRON DEFICIENCY ANEMIA, UNSPECIFIED IRON DEFICIENCY ANEMIA TYPE: ICD-10-CM

## 2020-11-04 DIAGNOSIS — Z12.11 SCREEN FOR COLON CANCER: ICD-10-CM

## 2020-11-04 DIAGNOSIS — Z96.652 S/P REVISION OF TOTAL KNEE, LEFT: ICD-10-CM

## 2020-11-04 DIAGNOSIS — M15.0 PRIMARY OSTEOARTHRITIS INVOLVING MULTIPLE JOINTS: ICD-10-CM

## 2020-11-04 DIAGNOSIS — R33.9 URINARY RETENTION: ICD-10-CM

## 2020-11-04 DIAGNOSIS — E03.9 ACQUIRED HYPOTHYROIDISM: ICD-10-CM

## 2020-11-04 DIAGNOSIS — Z51.81 MEDICATION MONITORING ENCOUNTER: ICD-10-CM

## 2020-11-04 DIAGNOSIS — Z00.00 ENCOUNTER FOR ROUTINE ADULT HEALTH EXAMINATION WITHOUT ABNORMAL FINDINGS: Primary | ICD-10-CM

## 2020-11-04 DIAGNOSIS — R73.03 PREDIABETES: ICD-10-CM

## 2020-11-04 DIAGNOSIS — N52.9 ERECTILE DYSFUNCTION, UNSPECIFIED ERECTILE DYSFUNCTION TYPE: ICD-10-CM

## 2020-11-04 DIAGNOSIS — C43.59 MALIGNANT MELANOMA OF TORSO EXCLUDING BREAST (H): ICD-10-CM

## 2020-11-04 DIAGNOSIS — E78.5 HYPERLIPIDEMIA LDL GOAL <100: ICD-10-CM

## 2020-11-04 DIAGNOSIS — Z96.651 STATUS POST TOTAL RIGHT KNEE REPLACEMENT: ICD-10-CM

## 2020-11-04 DIAGNOSIS — Z12.5 SCREENING FOR PROSTATE CANCER: ICD-10-CM

## 2020-11-04 DIAGNOSIS — N40.1 BENIGN PROSTATIC HYPERPLASIA WITH LOWER URINARY TRACT SYMPTOMS, SYMPTOM DETAILS UNSPECIFIED: ICD-10-CM

## 2020-11-04 DIAGNOSIS — Z00.00 ENCOUNTER FOR MEDICARE ANNUAL WELLNESS EXAM: ICD-10-CM

## 2020-11-04 LAB
ALBUMIN SERPL-MCNC: 3.8 G/DL (ref 3.4–5)
ALBUMIN UR-MCNC: NEGATIVE MG/DL
ALP SERPL-CCNC: 59 U/L (ref 40–150)
ALT SERPL W P-5'-P-CCNC: 32 U/L (ref 0–70)
ANION GAP SERPL CALCULATED.3IONS-SCNC: 5 MMOL/L (ref 3–14)
APPEARANCE UR: CLEAR
AST SERPL W P-5'-P-CCNC: 24 U/L (ref 0–45)
BILIRUB SERPL-MCNC: 0.5 MG/DL (ref 0.2–1.3)
BILIRUB UR QL STRIP: NEGATIVE
BUN SERPL-MCNC: 14 MG/DL (ref 7–30)
CALCIUM SERPL-MCNC: 9.1 MG/DL (ref 8.5–10.1)
CHLORIDE SERPL-SCNC: 109 MMOL/L (ref 94–109)
CHOLEST SERPL-MCNC: 142 MG/DL
CK SERPL-CCNC: 148 U/L (ref 30–300)
CO2 SERPL-SCNC: 27 MMOL/L (ref 20–32)
COLOR UR AUTO: YELLOW
CREAT SERPL-MCNC: 0.78 MG/DL (ref 0.66–1.25)
CREAT UR-MCNC: 105 MG/DL
ERYTHROCYTE [DISTWIDTH] IN BLOOD BY AUTOMATED COUNT: 12.9 % (ref 10–15)
FERRITIN SERPL-MCNC: 123 NG/ML (ref 26–388)
GFR SERPL CREATININE-BSD FRML MDRD: >90 ML/MIN/{1.73_M2}
GLUCOSE SERPL-MCNC: 95 MG/DL (ref 70–99)
GLUCOSE UR STRIP-MCNC: NEGATIVE MG/DL
HBA1C MFR BLD: 5.2 % (ref 0–5.6)
HCT VFR BLD AUTO: 39.6 % (ref 40–53)
HDLC SERPL-MCNC: 78 MG/DL
HGB BLD-MCNC: 13.5 G/DL (ref 13.3–17.7)
HGB UR QL STRIP: ABNORMAL
IRON SATN MFR SERPL: 25 % (ref 15–46)
IRON SERPL-MCNC: 83 UG/DL (ref 35–180)
KETONES UR STRIP-MCNC: NEGATIVE MG/DL
LDLC SERPL CALC-MCNC: 55 MG/DL
LEUKOCYTE ESTERASE UR QL STRIP: ABNORMAL
MCH RBC QN AUTO: 34.5 PG (ref 26.5–33)
MCHC RBC AUTO-ENTMCNC: 34.1 G/DL (ref 31.5–36.5)
MCV RBC AUTO: 101 FL (ref 78–100)
MICROALBUMIN UR-MCNC: 55 MG/L
MICROALBUMIN/CREAT UR: 52.1 MG/G CR (ref 0–17)
NITRATE UR QL: NEGATIVE
NON-SQ EPI CELLS #/AREA URNS LPF: ABNORMAL /LPF
NONHDLC SERPL-MCNC: 64 MG/DL
PH UR STRIP: 5.5 PH (ref 5–7)
PLATELET # BLD AUTO: 258 10E9/L (ref 150–450)
POTASSIUM SERPL-SCNC: 4.8 MMOL/L (ref 3.4–5.3)
PROT SERPL-MCNC: 7.5 G/DL (ref 6.8–8.8)
PSA SERPL-ACNC: 0.87 UG/L (ref 0–4)
RBC # BLD AUTO: 3.91 10E12/L (ref 4.4–5.9)
RBC #/AREA URNS AUTO: ABNORMAL /HPF
SODIUM SERPL-SCNC: 141 MMOL/L (ref 133–144)
SOURCE: ABNORMAL
SP GR UR STRIP: 1.02 (ref 1–1.03)
T4 FREE SERPL-MCNC: 0.93 NG/DL (ref 0.76–1.46)
TIBC SERPL-MCNC: 332 UG/DL (ref 240–430)
TRIGL SERPL-MCNC: 46 MG/DL
TSH SERPL DL<=0.005 MIU/L-ACNC: 2.36 MU/L (ref 0.4–4)
UROBILINOGEN UR STRIP-ACNC: 0.2 EU/DL (ref 0.2–1)
WBC # BLD AUTO: 5.5 10E9/L (ref 4–11)
WBC #/AREA URNS AUTO: ABNORMAL /HPF

## 2020-11-04 PROCEDURE — 84443 ASSAY THYROID STIM HORMONE: CPT | Performed by: FAMILY MEDICINE

## 2020-11-04 PROCEDURE — 82043 UR ALBUMIN QUANTITATIVE: CPT | Performed by: FAMILY MEDICINE

## 2020-11-04 PROCEDURE — 84403 ASSAY OF TOTAL TESTOSTERONE: CPT | Mod: 90 | Performed by: FAMILY MEDICINE

## 2020-11-04 PROCEDURE — 84439 ASSAY OF FREE THYROXINE: CPT | Performed by: FAMILY MEDICINE

## 2020-11-04 PROCEDURE — 83036 HEMOGLOBIN GLYCOSYLATED A1C: CPT | Performed by: FAMILY MEDICINE

## 2020-11-04 PROCEDURE — 83540 ASSAY OF IRON: CPT | Performed by: FAMILY MEDICINE

## 2020-11-04 PROCEDURE — 85027 COMPLETE CBC AUTOMATED: CPT | Performed by: FAMILY MEDICINE

## 2020-11-04 PROCEDURE — 80053 COMPREHEN METABOLIC PANEL: CPT | Performed by: FAMILY MEDICINE

## 2020-11-04 PROCEDURE — 99000 SPECIMEN HANDLING OFFICE-LAB: CPT | Performed by: FAMILY MEDICINE

## 2020-11-04 PROCEDURE — 80061 LIPID PANEL: CPT | Performed by: FAMILY MEDICINE

## 2020-11-04 PROCEDURE — 83550 IRON BINDING TEST: CPT | Performed by: FAMILY MEDICINE

## 2020-11-04 PROCEDURE — 84270 ASSAY OF SEX HORMONE GLOBUL: CPT | Performed by: FAMILY MEDICINE

## 2020-11-04 PROCEDURE — 36415 COLL VENOUS BLD VENIPUNCTURE: CPT | Performed by: FAMILY MEDICINE

## 2020-11-04 PROCEDURE — 99397 PER PM REEVAL EST PAT 65+ YR: CPT | Performed by: FAMILY MEDICINE

## 2020-11-04 PROCEDURE — 82728 ASSAY OF FERRITIN: CPT | Performed by: FAMILY MEDICINE

## 2020-11-04 PROCEDURE — 82550 ASSAY OF CK (CPK): CPT | Performed by: FAMILY MEDICINE

## 2020-11-04 PROCEDURE — G0103 PSA SCREENING: HCPCS | Performed by: FAMILY MEDICINE

## 2020-11-04 PROCEDURE — 81001 URINALYSIS AUTO W/SCOPE: CPT | Performed by: FAMILY MEDICINE

## 2020-11-04 RX ORDER — TAMSULOSIN HYDROCHLORIDE 0.4 MG/1
0.4 CAPSULE ORAL DAILY
Qty: 90 CAPSULE | Refills: 1 | Status: SHIPPED | OUTPATIENT
Start: 2020-11-04 | End: 2021-05-28

## 2020-11-04 RX ORDER — LEVOTHYROXINE SODIUM 175 UG/1
175 TABLET ORAL DAILY
Qty: 90 TABLET | Refills: 1 | Status: SHIPPED | OUTPATIENT
Start: 2020-11-04 | End: 2021-05-04

## 2020-11-04 RX ORDER — SIMVASTATIN 20 MG
20 TABLET ORAL AT BEDTIME
Qty: 90 TABLET | Refills: 1 | Status: SHIPPED | OUTPATIENT
Start: 2020-11-04 | End: 2021-11-29

## 2020-11-04 ASSESSMENT — ACTIVITIES OF DAILY LIVING (ADL): CURRENT_FUNCTION: NO ASSISTANCE NEEDED

## 2020-11-04 ASSESSMENT — MIFFLIN-ST. JEOR: SCORE: 1757.83

## 2020-11-04 NOTE — PROGRESS NOTES
"[facility]    Sundar German is a 70 year old male who presents for Preventive Visit with no acute complaints.    Patient has been advised of split billing requirements and indicates understanding: Yes   Are you in the first 12 months of your Medicare coverage?  No    Healthy Habits:    In general, how would you rate your overall health?  Excellent    Frequency of exercise:: No routine - staying active.    Do you usually eat at least 4 servings of fruit and vegetables a day, include whole grains    & fiber and avoid regularly eating high fat or \"junk\" foods?  No    Taking medications regularly:  Yes    Barriers to taking medications:  None    Medication side effects:  None    Ability to successfully perform activities of daily living:  No assistance needed    Home Safety:  No safety concerns identified    Hearing impairment symptoms: Has hearing aids.    In the past 6 months, have you been bothered by leaking of urine?  No    In general, how would you rate your overall mental or emotional health?  Excellent      PHQ-2 Total Score:    Additional concerns today:  No    Do you feel safe in your environment? Yes    Have you ever done Advance Care Planning? (For example, a Health Directive, POLST, or a discussion with a medical provider or your loved ones about your wishes): No, advance care planning information given to patient to review.  Patient plans to discuss their wishes with loved ones or provider.        Fall risk  Fallen 2 or more times in the past year?: No  Any fall with injury in the past year?: No    Cognitive Screening   1) Repeat 3 items (Leader, Season, Table)    2) Clock draw: NORMAL  3) 3 item recall: Recalls 3 objects  Results: NORMAL clock, 1-2 items recalled: COGNITIVE IMPAIRMENT LESS LIKELY    Mini-CogTM Copyright TAMMIE Traylor. Licensed by the author for use in [facility]; reprinted with permission (nany@.[facility]). All rights reserved.      Do you have " sleep apnea, excessive snoring or daytime drowsiness?: no    Reviewed and updated as needed this visit by clinical staff  Tobacco  Allergies  Meds   Med Hx  Surg Hx  Fam Hx  Soc Hx        Reviewed and updated as needed this visit by Provider                Social History     Tobacco Use     Smoking status: Former Smoker     Packs/day: 1.00     Years: 20.00     Pack years: 20.00     Quit date: 1981     Years since quittin.8     Smokeless tobacco: Former User     Types: Chew     Quit date: 1992     Tobacco comment: 1 tin per week   Substance Use Topics     Alcohol use: Yes     Alcohol/week: 7.0 - 8.0 standard drinks     Types: 7 - 8 Standard drinks or equivalent per week     Comment: 1 beer daily     If you drink alcohol do you typically have >3 drinks per day or >7 drinks per week? No    Alcohol Use 2020   Prescreen: >3 drinks/day or >7 drinks/week? No     Prediabetes, stable. Labs pending   Lab Results   Component Value Date     2020     2020     2020     2020     2019     Hemoglobin A1C   Date Value Ref Range Status   2020 5.3 0 - 5.6 % Final     Comment:     Normal <5.7% Prediabetes 5.7-6.4%  Diabetes 6.5% or higher - adopted from ADA   consensus guidelines.     2020 5.3 0 - 5.6 % Final     Comment:     Normal <5.7% Prediabetes 5.7-6.4%  Diabetes 6.5% or higher - adopted from ADA   consensus guidelines.     2019 5.5 0 - 5.6 % Final     Comment:     Normal <5.7% Prediabetes 5.7-6.4%  Diabetes 6.5% or higher - adopted from ADA   consensus guidelines.       Hyperlipidemia Follow-Up, stable. Lab pending.  Lab Results   Component Value Date    CHOL 167 2020    CHOL 157 2019     Lab Results   Component Value Date    HDL 73 2020    HDL 51 2019     Lab Results   Component Value Date    LDL 81 2020    LDL 94 2019     Lab Results   Component Value Date    TRIG 65 2020    TRIG  61 05/07/2019     Lab Results   Component Value Date    CHOLHDLRATIO 2.8 06/02/2015    CHOLHDLRATIO 3.2 11/19/2013         Are you regularly taking any medication or supplement to lower your cholesterol?   Yes- Simvastatin    Are you having muscle aches or other side effects that you think could be caused by your cholesterol lowering medication?  No    Hypothyroidism Follow-up, lab pending   TSH   Date Value Ref Range Status   07/30/2020 1.18 0.40 - 4.00 mU/L Final       Since last visit, patient describes the following symptoms: Weight stable, no hair loss, no skin changes, no constipation, no loose stools    Hx of malignant melanoma, stable    Followed by Dermatology. Last appointment 1 month ago.     Primary arthritis multiple joints s/p bilateral knee replacements   Stable no complaints     Hx of iron deficient anemia, stable    Lab Results   Component Value Date    HGB 14.0 07/30/2020     Ferritin   Date Value Ref Range Status   07/30/2020 91 26 - 388 ng/mL Final     Iron   Date Value Ref Range Status   07/30/2020 65 35 - 180 ug/dL Final     Iron Binding Cap   Date Value Ref Range Status   07/30/2020 360 240 - 430 ug/dL Final     BPH with hypoactive bladder  Followed by Dr. Gandhi. Stable.     Erectile dysfunction  Following up with Dr. Martel.     Seasonal allergies  Followed by Dr. Weiss. Receiving maintenance allergy shots. Appointment later today. Stable.     Current providers sharing in care for this patient include:   Patient Care Team:  Liu Nolasco MD as PCP - General (Family Practice)  Liu Nolasco MD as Assigned PCP  Sundar Gandhi MD as MD (Urology)  Sabrina Milan RN as Registered Nurse  Sundar Gandhi MD as Assigned Surgical Provider    The following health maintenance items are reviewed in Epic and correct as of today:  Health Maintenance   Topic Date Due     FALL RISK ASSESSMENT  07/27/2021     PSA  07/30/2021     MEDICARE ANNUAL WELLNESS VISIT  11/04/2021     TSH W/FREE T4  REFLEX  11/04/2021     COLORECTAL CANCER SCREENING  10/02/2023     LIPID  11/04/2025     ADVANCE CARE PLANNING  11/04/2025     DTAP/TDAP/TD IMMUNIZATION (3 - Td) 03/02/2028     HEPATITIS C SCREENING  Completed     PHQ-2  Completed     INFLUENZA VACCINE  Completed     Pneumococcal Vaccine: 65+ Years  Completed     ZOSTER IMMUNIZATION  Completed     AORTIC ANEURYSM SCREENING (SYSTEM ASSIGNED)  Completed     Pneumococcal Vaccine: Pediatrics (0 to 5 Years) and At-Risk Patients (6 to 64 Years)  Aged Out     IPV IMMUNIZATION  Aged Out     MENINGITIS IMMUNIZATION  Aged Out     HEPATITIS B IMMUNIZATION  Aged Out       Health Maintenance     Colonoscopy:  Due 2023   FIT:  given              PSA:  pending   DEXA:  NA    There are no preventive care reminders to display for this patient.    Current Problem List    Patient Active Problem List   Diagnosis     Allergic rhinitis     Acquired hypothyroidism     Hyperlipidemia LDL goal <100     Impaired memory     Sleep related leg cramps     Chronic sinusitis     S/P revision of total knee, left     BPH (benign prostatic hyperplasia)     OA (osteoarthritis)     Prediabetes     Class 1 obesity with serious comorbidity and body mass index (BMI) of 33.0 to 33.9 in adult, unspecified obesity type     h/o Malignant melanoma of torso excluding breast (H)     GI bleed     * * * SBE PROPHYLAXIS * * *     S/P total knee arthroplasty       Past Medical History    Past Medical History:   Diagnosis Date     Abnormal glucose     A1C 1/06 =  6.3     Acquired hypothyroidism 10/11/2005     Problem list name updated by automated process. Provider to review     Allergic rhinitis, cause unspecified     mary spring time     Benign localized hyperplasia of prostate with urinary obstruction and other lower urinary tract symptoms (LUTS)(600.21)     slow stream, nocturia - Dr Malave     Chronic sinusitis 3/14/2014     Complication of anesthesia      Environmental allergies     AIT - Dr Weiss      History of blood transfusion      Hyperlipidemia LDL goal <130 10/31/2010     hypogonadism     low testosterone at times      RAFIQ (iron deficiency anemia)      Impaired memory 02/11/2011    ?     Melanoma (H) 04/2018    Melanoma 0.7 mm depth, Levar III, superficial spreading, stage T1a     OA (osteoarthritis) total L knee 3/09    knees bother;; has had bilat arthroscopic     PONV (postoperative nausea and vomiting)      Prediabetes      Sleep related leg cramps 06/2012       Past Surgical History    Past Surgical History:   Procedure Laterality Date     ARTHROPLASTY KNEE Right 3/16/2020    Rt TKA - Dr DUY Dudley     ARTHROPLASTY REVISION KNEE Left 10/26/2015     ARTHROPLASTY REVISION KNEE - Dr Dudley     COLONOSCOPY  10/2/2013    diverticulosis - due 10 yrs (10/2023), initial 10/2003     CYSTOSCOPY  04/2016     ENDOSCOPIC SINUS SURGERY  3/17/2014    Bilateral Endoscopic Sinus Surgery - Dr Johnson     ESOPHAGOSCOPY, GASTROSCOPY, DUODENOSCOPY (EGD), COMBINED N/A 2/28/2019    Non bleeding gastric ulcers and duodenal ulcer. Recheck 3 months. Dr Sinclair     HC REPAIR UMBILICAL KASI,5+Y/O,REDUC  2002     LASER REVOLIX PHOTOSELECTIVE VAPORIZATION PROSTATE N/A 4/6/2016    LASER REVOLIX / QUANTA PHOTOSELECTIVE VAPORIZATION PROSTATE - Dr Malave     ROTATOR CUFF REPAIR RT/LT Left 03/2012    left shoulder     SURGICAL HISTORY OF -   1996    tonsils and adenoids     SURGICAL HISTORY OF -  Left 03/2009    Lt TKA     SURGICAL HISTORY OF -  Bilateral     Rt Knee x 1 (meniscus, 1995), Lt x 3 (last 2009)     UPPER GI ENDOSCOPY  05/2019    normal     VASECTOMY Bilateral 1986       Current Medications    Current Outpatient Medications   Medication Sig Dispense Refill     acetaminophen (TYLENOL) 325 MG tablet Take 3 tablets (975 mg) by mouth every 8 hours 270 tablet 0     fluticasone (FLONASE) 50 MCG/ACT nasal spray Spray 1 spray into both nostrils daily 48 g 3     levothyroxine (SYNTHROID/LEVOTHROID) 175 MCG tablet Take 1 tablet (175 mcg) by  mouth daily 90 tablet 1     multivitamin, therapeutic with minerals (MULTI-VITAMIN) TABS Take 1 tablet by mouth daily       psyllium (METAMUCIL/KONSYL) Packet Take 1 packet by mouth daily       sildenafil (REVATIO) 20 MG tablet Take 1 tablet (20 mg) by mouth three daily as needed. 90 tablet 0     simvastatin (ZOCOR) 20 MG tablet Take 1 tablet (20 mg) by mouth At Bedtime 90 tablet 1     tamsulosin (FLOMAX) 0.4 MG capsule Take 1 capsule (0.4 mg) by mouth daily 90 capsule 1       Allergies    No Known Allergies    Immunizations    Immunization History   Administered Date(s) Administered     Influenza (High Dose) 3 valent vaccine 10/24/2019, 09/28/2020     Influenza (IIV3) PF 10/01/2003, 10/21/2012, 11/19/2013, 09/14/2015     Influenza Vaccine IM > 6 months Valent IIV4 11/19/2013, 09/15/2017, 10/01/2018, 10/24/2019     Influenza, Quad, High Dose, Pf, 65yr + 09/28/2020     Pneumo Conj 13-V (2010&after) 01/12/2016     Pneumococcal 23 valent 11/21/2003, 03/02/2018     TD (ADULT, 7+) 03/02/2018     TDAP Vaccine (Adacel) 10/06/2008     Zoster vaccine recombinant adjuvanted (SHINGRIX) 05/17/2019, 09/03/2019     Zoster vaccine, live 11/19/2013       Family History    Family History   Problem Relation Age of Onset     C.A.D. Father         CHF     Heart Failure Father      Chronic Obstructive Pulmonary Disease Father      Cerebrovascular Disease Brother 61     Gastrointestinal Disease Brother         GI Bleed - colectomy     Hypertension Mother      Cardiovascular Mother 84        MI     Lipids Mother      Chronic Obstructive Pulmonary Disease Mother      No Known Problems Sister      No Known Problems Brother        Social History    Social History     Socioeconomic History     Marital status:      Spouse name: Ingrid     Number of children: 4     Years of education: Not on file     Highest education level: Not on file   Occupational History     Employer: [employer]   Social Needs     Financial resource strain: Not  on file     Food insecurity     Worry: Not on file     Inability: Not on file     Transportation needs     Medical: Not on file     Non-medical: Not on file   Tobacco Use     Smoking status: Former Smoker     Packs/day: 1.00     Years: 20.00     Pack years: 20.00     Quit date: 1981     Years since quittin.8     Smokeless tobacco: Former User     Types: Chew     Quit date: 1992     Tobacco comment: 1 tin per week   Substance and Sexual Activity     Alcohol use: Yes     Alcohol/week: 7.0 - 8.0 standard drinks     Types: 7 - 8 Standard drinks or equivalent per week     Comment: 1 beer daily     Drug use: No     Sexual activity: Yes     Partners: Female   Lifestyle     Physical activity     Days per week: Not on file     Minutes per session: Not on file     Stress: Not on file   Relationships     Social connections     Talks on phone: Not on file     Gets together: Not on file     Attends Adventism service: Not on file     Active member of club or organization: Not on file     Attends meetings of clubs or organizations: Not on file     Relationship status: Not on file     Intimate partner violence     Fear of current or ex partner: Not on file     Emotionally abused: Not on file     Physically abused: Not on file     Forced sexual activity: Not on file   Other Topics Concern     Parent/sibling w/ CABG, MI or angioplasty before 65F 55M? Not Asked   Social History Narrative     Not on file       ROS    CONSTITUTIONAL: NEGATIVE for fever, chills, change in weight  INTEGUMENTARY/SKIN: NEGATIVE for worrisome rashes, moles or lesions  EYES: NEGATIVE for vision changes or irritation  ENT/MOUTH: NEGATIVE for ear, mouth and throat problems  RESP: NEGATIVE for significant cough or SOB  CV: NEGATIVE for chest pain, palpitations or peripheral edema  GI: NEGATIVE for nausea, abdominal pain, heartburn, or change in bowel habits  : NEGATIVE for frequency, dysuria, or hematuria  MUSCULOSKELETAL: NEGATIVE for significant  "arthralgias or myalgia  NEURO: NEGATIVE for weakness, dizziness or paresthesias  ENDOCRINE: NEGATIVE for temperature intolerance, skin/hair changes  HEME: NEGATIVE for bleeding problems  PSYCHIATRIC: NEGATIVE for changes in mood or affect    OBJECTIVE    /72 (BP Location: Right arm, Patient Position: Chair, Cuff Size: Adult Large)   Pulse 54   Temp 97.4  F (36.3  C) (Tympanic)   Ht 1.803 m (5' 11\")   Wt 97.1 kg (214 lb)   SpO2 99%   BMI 29.85 kg/m   Estimated body mass index is 29.85 kg/m  as calculated from the following:    Height as of this encounter: 1.803 m (5' 11\").    Weight as of this encounter: 97.1 kg (214 lb).  EXAM:   GENERAL: healthy, alert and no distress  EYES: Eyes grossly normal to inspection, PERRL and conjunctivae and sclerae normal  HENT: ear canals and TM's normal, nose and mouth without ulcers or lesions  NECK: no adenopathy, no asymmetry, masses, or scars and thyroid normal to palpation  RESP: lungs clear to auscultation - no rales, rhonchi or wheezes  CV: regular rate and rhythm, normal S1 S2, no S3 or S4, no murmur, click or rub, no peripheral edema and peripheral pulses strong  ABDOMEN: soft, nontender, no hepatosplenomegaly, no masses and bowel sounds normal   (male): Per Dr Gandhi  RECTAL: Dr Gandhi  MS: no gross musculoskeletal defects noted, no edema  NEURO: Normal strength and tone, mentation intact and speech normal  PSYCH: mentation appears normal, affect normal/bright  LYMPH: no cervical, supraclavicular, axillary, or inguinal adenopathy    DIAGNOSTICS/PROCEDURES    Pending    ASSESSMENT      ICD-10-CM    1. Encounter for routine adult health examination without abnormal findings  Z00.00 Comprehensive metabolic panel     Lipid panel reflex to direct LDL Fasting     CK total     CBC with platelets     UA reflex to Microscopic and Culture     Albumin Random Urine Quantitative with Creat Ratio     Prostate spec antigen screen     Fecal colorectal cancer screen FIT     " Hemoglobin A1c     TSH     T4, free     Testosterone Free and Total     Iron and iron binding capacity     Ferritin   2. Encounter for Medicare annual wellness exam  Z00.00 Comprehensive metabolic panel     Lipid panel reflex to direct LDL Fasting     CK total     CBC with platelets     UA reflex to Microscopic and Culture     Albumin Random Urine Quantitative with Creat Ratio     Prostate spec antigen screen     Fecal colorectal cancer screen FIT     Hemoglobin A1c     TSH     T4, free     Testosterone Free and Total     Iron and iron binding capacity     Ferritin   3. Prediabetes  R73.03 Comprehensive metabolic panel     Lipid panel reflex to direct LDL Fasting     UA reflex to Microscopic and Culture     Albumin Random Urine Quantitative with Creat Ratio     Hemoglobin A1c     simvastatin (ZOCOR) 20 MG tablet   4. Hyperlipidemia LDL goal <100  E78.5 Comprehensive metabolic panel     Lipid panel reflex to direct LDL Fasting     CK total     simvastatin (ZOCOR) 20 MG tablet   5. h/o Malignant melanoma of torso excluding breast (H)  C43.59    6. Primary osteoarthritis involving multiple joints  M89.49    7. Status post total right knee replacement  Z96.651    8. S/P revision of total knee, left  Z96.652    9. Acquired hypothyroidism  E03.9 TSH     T4, free     levothyroxine (SYNTHROID/LEVOTHROID) 175 MCG tablet   10. Iron deficiency anemia, unspecified iron deficiency anemia type  D50.9 CBC with platelets     Iron and iron binding capacity     Ferritin   11. Urinary retention  R33.9 tamsulosin (FLOMAX) 0.4 MG capsule   12. Erectile dysfunction, unspecified erectile dysfunction type  N52.9 Testosterone Free and Total   13. Benign prostatic hyperplasia with lower urinary tract symptoms, symptom details unspecified  N40.1 Prostate spec antigen screen     tamsulosin (FLOMAX) 0.4 MG capsule   14. Screening for prostate cancer  Z12.5 Prostate spec antigen screen     Testosterone Free and Total   15. Screen for colon  "cancer  Z12.11 Fecal colorectal cancer screen FIT   16. Medication monitoring encounter  Z51.81 Comprehensive metabolic panel     Lipid panel reflex to direct LDL Fasting     CK total     CBC with platelets     UA reflex to Microscopic and Culture     Albumin Random Urine Quantitative with Creat Ratio     Hemoglobin A1c     TSH     T4, free     levothyroxine (SYNTHROID/LEVOTHROID) 175 MCG tablet       PLAN    Discussed treatment/modality options, including risk and benefits, he desires:    advised alcohol consumption 1oz per day or less, advised 1 multivitamin per day, advised diet and exercise and diet discussed    All diagnosis above reviewed and noted above, otherwise stable.      See [facility] [employer] orders for further details.      1) Labs pending    2) Follow up with Dr. Martel as scheduled for ED management     3) Follow up with Dr. Weiss as scheduled for allergies       Return in about 6 months (around 5/4/2021), or if symptoms worsen or fail to improve, for Annual Wellness Visit, Medication Recheck Visit, Follow Up Chronic.    There are no preventive care reminders to display for this patient.    End of Life Planning:  Patient currently has an advanced directive: No.  I have verified the patient's ablity to prepare an advanced directive/make health care decisions.  Literature was provided to assist patient in preparing an advanced directive.    COUNSELING    Reviewed preventive health counseling, as reflected in patient instructions    Estimated body mass index is 29.85 kg/m  as calculated from the following:    Height as of this encounter: 1.803 m (5' 11\").    Weight as of this encounter: 97.1 kg (214 lb).    Weight management plan: Discussed healthy diet and exercise guidelines     reports that he quit smoking about 39 years ago. He has a 20.00 pack-year smoking history. He quit smokeless tobacco use about 28 years ago.  His smokeless tobacco use included chew.      Appropriate preventive services were " discussed with this patient, including applicable screening as appropriate for cardiovascular disease, diabetes, osteopenia/osteoporosis, and glaucoma.  As appropriate for age/gender, discussed screening for colorectal cancer, prostate cancer, breast cancer, and cervical cancer. Checklist reviewing preventive services available has been given to the patient.    Reviewed patients plan of care and provided an AVS. The Basic Care Plan (routine screening as documented in Health Maintenance) for Sundar meets the Care Plan requirement. This Care Plan has been established and reviewed with the Patient.    Jared Ramírez, MS3,  has participated in the care of this patient.     Provider Disclosure:  I agree with above History, Review of Systems, Physical exam and Plan.  I have reviewed the content of the documentation and have edited it as needed. I have personally performed the services documented here and the documentation accurately represents those services and the decisions I have made.      Electronically signed by:         Liu Nolasco MD, FAAFP     [facility]  [address] [place] [id number]  [email or web address]   Office: [phone]  Fax: [phone]  Pager: [phone]

## 2020-11-06 LAB
SHBG SERPL-SCNC: 34 NMOL/L (ref 11–80)
TESTOST FREE SERPL-MCNC: 8.44 NG/DL (ref 4.7–24.4)
TESTOST SERPL-MCNC: 420 NG/DL (ref 240–950)

## 2020-11-11 DIAGNOSIS — R80.9 PROTEIN IN URINE: ICD-10-CM

## 2020-11-11 DIAGNOSIS — R31.9 HEMATURIA: Primary | ICD-10-CM

## 2020-12-03 ENCOUNTER — PRE VISIT (OUTPATIENT)
Dept: UROLOGY | Facility: CLINIC | Age: 70
End: 2020-12-03

## 2020-12-03 NOTE — TELEPHONE ENCOUNTER
Reason for Visit: Consult    Diagnosis: Erectile Dysfunction    Orders/Procedures/Records: in system    Contact Patient: n/a    Rooming Requirements: normal      Katerine Sterling LPN  12/03/20  12:03 PM

## 2020-12-16 DIAGNOSIS — R31.9 HEMATURIA: ICD-10-CM

## 2020-12-16 DIAGNOSIS — Z51.81 MEDICATION MONITORING ENCOUNTER: ICD-10-CM

## 2020-12-16 DIAGNOSIS — R73.03 PREDIABETES: ICD-10-CM

## 2020-12-16 DIAGNOSIS — R80.9 PROTEIN IN URINE: ICD-10-CM

## 2020-12-16 LAB
ALBUMIN UR-MCNC: NEGATIVE MG/DL
APPEARANCE UR: CLEAR
BILIRUB UR QL STRIP: NEGATIVE
COLOR UR AUTO: YELLOW
GLUCOSE UR STRIP-MCNC: NEGATIVE MG/DL
HGB UR QL STRIP: NEGATIVE
KETONES UR STRIP-MCNC: NEGATIVE MG/DL
LEUKOCYTE ESTERASE UR QL STRIP: NEGATIVE
NITRATE UR QL: NEGATIVE
PH UR STRIP: 7 PH (ref 5–7)
SOURCE: NORMAL
SP GR UR STRIP: 1.02 (ref 1–1.03)
UROBILINOGEN UR STRIP-ACNC: 0.2 EU/DL (ref 0.2–1)

## 2020-12-16 PROCEDURE — 81003 URINALYSIS AUTO W/O SCOPE: CPT | Performed by: FAMILY MEDICINE

## 2020-12-16 PROCEDURE — 82043 UR ALBUMIN QUANTITATIVE: CPT | Performed by: FAMILY MEDICINE

## 2020-12-16 PROCEDURE — 87086 URINE CULTURE/COLONY COUNT: CPT | Performed by: FAMILY MEDICINE

## 2020-12-17 ENCOUNTER — VIRTUAL VISIT (OUTPATIENT)
Dept: UROLOGY | Facility: CLINIC | Age: 70
End: 2020-12-17
Payer: COMMERCIAL

## 2020-12-17 DIAGNOSIS — N52.9 ERECTILE DYSFUNCTION, UNSPECIFIED ERECTILE DYSFUNCTION TYPE: Primary | ICD-10-CM

## 2020-12-17 LAB
BACTERIA SPEC CULT: NO GROWTH
CREAT UR-MCNC: 79 MG/DL
Lab: NORMAL
MICROALBUMIN UR-MCNC: 29 MG/L
MICROALBUMIN/CREAT UR: 37.17 MG/G CR (ref 0–17)
SPECIMEN SOURCE: NORMAL

## 2020-12-17 PROCEDURE — 99213 OFFICE O/P EST LOW 20 MIN: CPT | Mod: 95 | Performed by: UROLOGY

## 2020-12-17 RX ORDER — TADALAFIL 20 MG/1
20 TABLET ORAL DAILY PRN
Qty: 10 TABLET | Refills: 11 | Status: SHIPPED | OUTPATIENT
Start: 2020-12-17 | End: 2021-10-01

## 2020-12-17 NOTE — PROGRESS NOTES
"Video Visit Technology for this patient: Koby Video Visit- Patient was left in waiting room    Sundar German is a 70 year old male who is being evaluated via a billable video visit.      The patient has been notified of following:     \"This video visit will be conducted via a call between you and your physician/provider. We have found that certain health care needs can be provided without the need for an in-person physical exam.  This service lets us provide the care you need with a video conversation.  If a prescription is necessary we can send it directly to your pharmacy.  If lab work is needed we can place an order for that and you can then stop by our lab to have the test done at a later time.    Video visits are billed at different rates depending on your insurance coverage.  Please reach out to your insurance provider with any questions.    If during the course of the call the physician/provider feels a video visit is not appropriate, you will not be charged for this service.\"    Patient has given verbal consent for Video visit? Yes  How would you like to obtain your AVS? Mail a copy  If you are dropped from the video visit, the video invite should be resent to: Send to e-mail at: [email or web address]  Will anyone else be joining your video visit? No        Video-Visit Details    Type of service:  Video Visit    Video Start Time: 10:58 AM  Video End Time: 11:16 AM    Originating Location (pt. Location): Home    Distant Location (provider location):  [facility] [place]     Platform used for Video Visit: Koby      Has a secondary concern Mr. German did note trouble with erectile dysfunction.  He does get morning erections but finds that these are weak and insufficient for intercourse.  He has a history of hypogonadism though has not had his testosterone checked in several years.  He was previously on TRT, 15 years ago.  He also has a good response to PDE 5 inhibitors though " finds them cost prohibitive.  He does have a prescription for generic sildenafil which he has not used.     ED/Vascular disease risk factors:  HTN:   No  Hyperlipidemia: yes, has been treated.  Smoking: quit 1981   DM: not  Cardiovascular disease: None  known  Meds associated with ED that he's taking: nothing  Anxiety/anger/depression:  No  Penile Plaques or curvature:  None significant known.  Testosterone is checked and is normal.      Component      Latest Ref Rng & Units 11/4/2020   Testosterone Total      240 - 950 ng/dL 420   Sex Hormone Binding Globulin      11 - 80 nmol/L 34   Free Testosterone Calculated      4.7 - 24.4 ng/dL 8.44   PSA      0 - 4 ug/L 0.87   Hemoglobin A1C      0 - 5.6 % 5.2   TSH      0.40 - 4.00 mU/L 2.36   T4 Free      0.76 - 1.46 ng/dL 0.93     Exam  General- Alert, oriented, nad.  Pleasant and conversant.  Eyes- anicteric, EOMI.  Resps- normal, non-labored.  No cough  Abdomen-  nondistended.   exam- deferred.   Neurological - no tremors  Skin - no discoloration/ lesions noted  Psychiatric - no anxiety, alert & oriented.       The rest of a comprehensive physical examination is deferred due to PHE (public health emergency) video visit restrictions.        A-  Erectile dysfunction- likely organic.     Plan  Discussed PDE5i and how to take.  Discussed Viagra versus Cialis- he would like to try Cialis right now.  Declines intracavernosal injections or IPP options right now.  He will call if interested in doing intracavernosal injections trial in the clinic.      Elizabeth MAYORGA

## 2020-12-17 NOTE — LETTER
"12/17/2020       RE: Sundar German  [address] [address]     Dear Colleague,    Thank you for referring your patient, Sundar German, to the [facility] [place] [facility]. Please see a copy of my visit note below.    Video Visit Technology for this patient: Koby Video Visit- Patient was left in waiting room    Sundar German is a 70 year old male who is being evaluated via a billable video visit.      The patient has been notified of following:     \"This video visit will be conducted via a call between you and your physician/provider. We have found that certain health care needs can be provided without the need for an in-person physical exam.  This service lets us provide the care you need with a video conversation.  If a prescription is necessary we can send it directly to your pharmacy.  If lab work is needed we can place an order for that and you can then stop by our lab to have the test done at a later time.    Video visits are billed at different rates depending on your insurance coverage.  Please reach out to your insurance provider with any questions.    If during the course of the call the physician/provider feels a video visit is not appropriate, you will not be charged for this service.\"    Patient has given verbal consent for Video visit? Yes  How would you like to obtain your AVS? Mail a copy  If you are dropped from the video visit, the video invite should be resent to: Send to e-mail at: [email or web address]  Will anyone else be joining your video visit? No        Video-Visit Details    Type of service:  Video Visit    Video Start Time: 10:58 AM  Video End Time: 11:16 AM    Originating Location (pt. Location): Home    Distant Location (provider location):  [facility] UROLOGY [facility]     Platform used for Video Visit: Koby      Has a secondary concern Mr. German did note trouble " with erectile dysfunction.  He does get morning erections but finds that these are weak and insufficient for intercourse.  He has a history of hypogonadism though has not had his testosterone checked in several years.  He was previously on TRT, 15 years ago.  He also has a good response to PDE 5 inhibitors though finds them cost prohibitive.  He does have a prescription for generic sildenafil which he has not used.     ED/Vascular disease risk factors:  HTN:   No  Hyperlipidemia: yes, has been treated.  Smoking: quit 1981   DM: not  Cardiovascular disease: None  known  Meds associated with ED that he's taking: nothing  Anxiety/anger/depression:  No  Penile Plaques or curvature:  None significant known.  Testosterone is checked and is normal.      Component      Latest Ref Rng & Units 11/4/2020   Testosterone Total      240 - 950 ng/dL 420   Sex Hormone Binding Globulin      11 - 80 nmol/L 34   Free Testosterone Calculated      4.7 - 24.4 ng/dL 8.44   PSA      0 - 4 ug/L 0.87   Hemoglobin A1C      0 - 5.6 % 5.2   TSH      0.40 - 4.00 mU/L 2.36   T4 Free      0.76 - 1.46 ng/dL 0.93     Exam  General- Alert, oriented, nad.  Pleasant and conversant.  Eyes- anicteric, EOMI.  Resps- normal, non-labored.  No cough  Abdomen-  nondistended.   exam- deferred.   Neurological - no tremors  Skin - no discoloration/ lesions noted  Psychiatric - no anxiety, alert & oriented.       The rest of a comprehensive physical examination is deferred due to PHE (public health emergency) video visit restrictions.        A-  Erectile dysfunction- likely organic.     Plan  Discussed PDE5i and how to take.  Discussed Viagra versus Cialis- he would like to try Cialis right now.  Declines intracavernosal injections or IPP options right now.  He will call if interested in doing intracavernosal injections trial in the clinic.      Elizabeth MAYORGA

## 2021-04-09 ENCOUNTER — TRANSFERRED RECORDS (OUTPATIENT)
Dept: HEALTH INFORMATION MANAGEMENT | Facility: CLINIC | Age: 71
End: 2021-04-09

## 2021-05-28 DIAGNOSIS — R33.9 URINARY RETENTION: ICD-10-CM

## 2021-05-28 DIAGNOSIS — N40.1 BENIGN PROSTATIC HYPERPLASIA WITH LOWER URINARY TRACT SYMPTOMS, SYMPTOM DETAILS UNSPECIFIED: ICD-10-CM

## 2021-05-28 RX ORDER — TAMSULOSIN HYDROCHLORIDE 0.4 MG/1
CAPSULE ORAL
Qty: 90 CAPSULE | Refills: 3 | Status: SHIPPED | OUTPATIENT
Start: 2021-05-28 | End: 2021-09-17

## 2021-05-28 NOTE — TELEPHONE ENCOUNTER
Routing refill request to provider for review/approval because:  Drug interaction warning  Simone Avendano RN, BSN

## 2021-06-01 NOTE — TELEPHONE ENCOUNTER
Called and informed patient that tamsulosin was renewed. Advised to not take flomax and cialis together, to wait 12 hours. He should watch for signs of low BP (lightheadedness, dizziness). Patient stated an understanding and agreed with plan.     Mariana Nguyen RN  [facility]

## 2021-09-12 ENCOUNTER — VIRTUAL VISIT (OUTPATIENT)
Dept: URGENT CARE | Facility: CLINIC | Age: 71
End: 2021-09-12
Payer: COMMERCIAL

## 2021-09-12 DIAGNOSIS — R05.9 COUGH: Primary | ICD-10-CM

## 2021-09-12 PROCEDURE — 99213 OFFICE O/P EST LOW 20 MIN: CPT | Mod: 95

## 2021-09-12 NOTE — PROGRESS NOTES
Andi is a 71 year old who is being evaluated via a billable telephone visit.      What phone number would you like to be contacted at?  [phone]  How would you like to obtain your AVS? MyChart    Assessment & Plan     Cough    - Symptomatic COVID-19 Virus (Coronavirus) by PCR; Future      10 minutes spent on the date of the encounter doing patient visit and documentation         See Patient Instructions    Return in about 1 week (around 9/19/2021), or if symptoms worsen or fail to improve.    Virtual Urgent Care  [facility] VIRTUAL URGENT CARE    Subjective   Andi is a 71 year old who presents for the following health issues     HPI     71-year-old male presents to virtual urgent care via a telephone visit with a Covid concern.   Symptoms began 5 days ago with chills, head congestion and cough.  Has some fatigue, and voice is very hoarse.  Is now having a productive cough of yellow-green sputum.  Is fully vaccinated for COVID-19.  No known ill contacts.      Review of Systems   Constitutional, HEENT, cardiovascular, pulmonary, gi and gu systems are negative, except as otherwise noted.      Objective           Vitals:  No vitals were obtained today due to virtual visit.    Physical Exam   healthy, alert and no distress  PSYCH: Alert and oriented times 3; coherent speech, normal   rate and volume, able to articulate logical thoughts, able   to abstract reason, no tangential thoughts, no hallucinations   or delusions  His affect is normal  RESP: No cough, no audible wheezing, able to talk in full sentences  Remainder of exam unable to be completed due to telephone visits          Phone call duration: 5 minutes

## 2021-09-12 NOTE — PATIENT INSTRUCTIONS
Please call [phone] to schedule your Covid test.  Test results are typically available 18 to 24 hours after you complete your test.  If your test is positive you will be called by an [facility] nurse.  Test results are always available on [facility].  Please quarantine until you know your test results.

## 2021-09-13 ENCOUNTER — LAB (OUTPATIENT)
Dept: URGENT CARE | Facility: URGENT CARE | Age: 71
End: 2021-09-13
Attending: NURSE PRACTITIONER
Payer: COMMERCIAL

## 2021-09-13 DIAGNOSIS — R05.9 COUGH: ICD-10-CM

## 2021-09-13 LAB — SARS-COV-2 RNA RESP QL NAA+PROBE: NEGATIVE

## 2021-09-13 PROCEDURE — U0005 INFEC AGEN DETEC AMPLI PROBE: HCPCS

## 2021-09-13 PROCEDURE — U0003 INFECTIOUS AGENT DETECTION BY NUCLEIC ACID (DNA OR RNA); SEVERE ACUTE RESPIRATORY SYNDROME CORONAVIRUS 2 (SARS-COV-2) (CORONAVIRUS DISEASE [COVID-19]), AMPLIFIED PROBE TECHNIQUE, MAKING USE OF HIGH THROUGHPUT TECHNOLOGIES AS DESCRIBED BY CMS-2020-01-R: HCPCS

## 2021-09-15 DIAGNOSIS — N40.1 BENIGN PROSTATIC HYPERPLASIA WITH LOWER URINARY TRACT SYMPTOMS, SYMPTOM DETAILS UNSPECIFIED: ICD-10-CM

## 2021-09-15 DIAGNOSIS — R33.9 URINARY RETENTION: ICD-10-CM

## 2021-09-17 RX ORDER — TAMSULOSIN HYDROCHLORIDE 0.4 MG/1
CAPSULE ORAL
Qty: 90 CAPSULE | Refills: 3 | Status: SHIPPED | OUTPATIENT
Start: 2021-09-17 | End: 2021-11-29

## 2021-09-17 NOTE — TELEPHONE ENCOUNTER
Failed protocol.  please route to  team if patient needs an appointment     Tamie BROWNRN BSN  [facility]  [phone]

## 2021-09-20 ENCOUNTER — MYC MEDICAL ADVICE (OUTPATIENT)
Dept: FAMILY MEDICINE | Facility: CLINIC | Age: 71
End: 2021-09-20

## 2021-09-23 DIAGNOSIS — N52.9 ERECTILE DYSFUNCTION, UNSPECIFIED ERECTILE DYSFUNCTION TYPE: ICD-10-CM

## 2021-09-28 NOTE — TELEPHONE ENCOUNTER
Routing refill request to provider for review/approval because:  Requires med review     Kae Pisano RN

## 2021-09-29 NOTE — TELEPHONE ENCOUNTER
Please med rec with patient    Please note PDE's and alpha blockers may cause low BP, please watch for this

## 2021-10-01 DIAGNOSIS — N52.9 ERECTILE DYSFUNCTION, UNSPECIFIED ERECTILE DYSFUNCTION TYPE: ICD-10-CM

## 2021-10-01 RX ORDER — SILDENAFIL CITRATE 20 MG/1
TABLET ORAL
Qty: 90 TABLET | Refills: 0 | Status: SHIPPED | OUTPATIENT
Start: 2021-10-01 | End: 2021-10-04

## 2021-10-01 RX ORDER — TADALAFIL 20 MG/1
20 TABLET ORAL DAILY PRN
Qty: 10 TABLET | Refills: 11 | Status: SHIPPED | OUTPATIENT
Start: 2021-10-01 | End: 2021-10-04

## 2021-10-01 RX ORDER — SILDENAFIL CITRATE 20 MG/1
TABLET ORAL
Qty: 90 TABLET | Refills: 0 | Status: SHIPPED | OUTPATIENT
Start: 2021-10-01 | End: 2021-11-29

## 2021-10-01 NOTE — TELEPHONE ENCOUNTER
Pt walked into clinic to discuss medications. Pt advised of provider note below. Pt stated he would like both Cialis and Sildenafil sent to pharmacy to see which would be less expensive. Writer advised of good rx coupons available, pt agreed and will fill which ever is less. Pt advised to be cautious while taking these along with flomax or other alpha blockers.  Patient stated an understanding.    Pt will discuss further at upcoming visit.    Next 5 appointments (look out 90 days)    Nov 29, 2021  8:30 AM  PHYSICAL with Liu Nolasco MD  [facility] ([facility] ) [address] [place] [id number]  [phone]        Marcos OLIVA RN   [facility] Triage

## 2021-10-01 NOTE — TELEPHONE ENCOUNTER
Pharmacist calls to check on status of refill. Advised him of Dr. Nolasco's message, he will notify patient of this. Please call patient to discuss.

## 2021-10-04 RX ORDER — SILDENAFIL CITRATE 20 MG/1
TABLET ORAL
Qty: 30 TABLET | Refills: 1 | Status: SHIPPED | OUTPATIENT
Start: 2021-10-04 | End: 2021-10-07 | Stop reason: ALTCHOICE

## 2021-10-04 RX ORDER — TADALAFIL 20 MG/1
TABLET ORAL
Qty: 30 TABLET | Refills: 1 | Status: SHIPPED | OUTPATIENT
Start: 2021-10-04 | End: 2021-10-07 | Stop reason: ALTCHOICE

## 2021-10-04 NOTE — TELEPHONE ENCOUNTER
Routing refill request to provider for review/approval because:  Patient requesting 2 different ED meds. See note  Autumn ULRICH RN, BSN  -[facility]

## 2021-10-07 NOTE — TELEPHONE ENCOUNTER
Patient picked up sildenafil prescribed from Dr. Joe. Discontinued other orders.     Mariana Nguyen RN  [facility]

## 2021-10-13 NOTE — TELEPHONE ENCOUNTER
Called patient to apologize for the long phone wait times. Acknowledged that we have not had the level of service we want to provide and we are working on it at a system level with several steps. Patient appreciated the phone call and good to hear we know its something that needs to get fixed for patient service.    Oanh Reyes  Clinic Manager

## 2021-10-23 ENCOUNTER — HEALTH MAINTENANCE LETTER (OUTPATIENT)
Age: 71
End: 2021-10-23

## 2021-11-29 ENCOUNTER — OFFICE VISIT (OUTPATIENT)
Dept: FAMILY MEDICINE | Facility: CLINIC | Age: 71
End: 2021-11-29
Payer: COMMERCIAL

## 2021-11-29 VITALS
HEIGHT: 71 IN | DIASTOLIC BLOOD PRESSURE: 78 MMHG | TEMPERATURE: 97 F | BODY MASS INDEX: 31.5 KG/M2 | SYSTOLIC BLOOD PRESSURE: 130 MMHG | HEART RATE: 58 BPM | WEIGHT: 225 LBS | OXYGEN SATURATION: 98 %

## 2021-11-29 DIAGNOSIS — R73.03 PREDIABETES: ICD-10-CM

## 2021-11-29 DIAGNOSIS — E78.5 HYPERLIPIDEMIA LDL GOAL <100: ICD-10-CM

## 2021-11-29 DIAGNOSIS — E03.9 ACQUIRED HYPOTHYROIDISM: ICD-10-CM

## 2021-11-29 DIAGNOSIS — Z12.11 SCREEN FOR COLON CANCER: ICD-10-CM

## 2021-11-29 DIAGNOSIS — Z12.5 SCREENING FOR PROSTATE CANCER: ICD-10-CM

## 2021-11-29 DIAGNOSIS — H90.3 SENSORINEURAL HEARING LOSS (SNHL) OF BOTH EARS: ICD-10-CM

## 2021-11-29 DIAGNOSIS — N40.1 BENIGN PROSTATIC HYPERPLASIA WITH LOWER URINARY TRACT SYMPTOMS, SYMPTOM DETAILS UNSPECIFIED: ICD-10-CM

## 2021-11-29 DIAGNOSIS — R33.9 URINARY RETENTION: ICD-10-CM

## 2021-11-29 DIAGNOSIS — Z00.00 ROUTINE GENERAL MEDICAL EXAMINATION AT A HEALTH CARE FACILITY: Primary | ICD-10-CM

## 2021-11-29 DIAGNOSIS — M15.0 PRIMARY OSTEOARTHRITIS INVOLVING MULTIPLE JOINTS: ICD-10-CM

## 2021-11-29 DIAGNOSIS — C43.59 MALIGNANT MELANOMA OF TORSO EXCLUDING BREAST (H): ICD-10-CM

## 2021-11-29 DIAGNOSIS — D50.9 IRON DEFICIENCY ANEMIA, UNSPECIFIED IRON DEFICIENCY ANEMIA TYPE: ICD-10-CM

## 2021-11-29 DIAGNOSIS — N52.9 ERECTILE DYSFUNCTION, UNSPECIFIED ERECTILE DYSFUNCTION TYPE: ICD-10-CM

## 2021-11-29 DIAGNOSIS — Z51.81 MEDICATION MONITORING ENCOUNTER: ICD-10-CM

## 2021-11-29 DIAGNOSIS — Z91.09 ENVIRONMENTAL ALLERGIES: ICD-10-CM

## 2021-11-29 LAB
ALBUMIN UR-MCNC: NEGATIVE MG/DL
APPEARANCE UR: CLEAR
BILIRUB UR QL STRIP: NEGATIVE
COLOR UR AUTO: YELLOW
ERYTHROCYTE [DISTWIDTH] IN BLOOD BY AUTOMATED COUNT: 13.2 % (ref 10–15)
GLUCOSE UR STRIP-MCNC: NEGATIVE MG/DL
HBA1C MFR BLD: 5.4 % (ref 0–5.6)
HCT VFR BLD AUTO: 40.6 % (ref 40–53)
HGB BLD-MCNC: 13.5 G/DL (ref 13.3–17.7)
HGB UR QL STRIP: ABNORMAL
KETONES UR STRIP-MCNC: NEGATIVE MG/DL
LEUKOCYTE ESTERASE UR QL STRIP: NEGATIVE
MCH RBC QN AUTO: 33.9 PG (ref 26.5–33)
MCHC RBC AUTO-ENTMCNC: 33.3 G/DL (ref 31.5–36.5)
MCV RBC AUTO: 102 FL (ref 78–100)
NITRATE UR QL: NEGATIVE
PH UR STRIP: 5 [PH] (ref 5–7)
PLATELET # BLD AUTO: 245 10E3/UL (ref 150–450)
RBC # BLD AUTO: 3.98 10E6/UL (ref 4.4–5.9)
RBC #/AREA URNS AUTO: NORMAL /HPF
SP GR UR STRIP: 1.02 (ref 1–1.03)
UROBILINOGEN UR STRIP-ACNC: 0.2 E.U./DL
WBC # BLD AUTO: 4.8 10E3/UL (ref 4–11)
WBC #/AREA URNS AUTO: NORMAL /HPF

## 2021-11-29 PROCEDURE — 83036 HEMOGLOBIN GLYCOSYLATED A1C: CPT | Performed by: FAMILY MEDICINE

## 2021-11-29 PROCEDURE — 82550 ASSAY OF CK (CPK): CPT | Performed by: FAMILY MEDICINE

## 2021-11-29 PROCEDURE — G0103 PSA SCREENING: HCPCS | Performed by: FAMILY MEDICINE

## 2021-11-29 PROCEDURE — 36415 COLL VENOUS BLD VENIPUNCTURE: CPT | Performed by: FAMILY MEDICINE

## 2021-11-29 PROCEDURE — 84443 ASSAY THYROID STIM HORMONE: CPT | Performed by: FAMILY MEDICINE

## 2021-11-29 PROCEDURE — 83550 IRON BINDING TEST: CPT | Performed by: FAMILY MEDICINE

## 2021-11-29 PROCEDURE — 82728 ASSAY OF FERRITIN: CPT | Performed by: FAMILY MEDICINE

## 2021-11-29 PROCEDURE — 99397 PER PM REEVAL EST PAT 65+ YR: CPT | Performed by: FAMILY MEDICINE

## 2021-11-29 PROCEDURE — 80053 COMPREHEN METABOLIC PANEL: CPT | Performed by: FAMILY MEDICINE

## 2021-11-29 PROCEDURE — 81001 URINALYSIS AUTO W/SCOPE: CPT | Performed by: FAMILY MEDICINE

## 2021-11-29 PROCEDURE — 80061 LIPID PANEL: CPT | Performed by: FAMILY MEDICINE

## 2021-11-29 PROCEDURE — 84439 ASSAY OF FREE THYROXINE: CPT | Performed by: FAMILY MEDICINE

## 2021-11-29 PROCEDURE — 85027 COMPLETE CBC AUTOMATED: CPT | Performed by: FAMILY MEDICINE

## 2021-11-29 PROCEDURE — 82043 UR ALBUMIN QUANTITATIVE: CPT | Performed by: FAMILY MEDICINE

## 2021-11-29 RX ORDER — SILDENAFIL CITRATE 20 MG/1
TABLET ORAL
Qty: 90 TABLET | Refills: 1 | Status: SHIPPED | OUTPATIENT
Start: 2021-11-29 | End: 2022-08-11

## 2021-11-29 RX ORDER — SIMVASTATIN 20 MG
20 TABLET ORAL AT BEDTIME
Qty: 90 TABLET | Refills: 3 | Status: SHIPPED | OUTPATIENT
Start: 2021-11-29 | End: 2023-02-01

## 2021-11-29 RX ORDER — LEVOTHYROXINE SODIUM 175 UG/1
175 TABLET ORAL DAILY
Qty: 90 TABLET | Refills: 3 | Status: SHIPPED | OUTPATIENT
Start: 2021-11-29 | End: 2021-12-02

## 2021-11-29 RX ORDER — TAMSULOSIN HYDROCHLORIDE 0.4 MG/1
0.4 CAPSULE ORAL EVERY EVENING
Qty: 90 CAPSULE | Refills: 3 | Status: SHIPPED | OUTPATIENT
Start: 2021-11-29 | End: 2022-12-20

## 2021-11-29 RX ORDER — FLUTICASONE PROPIONATE 50 MCG
1 SPRAY, SUSPENSION (ML) NASAL DAILY
Qty: 48 G | Refills: 3 | Status: SHIPPED | OUTPATIENT
Start: 2021-11-29 | End: 2023-02-01

## 2021-11-29 ASSESSMENT — ENCOUNTER SYMPTOMS
CONSTIPATION: 0
ARTHRALGIAS: 0
ABDOMINAL PAIN: 0
NAUSEA: 0
COUGH: 0
FEVER: 0
HEMATOCHEZIA: 0
SHORTNESS OF BREATH: 0
JOINT SWELLING: 0
PALPITATIONS: 0
DYSURIA: 0
EYE PAIN: 0
PARESTHESIAS: 0
HEMATURIA: 0
HEADACHES: 0
WEAKNESS: 0
DIARRHEA: 0
FREQUENCY: 0
MYALGIAS: 0
SORE THROAT: 0
CHILLS: 0
DIZZINESS: 0
HEARTBURN: 0
NERVOUS/ANXIOUS: 0

## 2021-11-29 ASSESSMENT — MIFFLIN-ST. JEOR: SCORE: 1802.72

## 2021-11-29 ASSESSMENT — ACTIVITIES OF DAILY LIVING (ADL): CURRENT_FUNCTION: NO ASSISTANCE NEEDED

## 2021-11-29 NOTE — PROGRESS NOTES
"[facility]    Sundar German is a 71 year old male who presents for Preventive Visit.    Patient has been advised of split billing requirements and indicates understanding: Yes   Are you in the first 12 months of your Medicare coverage?  No    Healthy Habits:     In general, how would you rate your overall health?  Excellent    Frequency of exercise:  4-5 days/week    Duration of exercise:  30-45 minutes    Do you usually eat at least 4 servings of fruit and vegetables a day, include whole grains    & fiber and avoid regularly eating high fat or \"junk\" foods?  Yes    Taking medications regularly:  Yes    Medication side effects:  Not applicable    Ability to successfully perform activities of daily living:  No assistance needed    Home Safety:  No safety concerns identified    Hearing Impairment:  No hearing concerns    In the past 6 months, have you been bothered by leaking of urine?  No    In general, how would you rate your overall mental or emotional health?  Excellent      PHQ-2 Total Score: 0    Additional concerns today:  No       Do you feel safe in your environment? Yes    Have you ever done Advance Care Planning? (For example, a Health Directive, POLST, or a discussion with a medical provider or your loved ones about your wishes): No, advance care planning information given to patient to review.  Patient plans to discuss their wishes with loved ones or provider.         Fall risk  Fallen 2 or more times in the past year?: No  Any fall with injury in the past year?: No    Cognitive Screening   1) Repeat 3 items (Leader, Season, Table)    2) Clock draw: NORMAL  3) 3 item recall: Recalls 2 objects   Results: NORMAL clock, 1-2 items recalled: COGNITIVE IMPAIRMENT LESS LIKELY    Mini-CogTM Copyright TAMMIE Traylor. Licensed by the author for use in [facility]; reprinted with permission (nany@.[email or web address]). All rights reserved.      Do you have sleep apnea, excessive snoring " or daytime drowsiness?: no    Reviewed and updated as needed this visit by clinical staff  Tobacco  Allergies  Meds             Reviewed and updated as needed this visit by Provider               Social History     Tobacco Use     Smoking status: Former Smoker     Packs/day: 1.00     Years: 20.00     Pack years: 20.00     Quit date: 1981     Years since quittin.9     Smokeless tobacco: Former User     Types: Chew     Quit date: 1992     Tobacco comment: 1 tin per week   Substance Use Topics     Alcohol use: Yes     Alcohol/week: 7.0 - 8.0 standard drinks     Types: 7 - 8 Standard drinks or equivalent per week     Comment: 1 beer daily     If you drink alcohol do you typically have >3 drinks per day or >7 drinks per week? No    Alcohol Use 2021   Prescreen: >3 drinks/day or >7 drinks/week? No   Prescreen: >3 drinks/day or >7 drinks/week? -       Current providers sharing in care for this patient include:     Patient Care Team:  Liu Nolasco MD as PCP - General (Family Practice)  Liu Nolasco MD as Assigned PCP  Sundar Gandhi MD as MD (Urology)  Sabrina Milan RN as Registered Nurse  Sundar Gandhi MD as Assigned Surgical Provider    The following health maintenance items are reviewed in Epic and correct as of today:  Health Maintenance Due   Topic Date Due     PHQ-2  2021     COVID-19 Vaccine (3 - Booster for Moderna series) 10/29/2021     FALL RISK ASSESSMENT  2021     PSA  2021     TSH W/FREE T4 REFLEX  2021     Prediabetes    Glucose   Date Value Ref Range Status   2020 95 70 - 99 mg/dL Final     Comment:     Fasting specimen   2020 110 (H) 70 - 99 mg/dL Final   2020 115 (H) 70 - 99 mg/dL Final   2020 134 (H) 70 - 99 mg/dL Final   2020 112 (H) 70 - 99 mg/dL Final     Comment:     Fasting specimen     Lab Results   Component Value Date    A1C 5.2 2020    A1C 5.3 2020    A1C 5.3 2020    A1C 5.5 2019     A1C 5.6 03/02/2018     Lipids    Recent Labs   Lab Test 11/04/20  0842 07/30/20  0956 01/12/16  1122 06/02/15  0924 11/19/13  0837   CHOL 142 167   < > 152 189   HDL 78 73   < > 55 60   LDL 55 81   < > 81 97   TRIG 46 65   < > 82 165*   CHOLHDLRATIO  --   --   --  2.8 3.2    < > = values in this interval not displayed.     Hypothyroid    TSH   Date Value Ref Range Status   11/04/2020 2.36 0.40 - 4.00 mU/L Final     OA - minor - s/p bilateral TKA, hx rotator cuff repair bilaterally    RAFIQ    Hemoglobin   Date Value Ref Range Status   11/29/2021 13.5 13.3 - 17.7 g/dL Final   11/04/2020 13.5 13.3 - 17.7 g/dL Final   07/30/2020 14.0 13.3 - 17.7 g/dL Final     Environmental allergies - spring - Dr Weiss - AIT every 6 weeks    Melanoma - dermatology - Dr Sierra    Health Maintenance     Colonoscopy:  Due 10/2023   FIT:  given              PSA:  pending   DEXA:  NA    Health Maintenance Due   Topic Date Due     PHQ-2  01/01/2021     COVID-19 Vaccine (3 - Booster for Moderna series) 10/29/2021     FALL RISK ASSESSMENT  11/04/2021     PSA  11/04/2021     TSH W/FREE T4 REFLEX  11/04/2021       Current Problem List    Patient Active Problem List   Diagnosis     Allergic rhinitis     Acquired hypothyroidism     Hyperlipidemia LDL goal <100     Impaired memory     Sleep related leg cramps     Chronic sinusitis     S/P revision of total knee, left     BPH (benign prostatic hyperplasia)     OA (osteoarthritis)     Prediabetes     Class 1 obesity with serious comorbidity and body mass index (BMI) of 33.0 to 33.9 in adult, unspecified obesity type     h/o Malignant melanoma of torso excluding breast (H)     GI bleed     * * * SBE PROPHYLAXIS * * *     S/P total knee arthroplasty     Sensorineural hearing loss (SNHL) of both ears       Past Medical History    Past Medical History:   Diagnosis Date     Abnormal glucose     A1C 1/06 =  6.3     Acquired hypothyroidism 10/11/2005     Problem list name updated by automated process.  Provider to review     Allergic rhinitis, cause unspecified     mary spring time     Benign localized hyperplasia of prostate with urinary obstruction and other lower urinary tract symptoms (LUTS)(600.21)     slow stream, nocturia - Dr Malave     Chronic sinusitis 03/14/2014     Complication of anesthesia      Environmental allergies     AIT - Dr Weiss     History of blood transfusion      Hyperlipidemia LDL goal <130 10/31/2010     hypogonadism     low testosterone at times      RAFIQ (iron deficiency anemia)      Impaired memory 02/11/2011    ?     Melanoma (H) 04/2018    Melanoma 0.7 mm depth, Levar III, superficial spreading, stage T1a     OA (osteoarthritis) total L knee 3/09    knees bother;; has had bilat arthroscopic     PONV (postoperative nausea and vomiting)      Prediabetes      Sensorineural hearing loss (SNHL) of both ears      Sleep related leg cramps 06/2012       Past Surgical History    Past Surgical History:   Procedure Laterality Date     ARTHROPLASTY KNEE Right 3/16/2020    Rt TKA - Dr DUY Dudley     ARTHROPLASTY REVISION KNEE Left 10/26/2015     ARTHROPLASTY REVISION KNEE - Dr Dudley     COLONOSCOPY  10/2/2013    diverticulosis - due 10 yrs (10/2023), initial 10/2003     CYSTOSCOPY  04/2016     ENDOSCOPIC SINUS SURGERY  3/17/2014    Bilateral Endoscopic Sinus Surgery - Dr Johnson     ESOPHAGOSCOPY, GASTROSCOPY, DUODENOSCOPY (EGD), COMBINED N/A 2/28/2019    Non bleeding gastric ulcers and duodenal ulcer. Recheck 3 months. Dr Sinclair     HC REPAIR UMBILICAL KASI,5+Y/O,REDUC  2002     LASER REVOLIX PHOTOSELECTIVE VAPORIZATION PROSTATE N/A 4/6/2016    LASER REVOLIX / QUANTA PHOTOSELECTIVE VAPORIZATION PROSTATE - Dr Malave     ROTATOR CUFF REPAIR RT/LT Left 03/2012    left shoulder     SURGICAL HISTORY OF -   1996    tonsils and adenoids     SURGICAL HISTORY OF -  Left 03/2009    Lt TKA     SURGICAL HISTORY OF -  Bilateral     Rt Knee x 1 (meniscus, 1995), Lt x 3 (last 2009)     UPPER GI ENDOSCOPY   05/2019    normal     VASECTOMY Bilateral 1986       Current Medications    Current Outpatient Medications   Medication Sig Dispense Refill     fluticasone (FLONASE) 50 MCG/ACT nasal spray Spray 1 spray into both nostrils daily 48 g 3     levothyroxine (SYNTHROID/LEVOTHROID) 175 MCG tablet Take 1 tablet (175 mcg) by mouth daily 90 tablet 3     sildenafil (REVATIO) 20 MG tablet TAKE 1 TABLET BY MOUTH THREE TIMES DAILY FOR PULMONARY HYPERTENSION. NEVER USE WITH NITROGLYCERIN, TERAZOSIN OR DOXAZOSIN 90 tablet 1     simvastatin (ZOCOR) 20 MG tablet Take 1 tablet (20 mg) by mouth At Bedtime 90 tablet 3     tamsulosin (FLOMAX) 0.4 MG capsule Take 1 capsule (0.4 mg) by mouth every evening 90 capsule 3     multivitamin, therapeutic with minerals (MULTI-VITAMIN) TABS Take 1 tablet by mouth daily       psyllium (METAMUCIL/KONSYL) Packet Take 1 packet by mouth daily         Allergies    No Known Allergies    Immunizations    Immunization History   Administered Date(s) Administered     COVID-19,PF,Moderna 04/01/2021, 04/29/2021     Influenza (High Dose) 3 valent vaccine 10/24/2019, 09/28/2020     Influenza (IIV3) PF 10/01/2003, 10/21/2012, 11/19/2013, 09/14/2015     Influenza Vaccine IM > 6 months Valent IIV4 (Alfuria,Fluzone) 11/19/2013, 09/15/2017, 10/01/2018, 10/24/2019     Influenza, Quad, High Dose, Pf, 65yr+ (Fluzone HD) 09/28/2020     Pneumo Conj 13-V (2010&after) 01/12/2016     Pneumococcal 23 valent 11/21/2003, 03/02/2018     TD (ADULT, 7+) 03/02/2018     TDAP Vaccine (Adacel) 10/06/2008     Zoster vaccine recombinant adjuvanted (SHINGRIX) 05/17/2019, 09/03/2019     Zoster vaccine, live 11/19/2013       Family History    Family History   Problem Relation Age of Onset     C.A.D. Father         CHF     Heart Failure Father      Chronic Obstructive Pulmonary Disease Father      Cerebrovascular Disease Brother 61     Gastrointestinal Disease Brother         GI Bleed - colectomy     Hypertension Mother      Cardiovascular  Mother 84        MI     Lipids Mother      Chronic Obstructive Pulmonary Disease Mother      No Known Problems Sister      No Known Problems Brother        Social History    Social History     Socioeconomic History     Marital status:      Spouse name: Ingrid     Number of children: 4     Years of education: Not on file     Highest education level: Not on file   Occupational History     Employer: [employer]   Tobacco Use     Smoking status: Former Smoker     Packs/day: 1.00     Years: 20.00     Pack years: 20.00     Quit date: 1981     Years since quittin.9     Smokeless tobacco: Former User     Types: Chew     Quit date: 1992     Tobacco comment: 1 tin per week   Vaping Use     Vaping Use: Never used   Substance and Sexual Activity     Alcohol use: Yes     Alcohol/week: 7.0 - 8.0 standard drinks     Types: 7 - 8 Standard drinks or equivalent per week     Comment: 1 beer daily     Drug use: No     Sexual activity: Yes     Partners: Female   Other Topics Concern     Parent/sibling w/ CABG, MI or angioplasty before 65F 55M? Not Asked   Social History Narrative     Not on file     Social Determinants of Health     Financial Resource Strain: Not on file   Food Insecurity: Not on file   Transportation Needs: Not on file   Physical Activity: Not on file   Stress: Not on file   Social Connections: Not on file   Intimate Partner Violence: Not on file   Housing Stability: Not on file       ROS    CONSTITUTIONAL: NEGATIVE for fever, chills, change in weight  INTEGUMENTARY/SKIN: NEGATIVE for worrisome rashes, moles or lesions  EYES: NEGATIVE for vision changes or irritation  ENT/MOUTH: NEGATIVE for ear, mouth and throat problems  RESP: NEGATIVE for significant cough or SOB  BREAST: NEGATIVE for masses, tenderness or discharge  CV: NEGATIVE for chest pain, palpitations or peripheral edema  GI: NEGATIVE for nausea, abdominal pain, heartburn, or change in bowel habits  : NEGATIVE for frequency, dysuria,  "or hematuria  MUSCULOSKELETAL: NEGATIVE for significant arthralgias or myalgia  NEURO: NEGATIVE for weakness, dizziness or paresthesias  ENDOCRINE: NEGATIVE for temperature intolerance, skin/hair changes  HEME: NEGATIVE for bleeding problems  PSYCHIATRIC: NEGATIVE for changes in mood or affect    OBJECTIVE    /78 (BP Location: Left arm, Cuff Size: Adult Regular)   Pulse 58   Temp 97  F (36.1  C) (Tympanic)   Ht 1.803 m (5' 11\")   Wt 102.1 kg (225 lb)   SpO2 98%   BMI 31.38 kg/m      EXAM:    GENERAL: healthy, alert and no distress  EYES: Eyes grossly normal to inspection, PERRL and conjunctivae and sclerae normal  HENT: ear canals and TM's normal, nose and mouth without ulcers or lesions  NECK: no adenopathy, no asymmetry, masses, or scars and thyroid normal to palpation  RESP: lungs clear to auscultation - no rales, rhonchi or wheezes  CV: regular rate and rhythm, normal S1 S2, no S3 or S4, no murmur, click or rub, no peripheral edema and peripheral pulses strong  ABDOMEN: soft, nontender, no hepatosplenomegaly, no masses and bowel sounds normal   (male): testicles normal without atrophy or masses, no hernias and penis normal without urethral discharge  RECTAL: normal sphincter tone, no rectal masses and prostate of 1+ size for age, smooth, nontender without masses/nodules  MS: no gross musculoskeletal defects noted, no edema  SKIN: no suspicious lesions or rashes  NEURO: Normal strength and tone, mentation intact and speech normal  PSYCH: mentation appears normal, affect normal/bright  LYMPH: no cervical, supraclavicular, axillary, or inguinal adenopathy    Left great toe - stubbed early October - great toe nail black since - pain free - will see Dr Sierra    DIAGNOSTICS/PROCEDURES    Pending    ASSESSMENT      ICD-10-CM    1. Routine general medical examination at a health care facility  Z00.00 Comprehensive metabolic panel     Lipid panel reflex to direct LDL Fasting     CBC with platelets     CK " total     UA reflex to Microscopic and Culture     Albumin Random Urine Quantitative with Creat Ratio     Prostate Specific Antigen Screen     Fecal colorectal cancer screen FIT     Hemoglobin A1c     Iron and iron binding capacity     Ferritin     TSH     T4, free     REVIEW OF HEALTH MAINTENANCE PROTOCOL ORDERS     Comprehensive metabolic panel     Lipid panel reflex to direct LDL Fasting     CBC with platelets     CK total     Prostate Specific Antigen Screen     Fecal colorectal cancer screen FIT     Hemoglobin A1c     Iron and iron binding capacity     Ferritin     TSH     T4, free   2. Prediabetes  R73.03 Comprehensive metabolic panel     Lipid panel reflex to direct LDL Fasting     UA reflex to Microscopic and Culture     Albumin Random Urine Quantitative with Creat Ratio     Hemoglobin A1c     simvastatin (ZOCOR) 20 MG tablet     REVIEW OF HEALTH MAINTENANCE PROTOCOL ORDERS     Comprehensive metabolic panel     Lipid panel reflex to direct LDL Fasting     Hemoglobin A1c   3. Hyperlipidemia LDL goal <100  E78.5 Comprehensive metabolic panel     Lipid panel reflex to direct LDL Fasting     CK total     simvastatin (ZOCOR) 20 MG tablet     REVIEW OF HEALTH MAINTENANCE PROTOCOL ORDERS     Comprehensive metabolic panel     Lipid panel reflex to direct LDL Fasting     CK total   4. Acquired hypothyroidism  E03.9 TSH     T4, free     levothyroxine (SYNTHROID/LEVOTHROID) 175 MCG tablet     REVIEW OF HEALTH MAINTENANCE PROTOCOL ORDERS     TSH     T4, free   5. Primary osteoarthritis involving multiple joints  M89.49 REVIEW OF HEALTH MAINTENANCE PROTOCOL ORDERS   6. Iron deficiency anemia, unspecified iron deficiency anemia type  D50.9 CBC with platelets     Iron and iron binding capacity     Ferritin     REVIEW OF HEALTH MAINTENANCE PROTOCOL ORDERS     CBC with platelets     Iron and iron binding capacity     Ferritin   7. Environmental allergies  Z91.09 fluticasone (FLONASE) 50 MCG/ACT nasal spray     REVIEW OF  HEALTH MAINTENANCE PROTOCOL ORDERS   8. h/o Malignant melanoma of torso excluding breast (H)  C43.59 REVIEW OF HEALTH MAINTENANCE PROTOCOL ORDERS   9. Benign prostatic hyperplasia with lower urinary tract symptoms, symptom details unspecified  N40.1 Prostate Specific Antigen Screen     tamsulosin (FLOMAX) 0.4 MG capsule     REVIEW OF HEALTH MAINTENANCE PROTOCOL ORDERS     Prostate Specific Antigen Screen   10. Urinary retention  R33.9 tamsulosin (FLOMAX) 0.4 MG capsule     REVIEW OF HEALTH MAINTENANCE PROTOCOL ORDERS   11. Sensorineural hearing loss (SNHL) of both ears  H90.3 REVIEW OF HEALTH MAINTENANCE PROTOCOL ORDERS   12. Erectile dysfunction, unspecified erectile dysfunction type  N52.9 sildenafil (REVATIO) 20 MG tablet     REVIEW OF HEALTH MAINTENANCE PROTOCOL ORDERS   13. Screen for colon cancer  Z12.11 Fecal colorectal cancer screen FIT     REVIEW OF HEALTH MAINTENANCE PROTOCOL ORDERS     Fecal colorectal cancer screen FIT   14. Screening for prostate cancer  Z12.5 Prostate Specific Antigen Screen     REVIEW OF HEALTH MAINTENANCE PROTOCOL ORDERS     Prostate Specific Antigen Screen   15. Medication monitoring encounter  Z51.81 Comprehensive metabolic panel     Lipid panel reflex to direct LDL Fasting     CBC with platelets     CK total     UA reflex to Microscopic and Culture     Albumin Random Urine Quantitative with Creat Ratio     Hemoglobin A1c     Iron and iron binding capacity     Ferritin     TSH     T4, free     levothyroxine (SYNTHROID/LEVOTHROID) 175 MCG tablet     REVIEW OF HEALTH MAINTENANCE PROTOCOL ORDERS     Comprehensive metabolic panel     Lipid panel reflex to direct LDL Fasting     CBC with platelets     CK total     Hemoglobin A1c     Iron and iron binding capacity     Ferritin     TSH     T4, free       PLAN    Discussed treatment/modality options, including risk and benefits, he desires:    advised alcohol consumption 1oz per day or less, advised aspirin 81 mg po daily, advised 1  "multivitamin per day, advised calcium 9234-1366 mg/d and Vitamin D 800-1200 IU/d, advised dentist every 6 months, advised diet, exercise, and weight loss, advised opthalmologist every 1-2 years, advised self testicular exam q month, further health care maintenance, further lab(s), medication refill(s) and observation    Discussed controversies surrounding PSA. Specifically reviewed 2017 USPSTF findings recommending discussion of PSA testing for men ages 55-69.  Reviewed findings of the  Randomized Study of Screening for Prostate Cancer which showed a 30% reduction in advanced stage prostate cancer and a 20% reduction in death rate from prostate cancer in this age group. PSA-based screening may prevent up to 2 deaths and up to 3 cases of metastatic disease per 1,000 men screened over 13 years.    We've elected to do PSA this year after discussing these controversies.    All diagnosis above reviewed and noted above, otherwise stable.      See [employer] orders for further details.      1) med refills    2) labs pending    3) reviewed immunizations, Moderna #3 at 4 pm today, Flu 10/5/2021    4) Dermatology follow up soon    5) Dr Nazario cuevas    Return in about 1 year (around 11/29/2022) for Complete Physical, Medication Recheck Visit, Follow Up Chronic.    Health Maintenance Due   Topic Date Due     PHQ-2  01/01/2021     COVID-19 Vaccine (3 - Booster for Moderna series) 10/29/2021     FALL RISK ASSESSMENT  11/04/2021     PSA  11/04/2021     TSH W/FREE T4 REFLEX  11/04/2021       COUNSELING    Reviewed preventive health counseling, as reflected in patient instructions    BP Readings from Last 1 Encounters:   11/29/21 130/78     Estimated body mass index is 31.38 kg/m  as calculated from the following:    Height as of this encounter: 1.803 m (5' 11\").    Weight as of this encounter: 102.1 kg (225 lb).      Weight management plan: diet and exercsie     reports that he quit smoking about 40 years ago. He has a 20.00 " pack-year smoking history. He quit smokeless tobacco use about 29 years ago.  His smokeless tobacco use included chew.      Counseling Resources:    ATP IV Guidelines  Pooled Cohorts Equation Calculator  FRAX Risk Assessment  ICSI Preventive Guidelines  Dietary Guidelines for Americans, 2010  USDA's MyPlate  ASA Prophylaxis  Lung CA Screening           Liu Nolasco MD, FAAFP     [facility]  [address] [place] [id number]  [email or web address]   Office: [phone]  Fax: [phone]  Pager: [phone]     Identified Health Risks:  Answers for HPI/ROS submitted by the patient on 11/29/2021  abdominal pain: No  Blood in stool: No  Blood in urine: No  chest pain: No  chills: No  congestion: No  constipation: No  cough: No  diarrhea: No  dizziness: No  ear pain: No  eye pain: No  nervous/anxious: No  fever: No  frequency: No  genital sores: No  headaches: No  hearing loss: Yes  heartburn: No  arthralgias: No  joint swelling: No  peripheral edema: No  mood changes: No  myalgias: No  nausea: No  dysuria: No  palpitations: No  Skin sensation changes: No  sore throat: No  urgency: No  rash: No  shortness of breath: No  visual disturbance: No  weakness: No  impotence: Yes  penile discharge: No

## 2021-11-30 LAB
ALBUMIN SERPL-MCNC: 3.7 G/DL (ref 3.4–5)
ALP SERPL-CCNC: 52 U/L (ref 40–150)
ALT SERPL W P-5'-P-CCNC: 35 U/L (ref 0–70)
ANION GAP SERPL CALCULATED.3IONS-SCNC: 9 MMOL/L (ref 3–14)
AST SERPL W P-5'-P-CCNC: 28 U/L (ref 0–45)
BILIRUB SERPL-MCNC: 0.4 MG/DL (ref 0.2–1.3)
BUN SERPL-MCNC: 15 MG/DL (ref 7–30)
CALCIUM SERPL-MCNC: 9.3 MG/DL (ref 8.5–10.1)
CHLORIDE BLD-SCNC: 110 MMOL/L (ref 94–109)
CHOLEST SERPL-MCNC: 162 MG/DL
CK SERPL-CCNC: 186 U/L (ref 30–300)
CO2 SERPL-SCNC: 22 MMOL/L (ref 20–32)
CREAT SERPL-MCNC: 0.8 MG/DL (ref 0.66–1.25)
FASTING STATUS PATIENT QL REPORTED: YES
FERRITIN SERPL-MCNC: 183 NG/ML (ref 26–388)
GFR SERPL CREATININE-BSD FRML MDRD: >90 ML/MIN/1.73M2
GLUCOSE BLD-MCNC: 107 MG/DL (ref 70–99)
HDLC SERPL-MCNC: 72 MG/DL
IRON SATN MFR SERPL: 22 % (ref 15–46)
IRON SERPL-MCNC: 74 UG/DL (ref 35–180)
LDLC SERPL CALC-MCNC: 75 MG/DL
NONHDLC SERPL-MCNC: 90 MG/DL
POTASSIUM BLD-SCNC: 4.6 MMOL/L (ref 3.4–5.3)
PROT SERPL-MCNC: 7.4 G/DL (ref 6.8–8.8)
PSA SERPL-MCNC: 0.87 UG/L (ref 0–4)
SODIUM SERPL-SCNC: 141 MMOL/L (ref 133–144)
T4 FREE SERPL-MCNC: 0.88 NG/DL (ref 0.76–1.46)
TIBC SERPL-MCNC: 338 UG/DL (ref 240–430)
TRIGL SERPL-MCNC: 75 MG/DL
TSH SERPL DL<=0.005 MIU/L-ACNC: 7.35 MU/L (ref 0.4–4)

## 2021-12-02 DIAGNOSIS — Z51.81 MEDICATION MONITORING ENCOUNTER: ICD-10-CM

## 2021-12-02 DIAGNOSIS — E03.9 ACQUIRED HYPOTHYROIDISM: ICD-10-CM

## 2021-12-02 DIAGNOSIS — N18.2 CKD (CHRONIC KIDNEY DISEASE) STAGE 2, GFR 60-89 ML/MIN: ICD-10-CM

## 2021-12-02 DIAGNOSIS — C43.59 MALIGNANT MELANOMA OF TORSO EXCLUDING BREAST (H): Primary | ICD-10-CM

## 2021-12-02 LAB
CREAT UR-MCNC: 83 MG/DL
MICROALBUMIN UR-MCNC: 43 MG/L
MICROALBUMIN/CREAT UR: 51.81 MG/G CR (ref 0–17)

## 2021-12-02 RX ORDER — LISINOPRIL 2.5 MG/1
2.5 TABLET ORAL DAILY
Qty: 90 TABLET | Refills: 3 | Status: SHIPPED | OUTPATIENT
Start: 2021-12-02 | End: 2023-02-01

## 2021-12-02 RX ORDER — LEVOTHYROXINE SODIUM 200 UG/1
200 TABLET ORAL DAILY
Qty: 30 TABLET | Refills: 1 | Status: SHIPPED | OUTPATIENT
Start: 2021-12-02 | End: 2021-12-23

## 2021-12-03 NOTE — RESULT ENCOUNTER NOTE
Mr. German,    -Normal red blood cell (hgb) levels, normal white blood cell count and normal platelet levels.  -PSA (prostate specific antigen) test is normal.  -Cholesterol levels are at your goal levels.  ADVISE: continuing your medication, a regular exercise program with at least 150 minutes of aerobic exercise per week, and eating a low saturated fat/low carbohydrate diet.  Also, you should recheck this fasting cholesterol panel in 12 months.  -Kidney function (GFR) is normal.  -Sodium is normal.  -Potassium is normal.  -Calcium is normal.  -Glucose (diabetic screening test) is normal.  -TSH (thyroid stimulating hormone) is abnormal suggesting you are currently underreplaced.  ADVISE: changing your medication dose to 200 micrograms/day (a prescription has been sent to your pharmacy) and rechecking your TSH with a lab appointment in 6-8 weeks.  -Ferritin (iron) level is normal.  -Microalbumin (urine protein) level is elevated. This is suggestive of early damage to your kidneys from prediabetes.  ADVISE: avoiding anti-inflamatory agents such as ibuprofen (Advil, Motrin) or naproxen (Aleve) as much as possible, keeping your blood pressure in a normal range, and starting treatment with an ACE inhibitor medication called lisinopril (lowers blood pressure and protects kidneys with an occasional side effect of a dry cough or lightheadedness).  I have sent a prescription to your pharmacy. Also, you should schedule a nurse and lab appointment in 1-3 weeks to recheck your blood pressure and kidney function labs.  -Urine is normal.  -A1C indicated diabetes is pre-diabetes at this point.  Remainder of your labs are normal.    If you have further questions about the interpretation of your labs, [email or web address] is a good website to check out for further information.    Please contact the clinic if you have additional questions.  Thank you.    Sincerely,    Sean Kimble MD

## 2021-12-07 RX ORDER — LEVOTHYROXINE SODIUM 200 UG/1
TABLET ORAL
Qty: 90 TABLET | OUTPATIENT
Start: 2021-12-07

## 2021-12-07 NOTE — TELEPHONE ENCOUNTER
Will give 90 day quantity when we confirm 200 mcg is the correct dose of levothyroxine to be taking.  Takes 6 weeks or so after changing a dose to see if it's the correct one or if it needs further adjustment.  Should have a lab appointment in mid-January 2022 to assess this.  If normal TSH at that point, will give one year of refills of levothyroxine.  Please call patient.     Sean Kimble MD

## 2021-12-23 RX ORDER — LEVOTHYROXINE SODIUM 200 UG/1
200 TABLET ORAL DAILY
Qty: 90 TABLET | Refills: 1 | Status: SHIPPED | OUTPATIENT
Start: 2021-12-23 | End: 2022-03-28

## 2021-12-23 NOTE — TELEPHONE ENCOUNTER
Wife states patient never picked up the 200 mcg tablets.     Advised to  today. 6 week lab recheck would be February 3rd. They are going on vacation for 2 months to [place].     Routing to provider to review and advise. Ok for 90 day supply? Labs upon return in April?    Mariana Nguyen RN  [facility]

## 2021-12-23 NOTE — TELEPHONE ENCOUNTER
Called and spoke with Ingrid. She stated an understanding and agreed with plan. Lab appointment rescheduled.     Mariana Nguyen RN  [facility]

## 2022-03-25 ENCOUNTER — LAB (OUTPATIENT)
Dept: LAB | Facility: CLINIC | Age: 72
End: 2022-03-25
Payer: COMMERCIAL

## 2022-03-25 DIAGNOSIS — N18.2 CKD (CHRONIC KIDNEY DISEASE) STAGE 2, GFR 60-89 ML/MIN: ICD-10-CM

## 2022-03-25 DIAGNOSIS — E03.9 ACQUIRED HYPOTHYROIDISM: ICD-10-CM

## 2022-03-25 PROCEDURE — 84439 ASSAY OF FREE THYROXINE: CPT

## 2022-03-25 PROCEDURE — 84443 ASSAY THYROID STIM HORMONE: CPT

## 2022-03-25 PROCEDURE — 36415 COLL VENOUS BLD VENIPUNCTURE: CPT

## 2022-03-25 PROCEDURE — 80048 BASIC METABOLIC PNL TOTAL CA: CPT

## 2022-03-26 LAB
ANION GAP SERPL CALCULATED.3IONS-SCNC: 7 MMOL/L (ref 3–14)
BUN SERPL-MCNC: 12 MG/DL (ref 7–30)
CALCIUM SERPL-MCNC: 9.7 MG/DL (ref 8.5–10.1)
CHLORIDE BLD-SCNC: 109 MMOL/L (ref 94–109)
CO2 SERPL-SCNC: 22 MMOL/L (ref 20–32)
CREAT SERPL-MCNC: 0.75 MG/DL (ref 0.66–1.25)
GFR SERPL CREATININE-BSD FRML MDRD: >90 ML/MIN/1.73M2
GLUCOSE BLD-MCNC: 108 MG/DL (ref 70–99)
POTASSIUM BLD-SCNC: 4.5 MMOL/L (ref 3.4–5.3)
SODIUM SERPL-SCNC: 138 MMOL/L (ref 133–144)
T4 FREE SERPL-MCNC: 1.2 NG/DL (ref 0.76–1.46)
TSH SERPL DL<=0.005 MIU/L-ACNC: 0.26 MU/L (ref 0.4–4)

## 2022-03-28 DIAGNOSIS — Z51.81 MEDICATION MONITORING ENCOUNTER: ICD-10-CM

## 2022-03-28 DIAGNOSIS — E03.9 ACQUIRED HYPOTHYROIDISM: ICD-10-CM

## 2022-03-28 RX ORDER — LEVOTHYROXINE SODIUM 200 UG/1
200 TABLET ORAL DAILY
Qty: 90 TABLET | Refills: 3 | Status: SHIPPED | OUTPATIENT
Start: 2022-03-28 | End: 2022-12-28

## 2022-03-28 NOTE — TELEPHONE ENCOUNTER
Labs note prediabetes, would continue same dose levothyroxine    Free T4, improved, normal, TSH slightly low    TSH   Date Value Ref Range Status   03/25/2022 0.26 (L) 0.40 - 4.00 mU/L Final   11/04/2020 2.36 0.40 - 4.00 mU/L Final     Recheck TSH/Free T4 in 3-4 months    Rx done

## 2022-03-28 NOTE — TELEPHONE ENCOUNTER
Pt due for refill of thyroid medication but wondering if PCP is changing dosage at all due to recent lab results. Please advise, selected pharmacy.  -Rivka Andres

## 2022-06-23 ENCOUNTER — OFFICE VISIT (OUTPATIENT)
Dept: PODIATRY | Facility: CLINIC | Age: 72
End: 2022-06-23
Payer: COMMERCIAL

## 2022-06-23 VITALS
DIASTOLIC BLOOD PRESSURE: 76 MMHG | WEIGHT: 215 LBS | HEIGHT: 71 IN | BODY MASS INDEX: 30.1 KG/M2 | SYSTOLIC BLOOD PRESSURE: 126 MMHG

## 2022-06-23 DIAGNOSIS — L60.0 INGROWN NAIL OF GREAT TOE OF LEFT FOOT: ICD-10-CM

## 2022-06-23 DIAGNOSIS — B35.1 ONYCHOMYCOSIS OF TOENAIL: ICD-10-CM

## 2022-06-23 DIAGNOSIS — L60.0 INGROWN NAIL OF GREAT TOE OF RIGHT FOOT: ICD-10-CM

## 2022-06-23 DIAGNOSIS — M79.671 FOOT PAIN, BILATERAL: Primary | ICD-10-CM

## 2022-06-23 DIAGNOSIS — M79.672 FOOT PAIN, BILATERAL: Primary | ICD-10-CM

## 2022-06-23 PROCEDURE — 11732 AVLSN NAIL PLATE SIMPLE EACH: CPT | Mod: TA | Performed by: PODIATRIST

## 2022-06-23 PROCEDURE — 11730 AVULSION NAIL PLATE SIMPLE 1: CPT | Mod: T5 | Performed by: PODIATRIST

## 2022-06-23 PROCEDURE — 99203 OFFICE O/P NEW LOW 30 MIN: CPT | Mod: 25 | Performed by: PODIATRIST

## 2022-06-23 RX ORDER — CEPHALEXIN 500 MG/1
500 CAPSULE ORAL 2 TIMES DAILY
Qty: 20 CAPSULE | Refills: 0 | Status: SHIPPED | OUTPATIENT
Start: 2022-06-23 | End: 2022-07-03

## 2022-06-23 RX ORDER — CICLOPIROX OLAMINE 7.7 MG/G
CREAM TOPICAL DAILY
Qty: 30 G | Refills: 6 | Status: SHIPPED | OUTPATIENT
Start: 2022-06-23 | End: 2023-02-01

## 2022-06-23 NOTE — PROGRESS NOTES
PATIENT HISTORY:   Sundar German is a 72 year old male who presents to clinic for pain to both great toes.  Notes that he injured his left great toenail about 10 months ago and it grew back with the sides going in.  Pain can be 9 out of 10 at its worst.  Worse with pressure.  Will be a throbbing pain.  Also notes that the right great toenail on the outside of the nail grows and has been sore for years.  Denies fever, nausea, vomiting.  Wondering what can be done for the nails.    Review of Systems:  Patient denies fever, chills, rash, wound, stiffness, limping, numbness, weakness, heart burn, blood in stool, chest pain with activity, calf pain when walking, shortness of breath with activity, chronic cough, easy bleeding/bruising, swelling of ankles, excessive thirst, fatigue, depression, anxiety.       PAST MEDICAL HISTORY:   Past Medical History:   Diagnosis Date     Abnormal glucose     A1C 1/06 =  6.3     Acquired hypothyroidism 10/11/2005     Problem list name updated by automated process. Provider to review     Allergic rhinitis, cause unspecified     mary spring time     Benign localized hyperplasia of prostate with urinary obstruction and other lower urinary tract symptoms (LUTS)(600.21)     slow stream, nocturia - Dr Malave     Chronic sinusitis 03/14/2014     Complication of anesthesia      Environmental allergies     AIT - Dr Weiss     History of blood transfusion      Hyperlipidemia LDL goal <130 10/31/2010     hypogonadism     low testosterone at times      RAFIQ (iron deficiency anemia)      Impaired memory 02/11/2011    ?     Melanoma (H) 04/2018    Melanoma 0.7 mm depth, Levar III, superficial spreading, stage T1a     OA (osteoarthritis) total L knee 3/09    knees bother;; has had bilat arthroscopic     PONV (postoperative nausea and vomiting)      Prediabetes      Sensorineural hearing loss (SNHL) of both ears      Sleep related leg cramps 06/2012        PAST SURGICAL HISTORY:   Past Surgical  History:   Procedure Laterality Date     ARTHROPLASTY KNEE Right 3/16/2020    Rt TKA - Dr DUY Dudley     ARTHROPLASTY REVISION KNEE Left 10/26/2015     ARTHROPLASTY REVISION KNEE - Dr Dudley     COLONOSCOPY  10/2/2013    diverticulosis - due 10 yrs (10/2023), initial 10/2003     CYSTOSCOPY  04/2016     ENDOSCOPIC SINUS SURGERY  3/17/2014    Bilateral Endoscopic Sinus Surgery - Dr Johnson     ESOPHAGOSCOPY, GASTROSCOPY, DUODENOSCOPY (EGD), COMBINED N/A 2/28/2019    Non bleeding gastric ulcers and duodenal ulcer. Recheck 3 months. Dr Sinclair     HC REPAIR UMBILICAL KASI,5+Y/O,REDUC  2002     LASER REVOLIX PHOTOSELECTIVE VAPORIZATION PROSTATE N/A 4/6/2016    LASER REVOLIX / QUANTA PHOTOSELECTIVE VAPORIZATION PROSTATE - Dr Malave     ROTATOR CUFF REPAIR RT/LT Left 03/2012    left shoulder     SURGICAL HISTORY OF -   1996    tonsils and adenoids     SURGICAL HISTORY OF -  Left 03/2009    Lt TKA     SURGICAL HISTORY OF -  Bilateral     Rt Knee x 1 (meniscus, 1995), Lt x 3 (last 2009)     UPPER GI ENDOSCOPY  05/2019    normal     VASECTOMY Bilateral 1986        MEDICATIONS:   Current Outpatient Medications:      fluticasone (FLONASE) 50 MCG/ACT nasal spray, Spray 1 spray into both nostrils daily, Disp: 48 g, Rfl: 3     levothyroxine (SYNTHROID/LEVOTHROID) 200 MCG tablet, Take 1 tablet (200 mcg) by mouth daily, Disp: 90 tablet, Rfl: 3     lisinopril (ZESTRIL) 2.5 MG tablet, Take 1 tablet (2.5 mg) by mouth daily, Disp: 90 tablet, Rfl: 3     multivitamin, therapeutic with minerals (MULTI-VITAMIN) TABS, Take 1 tablet by mouth daily, Disp: , Rfl:      psyllium (METAMUCIL/KONSYL) Packet, Take 1 packet by mouth daily, Disp: , Rfl:      sildenafil (REVATIO) 20 MG tablet, TAKE 1 TABLET BY MOUTH THREE TIMES DAILY FOR PULMONARY HYPERTENSION. NEVER USE WITH NITROGLYCERIN, TERAZOSIN OR DOXAZOSIN, Disp: 90 tablet, Rfl: 1     simvastatin (ZOCOR) 20 MG tablet, Take 1 tablet (20 mg) by mouth At Bedtime, Disp: 90 tablet, Rfl: 3     tamsulosin  "(FLOMAX) 0.4 MG capsule, Take 1 capsule (0.4 mg) by mouth every evening, Disp: 90 capsule, Rfl: 3     ALLERGIES:  No Known Allergies     SOCIAL HISTORY:   Social History     Socioeconomic History     Marital status:      Spouse name: Ingrid     Number of children: 4     Years of education: Not on file     Highest education level: Not on file   Occupational History     Employer: [employer]   Tobacco Use     Smoking status: Former Smoker     Packs/day: 1.00     Years: 20.00     Pack years: 20.00     Quit date: 1981     Years since quittin.5     Smokeless tobacco: Former User     Types: Chew     Quit date: 1992     Tobacco comment: 1 tin per week   Vaping Use     Vaping Use: Never used   Substance and Sexual Activity     Alcohol use: Yes     Alcohol/week: 7.0 - 8.0 standard drinks     Types: 7 - 8 Standard drinks or equivalent per week     Comment: 1 beer daily     Drug use: No     Sexual activity: Yes     Partners: Female   Other Topics Concern     Parent/sibling w/ CABG, MI or angioplasty before 65F 55M? Not Asked   Social History Narrative     Not on file     Social Determinants of Health     Financial Resource Strain: Not on file   Food Insecurity: Not on file   Transportation Needs: Not on file   Physical Activity: Not on file   Stress: Not on file   Social Connections: Not on file   Intimate Partner Violence: Not on file   Housing Stability: Not on file        FAMILY HISTORY:   Family History   Problem Relation Age of Onset     C.A.D. Father         CHF     Heart Failure Father      Chronic Obstructive Pulmonary Disease Father      Cerebrovascular Disease Brother 61     Gastrointestinal Disease Brother         GI Bleed - colectomy     Hypertension Mother      Cardiovascular Mother 84        MI     Lipids Mother      Chronic Obstructive Pulmonary Disease Mother      No Known Problems Sister      No Known Problems Brother         EXAM:Vitals: /76   Ht 1.803 m (5' 11\")   Wt 97.5 kg " (215 lb)   BMI 29.99 kg/m      General appearance: Patient is alert and fully cooperative with history & exam.  No sign of distress is noted during the visit.     Psychiatric: Affect is pleasant & appropriate.  Patient appears motivated to improve health.     Respiratory: Breathing is regular & unlabored while sitting.     HEENT: Hearing is intact to spoken word.  Speech is clear.  No gross evidence of visual impairment that would impact ambulation.     Dermatologic: Both borders of the left great toenail and the lateral border of the right great toenail are incurvated.  Localized redness and pain on palpation.  Right great toenail is thickened and presents with subungual debris.     Vascular: DP & PT pulses are intact & regular bilaterally.  No significant edema or varicosities noted.  CFT and skin temperature is normal to both lower extremities.     Neurologic: Lower extremity sensation is intact to light touch.  No evidence of weakness or contracture in the lower extremities.  No evidence of neuropathy.     Musculoskeletal: Patient is ambulatory without assistive device or brace.  No gross ankle deformity noted.  No foot or ankle joint effusion is noted.     ASSESSMENT:    Foot pain, bilateral  Ingrown nail of great toe of right foot  Ingrown nail of great toe of left foot  Onychomycosis of toenail     Medical Decision Making/Plan:  Reviewed patient's chart in [facility]. The potential causes and nature of an ingrown toenail were discussed with the patient.  We reviewed the natural history/prognosis of the condition and potential risks if no treatment is provided.      Treatment options discussed included conservative management (oral antibiotics, soaking of foot, adequate width shoes)  as well as surgical management (partial or total nail removal).  The pros and cons of both forms of treatment were reviewed.      After thorough discussion and answering all questions, the patient elected to have the borders removed.   He will soak the foot twice a day for 2 weeks and apply antibiotic cream and Band-Aid.  He was started on Keflex due to his knee replacements to try to prevent infection.  All questions were answered to patient satisfaction and he will call with further questions or concerns.    Procedure:After verbal consent, the right big toe was anesthetized with 5cc's of 1% lidocaine plain. A tourniquet was applied to the toe. The lateral border was then raised from the nail bed and then cut the length of the nail.  The offending nail border was then removed.  T Bacitracin was applied to the nail bed.  The tourniquet was removed.  Bandage was applied to the toe.  The patient tolerated the procedure and anesthesia well.    Procedure 2: Exact same procedure done to the medial and lateral borders of the right great toe.    Patient risk factor: Patient is at low risk for infection.        Galina Jones DPM, Podiatry/Foot and Ankle Surgery

## 2022-06-23 NOTE — LETTER
6/23/2022         RE: Sundar German  [address] [address]        Dear Colleague,    Thank you for referring your patient, Sundar German, to the [facility]. Please see a copy of my visit note below.    PATIENT HISTORY:   Sundar German is a 72 year old male who presents to clinic for pain to both great toes.  Notes that he injured his left great toenail about 10 months ago and it grew back with the sides going in.  Pain can be 9 out of 10 at its worst.  Worse with pressure.  Will be a throbbing pain.  Also notes that the right great toenail on the outside of the nail grows and has been sore for years.  Denies fever, nausea, vomiting.  Wondering what can be done for the nails.    Review of Systems:  Patient denies fever, chills, rash, wound, stiffness, limping, numbness, weakness, heart burn, blood in stool, chest pain with activity, calf pain when walking, shortness of breath with activity, chronic cough, easy bleeding/bruising, swelling of ankles, excessive thirst, fatigue, depression, anxiety.       PAST MEDICAL HISTORY:   Past Medical History:   Diagnosis Date     Abnormal glucose     A1C 1/06 =  6.3     Acquired hypothyroidism 10/11/2005     Problem list name updated by automated process. Provider to review     Allergic rhinitis, cause unspecified     mary spring time     Benign localized hyperplasia of prostate with urinary obstruction and other lower urinary tract symptoms (LUTS)(600.21)     slow stream, nocturia - Dr Malave     Chronic sinusitis 03/14/2014     Complication of anesthesia      Environmental allergies     AIT - Dr Weiss     History of blood transfusion      Hyperlipidemia LDL goal <130 10/31/2010     hypogonadism     low testosterone at times      RAFIQ (iron deficiency anemia)      Impaired memory 02/11/2011    ?     Melanoma (H) 04/2018    Melanoma 0.7 mm depth, Levar III, superficial spreading, stage T1a     OA  (osteoarthritis) total L knee 3/09    knees bother;; has had bilat arthroscopic     PONV (postoperative nausea and vomiting)      Prediabetes      Sensorineural hearing loss (SNHL) of both ears      Sleep related leg cramps 06/2012        PAST SURGICAL HISTORY:   Past Surgical History:   Procedure Laterality Date     ARTHROPLASTY KNEE Right 3/16/2020    Rt TKA - Dr DUY Dudley     ARTHROPLASTY REVISION KNEE Left 10/26/2015     ARTHROPLASTY REVISION KNEE - Dr Dudley     COLONOSCOPY  10/2/2013    diverticulosis - due 10 yrs (10/2023), initial 10/2003     CYSTOSCOPY  04/2016     ENDOSCOPIC SINUS SURGERY  3/17/2014    Bilateral Endoscopic Sinus Surgery - Dr Johnson     ESOPHAGOSCOPY, GASTROSCOPY, DUODENOSCOPY (EGD), COMBINED N/A 2/28/2019    Non bleeding gastric ulcers and duodenal ulcer. Recheck 3 months. Dr Sinclair     HC REPAIR UMBILICAL KASI,5+Y/O,REDUC  2002     LASER REVOLIX PHOTOSELECTIVE VAPORIZATION PROSTATE N/A 4/6/2016    LASER REVOLIX / QUANTA PHOTOSELECTIVE VAPORIZATION PROSTATE - Dr Malave     ROTATOR CUFF REPAIR RT/LT Left 03/2012    left shoulder     SURGICAL HISTORY OF -   1996    tonsils and adenoids     SURGICAL HISTORY OF -  Left 03/2009    Lt TKA     SURGICAL HISTORY OF -  Bilateral     Rt Knee x 1 (meniscus, 1995), Lt x 3 (last 2009)     UPPER GI ENDOSCOPY  05/2019    normal     VASECTOMY Bilateral 1986        MEDICATIONS:   Current Outpatient Medications:      fluticasone (FLONASE) 50 MCG/ACT nasal spray, Spray 1 spray into both nostrils daily, Disp: 48 g, Rfl: 3     levothyroxine (SYNTHROID/LEVOTHROID) 200 MCG tablet, Take 1 tablet (200 mcg) by mouth daily, Disp: 90 tablet, Rfl: 3     lisinopril (ZESTRIL) 2.5 MG tablet, Take 1 tablet (2.5 mg) by mouth daily, Disp: 90 tablet, Rfl: 3     multivitamin, therapeutic with minerals (MULTI-VITAMIN) TABS, Take 1 tablet by mouth daily, Disp: , Rfl:      psyllium (METAMUCIL/KONSYL) Packet, Take 1 packet by mouth daily, Disp: , Rfl:      sildenafil (REVATIO) 20 MG  tablet, TAKE 1 TABLET BY MOUTH THREE TIMES DAILY FOR PULMONARY HYPERTENSION. NEVER USE WITH NITROGLYCERIN, TERAZOSIN OR DOXAZOSIN, Disp: 90 tablet, Rfl: 1     simvastatin (ZOCOR) 20 MG tablet, Take 1 tablet (20 mg) by mouth At Bedtime, Disp: 90 tablet, Rfl: 3     tamsulosin (FLOMAX) 0.4 MG capsule, Take 1 capsule (0.4 mg) by mouth every evening, Disp: 90 capsule, Rfl: 3     ALLERGIES:  No Known Allergies     SOCIAL HISTORY:   Social History     Socioeconomic History     Marital status:      Spouse name: Ingrid     Number of children: 4     Years of education: Not on file     Highest education level: Not on file   Occupational History     Employer: [employer]   Tobacco Use     Smoking status: Former Smoker     Packs/day: 1.00     Years: 20.00     Pack years: 20.00     Quit date: 1981     Years since quittin.5     Smokeless tobacco: Former User     Types: Chew     Quit date: 1992     Tobacco comment: 1 tin per week   Vaping Use     Vaping Use: Never used   Substance and Sexual Activity     Alcohol use: Yes     Alcohol/week: 7.0 - 8.0 standard drinks     Types: 7 - 8 Standard drinks or equivalent per week     Comment: 1 beer daily     Drug use: No     Sexual activity: Yes     Partners: Female   Other Topics Concern     Parent/sibling w/ CABG, MI or angioplasty before 65F 55M? Not Asked   Social History Narrative     Not on file     Social Determinants of Health     Financial Resource Strain: Not on file   Food Insecurity: Not on file   Transportation Needs: Not on file   Physical Activity: Not on file   Stress: Not on file   Social Connections: Not on file   Intimate Partner Violence: Not on file   Housing Stability: Not on file        FAMILY HISTORY:   Family History   Problem Relation Age of Onset     C.A.D. Father         CHF     Heart Failure Father      Chronic Obstructive Pulmonary Disease Father      Cerebrovascular Disease Brother 61     Gastrointestinal Disease Brother         GI Bleed  "- colectomy     Hypertension Mother      Cardiovascular Mother 84        MI     Lipids Mother      Chronic Obstructive Pulmonary Disease Mother      No Known Problems Sister      No Known Problems Brother         EXAM:Vitals: /76   Ht 1.803 m (5' 11\")   Wt 97.5 kg (215 lb)   BMI 29.99 kg/m      General appearance: Patient is alert and fully cooperative with history & exam.  No sign of distress is noted during the visit.     Psychiatric: Affect is pleasant & appropriate.  Patient appears motivated to improve health.     Respiratory: Breathing is regular & unlabored while sitting.     HEENT: Hearing is intact to spoken word.  Speech is clear.  No gross evidence of visual impairment that would impact ambulation.     Dermatologic: Both borders of the left great toenail and the lateral border of the right great toenail are incurvated.  Localized redness and pain on palpation.  Right great toenail is thickened and presents with subungual debris.     Vascular: DP & PT pulses are intact & regular bilaterally.  No significant edema or varicosities noted.  CFT and skin temperature is normal to both lower extremities.     Neurologic: Lower extremity sensation is intact to light touch.  No evidence of weakness or contracture in the lower extremities.  No evidence of neuropathy.     Musculoskeletal: Patient is ambulatory without assistive device or brace.  No gross ankle deformity noted.  No foot or ankle joint effusion is noted.     ASSESSMENT:    Foot pain, bilateral  Ingrown nail of great toe of right foot  Ingrown nail of great toe of left foot  Onychomycosis of toenail     Medical Decision Making/Plan:  Reviewed patient's chart in [facility]. The potential causes and nature of an ingrown toenail were discussed with the patient.  We reviewed the natural history/prognosis of the condition and potential risks if no treatment is provided.      Treatment options discussed included conservative management (oral antibiotics, " soaking of foot, adequate width shoes)  as well as surgical management (partial or total nail removal).  The pros and cons of both forms of treatment were reviewed.      After thorough discussion and answering all questions, the patient elected to have the borders removed.  He will soak the foot twice a day for 2 weeks and apply antibiotic cream and Band-Aid.  He was started on Keflex due to his knee replacements to try to prevent infection.  All questions were answered to patient satisfaction and he will call with further questions or concerns.    Procedure:After verbal consent, the right big toe was anesthetized with 5cc's of 1% lidocaine plain. A tourniquet was applied to the toe. The lateral border was then raised from the nail bed and then cut the length of the nail.  The offending nail border was then removed.  T Bacitracin was applied to the nail bed.  The tourniquet was removed.  Bandage was applied to the toe.  The patient tolerated the procedure and anesthesia well.    Procedure 2: Exact same procedure done to the medial and lateral borders of the right great toe.    Patient risk factor: Patient is at low risk for infection.        Galina Jones DPM, Podiatry/Foot and Ankle Surgery        Again, thank you for allowing me to participate in the care of your patient.        Sincerely,        Galina Jones DPM, Podiatry/Foot and Ankle Surgery

## 2022-06-23 NOTE — PATIENT INSTRUCTIONS
Thank you for choosing [facility] / Foot & Ankle Surgery!    DR CORTEZ'S CLINIC:  [facility]   [address]   [place] [address]      TRIAGE LINE: [phone]  APPOINTMENTS: [phone]  RADIOLOGY: [phone]  SET UP SURGERY: [phone]  FAX NUMBER: [phone]  BILLING QUESTIONS: [phone]       Follow up: As needed      INGROWN TOENAILS  When a toenail is ingrown, it is curved and grows into the skin, usually at the nail borders (the sides of the nail). This  digging in  of the nail irritates the skin, often creating pain, redness, swelling, and warmth in the toe.  If an ingrown nail causes a break in the skin, bacteria may enter and cause an infection in the area, which is often marked by drainage and a foul odor. However, even if the toe isn t painful, red, swollen, or warm, a nail that curves downward into the skin can progress to an infection.  CAUSES:  Heredity: In many people, the tendency for ingrown toenails is inherited.   Trauma: Sometimes an ingrown toenail is the result of trauma, such as stubbing your toe, having an object fall on your toe, or engaging in activities that involve repeated pressure on the toes, such as kicking or running.   Improper Trimming:  The most common cause of ingrown toenails is cutting your nails too short. This encourages the skin next to the nail to fold over the nail.   Improperly Sized Footwear: Ingrown toenails can result from wearing socks and shoes that are tight or short.   Nail Conditions: Ingrown toenails can be caused by nail problems, such as fungal infections or losing a nail due to trauma.   TREATMENT: Sometimes initial treatment for ingrown toenails can be safely performed at home. However, home treatment is strongly discouraged if an infection is suspected, or for those who have medical conditions that put feet at high risk, such as diabetes, nerve damage in the foot, or poor circulation.  Home care: If you  don t have an infection or any of the above medical conditions, you can soak your foot in room-temperature water (adding Epsom s salt may be recommended by your doctor), and gently massage the side of the nail fold to help reduce the inflammation.  Avoid attempting  bathroom surgery.  Repeated cutting of the nail can cause the condition to worsen over time. If your symptoms fail to improve, it s time to see a foot and ankle surgeon.  Physician care: After examining the toe, the foot and ankle surgeon will select the treatment best suited for you. If an infection is present, an oral antibiotic may be prescribed.  Sometimes a minor surgical procedure, often performed in the office, will ease the pain and remove the offending nail. After applying a local anesthetic, the doctor removes part of the nail s side border. Some nails may become ingrown again, requiring removal of the nail root.  Following the nail procedure, a light bandage will be applied. Most people experience very little pain after surgery and may resume normal activity the next day. If your surgeon has prescribed an oral antibiotic, be sure to take all the medication, even if your symptoms have improved.  PREVENTION:  Proper Trimming: Cut toenails in a fairly straight line, and don t cut them too short. You should be able to get your fingernail under the sides and end of the nail.   Well-fitting Footwear: Don t wear shoes that are short or tight in the toe area. Avoid shoes that are loose, because they too cause pressure on the toes, especially when running or walking briskly.     INGROWN TOENAIL REMOVAL AFTERCARE   Go directly home and elevate the affected foot on one or two pillows for the remainder of the day/evening if possible. Your toe may stay numb anywhere from 2-8 hours.   Take Tylenol, ibuprofen or another anti-inflammatory as needed for pain.   Take antibiotic if that has been prescribed. Finish the entire prescribed antibiotic even if your  symptoms have improved.   The evening of the procedure, soak/wash the affected area in warm water (you may add Epsom salt) for 5 to 10 minutes. Do this twice a day for 2-4 weeks (6-8 weeks if you had phenol) (you may count showering/bathing as one soak).  After soaks, pat the area dry and then allow to airdry for a few minutes. Apply antibiotic ointment to the area and cover with 2 X 2 gauze and paper tape or band-aid.  You may pursue everyday activities as tolerated with either an open toe shoe or cut-out shoe as needed or you may wear regular shoes if no pain is noted.  Watch for any signs and symptoms of infection such as: redness, red streaks going up the foot/leg, swelling, pus or foul odor. Those that have had the phenol procedure, the toe will drain longer and will look like it is infected because it is a chemical burn.   Please call with questions.

## 2022-08-08 DIAGNOSIS — N52.9 ERECTILE DYSFUNCTION, UNSPECIFIED ERECTILE DYSFUNCTION TYPE: ICD-10-CM

## 2022-08-08 NOTE — LETTER
[facility] [place] [address]   [place] [id number] [phone]       August 15, 2022    Sundar German  [facility] [facility]  [place] [facility] [address]    To Andi:     We've been unsuccessful at reaching out to you. Your provider has sent in sildenafil to your pharmacy with instructions to no use within 12hrs of tamsulosin and watch for low blood pressure. You are due for your physical with fasting labs after November 29th, 2022.     Sincerely,        Liu Nolasco M.D./crc

## 2022-08-09 ENCOUNTER — TRANSFERRED RECORDS (OUTPATIENT)
Dept: HEALTH INFORMATION MANAGEMENT | Facility: CLINIC | Age: 72
End: 2022-08-09

## 2022-08-11 ENCOUNTER — TELEPHONE (OUTPATIENT)
Dept: FAMILY MEDICINE | Facility: CLINIC | Age: 72
End: 2022-08-11

## 2022-08-11 DIAGNOSIS — N52.9 ERECTILE DYSFUNCTION, UNSPECIFIED ERECTILE DYSFUNCTION TYPE: ICD-10-CM

## 2022-08-11 RX ORDER — SILDENAFIL CITRATE 20 MG/1
TABLET ORAL
Qty: 90 TABLET | Refills: 1 | Status: SHIPPED | OUTPATIENT
Start: 2022-08-11 | End: 2022-08-16

## 2022-08-11 NOTE — TELEPHONE ENCOUNTER
Routing refill request to provider for review/approval because:  Asbsence of alpha blockers on med list    Gamaliel BISHOP RN

## 2022-08-11 NOTE — TELEPHONE ENCOUNTER
PRIOR AUTHORIZATION DENIED    Medication: sildenafil (REVATIO) 20 MG tablet - EPA DENIED    Denial Date: 8/11/2022     Denial Rational: not covered for DX    Appeal Information:   Expedited fax

## 2022-08-11 NOTE — TELEPHONE ENCOUNTER
rx done, no use within 12 hrs of tamsulosin, watch for low blood pressure    cpx due after 11/29/2022

## 2022-08-12 NOTE — TELEPHONE ENCOUNTER
Attempt # 1    Called # [phone] - Left a non detailed VM to call back at [phone] and ask for any available Triage Nurse.    Could use GoodRx coupon. [facility] would be cheapest, $11 for 90 tablets.     Mariana Nguyen RN  [facility]

## 2022-08-16 RX ORDER — SILDENAFIL CITRATE 20 MG/1
TABLET ORAL
Qty: 90 TABLET | Refills: 1 | Status: SHIPPED | OUTPATIENT
Start: 2022-08-16 | End: 2023-02-01

## 2022-08-16 NOTE — TELEPHONE ENCOUNTER
Called and spoke with patient.     Advised of [place] denial and option of [email or web address]. 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 Rx sent to [facility] in [place].     Mariana Nguyen RN  [facility]

## 2022-10-09 ENCOUNTER — HEALTH MAINTENANCE LETTER (OUTPATIENT)
Age: 72
End: 2022-10-09

## 2022-12-01 ENCOUNTER — TRANSFERRED RECORDS (OUTPATIENT)
Dept: HEALTH INFORMATION MANAGEMENT | Facility: CLINIC | Age: 72
End: 2022-12-01

## 2022-12-06 ENCOUNTER — TRANSFERRED RECORDS (OUTPATIENT)
Dept: HEALTH INFORMATION MANAGEMENT | Facility: CLINIC | Age: 72
End: 2022-12-06

## 2023-01-31 ENCOUNTER — TRANSFERRED RECORDS (OUTPATIENT)
Dept: HEALTH INFORMATION MANAGEMENT | Facility: CLINIC | Age: 73
End: 2023-01-31
Payer: COMMERCIAL

## 2023-02-01 ENCOUNTER — OFFICE VISIT (OUTPATIENT)
Dept: FAMILY MEDICINE | Facility: CLINIC | Age: 73
End: 2023-02-01
Payer: COMMERCIAL

## 2023-02-01 VITALS
SYSTOLIC BLOOD PRESSURE: 118 MMHG | RESPIRATION RATE: 16 BRPM | DIASTOLIC BLOOD PRESSURE: 70 MMHG | HEIGHT: 71 IN | OXYGEN SATURATION: 98 % | WEIGHT: 227.4 LBS | HEART RATE: 62 BPM | BODY MASS INDEX: 31.84 KG/M2 | TEMPERATURE: 97.2 F

## 2023-02-01 DIAGNOSIS — L24.9 IRRITANT CONTACT DERMATITIS, UNSPECIFIED TRIGGER: ICD-10-CM

## 2023-02-01 DIAGNOSIS — D50.9 IRON DEFICIENCY ANEMIA, UNSPECIFIED IRON DEFICIENCY ANEMIA TYPE: ICD-10-CM

## 2023-02-01 DIAGNOSIS — N52.9 ERECTILE DYSFUNCTION, UNSPECIFIED ERECTILE DYSFUNCTION TYPE: ICD-10-CM

## 2023-02-01 DIAGNOSIS — G47.62 SLEEP RELATED LEG CRAMPS: ICD-10-CM

## 2023-02-01 DIAGNOSIS — Z12.5 SCREENING FOR PROSTATE CANCER: ICD-10-CM

## 2023-02-01 DIAGNOSIS — E66.811 CLASS 1 OBESITY WITH SERIOUS COMORBIDITY AND BODY MASS INDEX (BMI) OF 31.0 TO 31.9 IN ADULT, UNSPECIFIED OBESITY TYPE: ICD-10-CM

## 2023-02-01 DIAGNOSIS — Z91.09 ENVIRONMENTAL ALLERGIES: ICD-10-CM

## 2023-02-01 DIAGNOSIS — C43.59 MALIGNANT MELANOMA OF TORSO EXCLUDING BREAST (H): ICD-10-CM

## 2023-02-01 DIAGNOSIS — Z00.00 ROUTINE GENERAL MEDICAL EXAMINATION AT A HEALTH CARE FACILITY: Primary | ICD-10-CM

## 2023-02-01 DIAGNOSIS — R33.9 URINARY RETENTION: ICD-10-CM

## 2023-02-01 DIAGNOSIS — E78.5 HYPERLIPIDEMIA LDL GOAL <100: ICD-10-CM

## 2023-02-01 DIAGNOSIS — N18.2 CKD (CHRONIC KIDNEY DISEASE) STAGE 2, GFR 60-89 ML/MIN: ICD-10-CM

## 2023-02-01 DIAGNOSIS — Z12.11 SCREEN FOR COLON CANCER: ICD-10-CM

## 2023-02-01 DIAGNOSIS — B35.1 ONYCHOMYCOSIS OF TOENAIL: ICD-10-CM

## 2023-02-01 DIAGNOSIS — R73.03 PREDIABETES: ICD-10-CM

## 2023-02-01 DIAGNOSIS — B00.1 RECURRENT COLD SORES: ICD-10-CM

## 2023-02-01 DIAGNOSIS — M15.0 PRIMARY OSTEOARTHRITIS INVOLVING MULTIPLE JOINTS: ICD-10-CM

## 2023-02-01 DIAGNOSIS — N40.1 BENIGN PROSTATIC HYPERPLASIA WITH LOWER URINARY TRACT SYMPTOMS, SYMPTOM DETAILS UNSPECIFIED: ICD-10-CM

## 2023-02-01 DIAGNOSIS — Z00.00 MEDICARE ANNUAL WELLNESS VISIT, SUBSEQUENT: ICD-10-CM

## 2023-02-01 DIAGNOSIS — E03.9 ACQUIRED HYPOTHYROIDISM: ICD-10-CM

## 2023-02-01 DIAGNOSIS — Z51.81 MEDICATION MONITORING ENCOUNTER: ICD-10-CM

## 2023-02-01 LAB
ALBUMIN SERPL BCG-MCNC: 4.4 G/DL (ref 3.5–5.2)
ALBUMIN UR-MCNC: NEGATIVE MG/DL
ALP SERPL-CCNC: 49 U/L (ref 40–129)
ALT SERPL W P-5'-P-CCNC: 27 U/L (ref 10–50)
ANION GAP SERPL CALCULATED.3IONS-SCNC: 13 MMOL/L (ref 7–15)
APPEARANCE UR: CLEAR
AST SERPL W P-5'-P-CCNC: 30 U/L (ref 10–50)
BACTERIA #/AREA URNS HPF: ABNORMAL /HPF
BILIRUB SERPL-MCNC: 0.3 MG/DL
BILIRUB UR QL STRIP: NEGATIVE
BUN SERPL-MCNC: 13.1 MG/DL (ref 8–23)
CALCIUM SERPL-MCNC: 9.4 MG/DL (ref 8.8–10.2)
CHLORIDE SERPL-SCNC: 107 MMOL/L (ref 98–107)
CHOLEST SERPL-MCNC: 150 MG/DL
CK SERPL-CCNC: 78 U/L (ref 39–308)
COLOR UR AUTO: YELLOW
CREAT SERPL-MCNC: 0.82 MG/DL (ref 0.67–1.17)
CREAT UR-MCNC: 111 MG/DL
DEPRECATED HCO3 PLAS-SCNC: 23 MMOL/L (ref 22–29)
ERYTHROCYTE [DISTWIDTH] IN BLOOD BY AUTOMATED COUNT: 13.3 % (ref 10–15)
FERRITIN SERPL-MCNC: 222 NG/ML (ref 31–409)
GFR SERPL CREATININE-BSD FRML MDRD: >90 ML/MIN/1.73M2
GLUCOSE SERPL-MCNC: 109 MG/DL (ref 70–99)
GLUCOSE UR STRIP-MCNC: NEGATIVE MG/DL
HBA1C MFR BLD: 5.7 % (ref 0–5.6)
HCT VFR BLD AUTO: 39.6 % (ref 40–53)
HDLC SERPL-MCNC: 68 MG/DL
HGB BLD-MCNC: 13.2 G/DL (ref 13.3–17.7)
HGB UR QL STRIP: ABNORMAL
IRON BINDING CAPACITY (ROCHE): 311 UG/DL (ref 240–430)
IRON SATN MFR SERPL: 22 % (ref 15–46)
IRON SERPL-MCNC: 67 UG/DL (ref 61–157)
KETONES UR STRIP-MCNC: NEGATIVE MG/DL
LDLC SERPL CALC-MCNC: 67 MG/DL
LEUKOCYTE ESTERASE UR QL STRIP: NEGATIVE
MCH RBC QN AUTO: 33.6 PG (ref 26.5–33)
MCHC RBC AUTO-ENTMCNC: 33.3 G/DL (ref 31.5–36.5)
MCV RBC AUTO: 101 FL (ref 78–100)
MICROALBUMIN UR-MCNC: 18.1 MG/L
MICROALBUMIN/CREAT UR: 16.31 MG/G CR (ref 0–17)
NITRATE UR QL: NEGATIVE
NONHDLC SERPL-MCNC: 82 MG/DL
PH UR STRIP: 6 [PH] (ref 5–7)
PLATELET # BLD AUTO: 284 10E3/UL (ref 150–450)
POTASSIUM SERPL-SCNC: 4.4 MMOL/L (ref 3.4–5.3)
PROT SERPL-MCNC: 6.9 G/DL (ref 6.4–8.3)
PSA SERPL-MCNC: 0.71 NG/ML (ref 0–6.5)
RBC # BLD AUTO: 3.93 10E6/UL (ref 4.4–5.9)
RBC #/AREA URNS AUTO: ABNORMAL /HPF
SODIUM SERPL-SCNC: 143 MMOL/L (ref 136–145)
SP GR UR STRIP: 1.02 (ref 1–1.03)
SQUAMOUS #/AREA URNS AUTO: ABNORMAL /LPF
T4 FREE SERPL-MCNC: 1.38 NG/DL (ref 0.9–1.7)
TRIGL SERPL-MCNC: 74 MG/DL
TSH SERPL DL<=0.005 MIU/L-ACNC: 0.25 UIU/ML (ref 0.3–4.2)
UROBILINOGEN UR STRIP-ACNC: 0.2 E.U./DL
WBC # BLD AUTO: 5.8 10E3/UL (ref 4–11)
WBC #/AREA URNS AUTO: ABNORMAL /HPF

## 2023-02-01 PROCEDURE — 84439 ASSAY OF FREE THYROXINE: CPT | Performed by: FAMILY MEDICINE

## 2023-02-01 PROCEDURE — 80061 LIPID PANEL: CPT | Performed by: FAMILY MEDICINE

## 2023-02-01 PROCEDURE — 82043 UR ALBUMIN QUANTITATIVE: CPT | Performed by: FAMILY MEDICINE

## 2023-02-01 PROCEDURE — 82550 ASSAY OF CK (CPK): CPT | Performed by: FAMILY MEDICINE

## 2023-02-01 PROCEDURE — G0103 PSA SCREENING: HCPCS | Performed by: FAMILY MEDICINE

## 2023-02-01 PROCEDURE — 80053 COMPREHEN METABOLIC PANEL: CPT | Performed by: FAMILY MEDICINE

## 2023-02-01 PROCEDURE — 83540 ASSAY OF IRON: CPT | Performed by: FAMILY MEDICINE

## 2023-02-01 PROCEDURE — 83036 HEMOGLOBIN GLYCOSYLATED A1C: CPT | Performed by: FAMILY MEDICINE

## 2023-02-01 PROCEDURE — 84443 ASSAY THYROID STIM HORMONE: CPT | Performed by: FAMILY MEDICINE

## 2023-02-01 PROCEDURE — 36415 COLL VENOUS BLD VENIPUNCTURE: CPT | Performed by: FAMILY MEDICINE

## 2023-02-01 PROCEDURE — 81001 URINALYSIS AUTO W/SCOPE: CPT | Performed by: FAMILY MEDICINE

## 2023-02-01 PROCEDURE — 85027 COMPLETE CBC AUTOMATED: CPT | Performed by: FAMILY MEDICINE

## 2023-02-01 PROCEDURE — 82570 ASSAY OF URINE CREATININE: CPT | Performed by: FAMILY MEDICINE

## 2023-02-01 PROCEDURE — 83550 IRON BINDING TEST: CPT | Performed by: FAMILY MEDICINE

## 2023-02-01 PROCEDURE — 82728 ASSAY OF FERRITIN: CPT | Performed by: FAMILY MEDICINE

## 2023-02-01 PROCEDURE — G0439 PPPS, SUBSEQ VISIT: HCPCS | Performed by: FAMILY MEDICINE

## 2023-02-01 PROCEDURE — 99214 OFFICE O/P EST MOD 30 MIN: CPT | Mod: 25 | Performed by: FAMILY MEDICINE

## 2023-02-01 RX ORDER — TAMSULOSIN HYDROCHLORIDE 0.4 MG/1
0.4 CAPSULE ORAL DAILY
Qty: 90 CAPSULE | Refills: 3 | Status: SHIPPED | OUTPATIENT
Start: 2023-02-01 | End: 2024-03-26

## 2023-02-01 RX ORDER — PENCICLOVIR 10 MG/G
CREAM TOPICAL
Qty: 5 G | Refills: 3 | Status: SHIPPED | OUTPATIENT
Start: 2023-02-01

## 2023-02-01 RX ORDER — BETAMETHASONE DIPROPIONATE 0.5 MG/G
CREAM TOPICAL
COMMUNITY
Start: 2021-06-16 | End: 2023-02-01

## 2023-02-01 RX ORDER — LEVOCETIRIZINE DIHYDROCHLORIDE 5 MG/1
5 TABLET, FILM COATED ORAL EVERY EVENING
Qty: 90 TABLET | Refills: 3 | Status: SHIPPED | OUTPATIENT
Start: 2023-02-01

## 2023-02-01 RX ORDER — CICLOPIROX OLAMINE 7.7 MG/G
CREAM TOPICAL DAILY
Qty: 90 G | Refills: 3 | Status: SHIPPED | OUTPATIENT
Start: 2023-02-01 | End: 2024-03-26

## 2023-02-01 RX ORDER — IPRATROPIUM BROMIDE 42 UG/1
SPRAY, METERED NASAL
Qty: 45 ML | Refills: 3 | Status: SHIPPED | OUTPATIENT
Start: 2023-02-01

## 2023-02-01 RX ORDER — SILDENAFIL CITRATE 20 MG/1
TABLET ORAL
Qty: 90 TABLET | Refills: 3 | Status: SHIPPED | OUTPATIENT
Start: 2023-02-01 | End: 2023-07-01

## 2023-02-01 RX ORDER — IPRATROPIUM BROMIDE 42 UG/1
SPRAY, METERED NASAL
COMMUNITY
Start: 2022-11-26 | End: 2023-02-01

## 2023-02-01 RX ORDER — LEVOTHYROXINE SODIUM 200 UG/1
200 TABLET ORAL DAILY
Qty: 90 TABLET | Refills: 3 | Status: ON HOLD | OUTPATIENT
Start: 2023-02-01 | End: 2023-07-02

## 2023-02-01 RX ORDER — SIMVASTATIN 20 MG
20 TABLET ORAL AT BEDTIME
Qty: 90 TABLET | Refills: 3 | Status: SHIPPED | OUTPATIENT
Start: 2023-02-01 | End: 2024-02-26

## 2023-02-01 RX ORDER — FLUTICASONE PROPIONATE 50 MCG
1 SPRAY, SUSPENSION (ML) NASAL DAILY
Qty: 48 G | Refills: 3 | Status: SHIPPED | OUTPATIENT
Start: 2023-02-01 | End: 2024-03-26

## 2023-02-01 RX ORDER — LEVOCETIRIZINE DIHYDROCHLORIDE 5 MG/1
TABLET, FILM COATED ORAL
COMMUNITY
Start: 2023-01-30 | End: 2023-02-01

## 2023-02-01 RX ORDER — BETAMETHASONE DIPROPIONATE 0.5 MG/G
CREAM TOPICAL 2 TIMES DAILY
Qty: 45 G | Refills: 3 | Status: SHIPPED | OUTPATIENT
Start: 2023-02-01

## 2023-02-01 ASSESSMENT — ENCOUNTER SYMPTOMS
DIARRHEA: 0
FREQUENCY: 0
NAUSEA: 0
CONSTIPATION: 0
HEARTBURN: 0
PARESTHESIAS: 0
SHORTNESS OF BREATH: 0
DIZZINESS: 0
EYE PAIN: 0
PALPITATIONS: 0
HEADACHES: 0
HEMATURIA: 0
ABDOMINAL PAIN: 0
WEAKNESS: 0
JOINT SWELLING: 0
HEMATOCHEZIA: 0
NERVOUS/ANXIOUS: 0
DYSURIA: 0
MYALGIAS: 0
COUGH: 0
ARTHRALGIAS: 0
SORE THROAT: 0
FEVER: 0
CHILLS: 0

## 2023-02-01 ASSESSMENT — ACTIVITIES OF DAILY LIVING (ADL): CURRENT_FUNCTION: NO ASSISTANCE NEEDED

## 2023-02-01 NOTE — PROGRESS NOTES
"[facility] Andrea Escamilla is a 72 year old who presents for Preventive Visit.         Patient has been advised of split billing requirements and indicates understanding: Yes     Are you in the first 12 months of your Medicare coverage?  No    Healthy Habits:     In general, how would you rate your overall health?  Excellent    Frequency of exercise:  1 day/week    Duration of exercise:  N/A    Do you usually eat at least 4 servings of fruit and vegetables a day, include whole grains    & fiber and avoid regularly eating high fat or \"junk\" foods?  Yes    Taking medications regularly:  Yes    Medication side effects:  None    Ability to successfully perform activities of daily living:  No assistance needed    Home Safety:  No safety concerns identified    Hearing Impairment:  Difficulty following a conversation in a noisy restaurant or crowded room    In the past 6 months, have you been bothered by leaking of urine?  No    In general, how would you rate your overall mental or emotional health?  Excellent      PHQ-2 Total Score: 0    Additional concerns today:  No    Have you ever done Advance Care Planning? (For example, a Health Directive, POLST, or a discussion with a medical provider or your loved ones about your wishes): Yes, advance care planning is on file.     Fall risk  Fallen 2 or more times in the past year?: No  Any fall with injury in the past year?: No    Cognitive Screening   1) Repeat 3 items (Leader, Season, Table)    2) Clock draw: NORMAL  3) 3 item recall: Recalls 3 objects  Results: 3 items recalled: COGNITIVE IMPAIRMENT LESS LIKELY    Mini-CogTM Copyright TAMMIE Traylor. Licensed by the author for use in [facility]; reprinted with permission (nany@.[facility]). All rights reserved.      Reviewed and updated as needed this visit by clinical staff   Tobacco  Allergies  Meds  Problems  Med Hx  Surg Hx  Fam Hx          Reviewed and updated as needed this visit by " Provider                 Social History     Tobacco Use     Smoking status: Former     Packs/day: 1.00     Years: 20.00     Pack years: 20.00     Types: Cigarettes     Quit date: 1981     Years since quittin.1     Smokeless tobacco: Former     Types: Chew     Quit date: 1992     Tobacco comments:     1 tin per week   Substance Use Topics     Alcohol use: Yes     Alcohol/week: 7.0 - 8.0 standard drinks     Types: 7 - 8 Standard drinks or equivalent per week     Comment: 1 beer daily     If you drink alcohol do you typically have >3 drinks per day or >7 drinks per week? No    Alcohol Use 2023   Prescreen: >3 drinks/day or >7 drinks/week? No   Prescreen: >3 drinks/day or >7 drinks/week? -   No flowsheet data found.    Current providers sharing in care for this patient include:     Patient Care Team:  Liu Nolasco MD as PCP - General (Family Practice)  Liu Nolasco MD as Assigned PCP  Sundar Gandhi MD as MD (Urology)  Sabrina Milan RN as Registered Nurse  Galina Jones DPM, Podiatry/Foot and Ankle Surgery as Assigned Musculoskeletal Provider    The following health maintenance items are reviewed in Epic and correct as of today:  Health Maintenance   Topic Date Due     LIPID  2022     MICROALBUMIN  2022     PSA  2022     BMP  2023     TSH W/FREE T4 REFLEX  2023     COLORECTAL CANCER SCREENING  10/02/2023     MEDICARE ANNUAL WELLNESS VISIT  2024     ANNUAL REVIEW OF HM ORDERS  2024     FALL RISK ASSESSMENT  2024     ADVANCE CARE PLANNING  2028     DTAP/TDAP/TD IMMUNIZATION (3 - Td or Tdap) 2028     HEPATITIS C SCREENING  Completed     PHQ-2 (once per calendar year)  Completed     INFLUENZA VACCINE  Completed     Pneumococcal Vaccine: 65+ Years  Completed     URINALYSIS  Completed     ZOSTER IMMUNIZATION  Completed     AORTIC ANEURYSM SCREENING (SYSTEM ASSIGNED)  Completed     COVID-19 Vaccine  Completed     IPV IMMUNIZATION  Aged Out      MENINGITIS IMMUNIZATION  Aged Out     Review of Systems   Constitutional: Negative for chills and fever.   HENT: Negative for congestion, ear pain, hearing loss and sore throat.    Eyes: Negative for pain and visual disturbance.   Respiratory: Negative for cough and shortness of breath.    Cardiovascular: Negative for chest pain, palpitations and peripheral edema.   Gastrointestinal: Negative for abdominal pain, constipation, diarrhea, heartburn, hematochezia and nausea.   Genitourinary: Positive for impotence. Negative for dysuria, frequency, genital sores, hematuria, penile discharge and urgency.   Musculoskeletal: Negative for arthralgias, joint swelling and myalgias.   Skin: Negative for rash.   Neurological: Negative for dizziness, weakness, headaches and paresthesias.   Psychiatric/Behavioral: Negative for mood changes. The patient is not nervous/anxious.      Melanoma - Dermatology - Dr Sierra    Prediabetes    Lab Results   Component Value Date    A1C 5.7 02/01/2023    A1C 5.4 11/29/2021    A1C 5.2 11/04/2020    A1C 5.3 07/30/2020    A1C 5.3 02/28/2020    A1C 5.5 05/07/2019    A1C 5.6 03/02/2018     CKD 2    BP Readings from Last 3 Encounters:   02/01/23 118/70   06/23/22 126/76   11/29/21 130/78     Creatinine   Date Value Ref Range Status   03/25/2022 0.75 0.66 - 1.25 mg/dL Final   11/04/2020 0.78 0.66 - 1.25 mg/dL Final     GFR Estimate   Date Value Ref Range Status   03/25/2022 >90 >60 mL/min/1.73m2 Final     Comment:     Effective December 21, 2021 eGFRcr in adults is calculated using the 2021 CKD-EPI creatinine equation which includes age and gender (Toi et al., NEJM, DOI: 10.1056/KKVHse9163432)   11/04/2020 >90 >60 mL/min/[1.73_m2] Final     Comment:     Non  GFR Calc  Starting 12/18/2018, serum creatinine based estimated GFR (eGFR) will be   calculated using the Chronic Kidney Disease Epidemiology Collaboration   (CKD-EPI) equation.       Hypothyroid    TSH   Date Value Ref Range  Status   03/25/2022 0.26 (L) 0.40 - 4.00 mU/L Final   11/04/2020 2.36 0.40 - 4.00 mU/L Final     RAFIQ    Hemoglobin   Date Value Ref Range Status   02/01/2023 13.2 (L) 13.3 - 17.7 g/dL Final   11/29/2021 13.5 13.3 - 17.7 g/dL Final   11/04/2020 13.5 13.3 - 17.7 g/dL Final   07/30/2020 14.0 13.3 - 17.7 g/dL Final     Sleep related cramps - controlled with fluids    OA - stable    Health Maintenance     Colonoscopy:  Due 10/2023   FIT:  NA              PSA:  pending   DEXA:  NA    Health Maintenance Due   Topic Date Due     LIPID  11/29/2022     MICROALBUMIN  11/29/2022     PSA  11/29/2022       Current Problem List    Patient Active Problem List   Diagnosis     Allergic rhinitis     Acquired hypothyroidism     Hyperlipidemia LDL goal <100     Impaired memory     Sleep related leg cramps     Chronic sinusitis     S/P revision of total knee, left     BPH (benign prostatic hyperplasia)     OA (osteoarthritis)     Prediabetes     Class 1 obesity with serious comorbidity and body mass index (BMI) of 31.0 to 31.9 in adult, unspecified obesity type     h/o Malignant melanoma of torso excluding breast (H)     GI bleed     * * * SBE PROPHYLAXIS * * *     S/P total knee arthroplasty     Sensorineural hearing loss (SNHL) of both ears     CKD (chronic kidney disease) stage 2, GFR 60-89 ml/min     Iron deficiency anemia, unspecified iron deficiency anemia type       Past Medical History    Past Medical History:   Diagnosis Date     Abnormal glucose     A1C 1/06 =  6.3     Acquired hypothyroidism 10/11/2005     Problem list name updated by automated process. Provider to review     Allergic rhinitis, cause unspecified     mary spring time     Benign localized hyperplasia of prostate with urinary obstruction and other lower urinary tract symptoms (LUTS)(600.21)     slow stream, nocturia - Dr Malave     Chronic sinusitis 03/14/2014     Complication of anesthesia      Environmental allergies     AIT - Dr Weiss     History of blood  transfusion      Hyperlipidemia LDL goal <130 10/31/2010     hypogonadism     low testosterone at times      RAFIQ (iron deficiency anemia)      Impaired memory 02/11/2011    ?     Melanoma (H) 04/2018    Melanoma 0.7 mm depth, Levar III, superficial spreading, stage T1a     OA (osteoarthritis) total L knee 3/09    knees bother;; has had bilat arthroscopic     PONV (postoperative nausea and vomiting)      Prediabetes      Sensorineural hearing loss (SNHL) of both ears      Sleep related leg cramps 06/2012       Past Surgical History    Past Surgical History:   Procedure Laterality Date     ARTHROPLASTY KNEE Right 3/16/2020    Rt TKA - Dr DUY Dudley     ARTHROPLASTY REVISION KNEE Left 10/26/2015     ARTHROPLASTY REVISION KNEE - Dr Dudley     COLONOSCOPY  10/2/2013    diverticulosis - due 10 yrs (10/2023), initial 10/2003     CYSTOSCOPY  04/2016     ENDOSCOPIC SINUS SURGERY  3/17/2014    Bilateral Endoscopic Sinus Surgery - Dr Johnson     ESOPHAGOSCOPY, GASTROSCOPY, DUODENOSCOPY (EGD), COMBINED N/A 2/28/2019    Non bleeding gastric ulcers and duodenal ulcer. Recheck 3 months. Dr Sinclair     LASER REVOLIX PHOTOSELECTIVE VAPORIZATION PROSTATE N/A 4/6/2016    LASER REVOLIX / QUANTA PHOTOSELECTIVE VAPORIZATION PROSTATE - Dr Malave     ROTATOR CUFF REPAIR RT/LT Left 03/2012    left shoulder     SURGICAL HISTORY OF -   1996    tonsils and adenoids     SURGICAL HISTORY OF -  Left 03/2009    Lt TKA     SURGICAL HISTORY OF -  Bilateral     Rt Knee x 1 (meniscus, 1995), Lt x 3 (last 2009)     UPPER GI ENDOSCOPY  05/2019    normal     VASECTOMY Bilateral 1986     ZZHC REPAIR UMBILICAL KASI,5+Y/O,REDUC  2002       Current Medications    Current Outpatient Medications   Medication Sig Dispense Refill     ACE/ARB/ARNI NOT PRESCRIBED (INTENTIONAL) Please choose reason not prescribed from choices below.       betamethasone dipropionate (DIPROSONE) 0.05 % external cream Apply topically 2 times daily 45 g 3     ciclopirox (LOPROX) 0.77 % cream  Apply topically daily 90 g 3     fluticasone (FLONASE) 50 MCG/ACT nasal spray Spray 1 spray into both nostrils daily 48 g 3     ipratropium (ATROVENT) 0.06 % nasal spray USE 1 SPRAY IN EACH NOSTRIL UP TO FOUR TIMES DAILY AS NEEDED Strength: 0.06 % 45 mL 3     levocetirizine (XYZAL) 5 MG tablet Take 1 tablet (5 mg) by mouth every evening 90 tablet 3     levothyroxine (SYNTHROID/LEVOTHROID) 200 MCG tablet Take 1 tablet (200 mcg) by mouth daily 90 tablet 3     multivitamin w/minerals (THERA-VIT-M) tablet Take 1 tablet by mouth daily       penciclovir (DENAVIR) 1 % external cream Apply topically every 2 hours 5 g 3     psyllium (METAMUCIL/KONSYL) Packet Take 1 packet by mouth daily       sildenafil (REVATIO) 20 MG tablet 1-3 tablets 30-60 minutes prior to intercourse. NEVER USE WITH NITROGLYCERIN, TERAZOSIN OR DOXAZOSIN 90 tablet 3     simvastatin (ZOCOR) 20 MG tablet Take 1 tablet (20 mg) by mouth At Bedtime 90 tablet 3     tamsulosin (FLOMAX) 0.4 MG capsule Take 1 capsule (0.4 mg) by mouth daily 90 capsule 3       Allergies    No Known Allergies    Immunizations    Immunization History   Administered Date(s) Administered     COVID-19 Vaccine 18+ (Moderna) 04/01/2021, 04/29/2021, 11/29/2021     COVID-19 Vaccine Bivalent Booster 18+ (Moderna) 09/23/2022     Influenza (High Dose) 3 valent vaccine 10/24/2019, 09/28/2020     Influenza (IIV3) PF 10/01/2003, 10/21/2012, 11/19/2013, 09/14/2015     Influenza Vaccine 65+ (Fluzone HD) 09/28/2020, 10/05/2021, 09/23/2022     Influenza Vaccine >6 months (Alfuria,Fluzone) 11/19/2013, 09/15/2017, 10/01/2018, 10/24/2019     Pneumo Conj 13-V (2010&after) 01/12/2016     Pneumococcal 23 valent 11/21/2003, 03/02/2018     TD (ADULT, 7+) 03/02/2018     TDAP Vaccine (Adacel) 10/06/2008     Zoster vaccine recombinant adjuvanted (SHINGRIX) 05/17/2019, 09/03/2019     Zoster vaccine, live 11/19/2013       Family History    Family History   Problem Relation Age of Onset     C.A.D. Father          CHF     Heart Failure Father      Chronic Obstructive Pulmonary Disease Father      Cerebrovascular Disease Brother 61     Gastrointestinal Disease Brother         GI Bleed - colectomy     Hypertension Mother      Cardiovascular Mother 84        MI     Lipids Mother      Chronic Obstructive Pulmonary Disease Mother      No Known Problems Sister      No Known Problems Brother        Social History    Social History     Socioeconomic History     Marital status:      Spouse name: Ingrid     Number of children: 4     Years of education: Not on file     Highest education level: Not on file   Occupational History     Employer: [employer]   Tobacco Use     Smoking status: Former     Packs/day: 1.00     Years: 20.00     Pack years: 20.00     Types: Cigarettes     Quit date: 1981     Years since quittin.1     Smokeless tobacco: Former     Types: Chew     Quit date: 1992     Tobacco comments:     1 tin per week   Vaping Use     Vaping Use: Never used   Substance and Sexual Activity     Alcohol use: Yes     Alcohol/week: 7.0 - 8.0 standard drinks     Types: 7 - 8 Standard drinks or equivalent per week     Comment: 1 beer daily     Drug use: No     Sexual activity: Yes     Partners: Female   Other Topics Concern     Parent/sibling w/ CABG, MI or angioplasty before 65F 55M? Not Asked   Social History Narrative     Not on file     Social Determinants of Health     Financial Resource Strain: Not on file   Food Insecurity: Not on file   Transportation Needs: Not on file   Physical Activity: Not on file   Stress: Not on file   Social Connections: Not on file   Intimate Partner Violence: Not on file   Housing Stability: Not on file       ROS    CONSTITUTIONAL: NEGATIVE for fever, chills, change in weight  INTEGUMENTARY/SKIN: NEGATIVE for worrisome rashes, moles or lesions  EYES: NEGATIVE for vision changes or irritation  ENT/MOUTH: NEGATIVE for ear, mouth and throat problems  RESP: NEGATIVE for significant  "cough or SOB  BREAST: NEGATIVE for masses, tenderness or discharge  CV: NEGATIVE for chest pain, palpitations or peripheral edema  GI: NEGATIVE for nausea, abdominal pain, heartburn, or change in bowel habits  : NEGATIVE for frequency, dysuria, or hematuria  MUSCULOSKELETAL: NEGATIVE for significant arthralgias or myalgia  NEURO: NEGATIVE for weakness, dizziness or paresthesias  ENDOCRINE: NEGATIVE for temperature intolerance, skin/hair changes  HEME: NEGATIVE for bleeding problems  PSYCHIATRIC: NEGATIVE for changes in mood or affect    OBJECTIVE    /70   Pulse 62   Temp 97.2  F (36.2  C) (Tympanic)   Resp 16   Ht 1.803 m (5' 11\")   Wt 103.1 kg (227 lb 6.4 oz)   SpO2 98%   BMI 31.72 kg/m      EXAM:    GENERAL: healthy, alert and no distress  EYES: Eyes grossly normal to inspection, PERRL and conjunctivae and sclerae normal  HENT: ear canals and TM's normal, nose and mouth without ulcers or lesions  NECK: no adenopathy, no asymmetry, masses, or scars and thyroid normal to palpation  RESP: lungs clear to auscultation - no rales, rhonchi or wheezes  CV: regular rate and rhythm, normal S1 S2, no S3 or S4, no murmur, click or rub, no peripheral edema and peripheral pulses strong  ABDOMEN: soft, nontender, no hepatosplenomegaly, no masses and bowel sounds normal   (male): pt declines  RECTAL: pt  declines  MS: no gross musculoskeletal defects noted, no edema  SKIN: no suspicious lesions or rashes  NEURO: Normal strength and tone, mentation intact and speech normal  PSYCH: mentation appears normal, affect normal/bright  LYMPH: no cervical, supraclavicular, axillary, or inguinal adenopathy    DIAGNOSTICS/PROCEDURES    Pending    ASSESSMENT      ICD-10-CM    1. Routine general medical examination at a health care facility  Z00.00 Comprehensive metabolic panel     Lipid panel reflex to direct LDL Fasting     CBC with platelets     CK total     UA reflex to Microscopic and Culture     Albumin Random Urine " Quantitative with Creat Ratio     Prostate Specific Antigen Screen     Hemoglobin A1c     TSH     T4, free     Iron and iron binding capacity     Ferritin     Comprehensive metabolic panel     Lipid panel reflex to direct LDL Fasting     CBC with platelets     CK total     UA reflex to Microscopic and Culture     Albumin Random Urine Quantitative with Creat Ratio     Prostate Specific Antigen Screen     Hemoglobin A1c     TSH     T4, free     Iron and iron binding capacity     Ferritin     Urine Microscopic     REVIEW OF HEALTH MAINTENANCE PROTOCOL ORDERS     CANCELED: Fecal colorectal cancer screen FIT     CANCELED: Fecal colorectal cancer screen FIT      2. Medicare annual wellness visit, subsequent  Z00.00 Comprehensive metabolic panel     Lipid panel reflex to direct LDL Fasting     CBC with platelets     CK total     UA reflex to Microscopic and Culture     Albumin Random Urine Quantitative with Creat Ratio     Prostate Specific Antigen Screen     Hemoglobin A1c     TSH     T4, free     Iron and iron binding capacity     Ferritin     Comprehensive metabolic panel     Lipid panel reflex to direct LDL Fasting     CBC with platelets     CK total     UA reflex to Microscopic and Culture     Albumin Random Urine Quantitative with Creat Ratio     Prostate Specific Antigen Screen     Hemoglobin A1c     TSH     T4, free     Iron and iron binding capacity     Ferritin     Urine Microscopic     REVIEW OF HEALTH MAINTENANCE PROTOCOL ORDERS     CANCELED: Fecal colorectal cancer screen FIT     CANCELED: Fecal colorectal cancer screen FIT      3. h/o Malignant melanoma of torso excluding breast (H)  C43.59 Comprehensive metabolic panel     CBC with platelets     Comprehensive metabolic panel     CBC with platelets     OFFICE/OUTPT VISIT,EST,LEVL IV     REVIEW OF HEALTH MAINTENANCE PROTOCOL ORDERS      4. Prediabetes  R73.03 Comprehensive metabolic panel     UA reflex to Microscopic and Culture     Albumin Random Urine  Quantitative with Creat Ratio     Hemoglobin A1c     Comprehensive metabolic panel     UA reflex to Microscopic and Culture     Albumin Random Urine Quantitative with Creat Ratio     Hemoglobin A1c     Urine Microscopic     OFFICE/OUTPT VISIT,EST,LEVL IV     simvastatin (ZOCOR) 20 MG tablet     ACE/ARB/ARNI NOT PRESCRIBED (INTENTIONAL)     REVIEW OF HEALTH MAINTENANCE PROTOCOL ORDERS      5. CKD (chronic kidney disease) stage 2, GFR 60-89 ml/min  N18.2 Comprehensive metabolic panel     CBC with platelets     UA reflex to Microscopic and Culture     Albumin Random Urine Quantitative with Creat Ratio     Comprehensive metabolic panel     CBC with platelets     UA reflex to Microscopic and Culture     Albumin Random Urine Quantitative with Creat Ratio     Urine Microscopic     OFFICE/OUTPT VISIT,EST,LEVL IV     REVIEW OF HEALTH MAINTENANCE PROTOCOL ORDERS      6. Hyperlipidemia LDL goal <100  E78.5 Comprehensive metabolic panel     Lipid panel reflex to direct LDL Fasting     CK total     Comprehensive metabolic panel     Lipid panel reflex to direct LDL Fasting     CK total     OFFICE/OUTPT VISIT,EST,LEVL IV     simvastatin (ZOCOR) 20 MG tablet     REVIEW OF HEALTH MAINTENANCE PROTOCOL ORDERS      7. Acquired hypothyroidism  E03.9 TSH     T4, free     TSH     T4, free     OFFICE/OUTPT VISIT,EST,LEVL IV     levothyroxine (SYNTHROID/LEVOTHROID) 200 MCG tablet     REVIEW OF HEALTH MAINTENANCE PROTOCOL ORDERS      8. Iron deficiency anemia, unspecified iron deficiency anemia type  D50.9 CBC with platelets     Iron and iron binding capacity     Ferritin     CBC with platelets     Iron and iron binding capacity     Ferritin     OFFICE/OUTPT VISIT,EST,LEVL IV     REVIEW OF HEALTH MAINTENANCE PROTOCOL ORDERS      9. Primary osteoarthritis involving multiple joints  M15.9 OFFICE/OUTPT VISIT,EST,LEVL IV     REVIEW OF HEALTH MAINTENANCE PROTOCOL ORDERS      10. Sleep related leg cramps  G47.62 OFFICE/OUTPT VISIT,EST,LEVL IV      REVIEW OF HEALTH MAINTENANCE PROTOCOL ORDERS      11. Environmental allergies  Z91.09 fluticasone (FLONASE) 50 MCG/ACT nasal spray     ipratropium (ATROVENT) 0.06 % nasal spray     levocetirizine (XYZAL) 5 MG tablet     REVIEW OF HEALTH MAINTENANCE PROTOCOL ORDERS      12. Onychomycosis of toenail  B35.1 ciclopirox (LOPROX) 0.77 % cream     REVIEW OF HEALTH MAINTENANCE PROTOCOL ORDERS      13. Irritant contact dermatitis, unspecified trigger  L24.9 betamethasone dipropionate (DIPROSONE) 0.05 % external cream     REVIEW OF HEALTH MAINTENANCE PROTOCOL ORDERS      14. Recurrent cold sores  B00.1 penciclovir (DENAVIR) 1 % external cream     REVIEW OF HEALTH MAINTENANCE PROTOCOL ORDERS      15. Erectile dysfunction, unspecified erectile dysfunction type  N52.9 sildenafil (REVATIO) 20 MG tablet     REVIEW OF HEALTH MAINTENANCE PROTOCOL ORDERS      16. Urinary retention  R33.9 tamsulosin (FLOMAX) 0.4 MG capsule     REVIEW OF HEALTH MAINTENANCE PROTOCOL ORDERS      17. Benign prostatic hyperplasia with lower urinary tract symptoms, symptom details unspecified  N40.1 Prostate Specific Antigen Screen     Prostate Specific Antigen Screen     OFFICE/OUTPT VISIT,EST,LEVL IV     tamsulosin (FLOMAX) 0.4 MG capsule     REVIEW OF HEALTH MAINTENANCE PROTOCOL ORDERS      18. Class 1 obesity with serious comorbidity and body mass index (BMI) of 31.0 to 31.9 in adult, unspecified obesity type  E66.9 OFFICE/OUTPT VISIT,EST,LEVL IV    Z68.31 REVIEW OF HEALTH MAINTENANCE PROTOCOL ORDERS      19. Screening for prostate cancer  Z12.5 Prostate Specific Antigen Screen     Prostate Specific Antigen Screen     OFFICE/OUTPT VISIT,EST,LEVL IV     REVIEW OF HEALTH MAINTENANCE PROTOCOL ORDERS      20. Screen for colon cancer  Z12.11 OFFICE/OUTPT VISIT,EST,LEVL IV     REVIEW OF HEALTH MAINTENANCE PROTOCOL ORDERS     CANCELED: Fecal colorectal cancer screen FIT     CANCELED: Fecal colorectal cancer screen FIT      21. Medication monitoring encounter   Z51.81 Comprehensive metabolic panel     Lipid panel reflex to direct LDL Fasting     CBC with platelets     CK total     UA reflex to Microscopic and Culture     Albumin Random Urine Quantitative with Creat Ratio     Hemoglobin A1c     TSH     T4, free     Iron and iron binding capacity     Ferritin     Comprehensive metabolic panel     Lipid panel reflex to direct LDL Fasting     CBC with platelets     CK total     UA reflex to Microscopic and Culture     Albumin Random Urine Quantitative with Creat Ratio     Hemoglobin A1c     TSH     T4, free     Iron and iron binding capacity     Ferritin     Urine Microscopic     OFFICE/OUTPT VISIT,EST,LEVL IV     levothyroxine (SYNTHROID/LEVOTHROID) 200 MCG tablet     REVIEW OF HEALTH MAINTENANCE PROTOCOL ORDERS          PLAN    Discussed treatment/modality options, including risk and benefits, he desires:    advised alcohol consumption 1oz per day or less, advised aspirin 81 mg po daily, advised 1 multivitamin per day, advised calcium 1493-5655 mg/d and Vitamin D 800-1200 IU/d, advised dentist every 6 months, advised diet, exercise, and weight loss, advised opthalmologist every 1-2 years, advised self testicular exam q month, further health care maintenance, further lab(s), immunization(s), medication refill(s) and observation    Discussed controversies surrounding PSA. Specifically reviewed 2017 USPSTF findings recommending discussion of PSA testing for men ages 55-69.  Reviewed findings of the  Randomized Study of Screening for Prostate Cancer which showed a 30% reduction in advanced stage prostate cancer and a 20% reduction in death rate from prostate cancer in this age group. PSA-based screening may prevent up to 2 deaths and up to 3 cases of metastatic disease per 1,000 men screened over 13 years.    We've elected to do PSA this year after discussing these controversies.    All diagnosis above reviewed and noted above, otherwise stable.      See [facility] orders  "for further details.      1) med refills    2) labs pending    3) immunizations - advised prevnar 20    4) colonoscopy 10/2023    5) Dr Sierra    Return in about 1 year (around 2/1/2024) for Annual Wellness Visit, Complete Physical, Medication Recheck Visit, Follow Up Chronic.    Health Maintenance Due   Topic Date Due     LIPID  11/29/2022     MICROALBUMIN  11/29/2022     PSA  11/29/2022       COUNSELING    Reviewed preventive health counseling, as reflected in patient instructions    BP Readings from Last 1 Encounters:   02/01/23 118/70     Estimated body mass index is 31.72 kg/m  as calculated from the following:    Height as of this encounter: 1.803 m (5' 11\").    Weight as of this encounter: 103.1 kg (227 lb 6.4 oz).      Weight management plan: diet and exercise     reports that he quit smoking about 42 years ago. He has a 20.00 pack-year smoking history. He quit smokeless tobacco use about 31 years ago.  His smokeless tobacco use included chew.      Counseling Resources:    ATP IV Guidelines  Pooled Cohorts Equation Calculator  FRAX Risk Assessment  ICSI Preventive Guidelines  Dietary Guidelines for Americans, 2010  USDA's MyPlate  ASA Prophylaxis  Lung CA Screening           Liu Nolasco MD, FAAFP     [facility]  [address] [place] [id number]  [email or web address]   Office: [phone]  Fax: [phone]  Pager: [phone]     Identified Health Risks:  "

## 2023-02-01 NOTE — PATIENT INSTRUCTIONS
Patient Education   Personalized Prevention Plan  You are due for the preventive services outlined below.  Your care team is available to assist you in scheduling these services.  If you have already completed any of these items, please share that information with your care team to update in your medical record.  Health Maintenance Due   Topic Date Due     Annual Wellness Visit  11/29/2022     Cholesterol Lab  11/29/2022     Kidney Microalbumin Urine Test  11/29/2022     Prostate Test  11/29/2022     ANNUAL REVIEW OF HM ORDERS  11/29/2022

## 2023-02-03 ENCOUNTER — TELEPHONE (OUTPATIENT)
Dept: FAMILY MEDICINE | Facility: CLINIC | Age: 73
End: 2023-02-03
Payer: COMMERCIAL

## 2023-02-03 NOTE — TELEPHONE ENCOUNTER
Reason for Call:  Form, our goal is to have forms completed with 72 hours, however, some forms may require a visit or additional information.    Type of letter, form or note:  medical    Who is the form from?: [facility] (if other please explain)    Where did the form come from: form was faxed in    What clinic location was the form placed at?: [facility]    Where the form was placed: TC Box/Folder    What number is listed as a contact on the form?: fax [phone]       Additional comments: Rx [phone]    Call taken on 2/3/2023 at 12:17 PM by Fred Berry

## 2023-02-03 NOTE — TELEPHONE ENCOUNTER
Prior Authorization Retail Medication Request    Medication/Dose: Sildenafil 20mg tab  ICD code (if different than what is on RX):    Previously Tried and Failed:    Rationale:      Insurance Name:  In chart  Insurance ID:  In chart     Plan doers not cover. Please advise    Pharmacy Information (if different than what is on RX)  Name:  [facility]   Phone:  [phone]    Jj Mccall

## 2023-02-08 NOTE — TELEPHONE ENCOUNTER
Central Prior Authorization Team   Phone: [phone]      PA Initiation    Medication: sildenafil (REVATIO) 20 MG tablet - PA INITIATED  Insurance Company: Mar ([employer]) - Phone [phone] Fax [phone]  Pharmacy Filling the Rx: [id number] - [place], [place] - [address]  Filling Pharmacy Phone: [phone]  Filling Pharmacy Fax:    Start Date: 2/8/2023

## 2023-02-08 NOTE — TELEPHONE ENCOUNTER
PRIOR AUTHORIZATION DENIED    Medication: sildenafil (REVATIO) 20 MG tablet - PA DENIED    Denial Date: 2/8/2023    Denial Rational: DRUGS FOR TREATMENT OF SEXUALY DYSFUNCTION ARE EXCLUDED FROM COVERAGE      Appeal Information: IF PROVIDER WOULD LIKE TO APPEAL THIS DECISION PLEASE PROVIDE PA TEAM WITH LETTER OF MEDICAL NECESSITY

## 2023-02-13 DIAGNOSIS — R31.29 MICROSCOPIC HEMATURIA: Primary | ICD-10-CM

## 2023-02-13 NOTE — TELEPHONE ENCOUNTER
Attempt # 1    Called #   Telephone Information:   Mobile [phone]       Left a non detailed VM with a wife     Neris Laguerre RN, BSN  [facility]

## 2023-02-13 NOTE — TELEPHONE ENCOUNTER
Patient calls back.     Advised of PA denial. Will use [employer]. Patient stated an understanding and agreed with plan.     FRANCISCO RODRIGUEZ RN on 2/13/2023 at 3:24 PM   [facility]

## 2023-07-01 ENCOUNTER — APPOINTMENT (OUTPATIENT)
Dept: MRI IMAGING | Facility: CLINIC | Age: 73
End: 2023-07-01
Attending: EMERGENCY MEDICINE
Payer: COMMERCIAL

## 2023-07-01 ENCOUNTER — HOSPITAL ENCOUNTER (OUTPATIENT)
Facility: CLINIC | Age: 73
Setting detail: OBSERVATION
Discharge: HOME | End: 2023-07-02
Attending: EMERGENCY MEDICINE | Admitting: HOSPITALIST
Payer: COMMERCIAL

## 2023-07-01 ENCOUNTER — APPOINTMENT (OUTPATIENT)
Dept: CT IMAGING | Facility: CLINIC | Age: 73
End: 2023-07-01
Attending: EMERGENCY MEDICINE
Payer: COMMERCIAL

## 2023-07-01 DIAGNOSIS — R42 DIZZINESS: Primary | ICD-10-CM

## 2023-07-01 DIAGNOSIS — F10.10 ALCOHOL ABUSE: ICD-10-CM

## 2023-07-01 DIAGNOSIS — E03.9 HYPOTHYROIDISM, UNSPECIFIED TYPE: ICD-10-CM

## 2023-07-01 DIAGNOSIS — R00.1 BRADYCARDIA: ICD-10-CM

## 2023-07-01 LAB
ALBUMIN SERPL BCG-MCNC: 4.1 G/DL (ref 3.5–5.2)
ALP SERPL-CCNC: 54 U/L (ref 40–129)
ALT SERPL W P-5'-P-CCNC: 29 U/L (ref 0–70)
ANION GAP SERPL CALCULATED.3IONS-SCNC: 10 MMOL/L (ref 7–15)
AST SERPL W P-5'-P-CCNC: 32 U/L (ref 0–45)
BASOPHILS # BLD AUTO: 0 10E3/UL (ref 0–0.2)
BASOPHILS NFR BLD AUTO: 1 %
BILIRUB SERPL-MCNC: 0.3 MG/DL
BUN SERPL-MCNC: 8.7 MG/DL (ref 8–23)
CALCIUM SERPL-MCNC: 9.2 MG/DL (ref 8.8–10.2)
CHLORIDE SERPL-SCNC: 108 MMOL/L (ref 98–107)
CREAT SERPL-MCNC: 0.67 MG/DL (ref 0.67–1.17)
DEPRECATED HCO3 PLAS-SCNC: 23 MMOL/L (ref 22–29)
EOSINOPHIL # BLD AUTO: 0.2 10E3/UL (ref 0–0.7)
EOSINOPHIL NFR BLD AUTO: 5 %
ERYTHROCYTE [DISTWIDTH] IN BLOOD BY AUTOMATED COUNT: 12.9 % (ref 10–15)
ETHANOL SERPL-MCNC: <0.01 G/DL
GFR SERPL CREATININE-BSD FRML MDRD: >90 ML/MIN/1.73M2
GLUCOSE SERPL-MCNC: 118 MG/DL (ref 70–99)
HCT VFR BLD AUTO: 39.4 % (ref 40–53)
HGB BLD-MCNC: 12.9 G/DL (ref 13.3–17.7)
HOLD SPECIMEN: NORMAL
IMM GRANULOCYTES # BLD: 0 10E3/UL
IMM GRANULOCYTES NFR BLD: 0 %
LACTATE SERPL-SCNC: 1 MMOL/L (ref 0.7–2)
LIPASE SERPL-CCNC: 41 U/L (ref 13–60)
LYMPHOCYTES # BLD AUTO: 1.4 10E3/UL (ref 0.8–5.3)
LYMPHOCYTES NFR BLD AUTO: 34 %
MCH RBC QN AUTO: 33.3 PG (ref 26.5–33)
MCHC RBC AUTO-ENTMCNC: 32.7 G/DL (ref 31.5–36.5)
MCV RBC AUTO: 102 FL (ref 78–100)
MONOCYTES # BLD AUTO: 0.4 10E3/UL (ref 0–1.3)
MONOCYTES NFR BLD AUTO: 10 %
NEUTROPHILS # BLD AUTO: 2 10E3/UL (ref 1.6–8.3)
NEUTROPHILS NFR BLD AUTO: 50 %
NRBC # BLD AUTO: 0 10E3/UL
NRBC BLD AUTO-RTO: 0 /100
NT-PROBNP SERPL-MCNC: 97 PG/ML (ref 0–900)
PLATELET # BLD AUTO: 238 10E3/UL (ref 150–450)
POTASSIUM SERPL-SCNC: 4.1 MMOL/L (ref 3.4–5.3)
PROT SERPL-MCNC: 6.6 G/DL (ref 6.4–8.3)
RBC # BLD AUTO: 3.87 10E6/UL (ref 4.4–5.9)
SODIUM SERPL-SCNC: 141 MMOL/L (ref 136–145)
T4 FREE SERPL-MCNC: 1.43 NG/DL (ref 0.9–1.7)
TROPONIN T SERPL HS-MCNC: 14 NG/L
TSH SERPL DL<=0.005 MIU/L-ACNC: 0.05 UIU/ML (ref 0.3–4.2)
WBC # BLD AUTO: 4 10E3/UL (ref 4–11)

## 2023-07-01 PROCEDURE — 80053 COMPREHEN METABOLIC PANEL: CPT | Performed by: EMERGENCY MEDICINE

## 2023-07-01 PROCEDURE — 99223 1ST HOSP IP/OBS HIGH 75: CPT | Performed by: NURSE PRACTITIONER

## 2023-07-01 PROCEDURE — 96374 THER/PROPH/DIAG INJ IV PUSH: CPT | Mod: 59

## 2023-07-01 PROCEDURE — 83690 ASSAY OF LIPASE: CPT | Performed by: EMERGENCY MEDICINE

## 2023-07-01 PROCEDURE — 250N000011 HC RX IP 250 OP 636: Performed by: EMERGENCY MEDICINE

## 2023-07-01 PROCEDURE — 70450 CT HEAD/BRAIN W/O DYE: CPT

## 2023-07-01 PROCEDURE — 83880 ASSAY OF NATRIURETIC PEPTIDE: CPT | Performed by: EMERGENCY MEDICINE

## 2023-07-01 PROCEDURE — 250N000013 HC RX MED GY IP 250 OP 250 PS 637: Performed by: NURSE PRACTITIONER

## 2023-07-01 PROCEDURE — 258N000003 HC RX IP 258 OP 636: Performed by: NURSE PRACTITIONER

## 2023-07-01 PROCEDURE — 70496 CT ANGIOGRAPHY HEAD: CPT

## 2023-07-01 PROCEDURE — 82077 ASSAY SPEC XCP UR&BREATH IA: CPT | Performed by: EMERGENCY MEDICINE

## 2023-07-01 PROCEDURE — 85025 COMPLETE CBC W/AUTO DIFF WBC: CPT | Performed by: EMERGENCY MEDICINE

## 2023-07-01 PROCEDURE — G0378 HOSPITAL OBSERVATION PER HR: HCPCS

## 2023-07-01 PROCEDURE — 255N000002 HC RX 255 OP 636: Performed by: EMERGENCY MEDICINE

## 2023-07-01 PROCEDURE — 70553 MRI BRAIN STEM W/O & W/DYE: CPT

## 2023-07-01 PROCEDURE — 84439 ASSAY OF FREE THYROXINE: CPT | Performed by: EMERGENCY MEDICINE

## 2023-07-01 PROCEDURE — A9585 GADOBUTROL INJECTION: HCPCS | Performed by: EMERGENCY MEDICINE

## 2023-07-01 PROCEDURE — 84484 ASSAY OF TROPONIN QUANT: CPT | Performed by: EMERGENCY MEDICINE

## 2023-07-01 PROCEDURE — 258N000003 HC RX IP 258 OP 636: Performed by: EMERGENCY MEDICINE

## 2023-07-01 PROCEDURE — 36415 COLL VENOUS BLD VENIPUNCTURE: CPT | Performed by: EMERGENCY MEDICINE

## 2023-07-01 PROCEDURE — 84443 ASSAY THYROID STIM HORMONE: CPT | Performed by: EMERGENCY MEDICINE

## 2023-07-01 PROCEDURE — 83605 ASSAY OF LACTIC ACID: CPT | Performed by: EMERGENCY MEDICINE

## 2023-07-01 PROCEDURE — 70498 CT ANGIOGRAPHY NECK: CPT

## 2023-07-01 PROCEDURE — 99285 EMERGENCY DEPT VISIT HI MDM: CPT | Mod: 25

## 2023-07-01 PROCEDURE — 93005 ELECTROCARDIOGRAM TRACING: CPT

## 2023-07-01 PROCEDURE — 250N000013 HC RX MED GY IP 250 OP 250 PS 637: Performed by: EMERGENCY MEDICINE

## 2023-07-01 PROCEDURE — 96361 HYDRATE IV INFUSION ADD-ON: CPT

## 2023-07-01 RX ORDER — POLYETHYLENE GLYCOL 3350 17 G/17G
17 POWDER, FOR SOLUTION ORAL DAILY PRN
Status: DISCONTINUED | OUTPATIENT
Start: 2023-07-01 | End: 2023-07-02 | Stop reason: HOSPADM

## 2023-07-01 RX ORDER — AMOXICILLIN 250 MG
2 CAPSULE ORAL 2 TIMES DAILY PRN
Status: DISCONTINUED | OUTPATIENT
Start: 2023-07-01 | End: 2023-07-02 | Stop reason: HOSPADM

## 2023-07-01 RX ORDER — SIMVASTATIN 20 MG
20 TABLET ORAL AT BEDTIME
Status: DISCONTINUED | OUTPATIENT
Start: 2023-07-01 | End: 2023-07-02 | Stop reason: HOSPADM

## 2023-07-01 RX ORDER — TAMSULOSIN HYDROCHLORIDE 0.4 MG/1
0.4 CAPSULE ORAL DAILY
Status: DISCONTINUED | OUTPATIENT
Start: 2023-07-02 | End: 2023-07-02 | Stop reason: HOSPADM

## 2023-07-01 RX ORDER — MECLIZINE HYDROCHLORIDE 25 MG/1
25 TABLET ORAL ONCE
Status: COMPLETED | OUTPATIENT
Start: 2023-07-01 | End: 2023-07-01

## 2023-07-01 RX ORDER — LIDOCAINE 40 MG/G
CREAM TOPICAL
Status: DISCONTINUED | OUTPATIENT
Start: 2023-07-01 | End: 2023-07-02 | Stop reason: HOSPADM

## 2023-07-01 RX ORDER — GADOBUTROL 604.72 MG/ML
10 INJECTION INTRAVENOUS ONCE
Status: COMPLETED | OUTPATIENT
Start: 2023-07-01 | End: 2023-07-01

## 2023-07-01 RX ORDER — LORAZEPAM 2 MG/ML
0.5 INJECTION INTRAMUSCULAR ONCE
Status: COMPLETED | OUTPATIENT
Start: 2023-07-01 | End: 2023-07-01

## 2023-07-01 RX ORDER — IOPAMIDOL 755 MG/ML
500 INJECTION, SOLUTION INTRAVASCULAR ONCE
Status: COMPLETED | OUTPATIENT
Start: 2023-07-01 | End: 2023-07-01

## 2023-07-01 RX ORDER — TADALAFIL 5 MG/1
5 TABLET ORAL DAILY
COMMUNITY

## 2023-07-01 RX ORDER — SODIUM CHLORIDE 9 MG/ML
INJECTION, SOLUTION INTRAVENOUS CONTINUOUS
Status: DISCONTINUED | OUTPATIENT
Start: 2023-07-01 | End: 2023-07-02 | Stop reason: HOSPADM

## 2023-07-01 RX ORDER — TADALAFIL 5 MG/1
1 TABLET ORAL DAILY
COMMUNITY
Start: 2023-04-22 | End: 2023-07-01

## 2023-07-01 RX ORDER — LEVOTHYROXINE SODIUM 100 UG/1
200 TABLET ORAL DAILY
Status: DISCONTINUED | OUTPATIENT
Start: 2023-07-02 | End: 2023-07-02 | Stop reason: HOSPADM

## 2023-07-01 RX ORDER — ONDANSETRON 4 MG/1
4 TABLET, ORALLY DISINTEGRATING ORAL EVERY 6 HOURS PRN
Status: DISCONTINUED | OUTPATIENT
Start: 2023-07-01 | End: 2023-07-02 | Stop reason: HOSPADM

## 2023-07-01 RX ORDER — AMOXICILLIN 250 MG
1 CAPSULE ORAL 2 TIMES DAILY PRN
Status: DISCONTINUED | OUTPATIENT
Start: 2023-07-01 | End: 2023-07-02 | Stop reason: HOSPADM

## 2023-07-01 RX ORDER — ONDANSETRON 2 MG/ML
4 INJECTION INTRAMUSCULAR; INTRAVENOUS EVERY 6 HOURS PRN
Status: DISCONTINUED | OUTPATIENT
Start: 2023-07-01 | End: 2023-07-02 | Stop reason: HOSPADM

## 2023-07-01 RX ADMIN — IOPAMIDOL 100 ML: 755 INJECTION, SOLUTION INTRAVENOUS at 15:40

## 2023-07-01 RX ADMIN — SODIUM CHLORIDE: 9 INJECTION, SOLUTION INTRAVENOUS at 15:40

## 2023-07-01 RX ADMIN — MECLIZINE HYDROCHLORIDE 25 MG: 25 TABLET ORAL at 13:38

## 2023-07-01 RX ADMIN — SIMVASTATIN 20 MG: 20 TABLET, FILM COATED ORAL at 22:14

## 2023-07-01 RX ADMIN — SODIUM CHLORIDE 1000 ML: 9 INJECTION, SOLUTION INTRAVENOUS at 13:38

## 2023-07-01 RX ADMIN — LORAZEPAM 0.5 MG: 2 INJECTION INTRAMUSCULAR; INTRAVENOUS at 13:38

## 2023-07-01 RX ADMIN — SODIUM CHLORIDE: 9 INJECTION, SOLUTION INTRAVENOUS at 22:23

## 2023-07-01 RX ADMIN — GADOBUTROL 10 ML: 604.72 INJECTION INTRAVENOUS at 16:28

## 2023-07-01 ASSESSMENT — ACTIVITIES OF DAILY LIVING (ADL)
ADLS_ACUITY_SCORE: 35

## 2023-07-01 NOTE — ED PROVIDER NOTES
"  History     Chief Complaint:  Dizziness and Nausea       The history is provided by the patient.      Sundar German is a 73 year old male who presents via EMS with sudden onset of dizziness, best characterized as lightheadedness, and nausea this morning when he woke up at 0855, approximately 5 hours ago.  He reports he woke up and felt lightheaded.  He states that if he were to stand up, he would feel unstable like he was falling over as his symptoms worsened with position changes.  He denies any spinning sensations or movement with this.  His wife states that he called her to his room at 0900 and pointed to his forehead stating, \"I feel really weird here.\"  Wife denies any confusion or slurred speech at this time.  He was unable to get up from the bed in the morning.  His wife called EMS and she and their neighbor had to hold him and assist him to the ambulance as he was not able to ambulate by himself.  Currently, his nausea has resolved but he continues to feel lightheaded when he lifts his head.  His wife notes he had 12 beers last night.  He denies similar episodes in the past.  He denies history of vertigo.  He denies headache, chest pain, shortness of breath, vomiting, diarrhea, and leg swelling.      Independent Historian:   Wife reports additional history as noted above.    Review of External Notes:   Clinic note from 12/2021       Medications:    Levothyroxine  Levocetirizine    Sildenafil  Simvastatin   Flomax    Past Medical History:    Hypothyroidism   Chronic sinusitis  Hyperlipidemia  Melanoma  SNHL  CKD  BPH     Past Surgical History:    TKA, bilateral  TKA revision, left  Colonoscopy  Cystoscopy  Bilateral endoscopic sinus surgery  EGD  Laser revolix photoselective vaporization prostate  Rotator cuff repair  Tonsillectomy   Adenoidectomy  Upper GI endoscopy  Vasectomy   Repair umbilical hernia      Physical Exam     Patient Vitals for the past 24 hrs:   BP Temp Temp src Pulse Resp SpO2 Height " "Weight   07/01/23 1730 139/71 -- -- (!) 46 13 97 % -- --   07/01/23 1700 (!) 143/90 -- -- 58 29 98 % -- --   07/01/23 1600 137/68 -- -- (!) 47 12 96 % -- --   07/01/23 1550 (!) 145/65 -- -- (!) 49 15 97 % -- --   07/01/23 1515 (!) 140/72 -- -- (!) 48 13 -- -- --   07/01/23 1417 (!) 151/74 -- -- 54 (!) 9 -- -- --   07/01/23 1415 (!) 151/74 -- -- 63 -- -- -- --   07/01/23 1410 (!) 159/77 -- -- 58 -- -- -- --   07/01/23 1409 (!) 159/77 -- -- -- -- -- -- --   07/01/23 1405 (!) 153/77 -- -- (!) 49 15 -- -- --   07/01/23 1402 (!) 153/77 -- -- -- -- -- -- --   07/01/23 1400 (!) 153/74 -- -- 51 10 -- -- --   07/01/23 1252 (!) 152/75 98.3  F (36.8  C) Oral 50 20 98 % 1.803 m (5' 11\") 98.9 kg (218 lb)        Physical Exam    General: The patient is alert, in no respiratory distress.    HENT: Mucous membranes moist.    Cardiovascular: Regular rate and rhythm. Good pulses in all four extremities. Normal capillary refill and skin turgor.     Respiratory: Lungs are clear. No nasal flaring. No retractions. No wheezing, no crackles.    Gastrointestinal: Abdomen soft. No guarding, no rebound. No palpable hernias.     Musculoskeletal: No gross deformity.     Skin: No rashes or petechiae.     Neurologic: The patient is alert and oriented x3. GCS 15. No testable cranial nerve deficit. Follows commands with clear and appropriate speech. Gives appropriate answers. Good strength in all extremities. No gross neurologic deficit. Gross sensation intact. Pupils are round and reactive. No meningismus. 2/3 object recall.  No pronator drift.  Can serially subtract 3s from 20.  He is unsteady standing.     Lymphatic: No cervical adenopathy. No lower extremity swelling.    Psychiatric: The patient is non-tearful.    Emergency Department Course   ECG  ECG taken at 1308, ECG read at 1311  Sinus bradycardia  Low voltage QRS  Old inferior Q waves compared to EKG dated 2/28/20  Rate 52 bpm. TN interval 204 ms. QRS duration 108 ms. QT/QTc 434/403 ms. " P-R-T axes -29 -27 -25.     Imaging:  MR Brain w/o & w Contrast   Final Result   IMPRESSION:   1.  No acute or subacute ischemic change. No acute intracranial process.   2.  Mild age-related changes.   3.  Moderate paranasal sinus disease including right maxillary air-fluid level, nonspecific though could be seen with acute sinusitis in the appropriate clinical setting.      CTA Head Neck with Contrast   Final Result   IMPRESSION:    HEAD CT:   1.  No CT evidence for acute intracranial process.   2.  Brain atrophy and presumed chronic microvascular ischemic changes as above.   3.  Mild to moderate paranasal sinus mucosal thickening.      HEAD CTA:    1.  No significant stenosis, aneurysm, or high flow vascular malformation identified.   2.  Variant [place] of [place] anatomy as above.      NECK CTA:   1.  No hemodynamically significant stenosis in the neck vessels.    2.  No evidence for dissection.      CT Head w/o Contrast   Final Result   IMPRESSION:    HEAD CT:   1.  No CT evidence for acute intracranial process.   2.  Brain atrophy and presumed chronic microvascular ischemic changes as above.   3.  Mild to moderate paranasal sinus mucosal thickening.      HEAD CTA:    1.  No significant stenosis, aneurysm, or high flow vascular malformation identified.   2.  Variant [place] of [place] anatomy as above.      NECK CTA:   1.  No hemodynamically significant stenosis in the neck vessels.    2.  No evidence for dissection.         Report per radiology    Laboratory:  Labs Ordered and Resulted from Time of ED Arrival to Time of ED Departure   COMPREHENSIVE METABOLIC PANEL - Abnormal       Result Value    Sodium 141      Potassium 4.1      Chloride 108 (*)     Carbon Dioxide (CO2) 23      Anion Gap 10      Urea Nitrogen 8.7      Creatinine 0.67      Calcium 9.2      Glucose 118 (*)     Alkaline Phosphatase 54      AST 32      ALT 29      Protein Total 6.6      Albumin 4.1      Bilirubin Total 0.3      GFR Estimate >90      TSH WITH FREE T4 REFLEX - Abnormal    TSH 0.05 (*)    CBC WITH PLATELETS AND DIFFERENTIAL - Abnormal    WBC Count 4.0      RBC Count 3.87 (*)     Hemoglobin 12.9 (*)     Hematocrit 39.4 (*)      (*)     MCH 33.3 (*)     MCHC 32.7      RDW 12.9      Platelet Count 238      % Neutrophils 50      % Lymphocytes 34      % Monocytes 10      % Eosinophils 5      % Basophils 1      % Immature Granulocytes 0      NRBCs per 100 WBC 0      Absolute Neutrophils 2.0      Absolute Lymphocytes 1.4      Absolute Monocytes 0.4      Absolute Eosinophils 0.2      Absolute Basophils 0.0      Absolute Immature Granulocytes 0.0      Absolute NRBCs 0.0     LIPASE - Normal    Lipase 41     LACTIC ACID WHOLE BLOOD - Normal    Lactic Acid 1.0     TROPONIN T, HIGH SENSITIVITY - Normal    Troponin T, High Sensitivity 14     NT PROBNP INPATIENT - Normal    N terminal Pro BNP Inpatient 97     ETHYL ALCOHOL LEVEL - Normal    Alcohol ethyl <0.01     T4 FREE - Normal    Free T4 1.43          Emergency Department Course & Assessments:    Interventions:  Medications   lidocaine 1 % 0.1-1 mL (has no administration in time range)   lidocaine (LMX4) cream (has no administration in time range)   sodium chloride (PF) 0.9% PF flush 3 mL (has no administration in time range)   sodium chloride (PF) 0.9% PF flush 3 mL (has no administration in time range)   sodium chloride 0.9% infusion ( Intravenous $New Bag 7/1/23 1540)   meclizine (ANTIVERT) tablet 25 mg (25 mg Oral $Given 7/1/23 1338)   LORazepam (ATIVAN) injection 0.5 mg (0.5 mg Intravenous $Given 7/1/23 1338)   0.9% sodium chloride BOLUS (0 mLs Intravenous Stopped 7/1/23 1909)   iopamidol (ISOVUE-370) solution 500 mL (100 mLs Intravenous $Given 7/1/23 1540)   sodium chloride (PF) 0.9% PF flush 100 mL (100 mLs Intravenous Not Given 7/1/23 1822)   gadobutrol (GADAVIST) injection 10 mL (10 mLs Intravenous $Given 7/1/23 1628)        Assessments:  1308 Initial assessment. I gathered history and  examined the patient as noted above.   1430 I rechecked the patient and explained findings.   1750 I rechecked the patient and explained findings. He feels improved.  1808 I rechecked the patient.  Road test passed, but patient continues to feel dizzy and unsteady.  We discussed admission.  Patient is in agreement.    Independent Interpretation (X-rays, CTs, rhythm strip):  None    Consultations/Discussion of Management or Tests:  1841 I consulted with Dr. Lj DNP, regarding the patient's history and presentation here in the emergency department who accepted the patient for admission under the care of Dr. Bagley.    Social Determinants of Health affecting care:   None    Disposition:  The patient was admitted to the hospital under the care of Dr. Bagley.     Impression & Plan      Medical Decision Making:  The patient had a difficult time describing his symptoms while he said it was not spinning he did point to his head and said he felt off.  The patient had no signs of a focal deficit to suggest that he was a tPA candidate.  He was not hypotensive there was no signs of bleeding and I do not think he had an infection though it did turn out he had some mild signs of sinusitis or sinus inflammation.  The patient was sent for CTA scan to look at the neck vessels as well as for an MRI to look for stroke.  The patient had had quite a few beers last [place] I think that might be part of this his alcohol level was negative however he did not appear to be in withdrawal.  I considered likely this may be related to that binge drinking.  I did consider however could be due to a toxin infection arrhythmia ACS toxidrome or other process.  The patient was still unsteady here said he had almost fallen and did not feel like he could go home the patient therefore be admitted to the hospital under observation status.  I think with continue hydration and potentially physical therapy the patient will likely get to go  home tomorrow.      Diagnosis:    ICD-10-CM    1. Dizziness  R42       2. Bradycardia  R00.1       3. Alcohol abuse  F10.10            Scribe Disclosure:  I, Ping Vazquez, am serving as a scribe at 1:03 PM on 7/1/2023 to document services personally performed by Rasheed Monzon MD based on my observations and the provider's statements to me.     7/1/2023   Rasheed Monzon MD Farnan, Christopher M, MD  07/01/23 1956

## 2023-07-01 NOTE — ED TRIAGE NOTES
Pt had a few beers at the cabin last night and woke up today with nausea, dizziness, lightheadedness. Denies CP, SOB. EMS EKG shows 1st degree AV block, HR upper 40's low 50's. Hx hypothyroid and elevated cholesterol.      Triage Assessment     Row Name 07/01/23 1253       Triage Assessment (Adult)    Airway WDL WDL       Respiratory WDL    Respiratory WDL WDL       Skin Circulation/Temperature WDL    Skin Circulation/Temperature WDL WDL       Cardiac WDL    Cardiac WDL X;rhythm    Cardiac Rhythm Heart block  1st degree       Peripheral/Neurovascular WDL    Peripheral Neurovascular WDL WDL       Cognitive/Neuro/Behavioral WDL    Cognitive/Neuro/Behavioral WDL WDL

## 2023-07-02 ENCOUNTER — APPOINTMENT (OUTPATIENT)
Dept: CARDIOLOGY | Facility: CLINIC | Age: 73
End: 2023-07-02
Attending: INTERNAL MEDICINE
Payer: COMMERCIAL

## 2023-07-02 VITALS
DIASTOLIC BLOOD PRESSURE: 71 MMHG | HEART RATE: 55 BPM | WEIGHT: 223.5 LBS | TEMPERATURE: 98 F | HEIGHT: 71 IN | SYSTOLIC BLOOD PRESSURE: 166 MMHG | BODY MASS INDEX: 31.29 KG/M2 | RESPIRATION RATE: 18 BRPM | OXYGEN SATURATION: 95 %

## 2023-07-02 LAB
ANION GAP SERPL CALCULATED.3IONS-SCNC: 8 MMOL/L (ref 7–15)
BUN SERPL-MCNC: 12.5 MG/DL (ref 8–23)
CALCIUM SERPL-MCNC: 8.8 MG/DL (ref 8.8–10.2)
CHLORIDE SERPL-SCNC: 111 MMOL/L (ref 98–107)
CREAT SERPL-MCNC: 0.75 MG/DL (ref 0.67–1.17)
DEPRECATED HCO3 PLAS-SCNC: 24 MMOL/L (ref 22–29)
ERYTHROCYTE [DISTWIDTH] IN BLOOD BY AUTOMATED COUNT: 13.1 % (ref 10–15)
GFR SERPL CREATININE-BSD FRML MDRD: >90 ML/MIN/1.73M2
GLUCOSE SERPL-MCNC: 117 MG/DL (ref 70–99)
HCT VFR BLD AUTO: 37.1 % (ref 40–53)
HGB BLD-MCNC: 12.2 G/DL (ref 13.3–17.7)
LVEF ECHO: NORMAL
MCH RBC QN AUTO: 33 PG (ref 26.5–33)
MCHC RBC AUTO-ENTMCNC: 32.9 G/DL (ref 31.5–36.5)
MCV RBC AUTO: 100 FL (ref 78–100)
PLATELET # BLD AUTO: 219 10E3/UL (ref 150–450)
POTASSIUM SERPL-SCNC: 3.7 MMOL/L (ref 3.4–5.3)
RBC # BLD AUTO: 3.7 10E6/UL (ref 4.4–5.9)
SODIUM SERPL-SCNC: 143 MMOL/L (ref 136–145)
WBC # BLD AUTO: 5.5 10E3/UL (ref 4–11)

## 2023-07-02 PROCEDURE — 99222 1ST HOSP IP/OBS MODERATE 55: CPT | Performed by: INTERNAL MEDICINE

## 2023-07-02 PROCEDURE — 85027 COMPLETE CBC AUTOMATED: CPT | Performed by: NURSE PRACTITIONER

## 2023-07-02 PROCEDURE — 255N000002 HC RX 255 OP 636: Performed by: HOSPITALIST

## 2023-07-02 PROCEDURE — 80048 BASIC METABOLIC PNL TOTAL CA: CPT | Performed by: NURSE PRACTITIONER

## 2023-07-02 PROCEDURE — G0378 HOSPITAL OBSERVATION PER HR: HCPCS

## 2023-07-02 PROCEDURE — 99239 HOSP IP/OBS DSCHRG MGMT >30: CPT | Performed by: INTERNAL MEDICINE

## 2023-07-02 PROCEDURE — 250N000013 HC RX MED GY IP 250 OP 250 PS 637: Performed by: NURSE PRACTITIONER

## 2023-07-02 PROCEDURE — 999N000147 HC STATISTIC PT IP EVAL DEFER: Performed by: PHYSICAL THERAPIST

## 2023-07-02 PROCEDURE — 36415 COLL VENOUS BLD VENIPUNCTURE: CPT | Performed by: NURSE PRACTITIONER

## 2023-07-02 PROCEDURE — 93306 TTE W/DOPPLER COMPLETE: CPT | Mod: 26 | Performed by: INTERNAL MEDICINE

## 2023-07-02 RX ORDER — LEVOTHYROXINE SODIUM 175 UG/1
175 TABLET ORAL DAILY
Qty: 30 TABLET | Refills: 3 | Status: SHIPPED | OUTPATIENT
Start: 2023-07-02 | End: 2023-07-13

## 2023-07-02 RX ADMIN — LEVOTHYROXINE SODIUM 200 MCG: 0.1 TABLET ORAL at 08:23

## 2023-07-02 RX ADMIN — TAMSULOSIN HYDROCHLORIDE 0.4 MG: 0.4 CAPSULE ORAL at 08:23

## 2023-07-02 RX ADMIN — HUMAN ALBUMIN MICROSPHERES AND PERFLUTREN 3 ML: 10; .22 INJECTION, SOLUTION INTRAVENOUS at 11:16

## 2023-07-02 ASSESSMENT — ACTIVITIES OF DAILY LIVING (ADL)
ADLS_ACUITY_SCORE: 31

## 2023-07-02 NOTE — PLAN OF CARE
PRIMARY DIAGNOSIS: NAUSEA.DIZZINESS  OUTPATIENT/OBSERVATION GOALS TO BE MET BEFORE DISCHARGE:  1. ADLs back to baseline: Yes    2. Activity and level of assistance: Ambulating independently.    3. Pain status: Pain free.    4. Return to near baseline physical activity: Yes     Discharge Planner Nurse   Safe discharge environment identified: No  Barriers to discharge: Yes       Entered by: Sharan Gipson RN 07/02/2023 5:17 AM     Vitals are Temp: 98  F (36.7  C) Temp src: Oral BP: 133/58 Pulse: (!) 47   Resp: 16 SpO2: 96 %.  Patient is Alert and Oriented x4. He is on  SBA/ IND with no assistive devices .  Pt is on a Regular diet. Denies  pain.  Patient has Normal Saline 0.9% running at 100 mL per hour.patient walk to bathroom to empty bladder. Tele  @ HR 43 with PVC and 1st Degree AV Block  Patient denies nausea & dizziness, went to bathroom by himself.   Patient might be discharge today.    Please review provider order for any additional goals.   Nurse to notify provider when observation goals have been met and patient is ready for discharge.

## 2023-07-02 NOTE — ED NOTES
[facility]  ED Nurse Handoff Report    ED Chief complaint: Dizziness and Nausea  . ED Diagnosis:   Final diagnoses:   Dizziness   Bradycardia   Alcohol abuse       Allergies: No Known Allergies    Code Status: Full Code    Activity level - Baseline/Home:  independent.  Activity Level - Current:   standby.   Lift room needed: No.   Bariatric: No   Needed: No   Isolation: No.   Infection: Not Applicable.     Respiratory status: Room air    Vital Signs (within 30 minutes):   Vitals:    07/01/23 1550 07/01/23 1600 07/01/23 1700 07/01/23 1730   BP: (!) 145/65 137/68 (!) 143/90 139/71   BP Location:       Pulse: (!) 49 (!) 47 58 (!) 46   Resp: 15 12 29 13   Temp:       TempSrc:       SpO2: 97% 96% 98% 97%   Weight:       Height:           Cardiac Rhythm:  ,   Cardiac  Cardiac Rhythm: Heart block (1st degree)  Pain level:    Patient confused: No.   Patient Falls Risk: nonskid shoes/slippers when out of bed and patient and family education.   Elimination Status: Has voided     Patient Report - Initial Complaint: dizzy and nausea.   Focused Assessment: pt woke up dizzy and nausated this AM. Reports a few beers last night. Hx of hypothyroidism. HR in 40s-50s. Stroke workup neg.      Abnormal Results:   Labs Ordered and Resulted from Time of ED Arrival to Time of ED Departure   COMPREHENSIVE METABOLIC PANEL - Abnormal       Result Value    Sodium 141      Potassium 4.1      Chloride 108 (*)     Carbon Dioxide (CO2) 23      Anion Gap 10      Urea Nitrogen 8.7      Creatinine 0.67      Calcium 9.2      Glucose 118 (*)     Alkaline Phosphatase 54      AST 32      ALT 29      Protein Total 6.6      Albumin 4.1      Bilirubin Total 0.3      GFR Estimate >90     TSH WITH FREE T4 REFLEX - Abnormal    TSH 0.05 (*)    CBC WITH PLATELETS AND DIFFERENTIAL - Abnormal    WBC Count 4.0      RBC Count 3.87 (*)     Hemoglobin 12.9 (*)     Hematocrit 39.4 (*)      (*)     MCH 33.3 (*)     MCHC 32.7       RDW 12.9      Platelet Count 238      % Neutrophils 50      % Lymphocytes 34      % Monocytes 10      % Eosinophils 5      % Basophils 1      % Immature Granulocytes 0      NRBCs per 100 WBC 0      Absolute Neutrophils 2.0      Absolute Lymphocytes 1.4      Absolute Monocytes 0.4      Absolute Eosinophils 0.2      Absolute Basophils 0.0      Absolute Immature Granulocytes 0.0      Absolute NRBCs 0.0     LIPASE - Normal    Lipase 41     LACTIC ACID WHOLE BLOOD - Normal    Lactic Acid 1.0     TROPONIN T, HIGH SENSITIVITY - Normal    Troponin T, High Sensitivity 14     NT PROBNP INPATIENT - Normal    N terminal Pro BNP Inpatient 97     ETHYL ALCOHOL LEVEL - Normal    Alcohol ethyl <0.01     T4 FREE - Normal    Free T4 1.43          MR Brain w/o & w Contrast   Final Result   IMPRESSION:   1.  No acute or subacute ischemic change. No acute intracranial process.   2.  Mild age-related changes.   3.  Moderate paranasal sinus disease including right maxillary air-fluid level, nonspecific though could be seen with acute sinusitis in the appropriate clinical setting.      CTA Head Neck with Contrast   Final Result   IMPRESSION:    HEAD CT:   1.  No CT evidence for acute intracranial process.   2.  Brain atrophy and presumed chronic microvascular ischemic changes as above.   3.  Mild to moderate paranasal sinus mucosal thickening.      HEAD CTA:    1.  No significant stenosis, aneurysm, or high flow vascular malformation identified.   2.  Variant [place] of [place] anatomy as above.      NECK CTA:   1.  No hemodynamically significant stenosis in the neck vessels.    2.  No evidence for dissection.      CT Head w/o Contrast   Final Result   IMPRESSION:    HEAD CT:   1.  No CT evidence for acute intracranial process.   2.  Brain atrophy and presumed chronic microvascular ischemic changes as above.   3.  Mild to moderate paranasal sinus mucosal thickening.      HEAD CTA:    1.  No significant stenosis, aneurysm, or high flow  vascular malformation identified.   2.  Variant [place] of [place] anatomy as above.      NECK CTA:   1.  No hemodynamically significant stenosis in the neck vessels.    2.  No evidence for dissection.          Treatments provided: meds, fluids, images  Family Comments:   OBS brochure/video discussed/provided to patient:  Yes  ED Medications:   Medications   lidocaine 1 % 0.1-1 mL (has no administration in time range)   lidocaine (LMX4) cream (has no administration in time range)   sodium chloride (PF) 0.9% PF flush 3 mL (has no administration in time range)   sodium chloride (PF) 0.9% PF flush 3 mL (has no administration in time range)   sodium chloride 0.9% infusion ( Intravenous $New Bag 7/1/23 1540)   meclizine (ANTIVERT) tablet 25 mg (25 mg Oral $Given 7/1/23 1338)   LORazepam (ATIVAN) injection 0.5 mg (0.5 mg Intravenous $Given 7/1/23 1338)   0.9% sodium chloride BOLUS (0 mLs Intravenous Stopped 7/1/23 1909)   iopamidol (ISOVUE-370) solution 500 mL (100 mLs Intravenous $Given 7/1/23 1540)   sodium chloride (PF) 0.9% PF flush 100 mL (100 mLs Intravenous Not Given 7/1/23 1822)   gadobutrol (GADAVIST) injection 10 mL (10 mLs Intravenous $Given 7/1/23 1628)       Drips infusing:  No  For the majority of the shift this patient was Green.   Interventions performed were NA.    Sepsis treatment initiated: No    Cares/treatment/interventions/medications to be completed following ED care: per orders    ED Nurse Name: Ladarius Miramontes RN  7:10 PM  RECEIVING UNIT ED HANDOFF REVIEW    Above ED Nurse Handoff Report was reviewed: Yes  Reviewed by: Keiko Peterson RN on July 1, 2023 at 9:00 PM

## 2023-07-02 NOTE — PHARMACY-ADMISSION MEDICATION HISTORY
"Pharmacist Admission Medication History    Admission medication history is complete. The information provided in this note is only as accurate as the sources available at the time of the update.    Medication reconciliation/reorder completed by provider prior to medication history? Yes    Information Source(s): Patient and CareEverywhere/SureScripts via in-person    Pertinent Information: -    Changes made to PTA medication list:    Added: tadalafil    Deleted: sildenafil    Changed: multiple topical products to \"not taking\"    Medication Affordability:  Not including over the counter (OTC) medications, was there a time in the past 3 months when you did not take your medications as prescribed because of cost?: No    Allergies reviewed with patient and updates made in EHR: yes    Medication History Completed By: Kurtis Dover [facility] 7/1/2023 7:35 PM    Prior to Admission medications    Medication Sig Last Dose Taking? Auth Provider Long Term End Date   fluticasone (FLONASE) 50 MCG/ACT nasal spray Spray 1 spray into both nostrils daily 6/30/2023 at AM Yes Liu Nolasco MD     ipratropium (ATROVENT) 0.06 % nasal spray USE 1 SPRAY IN EACH NOSTRIL UP TO FOUR TIMES DAILY AS NEEDED Strength: 0.06 % Unknown at PRN Yes Liu Nolasco MD     levocetirizine (XYZAL) 5 MG tablet Take 1 tablet (5 mg) by mouth every evening 6/30/2023 at PM Yes Liu Nolasco MD     levothyroxine (SYNTHROID/LEVOTHROID) 200 MCG tablet Take 1 tablet (200 mcg) by mouth daily 6/30/2023 at AM Yes Liu Nolasco MD Yes    multivitamin w/minerals (THERA-VIT-M) tablet Take 1 tablet by mouth daily 6/30/2023 at AM Yes Reported, Patient     psyllium (METAMUCIL/KONSYL) Packet Take 1 packet by mouth daily 6/30/2023 at AM Yes Reported, Patient     simvastatin (ZOCOR) 20 MG tablet Take 1 tablet (20 mg) by mouth At Bedtime 6/30/2023 at HS Yes Liu Nolasco MD Yes    tadalafil (CIALIS) 5 MG tablet Take 5 mg by mouth daily 6/30/2023 at AM Yes Unknown, Entered By History No  "   tamsulosin (FLOMAX) 0.4 MG capsule Take 1 capsule (0.4 mg) by mouth daily 6/30/2023 at AM Yes Liu Nolasco MD No    ACE/ARB/ARNI NOT PRESCRIBED (INTENTIONAL) Please choose reason not prescribed from choices below.   Liu Nolasco MD Yes    betamethasone dipropionate (DIPROSONE) 0.05 % external cream Apply topically 2 times daily  Patient not taking: Reported on 7/1/2023 Not Taking  Liu Nolasco MD     ciclopirox (LOPROX) 0.77 % cream Apply topically daily  Patient not taking: Reported on 7/1/2023 Not Taking  Liu Nolasco MD     penciclovir (DENAVIR) 1 % external cream Apply topically every 2 hours  Patient not taking: Reported on 7/1/2023 Not Taking  Liu Nolasco MD

## 2023-07-02 NOTE — CONSULTS
[facility]    Cardiology Consultation     Date of Admission:  7/1/2023    Assessment & Plan   Physiologic sinus bradycardia.  Highly unlikely to be a cause of his lightheadedness.  Probable silent inferior wall myocardial infarction, no symptoms of angina or heart failure.  Low normal left ventricular systolic function.  Transient lightheadedness, cause uncertain but probably related to alcohol intake  Mild ascending aortic enlargement, can follow with echo cardiogram in 1 to 2 years time.  Hypertension.  Mildly suboptimal control.  Med adjustments as with primary care physicians.  Dyslipidemia, on simvastatin 20 mg.  Heavy alcohol intake.  Patient advised to cut down.    Though he is asymptomatic from a cardiac point of view I think we should take another look at his cardiac status with a stress test.  Exercise nuclear study would be ideal as this would also test his chronotropic competence as well as his assessing his myocardial perfusion.  I offered testing tomorrow but patient is keen to get back to his cabin.  I think it can be done as an outpatient.    Addition I would also advise Zio patch for about 7 to 10 days to look at his heart rate profile just in case I am missing severe symptomatic bradycardia.    I have given the patient contact info and I can follow-up with him in clinic.    DR SUNDAR COBIAN MD, MD    Primary Care Physician   Liu Nolasco    Reason for Consult   Reason for consult: I was asked by Dr Cervantes to evaluate this patient for bradycardia and dizziness.    History of Present Illness   Sundar German is a 73 year old male who presents with dizziness after drinking 12 beers last night.    However on reflection he thinks he may have drank less beer than that mentioned in his HPI.  He usually drinks 3 beers a day on the weekdays and much more on the weekends when he is up at his cabin.    He woke up after a heavy drinking session with what he describes as lightheadedness.   He denies vertigo.  He denies other symptoms.  He did not have any blackouts and he did not fall.    Review of his multiple notes notes both in clinic and during hospitalization do show that he has bradycardia, probably physiologic sinus bradycardia.    His current EKG shows sinus bradycardia, normal QTc intervals and a previous inferior wall infarct.  Indeed comparison with previous EKGs in 2020 showed similar findings.    Patient tells me that about 15 years ago prior to him having knee surgery he was told he had an abnormal EKG.  He then underwent a stress test which she was told was normal.  He never had any symptoms of angina or heart failure and this continues to be the case today.  He plays golf almost every day.  He is very busy with yard and cabin work.    I located his stress test from 2011 as well as an echocardiogram.  At that time his echocardiogram was described as showing borderline global hypokinesis.  There was no evidence of inducible ischemia.    There is a history of hypothyroidism though T4 appears normal.    Electrolytes are essentially within normal limits.  Hemoglobin is 12.2 with normal white cell count and platelet count.    MRI of his brain showed no acute lesions.    Echocardiogram which I read today shows a mid and basal inferior inferolateral wall motion abnormality with low normal ejection fraction.                Patient Active Problem List   Diagnosis     Allergic rhinitis     Acquired hypothyroidism     Hyperlipidemia LDL goal <100     Impaired memory     Sleep related leg cramps     Chronic sinusitis     S/P revision of total knee, left     BPH (benign prostatic hyperplasia)     OA (osteoarthritis)     Prediabetes     Class 1 obesity with serious comorbidity and body mass index (BMI) of 31.0 to 31.9 in adult, unspecified obesity type     h/o Malignant melanoma of torso excluding breast (H)     GI bleed     * * * SBE PROPHYLAXIS * * *     S/P total knee arthroplasty      Sensorineural hearing loss (SNHL) of both ears     CKD (chronic kidney disease) stage 2, GFR 60-89 ml/min     Iron deficiency anemia, unspecified iron deficiency anemia type     Dizziness     Alcohol abuse     Bradycardia       Past Medical History   I have reviewed this patient's medical history and updated it with pertinent information if needed.   Past Medical History:   Diagnosis Date     Abnormal glucose     A1C 1/06 =  6.3     Acquired hypothyroidism 10/11/2005     Problem list name updated by automated process. Provider to review     Allergic rhinitis, cause unspecified     mary spring time     Benign localized hyperplasia of prostate with urinary obstruction and other lower urinary tract symptoms (LUTS)(600.21)     slow stream, nocturia - Dr Malave     Chronic sinusitis 03/14/2014     Complication of anesthesia      Environmental allergies     AIT - Dr Weiss     History of blood transfusion      Hyperlipidemia LDL goal <130 10/31/2010     hypogonadism     low testosterone at times      RAFIQ (iron deficiency anemia)      Impaired memory 02/11/2011    ?     Melanoma (H) 04/2018    Melanoma 0.7 mm depth, Levar III, superficial spreading, stage T1a     OA (osteoarthritis) total L knee 3/09    knees bother;; has had bilat arthroscopic     PONV (postoperative nausea and vomiting)      Prediabetes      Sensorineural hearing loss (SNHL) of both ears      Sleep related leg cramps 06/2012       Past Surgical History   I have reviewed this patient's surgical history and updated it with pertinent information if needed.  Past Surgical History:   Procedure Laterality Date     ARTHROPLASTY KNEE Right 3/16/2020    Rt TKA - Dr DUY Dudley     ARTHROPLASTY REVISION KNEE Left 10/26/2015     ARTHROPLASTY REVISION KNEE - Dr Dudley     COLONOSCOPY  10/2/2013    diverticulosis - due 10 yrs (10/2023), initial 10/2003     CYSTOSCOPY  04/2016     ENDOSCOPIC SINUS SURGERY  3/17/2014    Bilateral Endoscopic Sinus Surgery - Dr Johnson      ESOPHAGOSCOPY, GASTROSCOPY, DUODENOSCOPY (EGD), COMBINED N/A 2/28/2019    Non bleeding gastric ulcers and duodenal ulcer. Recheck 3 months. Dr Sinclair     LASER REVOLIX PHOTOSELECTIVE VAPORIZATION PROSTATE N/A 4/6/2016    LASER REVOLIX / QUANTA PHOTOSELECTIVE VAPORIZATION PROSTATE - Dr Malave     ROTATOR CUFF REPAIR RT/LT Left 03/2012    left shoulder     SURGICAL HISTORY OF -   1996    tonsils and adenoids     SURGICAL HISTORY OF -  Left 03/2009    Lt TKA     SURGICAL HISTORY OF -  Bilateral     Rt Knee x 1 (meniscus, 1995), Lt x 3 (last 2009)     UPPER GI ENDOSCOPY  05/2019    normal     VASECTOMY Bilateral 1986     ZZHC REPAIR UMBILICAL KASI,5+Y/O,REDUC  2002       Prior to Admission Medications   Prior to Admission Medications   Prescriptions Last Dose Informant Patient Reported? Taking?   ACE/ARB/ARNI NOT PRESCRIBED (INTENTIONAL)   Yes No   Sig: Please choose reason not prescribed from choices below.   betamethasone dipropionate (DIPROSONE) 0.05 % external cream Not Taking  No No   Sig: Apply topically 2 times daily   Patient not taking: Reported on 7/1/2023   ciclopirox (LOPROX) 0.77 % cream Not Taking  No No   Sig: Apply topically daily   Patient not taking: Reported on 7/1/2023   fluticasone (FLONASE) 50 MCG/ACT nasal spray 6/30/2023 at AM  No Yes   Sig: Spray 1 spray into both nostrils daily   ipratropium (ATROVENT) 0.06 % nasal spray Unknown at PRN  No Yes   Sig: USE 1 SPRAY IN EACH NOSTRIL UP TO FOUR TIMES DAILY AS NEEDED Strength: 0.06 %   levocetirizine (XYZAL) 5 MG tablet 6/30/2023 at PM  No Yes   Sig: Take 1 tablet (5 mg) by mouth every evening   levothyroxine (SYNTHROID/LEVOTHROID) 200 MCG tablet 6/30/2023 at AM  No Yes   Sig: Take 1 tablet (200 mcg) by mouth daily   multivitamin w/minerals (THERA-VIT-M) tablet 6/30/2023 at AM  Yes Yes   Sig: Take 1 tablet by mouth daily   penciclovir (DENAVIR) 1 % external cream Not Taking  No No   Sig: Apply topically every 2 hours   Patient not taking: Reported on  7/1/2023   psyllium (METAMUCIL/KONSYL) Packet 6/30/2023 at AM  Yes Yes   Sig: Take 1 packet by mouth daily   simvastatin (ZOCOR) 20 MG tablet 6/30/2023 at HS  No Yes   Sig: Take 1 tablet (20 mg) by mouth At Bedtime   tadalafil (CIALIS) 5 MG tablet 6/30/2023 at AM  Yes Yes   Sig: Take 5 mg by mouth daily   tamsulosin (FLOMAX) 0.4 MG capsule 6/30/2023 at AM  No Yes   Sig: Take 1 capsule (0.4 mg) by mouth daily      Facility-Administered Medications: None     Current Facility-Administered Medications   Medication Dose Route Frequency     levothyroxine  200 mcg Oral Daily     simvastatin  20 mg Oral At Bedtime     sodium chloride (PF)  3 mL Intracatheter Q8H     tamsulosin  0.4 mg Oral Daily     Current Facility-Administered Medications   Medication Last Rate     sodium chloride 100 mL/hr at 07/01/23 1540     sodium chloride 100 mL/hr at 07/01/23 2223     Allergies   No Known Allergies    Social History    reports that he quit smoking about 42 years ago. He has a 20.00 pack-year smoking history. He quit smokeless tobacco use about 31 years ago.  His smokeless tobacco use included chew. He reports current alcohol use of about 7.0 - 8.0 standard drinks of alcohol per week. He reports that he does not use drugs.    Family History   Family History   Problem Relation Age of Onset     C.A.D. Father         CHF     Heart Failure Father      Chronic Obstructive Pulmonary Disease Father      Cerebrovascular Disease Brother 61     Gastrointestinal Disease Brother         GI Bleed - colectomy     Hypertension Mother      Cardiovascular Mother 84        MI     Lipids Mother      Chronic Obstructive Pulmonary Disease Mother      No Known Problems Sister      No Known Problems Brother        Review of Systems   The comprehensive 10 point Review of Systems is negative other than noted in the HPI or here.     Physical Exam   Vital Signs with Ranges  Temp:  [97.9  F (36.6  C)-98.3  F (36.8  C)] 98  F (36.7  C)  Pulse:  [43-63] 49  Resp:  " [9-29] 16  BP: (129-163)/(58-90) 143/70  SpO2:  [95 %-99 %] 97 %  Wt Readings from Last 4 Encounters:   07/01/23 101.4 kg (223 lb 8 oz)   02/01/23 103.1 kg (227 lb 6.4 oz)   06/23/22 97.5 kg (215 lb)   11/29/21 102.1 kg (225 lb)     I/O last 3 completed shifts:  In: 120 [P.O.:120]  Out: -       Vitals: BP (!) 143/70 (BP Location: Right arm)   Pulse (!) 49   Temp 98  F (36.7  C) (Oral)   Resp 16   Ht 1.803 m (5' 11\")   Wt 101.4 kg (223 lb 8 oz)   SpO2 97%   BMI 31.17 kg/m      Appears well.  No clubbing no peripheral central cyanosis.  JVP normal  Cardiac apex not palpable.  Normal heart sounds, 1/6 systolic murmur left sternal border and aortic area does not sound hemodynamically significant.  Chest is clear to auscultation.    Abdomen soft and nontender.  Preserved foot pulses.  No significant peripheral edema.  Intuitive system examination grossly intact.      No lab results found in last 7 days.    Invalid input(s): TROPONINIES    Recent Labs   Lab 07/02/23  0534 07/01/23  1315   WBC 5.5 4.0   HGB 12.2* 12.9*    102*    238    141   POTASSIUM 3.7 4.1   CHLORIDE 111* 108*   CO2 24 23   BUN 12.5 8.7   CR 0.75 0.67   GFRESTIMATED >90 >90   ANIONGAP 8 10   VIKKI 8.8 9.2   * 118*   ALBUMIN  --  4.1   PROTTOTAL  --  6.6   BILITOTAL  --  0.3   ALKPHOS  --  54   ALT  --  29   AST  --  32   LIPASE  --  41     Recent Labs   Lab Test 02/01/23  1057 11/29/21  0920 01/12/16  1122 06/02/15  0924   CHOL 150 162   < > 152   HDL 68 72   < > 55   LDL 67 75   < > 81   TRIG 74 75   < > 82   CHOLHDLRATIO  --   --   --  2.8    < > = values in this interval not displayed.     Recent Labs   Lab 07/02/23  0534 07/01/23  1315   WBC 5.5 4.0   HGB 12.2* 12.9*   HCT 37.1* 39.4*    102*    238     No results for input(s): PH, PHV, PO2, PO2V, SAT, PCO2, PCO2V, HCO3, HCO3V in the last 168 hours.  Recent Labs   Lab 07/01/23  1315   NTBNPI 97     No results for input(s): DD in the last 168 hours.  No " results for input(s): SED, CRP in the last 168 hours.  Recent Labs   Lab 07/02/23  0534 07/01/23  1315    238     Recent Labs   Lab 07/01/23  1315   TSH 0.05*     No results for input(s): COLOR, APPEARANCE, URINEGLC, URINEBILI, URINEKETONE, SG, UBLD, URINEPH, PROTEIN, UROBILINOGEN, NITRITE, LEUKEST, RBCU, WBCU in the last 168 hours.    Imaging:  Recent Results (from the past 48 hour(s))   CT Head w/o Contrast    Narrative    EXAM: CT HEAD W/O CONTRAST, CTA HEAD NECK W CONTRAST  LOCATION: [facility]  DATE: 7/1/2023    INDICATION: dizziness  COMPARISON: None.  CONTRAST: 75mL Isovue 370  TECHNIQUE: Head and neck CT angiogram with IV contrast. Noncontrast head CT followed by axial helical CT images of the head and neck vessels obtained during the arterial phase of intravenous contrast administration. Axial 2D reconstructed images and   multiplanar 3D MIP reconstructed images of the head and neck vessels were performed by the technologist. Dose reduction techniques were used. All stenosis measurements made according to NASCET criteria unless otherwise specified.    FINDINGS:   NONCONTRAST HEAD CT:   INTRACRANIAL CONTENTS: No intracranial hemorrhage, extraaxial collection, or mass effect.  No CT evidence of acute infarct. Mild presumed chronic small vessel ischemic changes. Mild generalized volume loss. No hydrocephalus.     VISUALIZED ORBITS/SINUSES/MASTOIDS: Prior bilateral cataract surgery. Visualized portions of the orbits are otherwise unremarkable. Mild to moderate mucosal thickening scattered about the paranasal sinuses. No middle ear or mastoid effusion.    BONES/SOFT TISSUES: No acute abnormality.    HEAD CTA:  ANTERIOR CIRCULATION: No stenosis/occlusion, aneurysm, or high flow vascular malformation. There is nonstenotic atherosclerotic calcification of bilateral carotid siphons. Fetal origin of the right posterior cerebral artery from the anterior circulation.    POSTERIOR  CIRCULATION: No stenosis/occlusion, aneurysm, or high flow vascular malformation. Balanced vertebral arteries supply a normal basilar artery.     DURAL VENOUS SINUSES: Expected enhancement of the major dural venous sinuses.    NECK CTA:  RIGHT CAROTID: No measurable stenosis or dissection. Atherosclerotic calcifications are present.    LEFT CAROTID: No measurable stenosis or dissection. Atherosclerotic calcifications are present.    VERTEBRAL ARTERIES: No focal stenosis or dissection. Balanced vertebral arteries.    AORTIC ARCH: Classic aortic arch anatomy with no significant stenosis at the origin of the great vessels.    NONVASCULAR STRUCTURES: Unremarkable.      Impression    IMPRESSION:   HEAD CT:  1.  No CT evidence for acute intracranial process.  2.  Brain atrophy and presumed chronic microvascular ischemic changes as above.  3.  Mild to moderate paranasal sinus mucosal thickening.    HEAD CTA:   1.  No significant stenosis, aneurysm, or high flow vascular malformation identified.  2.  Variant [place] anatomy as above.    NECK CTA:  1.  No hemodynamically significant stenosis in the neck vessels.   2.  No evidence for dissection.   CTA Head Neck with Contrast    Narrative    EXAM: CT HEAD W/O CONTRAST, CTA HEAD NECK W CONTRAST  LOCATION: [facility]  DATE: 7/1/2023    INDICATION: dizziness  COMPARISON: None.  CONTRAST: 75mL Isovue 370  TECHNIQUE: Head and neck CT angiogram with IV contrast. Noncontrast head CT followed by axial helical CT images of the head and neck vessels obtained during the arterial phase of intravenous contrast administration. Axial 2D reconstructed images and   multiplanar 3D MIP reconstructed images of the head and neck vessels were performed by the technologist. Dose reduction techniques were used. All stenosis measurements made according to NASCET criteria unless otherwise specified.    FINDINGS:   NONCONTRAST HEAD CT:   INTRACRANIAL CONTENTS: No  intracranial hemorrhage, extraaxial collection, or mass effect.  No CT evidence of acute infarct. Mild presumed chronic small vessel ischemic changes. Mild generalized volume loss. No hydrocephalus.     VISUALIZED ORBITS/SINUSES/MASTOIDS: Prior bilateral cataract surgery. Visualized portions of the orbits are otherwise unremarkable. Mild to moderate mucosal thickening scattered about the paranasal sinuses. No middle ear or mastoid effusion.    BONES/SOFT TISSUES: No acute abnormality.    HEAD CTA:  ANTERIOR CIRCULATION: No stenosis/occlusion, aneurysm, or high flow vascular malformation. There is nonstenotic atherosclerotic calcification of bilateral carotid siphons. Fetal origin of the right posterior cerebral artery from the anterior circulation.    POSTERIOR CIRCULATION: No stenosis/occlusion, aneurysm, or high flow vascular malformation. Balanced vertebral arteries supply a normal basilar artery.     DURAL VENOUS SINUSES: Expected enhancement of the major dural venous sinuses.    NECK CTA:  RIGHT CAROTID: No measurable stenosis or dissection. Atherosclerotic calcifications are present.    LEFT CAROTID: No measurable stenosis or dissection. Atherosclerotic calcifications are present.    VERTEBRAL ARTERIES: No focal stenosis or dissection. Balanced vertebral arteries.    AORTIC ARCH: Classic aortic arch anatomy with no significant stenosis at the origin of the great vessels.    NONVASCULAR STRUCTURES: Unremarkable.      Impression    IMPRESSION:   HEAD CT:  1.  No CT evidence for acute intracranial process.  2.  Brain atrophy and presumed chronic microvascular ischemic changes as above.  3.  Mild to moderate paranasal sinus mucosal thickening.    HEAD CTA:   1.  No significant stenosis, aneurysm, or high flow vascular malformation identified.  2.  Variant [place] anatomy as above.    NECK CTA:  1.  No hemodynamically significant stenosis in the neck vessels.   2.  No evidence for dissection.   MR Brain  w/o & w Contrast    Narrative    EXAM: MR BRAIN W/O and W CONTRAST  LOCATION: [facility]  DATE: 7/1/2023    INDICATION: dizziness  COMPARISON: CT 07/01/2023.  CONTRAST: 10 mL Gadavist  TECHNIQUE: Routine multiplanar multisequence head MRI without and with intravenous contrast.    FINDINGS:  INTRACRANIAL CONTENTS: No acute or subacute infarct. No mass, acute hemorrhage, or extra-axial fluid collections. Normal brain parenchymal signal. Mild generalized cerebral atrophy. No hydrocephalus. Normal position of the cerebellar tonsils. No   pathologic contrast enhancement. Incidental note is made of a left cerebellar DVA.    SELLA: No abnormality accounting for technique.    OSSEOUS STRUCTURES/SOFT TISSUES: Normal marrow signal. The major intracranial vascular flow voids are maintained.     ORBITS: Prior bilateral cataract surgery. Visualized portions of the orbits are otherwise unremarkable.     SINUSES/MASTOIDS: Moderate mucosal thickening scattered about the paranasal sinuses. Right maxillary air-fluid level noted. No middle ear or mastoid effusion.       Impression    IMPRESSION:  1.  No acute or subacute ischemic change. No acute intracranial process.  2.  Mild age-related changes.  3.  Moderate paranasal sinus disease including right maxillary air-fluid level, nonspecific though could be seen with acute sinusitis in the appropriate clinical setting.       Echo:  No results found for this or any previous visit (from the past 4320 hour(s)).

## 2023-07-02 NOTE — PLAN OF CARE
Goal Outcome Evaluation:       Vitals stable and afebrile. Denies pain, dizziness or nausea. Iv fluids, telemetry autumn with few PVCs, first degree AV block. Amb to bathroom without difficulty.

## 2023-07-02 NOTE — PROGRESS NOTES
PT: Orders received. Chart reviewed and discussed with care team.  PT not indicated due to pt ambulating IND and nrsg do not express any strength/balance concerns at this time.  Defer discharge recommendations to medical team.  Will complete orders.

## 2023-07-02 NOTE — H&P
[facility]    History and Physical - Hospitalist Service       Date of Admission:  7/1/2023    Assessment & Plan      Sundar German is a 73 year old male with PMH significant for HTN, hypothyroidism, HLD BPH, chronic sinusitis and Prediabetes admitted on 7/1/2023 for dizziness. He had about 12 beers last night and when he woke up this morning he felt light headed which was worsened with position changes.  He was unable to get out of the bed prompting his wife to call EMS.      ED workup significant for bradycardia with HR in the 40's.  TSH is low at 0.05, but normal T4.  CBC shows mild anemia with HGB of 12.9, however, this appears to be near his baseline.  CMP is unremarkable, Troponin normal.  CT head/neck and MRI unremarkable.    #Dizziness  Work up unremarkable, but patient was unable to ambulate in the ED.  Likely secondary to recent ETOH use, could also consider vertigo exacerbated by chronic sinusitis.  -Admit to Obs  -NS @ 100ml/hr  -PT consult  -AM BMP/CBC     #Bradycardia  Possibly contributing?  HR at recent AWV was 6bpm and BP normal in the ED.  -Cardiac monitoring ordered  -Orthostatic BP in the AM, PT ordered  -Consider cardiology consult if symptoms do not improve with hydration    #Hypertension  Well controlled, not currently on medication or Beta blocker    #Hypothyroidism  TSH is low in the ED, but T4 normal.  -Consider decreasing Levothyroxine dose at discharge and 2 month follow up TSH with PCP vs outpatient follow up    #BPH  -Continue PTA Flomax    #HLD  -Continue PTA simvastatin           Diet:   Regular  DVT Prophylaxis: Low Risk/Ambulatory with no VTE prophylaxis indicated and Ambulate every shift  Lang Catheter: PRESENT, indication:    Lines: None     Cardiac Monitoring: None  Code Status:   Full    Clinically Significant Risk Factors Present on Admission                  # Hypertension: Home medication list includes antihypertensive(s)      # Obesity: Estimated  "body mass index is 30.4 kg/m  as calculated from the following:    Height as of this encounter: 1.803 m (5' 11\").    Weight as of this encounter: 98.9 kg (218 lb).            Disposition Plan      Expected Discharge Date: 07/02/2023                The patient's care was discussed with the Attending Physician, Dr. Bagley and Patient.    LÁZARO [facility]  Hospitalist Service  [facility]  Securely message with [employer] (more info)  Text page via [facility] Paging/Directory     ______________________________________________________________________    Chief Complaint   Dizziness    History is obtained from the patient    History of Present Illness   Sundar German is a 73 year old male with PMH significant for HTN, hypothyroidism, HLD BPH, chronic sinusitis and Prediabetes who presented to the ED via EMS for complaints of dizziness. Yesterday, he had 12 beers starting at 3pm.  This morning when he awoke, he was unable to get out of bed prompting his wife to call EMS.  He is otherwise fairly healthy.  He does have history of chronic sinusitis and takes flomax for BPH.  Not currently on any medication to treat BP including beta blockers.  During my assessment, he reports feeling about the same and very unsteady when standing up.  He was unable to tolerate ambulation with ED tech.  Denies fevers or chills.  Did have some nausea this AM which has resolved.        Past Medical History    Past Medical History:   Diagnosis Date     Abnormal glucose     A1C 1/06 =  6.3     Acquired hypothyroidism 10/11/2005     Problem list name updated by automated process. Provider to review     Allergic rhinitis, cause unspecified     mary spring time     Benign localized hyperplasia of prostate with urinary obstruction and other lower urinary tract symptoms (LUTS)(600.21)     slow stream, nocturia - Dr Malave     Chronic sinusitis 03/14/2014     Complication of anesthesia      Environmental allergies     GLORIA -  " Isela     History of blood transfusion      Hyperlipidemia LDL goal <130 10/31/2010     hypogonadism     low testosterone at times      RAFIQ (iron deficiency anemia)      Impaired memory 02/11/2011    ?     Melanoma (H) 04/2018    Melanoma 0.7 mm depth, Levar III, superficial spreading, stage T1a     OA (osteoarthritis) total L knee 3/09    knees bother;; has had bilat arthroscopic     PONV (postoperative nausea and vomiting)      Prediabetes      Sensorineural hearing loss (SNHL) of both ears      Sleep related leg cramps 06/2012       Past Surgical History   Past Surgical History:   Procedure Laterality Date     ARTHROPLASTY KNEE Right 3/16/2020    Rt TKA - Dr DUY Dudley     ARTHROPLASTY REVISION KNEE Left 10/26/2015     ARTHROPLASTY REVISION KNEE - Dr Dudley     COLONOSCOPY  10/2/2013    diverticulosis - due 10 yrs (10/2023), initial 10/2003     CYSTOSCOPY  04/2016     ENDOSCOPIC SINUS SURGERY  3/17/2014    Bilateral Endoscopic Sinus Surgery - Dr Johnson     ESOPHAGOSCOPY, GASTROSCOPY, DUODENOSCOPY (EGD), COMBINED N/A 2/28/2019    Non bleeding gastric ulcers and duodenal ulcer. Recheck 3 months. Dr Sinclair     LASER REVOLIX PHOTOSELECTIVE VAPORIZATION PROSTATE N/A 4/6/2016    LASER REVOLIX / QUANTA PHOTOSELECTIVE VAPORIZATION PROSTATE - Dr Malave     ROTATOR CUFF REPAIR RT/LT Left 03/2012    left shoulder     SURGICAL HISTORY OF -   1996    tonsils and adenoids     SURGICAL HISTORY OF -  Left 03/2009    Lt TKA     SURGICAL HISTORY OF -  Bilateral     Rt Knee x 1 (meniscus, 1995), Lt x 3 (last 2009)     UPPER GI ENDOSCOPY  05/2019    normal     VASECTOMY Bilateral 1986     ZZHC REPAIR UMBILICAL KASI,5+Y/O,REDUC  2002       Prior to Admission Medications   Prior to Admission Medications   Prescriptions Last Dose Informant Patient Reported? Taking?   ACE/ARB/ARNI NOT PRESCRIBED (INTENTIONAL)   Yes No   Sig: Please choose reason not prescribed from choices below.   betamethasone dipropionate (DIPROSONE) 0.05 % external  cream   No No   Sig: Apply topically 2 times daily   ciclopirox (LOPROX) 0.77 % cream   No No   Sig: Apply topically daily   fluticasone (FLONASE) 50 MCG/ACT nasal spray   No No   Sig: Spray 1 spray into both nostrils daily   ipratropium (ATROVENT) 0.06 % nasal spray   No No   Sig: USE 1 SPRAY IN EACH NOSTRIL UP TO FOUR TIMES DAILY AS NEEDED Strength: 0.06 %   levocetirizine (XYZAL) 5 MG tablet   No No   Sig: Take 1 tablet (5 mg) by mouth every evening   levothyroxine (SYNTHROID/LEVOTHROID) 200 MCG tablet   No No   Sig: Take 1 tablet (200 mcg) by mouth daily   multivitamin w/minerals (THERA-VIT-M) tablet   Yes No   Sig: Take 1 tablet by mouth daily   penciclovir (DENAVIR) 1 % external cream   No No   Sig: Apply topically every 2 hours   psyllium (METAMUCIL/KONSYL) Packet   Yes No   Sig: Take 1 packet by mouth daily   sildenafil (REVATIO) 20 MG tablet   No No   Si-3 tablets 30-60 minutes prior to intercourse. NEVER USE WITH NITROGLYCERIN, TERAZOSIN OR DOXAZOSIN   simvastatin (ZOCOR) 20 MG tablet   No No   Sig: Take 1 tablet (20 mg) by mouth At Bedtime   tamsulosin (FLOMAX) 0.4 MG capsule   No No   Sig: Take 1 capsule (0.4 mg) by mouth daily      Facility-Administered Medications: None        Review of Systems    The 10 point Review of Systems is negative other than noted in the HPI or here.      Physical Exam   Vital Signs: Temp: 98.3  F (36.8  C) Temp src: Oral BP: 139/71 Pulse: (!) 46   Resp: 13 SpO2: 97 % O2 Device: None (Room air)    Weight: 218 lbs 0 oz    GENERAL: No apparent distress.  HEENT: Patient is normocephalic, atraumatic.  EYES: Anicteric without injection.  RESPIRATORY: Clear to auscultation bilaterally.  CARDIOVASCULAR: Regular rate and rhythm.  Normal S1, S2 - no m/g/r.  GASTROINTESTINAL: The abdomen was normal in contour. Bowel sounds were present. Soft, non-tender without distension.  EXTREMITIES: Warm & well perfused. No edema  NEUROLOGIC: The patient was alert and conversation was  appropriate. Grossly non-focal.  CN II-XII intact  PSYCHIATRIC: Mood and affect congruent.  SKIN: Exposed skin is within normal limits.      Medical Decision Making       75 MINUTES SPENT BY ME on the date of service doing chart review, history, exam, documentation & further activities per the note.      Data     I have personally reviewed the following data over the past 24 hrs:    4.0  \   12.9 (L)   / 238     141 108 (H) 8.7 /  118 (H)   4.1 23 0.67 \       ALT: 29 AST: 32 AP: 54 TBILI: 0.3   ALB: 4.1 TOT PROTEIN: 6.6 LIPASE: 41       Trop: 14 BNP: 97       TSH: 0.05 (L) T4: 1.43 A1C: N/A       Procal: N/A CRP: N/A Lactic Acid: 1.0         Imaging results reviewed over the past 24 hrs:   Recent Results (from the past 24 hour(s))   CT Head w/o Contrast    Narrative    EXAM: CT HEAD W/O CONTRAST, CTA HEAD NECK W CONTRAST  LOCATION: [facility]  DATE: 7/1/2023    INDICATION: dizziness  COMPARISON: None.  CONTRAST: 75mL Isovue 370  TECHNIQUE: Head and neck CT angiogram with IV contrast. Noncontrast head CT followed by axial helical CT images of the head and neck vessels obtained during the arterial phase of intravenous contrast administration. Axial 2D reconstructed images and   multiplanar 3D MIP reconstructed images of the head and neck vessels were performed by the technologist. Dose reduction techniques were used. All stenosis measurements made according to NASCET criteria unless otherwise specified.    FINDINGS:   NONCONTRAST HEAD CT:   INTRACRANIAL CONTENTS: No intracranial hemorrhage, extraaxial collection, or mass effect.  No CT evidence of acute infarct. Mild presumed chronic small vessel ischemic changes. Mild generalized volume loss. No hydrocephalus.     VISUALIZED ORBITS/SINUSES/MASTOIDS: Prior bilateral cataract surgery. Visualized portions of the orbits are otherwise unremarkable. Mild to moderate mucosal thickening scattered about the paranasal sinuses. No middle ear or mastoid  effusion.    BONES/SOFT TISSUES: No acute abnormality.    HEAD CTA:  ANTERIOR CIRCULATION: No stenosis/occlusion, aneurysm, or high flow vascular malformation. There is nonstenotic atherosclerotic calcification of bilateral carotid siphons. Fetal origin of the right posterior cerebral artery from the anterior circulation.    POSTERIOR CIRCULATION: No stenosis/occlusion, aneurysm, or high flow vascular malformation. Balanced vertebral arteries supply a normal basilar artery.     DURAL VENOUS SINUSES: Expected enhancement of the major dural venous sinuses.    NECK CTA:  RIGHT CAROTID: No measurable stenosis or dissection. Atherosclerotic calcifications are present.    LEFT CAROTID: No measurable stenosis or dissection. Atherosclerotic calcifications are present.    VERTEBRAL ARTERIES: No focal stenosis or dissection. Balanced vertebral arteries.    AORTIC ARCH: Classic aortic arch anatomy with no significant stenosis at the origin of the great vessels.    NONVASCULAR STRUCTURES: Unremarkable.      Impression    IMPRESSION:   HEAD CT:  1.  No CT evidence for acute intracranial process.  2.  Brain atrophy and presumed chronic microvascular ischemic changes as above.  3.  Mild to moderate paranasal sinus mucosal thickening.    HEAD CTA:   1.  No significant stenosis, aneurysm, or high flow vascular malformation identified.  2.  Variant [place] anatomy as above.    NECK CTA:  1.  No hemodynamically significant stenosis in the neck vessels.   2.  No evidence for dissection.   CTA Head Neck with Contrast    Narrative    EXAM: CT HEAD W/O CONTRAST, CTA HEAD NECK W CONTRAST  LOCATION: [facility]  DATE: 7/1/2023    INDICATION: dizziness  COMPARISON: None.  CONTRAST: 75mL Isovue 370  TECHNIQUE: Head and neck CT angiogram with IV contrast. Noncontrast head CT followed by axial helical CT images of the head and neck vessels obtained during the arterial phase of intravenous contrast administration.  Axial 2D reconstructed images and   multiplanar 3D MIP reconstructed images of the head and neck vessels were performed by the technologist. Dose reduction techniques were used. All stenosis measurements made according to NASCET criteria unless otherwise specified.    FINDINGS:   NONCONTRAST HEAD CT:   INTRACRANIAL CONTENTS: No intracranial hemorrhage, extraaxial collection, or mass effect.  No CT evidence of acute infarct. Mild presumed chronic small vessel ischemic changes. Mild generalized volume loss. No hydrocephalus.     VISUALIZED ORBITS/SINUSES/MASTOIDS: Prior bilateral cataract surgery. Visualized portions of the orbits are otherwise unremarkable. Mild to moderate mucosal thickening scattered about the paranasal sinuses. No middle ear or mastoid effusion.    BONES/SOFT TISSUES: No acute abnormality.    HEAD CTA:  ANTERIOR CIRCULATION: No stenosis/occlusion, aneurysm, or high flow vascular malformation. There is nonstenotic atherosclerotic calcification of bilateral carotid siphons. Fetal origin of the right posterior cerebral artery from the anterior circulation.    POSTERIOR CIRCULATION: No stenosis/occlusion, aneurysm, or high flow vascular malformation. Balanced vertebral arteries supply a normal basilar artery.     DURAL VENOUS SINUSES: Expected enhancement of the major dural venous sinuses.    NECK CTA:  RIGHT CAROTID: No measurable stenosis or dissection. Atherosclerotic calcifications are present.    LEFT CAROTID: No measurable stenosis or dissection. Atherosclerotic calcifications are present.    VERTEBRAL ARTERIES: No focal stenosis or dissection. Balanced vertebral arteries.    AORTIC ARCH: Classic aortic arch anatomy with no significant stenosis at the origin of the great vessels.    NONVASCULAR STRUCTURES: Unremarkable.      Impression    IMPRESSION:   HEAD CT:  1.  No CT evidence for acute intracranial process.  2.  Brain atrophy and presumed chronic microvascular ischemic changes as above.  3.   Mild to moderate paranasal sinus mucosal thickening.    HEAD CTA:   1.  No significant stenosis, aneurysm, or high flow vascular malformation identified.  2.  Variant [place] of [place] anatomy as above.    NECK CTA:  1.  No hemodynamically significant stenosis in the neck vessels.   2.  No evidence for dissection.   MR Brain w/o & w Contrast    Narrative    EXAM: MR BRAIN W/O and W CONTRAST  LOCATION: [facility]  DATE: 7/1/2023    INDICATION: dizziness  COMPARISON: CT 07/01/2023.  CONTRAST: 10 mL Gadavist  TECHNIQUE: Routine multiplanar multisequence head MRI without and with intravenous contrast.    FINDINGS:  INTRACRANIAL CONTENTS: No acute or subacute infarct. No mass, acute hemorrhage, or extra-axial fluid collections. Normal brain parenchymal signal. Mild generalized cerebral atrophy. No hydrocephalus. Normal position of the cerebellar tonsils. No   pathologic contrast enhancement. Incidental note is made of a left cerebellar DVA.    SELLA: No abnormality accounting for technique.    OSSEOUS STRUCTURES/SOFT TISSUES: Normal marrow signal. The major intracranial vascular flow voids are maintained.     ORBITS: Prior bilateral cataract surgery. Visualized portions of the orbits are otherwise unremarkable.     SINUSES/MASTOIDS: Moderate mucosal thickening scattered about the paranasal sinuses. Right maxillary air-fluid level noted. No middle ear or mastoid effusion.       Impression    IMPRESSION:  1.  No acute or subacute ischemic change. No acute intracranial process.  2.  Mild age-related changes.  3.  Moderate paranasal sinus disease including right maxillary air-fluid level, nonspecific though could be seen with acute sinusitis in the appropriate clinical setting.

## 2023-07-02 NOTE — PLAN OF CARE
PRIMARY DIAGNOSIS: dizziness    OUTPATIENT/OBSERVATION GOALS TO BE MET BEFORE DISCHARGE  1. Orthostatic performed: N/A    2. Completion of appropriate imaging: Yes    3. Tolerating PO medications: Yes    4. Return to near baseline physical activity: Yes    5. Cleared for discharge by consultants (if involved): No    Discharge Planner Nurse   Safe discharge environment identified: Yes  Barriers to discharge: Yes       Entered by: Sharmila Rebollar RN 07/02/2023 9:49 AM     Please review provider order for any additional goals.   Nurse to notify provider when observation goals have been met and patient is ready for discharge.       Pt denies dizziness or chest pain.

## 2023-07-02 NOTE — DISCHARGE SUMMARY
[facility]    Discharge Summary  Hospitalist    Date of Admission:  7/1/2023  Date of Discharge:  7/2/2023  Discharging Provider: Ginger Griffith MD  Date of Service (when I saw the patient): 07/02/23    Discharge Diagnoses   Dizziness  Bradycardia  Hypertension  Hypothyroidism  Benign prostatic hypertrophy  Hyperlipidemia  Alcohol use with intoxication    History of Present Illness   Sundar German is a 73 year old man who was admitted on 7/1/2023. PMH significant for HTN, hypothyroidism, HLD BPH, chronic sinusitis and Prediabetes admitted on 7/1/2023 for dizziness. He had about 12 beers last night and when he woke up this morning he felt light headed which was worsened with position changes.  He was unable to get out of the bed prompting his wife to call EMS.    ED workup significant for bradycardia with HR in the 40's.  TSH is low at 0.05, but normal T4.  CBC shows mild anemia with HGB of 12.9, however, this appears to be near his baseline.  CMP is unremarkable, Troponin normal.  CT head/neck and MRI unremarkable.    Hospital Course   Sundar German was admitted on 7/1/2023.  The following problems were addressed during his hospitalization:    Dizziness  Work up unremarkable, but patient has been able to ambulate independently.  - Seen for dizziness and light-headedness which was worse with position changes. Underwent extensive workup and observation. No acute findings on brain MRI.  - Likely secondary to alcohol use.   - Able to discharge home. No ongoing physical therapy needs at this time.  - Do also note that cardiac monitoring notes sinus bradycardia in the 40s-50s. See below.     Bradycardia  - Patient seen by cardiology, Dr. Cantor.  - Cardiac stress testing recommended. This was offered in the hospital, but patient elected discharge at this time.   - Patient to call Dr. Cantor's office (with his business card he provided) to arrange clinic follow up with him and to arrange the  outpatient stress testing.   - Dr. Cantor recommends repeat echocardiogram in next 1-2 years.  - Zio patch ordered, though cardiology clinic will have to coordinate outpatient after the weekend. Patient to call number provided for Dr. Cantor's office if he does not hear from them.     Hypertension  Well controlled, not currently on medication.   - Recommend continued follow up with PCP as patient may need to start an antihypertensive in the future.      Hypothyroidism  - TSH found to be suppressed at 0.05.   - Have decreased PTA levothyroxine to 175 mcg daily at this time. PCP should recheck TSH in roughly 8 weeks.      Benign prostatic hypertrophy  -Continue PTA Flomax     Hyperlipidemia  -Continue PTA simvastatin     Alcohol use with intoxication  - Recommended that patient cut back on alcohol intake, but this may need to be done slowly.    Ginger Griffith MD FACP  Hospitalist Service  [facility]      Significant Results and Procedures   Imaging studies and echo report as below    Pending Results   N/A    Code Status   Full Code       Primary Care Physician   Liu Nolasco    Physical Exam   Temp: 98  F (36.7  C) Temp src: Oral BP: (!) 166/71 Pulse: 55   Resp: 18 SpO2: 95 % O2 Device: None (Room air)    Vitals:    07/01/23 1252 07/01/23 2140   Weight: 98.9 kg (218 lb) 101.4 kg (223 lb 8 oz)     Vital Signs with Ranges  Temp:  [97.9  F (36.6  C)-98.5  F (36.9  C)] 98  F (36.7  C)  Pulse:  [43-63] 55  Resp:  [9-29] 18  BP: (129-166)/(58-90) 166/71  SpO2:  [95 %-99 %] 95 %  I/O last 3 completed shifts:  In: 120 [P.O.:120]  Out: -     Constitutional: Pleasant gentleman resting in bed. Wife at bedside. Alert and oriented x3. No acute distress.   HEENT: NCAT. EOMI.   Respiratory: Clear to auscultation bilaterally. No crackles or wheezes.  Cardiovascular: Bradycardia. Regular rhythm otherwise. Systolic murmur.    Musculoskeletal: No gross deformities.   Neurologic: Alert and oriented x3. No focal neurologic  deficits. Did not personally assess gait, but patient has been ambulating independently.     Discharge Disposition   Discharged to home  Condition at discharge: Stable    Consultations This Hospital Stay   PHYSICAL THERAPY ADULT IP CONSULT  CARDIOLOGY IP CONSULT    Time Spent on this Encounter   I, Ginger Griffith MD, personally saw the patient today and spent greater than 30 minutes discharging this patient.    Discharge Orders      Activity    Your activity upon discharge: activity as tolerated     Follow-up and recommended labs and tests     Follow up with primary care provider, Liu Nolasco, within 7-14 days to evaluate medication change and for hospital follow- up.  The following labs/tests are recommended: CBC, BMP. Recheck TSH in roughly 8 weeks after medication change.  Recommend that you contact cardiologist, Dr. Cantor, who you saw in the hospital to schedule follow up in his clinic and outpatient stress testing. He gave you his business card so call that number to schedule as hospital follow-up in the next 1-2 weeks. Dr. Cantor also recommended a Zio patch to monitor your heart rate for 7-10 days. This cannot be set up on the weekend, but I will order. If no one calls you to set this up, call Dr. Cantor's number and let them know that you are needing to be set up with a Zio patch. Dr. Cantor also recommends consideration of starting a blood pressure medication and notes that you should have a repeat echocardiogram in 1-2 years.   Consider outpatient cardiac stress testing after discussion with your primary care provider.     Reason for your hospital stay    Seen for dizziness and light-headedness which was worse with position changes. Underwent extensive workup and observation. No acute findings on brain MRI. Cardiac monitoring notes sinus bradycardia in the 40s-50s.   You were seen by cardiology who noted that you should be evaluated with cardiac stress testing. This was offered in the hospital, but you have elected  discharge at this time. Call Dr. Cantor's office (with his business card he provided) to arrange clinic follow up with him and to arrange the outpatient stress testing. He also recommends consideration of starting a blood pressure medication in the future, as well as notes that you will need repeat echocardiogram in the next 1-2 years. Ordering a Zio patch to monitor your heart rate, but the cardiology clinic will need to coordinate this after the weekend. You may call number provided by Dr. Cantor if you do not hear from them.   TSH found to be suppressed at 0.05. We are recommending a decrease in your thyroid medication to levothyroxine 175 mcg daily at this time with plan to have TSH rechecked in roughly 8 weeks by your primary care provider. No ongoing dizziness and you have been able to ambulate here without assistance.   No ongoing physical therapy needs at this time.  Recommend that you cut back on alcohol intake, but this may need to be done slowly.     Adult Leadless EKG Monitor 8 to 14 Days     Diet    Follow this diet upon discharge: Regular diet     Discharge Medications   Current Discharge Medication List      CONTINUE these medications which have CHANGED    Details   levothyroxine (SYNTHROID/LEVOTHROID) 175 MCG tablet Take 1 tablet (175 mcg) by mouth daily  Qty: 30 tablet, Refills: 3    Associated Diagnoses: Hypothyroidism, unspecified type         CONTINUE these medications which have NOT CHANGED    Details   fluticasone (FLONASE) 50 MCG/ACT nasal spray Spray 1 spray into both nostrils daily  Qty: 48 g, Refills: 3    Associated Diagnoses: Environmental allergies      ipratropium (ATROVENT) 0.06 % nasal spray USE 1 SPRAY IN EACH NOSTRIL UP TO FOUR TIMES DAILY AS NEEDED Strength: 0.06 %  Qty: 45 mL, Refills: 3    Associated Diagnoses: Environmental allergies      levocetirizine (XYZAL) 5 MG tablet Take 1 tablet (5 mg) by mouth every evening  Qty: 90 tablet, Refills: 3    Associated Diagnoses: Environmental  allergies      multivitamin w/minerals (THERA-VIT-M) tablet Take 1 tablet by mouth daily      psyllium (METAMUCIL/KONSYL) Packet Take 1 packet by mouth daily      simvastatin (ZOCOR) 20 MG tablet Take 1 tablet (20 mg) by mouth At Bedtime  Qty: 90 tablet, Refills: 3    Associated Diagnoses: Prediabetes; Hyperlipidemia LDL goal <100      tadalafil (CIALIS) 5 MG tablet Take 5 mg by mouth daily      tamsulosin (FLOMAX) 0.4 MG capsule Take 1 capsule (0.4 mg) by mouth daily  Qty: 90 capsule, Refills: 3    Associated Diagnoses: Benign prostatic hyperplasia with lower urinary tract symptoms, symptom details unspecified; Urinary retention      ACE/ARB/ARNI NOT PRESCRIBED (INTENTIONAL) Please choose reason not prescribed from choices below.    Associated Diagnoses: Prediabetes      betamethasone dipropionate (DIPROSONE) 0.05 % external cream Apply topically 2 times daily  Qty: 45 g, Refills: 3    Associated Diagnoses: Irritant contact dermatitis, unspecified trigger      ciclopirox (LOPROX) 0.77 % cream Apply topically daily  Qty: 90 g, Refills: 3    Associated Diagnoses: Onychomycosis of toenail      penciclovir (DENAVIR) 1 % external cream Apply topically every 2 hours  Qty: 5 g, Refills: 3    Associated Diagnoses: Recurrent cold sores           Allergies   No Known Allergies  Data   Most Recent 3 CBC's:  Recent Labs   Lab Test 07/02/23  0534 07/01/23  1315 02/01/23  1057   WBC 5.5 4.0 5.8   HGB 12.2* 12.9* 13.2*    102* 101*    238 284      Most Recent 3 BMP's:  Recent Labs   Lab Test 07/02/23  0534 07/01/23  1315 02/01/23  1057    141 143   POTASSIUM 3.7 4.1 4.4   CHLORIDE 111* 108* 107   CO2 24 23 23   BUN 12.5 8.7 13.1   CR 0.75 0.67 0.82   ANIONGAP 8 10 13   VIKKI 8.8 9.2 9.4   * 118* 109*     Most Recent 2 LFT's:  Recent Labs   Lab Test 07/01/23  1315 02/01/23  1057   AST 32 30   ALT 29 27   ALKPHOS 54 49   BILITOTAL 0.3 0.3     Most Recent INR's and Anticoagulation Dosing  History:  Anticoagulation Dose History        Latest Ref Rng & Units 3/1/2009 3/3/2009 3/4/2009 3/5/2009   Recent Dosing and Labs   INR 0.86 - 1.14 0.93  1.13  1.16  1.31            3/6/2009 3/9/2009   Recent Dosing and Labs   INR 1.49  2.00      Most Recent 3 Troponin's:No lab results found.  Most Recent Cholesterol Panel:  Recent Labs   Lab Test 02/01/23  1057   CHOL 150   LDL 67   HDL 68   TRIG 74     Most Recent 6 Bacteria Isolates From Any Culture (See EPIC Reports for Culture Details):  Recent Labs   Lab Test 12/16/20  1053 03/02/18  1042 05/11/16  1210 10/26/15  0838 06/09/15  1045   CULT No growth No growth No growth No anaerobes isolated  No anaerobes isolated  On day 5, isolated in broth only: Propionibacterium acnes  Susceptibility testing of Propionibacterium species is not done from this   source. Our antibiogram indicates that Propionibacterum species is susceptible   to penicillin and cefotaxime and most are susceptible to clindamycin. Hilary Padilla M.D., Medical Director  These bacteria are part of normal skin roshan, but on occasion, may be true   pathogens.  Clinical correlation must be applied to interpreting this   microbiology result.  *  No growth  No growth  No growth No anaerobes isolated  No growth     Most Recent TSH, T4 and A1c Labs:  Recent Labs   Lab Test 07/01/23  1315 02/01/23  1057   TSH 0.05* 0.25*   T4 1.43 1.38   A1C  --  5.7*     Results for orders placed or performed during the hospital encounter of 07/01/23   CT Head w/o Contrast    Narrative    EXAM: CT HEAD W/O CONTRAST, CTA HEAD NECK W CONTRAST  LOCATION: [facility]  DATE: 7/1/2023    INDICATION: dizziness  COMPARISON: None.  CONTRAST: 75mL Isovue 370  TECHNIQUE: Head and neck CT angiogram with IV contrast. Noncontrast head CT followed by axial helical CT images of the head and neck vessels obtained during the arterial phase of intravenous contrast administration. Axial 2D reconstructed  images and   multiplanar 3D MIP reconstructed images of the head and neck vessels were performed by the technologist. Dose reduction techniques were used. All stenosis measurements made according to NASCET criteria unless otherwise specified.    FINDINGS:   NONCONTRAST HEAD CT:   INTRACRANIAL CONTENTS: No intracranial hemorrhage, extraaxial collection, or mass effect.  No CT evidence of acute infarct. Mild presumed chronic small vessel ischemic changes. Mild generalized volume loss. No hydrocephalus.     VISUALIZED ORBITS/SINUSES/MASTOIDS: Prior bilateral cataract surgery. Visualized portions of the orbits are otherwise unremarkable. Mild to moderate mucosal thickening scattered about the paranasal sinuses. No middle ear or mastoid effusion.    BONES/SOFT TISSUES: No acute abnormality.    HEAD CTA:  ANTERIOR CIRCULATION: No stenosis/occlusion, aneurysm, or high flow vascular malformation. There is nonstenotic atherosclerotic calcification of bilateral carotid siphons. Fetal origin of the right posterior cerebral artery from the anterior circulation.    POSTERIOR CIRCULATION: No stenosis/occlusion, aneurysm, or high flow vascular malformation. Balanced vertebral arteries supply a normal basilar artery.     DURAL VENOUS SINUSES: Expected enhancement of the major dural venous sinuses.    NECK CTA:  RIGHT CAROTID: No measurable stenosis or dissection. Atherosclerotic calcifications are present.    LEFT CAROTID: No measurable stenosis or dissection. Atherosclerotic calcifications are present.    VERTEBRAL ARTERIES: No focal stenosis or dissection. Balanced vertebral arteries.    AORTIC ARCH: Classic aortic arch anatomy with no significant stenosis at the origin of the great vessels.    NONVASCULAR STRUCTURES: Unremarkable.      Impression    IMPRESSION:   HEAD CT:  1.  No CT evidence for acute intracranial process.  2.  Brain atrophy and presumed chronic microvascular ischemic changes as above.  3.  Mild to moderate  paranasal sinus mucosal thickening.    HEAD CTA:   1.  No significant stenosis, aneurysm, or high flow vascular malformation identified.  2.  Variant [place] anatomy as above.    NECK CTA:  1.  No hemodynamically significant stenosis in the neck vessels.   2.  No evidence for dissection.   CTA Head Neck with Contrast    Narrative    EXAM: CT HEAD W/O CONTRAST, CTA HEAD NECK W CONTRAST  LOCATION: [facility]  DATE: 7/1/2023    INDICATION: dizziness  COMPARISON: None.  CONTRAST: 75mL Isovue 370  TECHNIQUE: Head and neck CT angiogram with IV contrast. Noncontrast head CT followed by axial helical CT images of the head and neck vessels obtained during the arterial phase of intravenous contrast administration. Axial 2D reconstructed images and   multiplanar 3D MIP reconstructed images of the head and neck vessels were performed by the technologist. Dose reduction techniques were used. All stenosis measurements made according to NASCET criteria unless otherwise specified.    FINDINGS:   NONCONTRAST HEAD CT:   INTRACRANIAL CONTENTS: No intracranial hemorrhage, extraaxial collection, or mass effect.  No CT evidence of acute infarct. Mild presumed chronic small vessel ischemic changes. Mild generalized volume loss. No hydrocephalus.     VISUALIZED ORBITS/SINUSES/MASTOIDS: Prior bilateral cataract surgery. Visualized portions of the orbits are otherwise unremarkable. Mild to moderate mucosal thickening scattered about the paranasal sinuses. No middle ear or mastoid effusion.    BONES/SOFT TISSUES: No acute abnormality.    HEAD CTA:  ANTERIOR CIRCULATION: No stenosis/occlusion, aneurysm, or high flow vascular malformation. There is nonstenotic atherosclerotic calcification of bilateral carotid siphons. Fetal origin of the right posterior cerebral artery from the anterior circulation.    POSTERIOR CIRCULATION: No stenosis/occlusion, aneurysm, or high flow vascular malformation. Balanced vertebral  arteries supply a normal basilar artery.     DURAL VENOUS SINUSES: Expected enhancement of the major dural venous sinuses.    NECK CTA:  RIGHT CAROTID: No measurable stenosis or dissection. Atherosclerotic calcifications are present.    LEFT CAROTID: No measurable stenosis or dissection. Atherosclerotic calcifications are present.    VERTEBRAL ARTERIES: No focal stenosis or dissection. Balanced vertebral arteries.    AORTIC ARCH: Classic aortic arch anatomy with no significant stenosis at the origin of the great vessels.    NONVASCULAR STRUCTURES: Unremarkable.      Impression    IMPRESSION:   HEAD CT:  1.  No CT evidence for acute intracranial process.  2.  Brain atrophy and presumed chronic microvascular ischemic changes as above.  3.  Mild to moderate paranasal sinus mucosal thickening.    HEAD CTA:   1.  No significant stenosis, aneurysm, or high flow vascular malformation identified.  2.  Variant [place] anatomy as above.    NECK CTA:  1.  No hemodynamically significant stenosis in the neck vessels.   2.  No evidence for dissection.   MR Brain w/o & w Contrast    Narrative    EXAM: MR BRAIN W/O and W CONTRAST  LOCATION: [facility]  DATE: 7/1/2023    INDICATION: dizziness  COMPARISON: CT 07/01/2023.  CONTRAST: 10 mL Gadavist  TECHNIQUE: Routine multiplanar multisequence head MRI without and with intravenous contrast.    FINDINGS:  INTRACRANIAL CONTENTS: No acute or subacute infarct. No mass, acute hemorrhage, or extra-axial fluid collections. Normal brain parenchymal signal. Mild generalized cerebral atrophy. No hydrocephalus. Normal position of the cerebellar tonsils. No   pathologic contrast enhancement. Incidental note is made of a left cerebellar DVA.    SELLA: No abnormality accounting for technique.    OSSEOUS STRUCTURES/SOFT TISSUES: Normal marrow signal. The major intracranial vascular flow voids are maintained.     ORBITS: Prior bilateral cataract surgery. Visualized  portions of the orbits are otherwise unremarkable.     SINUSES/MASTOIDS: Moderate mucosal thickening scattered about the paranasal sinuses. Right maxillary air-fluid level noted. No middle ear or mastoid effusion.       Impression    IMPRESSION:  1.  No acute or subacute ischemic change. No acute intracranial process.  2.  Mild age-related changes.  3.  Moderate paranasal sinus disease including right maxillary air-fluid level, nonspecific though could be seen with acute sinusitis in the appropriate clinical setting.   Echocardiogram Complete     Value    LVEF  50-55%    Narrative    [id number]  [id number]  [id number]^ARANZA^SUNDAR^YUDITH HAINES     [facility]  Echocardiography Laboratory  [address] [place] [address]     Name: ZAID DALTON  MRN: [id number]  : 1950  Study Date: 2023 10:37 AM  Age: 73 yrs  Gender: Male  Patient Location: [facility] [[email or web address]]  Reason For Study: Dizziness  Ordering Physician: SUNDAR COBIAN  Performed By: Destiny Benitez     BSA: 2.2 m2  Height: 71 in  Weight: 223 lb  BP: 143/70 mmHg  ______________________________________________________________________________  Procedure  Complete Portable Echo Adult. Optison (NDC #2041-0333) given intravenously.  ______________________________________________________________________________  Interpretation Summary     The visual ejection fraction is 50-55%.  Basal and mid inferior and inferolateral walls are hypokinetic.  The left atrium is mildly dilated.  The ascending aorta is Mildly dilated.  ______________________________________________________________________________  Left Ventricle  The left ventricle is normal in size. There is normal left ventricular wall  thickness. The visual ejection fraction is 50-55%. Left ventricular diastolic  function is abnormal.     Right Ventricle  The right ventricle is normal in structure, function and size.     Atria  The left atrium is mildly dilated. Right atrial size is normal.      Mitral Valve  The mitral valve leaflets appear normal. There is no evidence of stenosis,  fluttering, or prolapse.     Tricuspid Valve  Normal tricuspid valve.     Aortic Valve  There is moderate trileaflet aortic sclerosis.     Pulmonic Valve  The pulmonic valve is not well seen, but is grossly normal. There is trace  pulmonic valvular regurgitation.     Vessels  The aortic root is normal size. The ascending aorta is Mildly dilated.     Pericardium  There is no pericardial effusion.     Rhythm  Sinus rhythm was noted.  ______________________________________________________________________________  MMode/2D Measurements & Calculations  IVSd: 0.93 cm  LVIDd: 5.9 cm  LVIDs: 4.2 cm  LVPWd: 0.76 cm  FS: 27.6 %  LV mass(C)d: 192.4 grams  LV mass(C)dI: 87.1 grams/m2  Ao root diam: 3.7 cm  asc Aorta Diam: 4.2 cm  LVOT diam: 2.5 cm  LVOT area: 4.9 cm2  LA Volume (BP): 94.1 ml  LA Volume Index (BP): 42.6 ml/m2     RWT: 0.26  TAPSE: 3.5 cm     Doppler Measurements & Calculations  MV E max catrachito: 89.6 cm/sec  MV A max catrachito: 72.3 cm/sec  MV E/A: 1.2  MV dec slope: 455.0 cm/sec2  MV dec time: 0.20 sec  Ao V2 max: 224.0 cm/sec  Ao max P.0 mmHg  Ao V2 mean: 159.0 cm/sec  Ao mean P.0 mmHg  Ao V2 VTI: 61.6 cm  JENNIFER(I,D): 2.1 cm2  JENNIFER(V,D): 2.2 cm2  LV V1 max P.0 mmHg  LV V1 max: 99.4 cm/sec  LV V1 VTI: 26.2 cm  SV(LVOT): 128.6 ml  SI(LVOT): 58.2 ml/m2  PA acc time: 0.11 sec  AV Catr[place]ito Ratio (DI): 0.44  JENNIFER Index (cm2/m2): 0.95  E/E' avg: 10.4  Lateral E/e': 10.0  Medial E/e': 10.9     ______________________________________________________________________________  Report approved by: David Ferreira 2023 12:08 PM

## 2023-07-02 NOTE — PLAN OF CARE
Goal Outcome Evaluation:  Pt ready for discharge. Pt seen by cardiology. Pt instructions reviewed with pt and pts questions answered. Pt denies chest pain and dizziness. Pt up in room without difficulty.Vitals stable. Pt discharged with wife.       Patient's After Visit Summary was reviewed with patient  .   Patient verbalized understanding of After Visit Summary, recommended follow up and was given an opportunity to ask questions.   Discharge medications sent home with patient/family: YESDischarged with spouse             Patient's After Visit Summary was reviewed with patient and/or spouse.   Patient verbalized understanding of After Visit Summary, recommended follow up and was given an opportunity to ask questions.   Discharge medications sent home with patient/family: YES,  Discharged with spouse

## 2023-07-02 NOTE — PLAN OF CARE
PRIMARY DIAGNOSIS: NAUSEA.DIZZINESS  OUTPATIENT/OBSERVATION GOALS TO BE MET BEFORE DISCHARGE:  1. ADLs back to baseline: Yes    2. Activity and level of assistance: Ambulating independently.    3. Pain status: Pain free.    4. Return to near baseline physical activity: Yes     Discharge Planner Nurse   Safe discharge environment identified: No  Barriers to discharge: Yes       Entered by: Sharan Gipson RN 07/02/2023 12:36 AM     Vitals are Temp: 98.2  F (36.8  C) Temp src: Oral BP: 129/64 Pulse: (!) 46   Resp: 16 SpO2: 95 %.  Patient is Alert and Oriented x4. He is on  SBA with no assistive devices .  Pt is on a Regular diet. Denies  pain.  Patient has Normal Saline 0.9% running at 100 mL per hour.patient walk to bathroom to empty bladder. Tele is running @ HR 47 with PVC and 1st Degree AV Block  Will continue close observation.    Please review provider order for any additional goals.   Nurse to notify provider when observation goals have been met and patient is ready for discharge.

## 2023-07-03 LAB
ATRIAL RATE - MUSE: 52 BPM
DIASTOLIC BLOOD PRESSURE - MUSE: NORMAL MMHG
INTERPRETATION ECG - MUSE: NORMAL
P AXIS - MUSE: -29 DEGREES
PR INTERVAL - MUSE: 204 MS
QRS DURATION - MUSE: 108 MS
QT - MUSE: 434 MS
QTC - MUSE: 403 MS
R AXIS - MUSE: -27 DEGREES
SYSTOLIC BLOOD PRESSURE - MUSE: NORMAL MMHG
T AXIS - MUSE: -25 DEGREES
VENTRICULAR RATE- MUSE: 52 BPM

## 2023-07-04 ENCOUNTER — NURSE TRIAGE (OUTPATIENT)
Dept: NURSING | Facility: CLINIC | Age: 73
End: 2023-07-04
Payer: COMMERCIAL

## 2023-07-04 NOTE — TELEPHONE ENCOUNTER
Nurse Triage SBAR    Is this a 2nd Level Triage? NO    Situation: Lightheaded    Background: Patient calling, he was just discharged from the hospital on Saturday. He states that he is still feeling lightheaded.  He states that it is hard for him to get up and move around without holding onto something.  He denies pain, denies and numbness or weakness on either side of his body. He states that it does not feel like the room is spinning.       Assessment: lightheaded    Protocol Recommended Disposition:   Home Care    Recommendation:     Care advice given per protocol and call back precautions discussed. Patient and his daughter in law verbalized understanding and agreement with the plan of care.      n/a     CRISTEL SMITH RN      Does the patient meet one of the following criteria for ADS visit consideration? 16+ years old, with an [facility] PCP     TIP  Providers, please consider if this condition is appropriate for management at one of our Acute and Diagnostic Services sites.     If patient is a good candidate, please use dotphrase <dot>triageresponse and select Refer to ADS to document.    Reason for Disposition    Poor fluid intake probably caused the weakness    Additional Information    Negative: SEVERE difficulty breathing (e.g., struggling for each breath, speaks in single words)    Negative: Shock suspected (e.g., cold/pale/clammy skin, too weak to stand, low BP, rapid pulse)    Negative: Difficult to awaken or acting confused (e.g., disoriented, slurred speech)    Negative: [1] Fainted > 15 minutes ago AND [2] still feels too weak or dizzy to stand    Negative: [1] SEVERE weakness (i.e., unable to walk or barely able to walk, requires support) AND [2] new-onset or worsening    Negative: Sounds like a life-threatening emergency to the triager    Negative: Weakness of the face, arm or leg on one side of the body    Negative: [1] Diabetes mellitus AND [2] weakness from low blood sugar (i.e., < 60 mg/dl or  "3.5 mmol/l)    Negative: Heat exhaustion suspected (i.e., dehydration from heat exposure)    Negative: Chest pain    Negative: Vomiting is main symptom    Negative: Diarrhea is main symptom    Negative: Difficulty breathing    Negative: Heart beating < 50 beats per minute OR > 140 beats per minute    Negative: Extra heart beats OR irregular heart beating  (i.e., \"palpitations\")    Negative: Follows bleeding (e.g., from vomiting, rectum, vagina; Exception: small brief weakness from sight of a small amount blood)    Negative: Black or tarry bowel movements    Negative: [1] Drinking very little AND [2] dehydration suspected (e.g., no urine > 12 hours, very dry mouth, very lightheaded)    Negative: Patient sounds very sick or weak to the triager    Negative: [1] MODERATE weakness (i.e., interferes with work, school, normal activities) AND [2] cause unknown  (Exceptions: weakness with acute minor illness, or weakness from poor fluid intake)    Negative: [1] MODERATE weakness AND [2] from poor fluid intake AND [3] no improvement after 2 hours of rest and fluids    Negative: [1] Fever > 103 F (39.4 C) AND [2] not able to get the fever down using Fever Care Advice    Negative: [1] Fever > 101 F (38.3 C) AND [2] age > 60 years    Negative: [1] Fever > 100.0 F (37.8 C) AND [2] bedridden (e.g., nursing home patient, CVA, chronic illness, recovering from surgery)    Negative: [1] Fever > 100.0 F (37.8 C) AND [2] diabetes mellitus or weak immune system (e.g., HIV positive, cancer chemo, splenectomy, organ transplant, chronic steroids)    Negative: Pale skin (pallor)    Negative: [1] MODERATE weakness (i.e., interferes with work, school, normal activities) AND [2] persists > 3 days    Negative: Taking a medicine that could cause weakness (e.g., blood pressure medications, diuretics)    Negative: [1] MILD weakness (i.e., does not interfere with ability to work, go to school, normal activities) AND [2] persists > 1 week    " Negative: [1] Fatigue (i.e., tires easily, decreased energy) AND [2] persists > 1 week    Negative: Weakness is a chronic symptom (recurrent or ongoing AND present > 4 weeks)    Negative: Fatigue is a chronic symptom (recurrent or ongoing AND present > 4 weeks)    Protocols used: WEAKNESS (GENERALIZED) AND FATIGUE-A-AH

## 2023-07-05 ENCOUNTER — PATIENT OUTREACH (OUTPATIENT)
Dept: CARE COORDINATION | Facility: CLINIC | Age: 73
End: 2023-07-05
Payer: COMMERCIAL

## 2023-07-05 ENCOUNTER — NURSE TRIAGE (OUTPATIENT)
Dept: FAMILY MEDICINE | Facility: CLINIC | Age: 73
End: 2023-07-05
Payer: COMMERCIAL

## 2023-07-05 NOTE — TELEPHONE ENCOUNTER
Dr Kimble huddled with writer and advised back to ED due to  Severity of ongoing symptoms and bradycardia he had in the hospital and stress test was not done.     Called and advised wife of providers recommendations and rational.   Wife is not sure wether the patient will agree to go.   Wife Advised of ER locations.     Hetal MUHAMMAD RN   [facility] Triage

## 2023-07-05 NOTE — PROGRESS NOTES
General acute hospital    Background: Transitional Care Management program identified per system criteria and reviewed by General acute hospital team for possible outreach.    Assessment:  Upon chart review, patient is in communication with a clinic team member, nurse or provider within [facility] for reason of discussing hospital follow up plan of care and answering questions patient may have related to discharge instructions. [facility] Team member will update episode status of Transitional Care Management program to enrolled per system workflows.     General acute hospital made first outreach attempt on 7/3/23 to reach patient for hospital follow up and left message and that time.    Patient has active communication with a nurse, provider or care team for reason of post-hospital follow up plan.  Outreach call by [facility] team not indicated to minimize duplicative efforts.     Patient is in active communication today with a Registered Nurse from Nurse Triage at [facility]. No W outreach call needed at this time.    Plan: General acute hospital will make no additional outreach attempts to minimize duplicative efforts and reduce potential confusion for patient.      BRITTANY Canseco  [phone]  [facility]    *[facility] Resource Team does NOT follow patient ongoing. Referrals are identified based on internal discharge reports and the outreach is to ensure patient has an understanding of their discharge instructions.

## 2023-07-05 NOTE — TELEPHONE ENCOUNTER
Called mobile [phone]- LMTCB    Called mobile on file   Called and talked with wife and patient-agreeable to providers recommendation.     Dr Kimble advised push fluids and eat well and call back around 3pm with an update.     Advised Red flag symptoms-ED/911    Hetal MUHAMMAD RN   [facility] Triage

## 2023-07-05 NOTE — TELEPHONE ENCOUNTER
2nd level triage- yes- per protocol severe dizziness go to ER/UC (or to office with pcp approval)      Situation   Spouse calling with patient requesting an antibiotic due to MRI results from 7/1 in the ED. She was told he could have a sinus infection.       background   ED 7/1-7/2/23-OBS   Patient has a order for a Ziopatch (bradycardia and lightheaded), stress test order new dose Levothyroxine medication.   Was given antivert (advised wife patient was given this and explained commonly helps with vertigo)- 7/1 in the ED      Had CTA/MR     patient was not prescribed Antivert     Assessment   Patient states Sunday after discharging from the hospital he felt pretty good but Monday -present he has been feeling severely lightheaded when he lifts his head, turns his head and when he stand.   He has to hold onto the walls to walk.     BP was 150/70's HR 56 yesterday. Has not taken today    Has not fallen or fainted     Has some nausea -eating helps. No emesis    Denied sinus pressure on sides of face or forehead  No ear symptoms   No fever     Does not feel like the room is spinning   No vision changes    No headache  No numbness, tingling, and or weakness in face or any extremity  Denied all BEFAST/stoke questions besides balance is off with walking.      Mild headache 2 days ago and the headache went away on it's own.     The patient is drinking water, eating a little, decreased appetite. They denied being a daily drinker or alcohol abuse.     The wife is familiar with the Epley Maneuver maneuver- performed  x2, did not help.      Recommendations     Advised ER for worsening symptoms before hearing back.   Advised wife and patient we will call back with providers recommendation      Reason for Disposition    SEVERE dizziness (e.g., unable to stand, requires support to walk, feels like passing out now)    Lightheadedness (dizziness) present now, after 2 hours of rest and fluids    Additional Information    Negative:  SEVERE difficulty breathing (e.g., struggling for each breath, speaks in single words)    Negative: Shock suspected (e.g., cold/pale/clammy skin, too weak to stand, low BP, rapid pulse)    Negative: Difficult to awaken or acting confused (e.g., disoriented, slurred speech)    Negative: Fainted, and still feels dizzy afterwards    Negative: Overdose (accidental or intentional) of medications    Negative: New neurologic deficit that is present now: * Weakness of the face, arm, or leg on one side of the body * Numbness of the face, arm, or leg on one side of the body * Loss of speech or garbled speech    Negative: Heart beating < 50 beats per minute OR > 140 beats per minute    Negative: Sounds like a life-threatening emergency to the triager    Negative: Chest pain    Negative: Rectal bleeding, bloody stool, or tarry-black stool    Negative: Vomiting is main symptom    Negative: Diarrhea is main symptom    Negative: Headache is main symptom    Negative: Heat exhaustion suspected (i.e., dehydration from heat exposure)    Negative: Patient states that they are having an anxiety or panic attack    Negative: Dizziness from low blood sugar (i.e., < 60 mg/dl or 3.5 mmol/l)    Negative: SEVERE headache or neck pain    Negative: Spinning or tilting sensation (vertigo) present now and one or more stroke risk factors (i.e., hypertension, diabetes mellitus, prior stroke/TIA, heart attack, age over 60) (Exception: prior physician evaluation for this AND no different/worse than usual)    Negative: Neurologic deficit that was brief (now gone), ANY of the following:* Weakness of the face, arm, or leg on one side of the body* Numbness of the face, arm, or leg on one side of the body* Loss of speech or garbled speech    Negative: Loss of vision or double vision  (Exception: Similar to previous migraines.)    Negative: Extra heart beats OR irregular heart beating (i.e., 'palpitations')    Negative: Difficulty breathing    Negative:  Drinking very little and has signs of dehydration (e.g., no urine > 12 hours, very dry mouth, very lightheaded)    Negative: Follows bleeding (e.g., stomach, rectum, vagina)  (Exception: Became dizzy from sight of small amount blood.)    Negative: Patient sounds very sick or weak to the triager    Negative: Spinning or tilting sensation (vertigo) present now    Negative: Fever > 103 F (39.4 C)    Negative: Fever > 100.0 F (37.8 C) and has diabetes mellitus or a weak immune system (e.g., HIV positive, cancer chemotherapy, organ transplant, splenectomy, chronic steroids)    Protocols used: DIZZINESS-A-OH

## 2023-07-05 NOTE — TELEPHONE ENCOUNTER
Patient's wife calling with 3pm update.    Reports he is still doing the same, there has been no change - very lightheaded.  Did eat breakfast at 11am - 2 eggs, toast, and aguilar.    Drinking water all day.    Next step?    Please advise, thanks.

## 2023-07-06 ENCOUNTER — HOSPITAL ENCOUNTER (EMERGENCY)
Facility: CLINIC | Age: 73
Discharge: HOME | End: 2023-07-07
Attending: EMERGENCY MEDICINE | Admitting: EMERGENCY MEDICINE
Payer: COMMERCIAL

## 2023-07-06 ENCOUNTER — TELEPHONE (OUTPATIENT)
Dept: CARDIOLOGY | Facility: CLINIC | Age: 73
End: 2023-07-06

## 2023-07-06 ENCOUNTER — HOSPITAL ENCOUNTER (OUTPATIENT)
Dept: CARDIOLOGY | Facility: CLINIC | Age: 73
Discharge: HOME | End: 2023-07-06
Attending: INTERNAL MEDICINE | Admitting: INTERNAL MEDICINE
Payer: COMMERCIAL

## 2023-07-06 ENCOUNTER — APPOINTMENT (OUTPATIENT)
Dept: MRI IMAGING | Facility: CLINIC | Age: 73
End: 2023-07-06
Attending: EMERGENCY MEDICINE
Payer: COMMERCIAL

## 2023-07-06 ENCOUNTER — ALLIED HEALTH/NURSE VISIT (OUTPATIENT)
Dept: FAMILY MEDICINE | Facility: CLINIC | Age: 73
End: 2023-07-06
Payer: COMMERCIAL

## 2023-07-06 VITALS
TEMPERATURE: 97.8 F | OXYGEN SATURATION: 98 % | DIASTOLIC BLOOD PRESSURE: 63 MMHG | HEART RATE: 55 BPM | RESPIRATION RATE: 18 BRPM | SYSTOLIC BLOOD PRESSURE: 136 MMHG

## 2023-07-06 VITALS — DIASTOLIC BLOOD PRESSURE: 80 MMHG | SYSTOLIC BLOOD PRESSURE: 134 MMHG

## 2023-07-06 DIAGNOSIS — R26.81 UNSTEADINESS ON FEET: ICD-10-CM

## 2023-07-06 DIAGNOSIS — Z01.30 BP CHECK: Primary | ICD-10-CM

## 2023-07-06 DIAGNOSIS — R00.1 BRADYCARDIA: ICD-10-CM

## 2023-07-06 LAB
ANION GAP SERPL CALCULATED.3IONS-SCNC: 11 MMOL/L (ref 7–15)
BASOPHILS # BLD AUTO: 0.1 10E3/UL (ref 0–0.2)
BASOPHILS NFR BLD AUTO: 1 %
BUN SERPL-MCNC: 14.3 MG/DL (ref 8–23)
CALCIUM SERPL-MCNC: 10.2 MG/DL (ref 8.8–10.2)
CHLORIDE SERPL-SCNC: 103 MMOL/L (ref 98–107)
CREAT SERPL-MCNC: 0.76 MG/DL (ref 0.67–1.17)
DEPRECATED HCO3 PLAS-SCNC: 27 MMOL/L (ref 22–29)
EOSINOPHIL # BLD AUTO: 0.3 10E3/UL (ref 0–0.7)
EOSINOPHIL NFR BLD AUTO: 5 %
ERYTHROCYTE [DISTWIDTH] IN BLOOD BY AUTOMATED COUNT: 12.9 % (ref 10–15)
GFR SERPL CREATININE-BSD FRML MDRD: >90 ML/MIN/1.73M2
GLUCOSE SERPL-MCNC: 121 MG/DL (ref 70–99)
HCT VFR BLD AUTO: 45.6 % (ref 40–53)
HGB BLD-MCNC: 15.1 G/DL (ref 13.3–17.7)
HOLD SPECIMEN: NORMAL
HOLD SPECIMEN: NORMAL
IMM GRANULOCYTES # BLD: 0 10E3/UL
IMM GRANULOCYTES NFR BLD: 0 %
LYMPHOCYTES # BLD AUTO: 2.7 10E3/UL (ref 0.8–5.3)
LYMPHOCYTES NFR BLD AUTO: 41 %
MCH RBC QN AUTO: 33.4 PG (ref 26.5–33)
MCHC RBC AUTO-ENTMCNC: 33.1 G/DL (ref 31.5–36.5)
MCV RBC AUTO: 101 FL (ref 78–100)
MONOCYTES # BLD AUTO: 0.7 10E3/UL (ref 0–1.3)
MONOCYTES NFR BLD AUTO: 11 %
NEUTROPHILS # BLD AUTO: 2.7 10E3/UL (ref 1.6–8.3)
NEUTROPHILS NFR BLD AUTO: 42 %
NRBC # BLD AUTO: 0 10E3/UL
NRBC BLD AUTO-RTO: 0 /100
PLATELET # BLD AUTO: 292 10E3/UL (ref 150–450)
POTASSIUM SERPL-SCNC: 4.1 MMOL/L (ref 3.4–5.3)
RBC # BLD AUTO: 4.52 10E6/UL (ref 4.4–5.9)
SODIUM SERPL-SCNC: 141 MMOL/L (ref 136–145)
TROPONIN T SERPL HS-MCNC: 17 NG/L
WBC # BLD AUTO: 6.5 10E3/UL (ref 4–11)

## 2023-07-06 PROCEDURE — 85025 COMPLETE CBC W/AUTO DIFF WBC: CPT | Performed by: EMERGENCY MEDICINE

## 2023-07-06 PROCEDURE — 99207 PR NO CHARGE NURSE ONLY: CPT | Performed by: FAMILY MEDICINE

## 2023-07-06 PROCEDURE — 84484 ASSAY OF TROPONIN QUANT: CPT | Performed by: EMERGENCY MEDICINE

## 2023-07-06 PROCEDURE — 93248 EXT ECG>7D<15D REV&INTERPJ: CPT | Performed by: INTERNAL MEDICINE

## 2023-07-06 PROCEDURE — 99285 EMERGENCY DEPT VISIT HI MDM: CPT | Mod: 25

## 2023-07-06 PROCEDURE — 93005 ELECTROCARDIOGRAM TRACING: CPT | Mod: XU

## 2023-07-06 PROCEDURE — 70553 MRI BRAIN STEM W/O & W/DYE: CPT

## 2023-07-06 PROCEDURE — 36415 COLL VENOUS BLD VENIPUNCTURE: CPT | Performed by: EMERGENCY MEDICINE

## 2023-07-06 PROCEDURE — 80048 BASIC METABOLIC PNL TOTAL CA: CPT | Performed by: EMERGENCY MEDICINE

## 2023-07-06 PROCEDURE — 93246 EXT ECG>7D<15D RECORDING: CPT

## 2023-07-06 RX ORDER — GADOBUTROL 604.72 MG/ML
10 INJECTION INTRAVENOUS ONCE
Status: COMPLETED | OUTPATIENT
Start: 2023-07-06 | End: 2023-07-07

## 2023-07-06 ASSESSMENT — ACTIVITIES OF DAILY LIVING (ADL): ADLS_ACUITY_SCORE: 33

## 2023-07-06 NOTE — TELEPHONE ENCOUNTER
[facility] Call Center    Phone Message    May a detailed message be left on voicemail: yes     Reason for Call: Other: Spouse called on behalf of the patient to schedule a stress test. Please call spouse back to further coordinate, thank you.     Action Taken: Message routed to:  Other: Cardiology    Travel Screening: Not Applicable     Thank you!  [facility]

## 2023-07-06 NOTE — PROGRESS NOTES
Sundar German was evaluated at [facility] on July 6, 2023 at which time his blood pressure was:    BP Readings from Last 1 Encounters:   07/06/23 134/80     No data recorded      Reviewed lifestyle modifications for blood pressure control and reduction: including making healthy food choices, managing weight, getting regular exercise, smoking cessation, reducing alcohol consumption, monitoring blood pressure regularly.     Symptoms: None    BP Goal:< 140/90 mmHg    BP Assessment:  BP at goal    Potential Reasons for BP too high: NA - Not applicable    BP Follow-Up Plan: Recheck BP in 6 months at pharmacy    Recommendation to Provider:     Note completed by: Sukhwinder Santiago RPH

## 2023-07-07 LAB
ATRIAL RATE - MUSE: 51 BPM
DIASTOLIC BLOOD PRESSURE - MUSE: NORMAL MMHG
INTERPRETATION ECG - MUSE: NORMAL
P AXIS - MUSE: 11 DEGREES
PR INTERVAL - MUSE: 254 MS
QRS DURATION - MUSE: 114 MS
QT - MUSE: 420 MS
QTC - MUSE: 387 MS
R AXIS - MUSE: -32 DEGREES
SYSTOLIC BLOOD PRESSURE - MUSE: NORMAL MMHG
T AXIS - MUSE: 9 DEGREES
VENTRICULAR RATE- MUSE: 51 BPM

## 2023-07-07 PROCEDURE — 255N000002 HC RX 255 OP 636: Performed by: EMERGENCY MEDICINE

## 2023-07-07 PROCEDURE — A9585 GADOBUTROL INJECTION: HCPCS | Performed by: EMERGENCY MEDICINE

## 2023-07-07 RX ADMIN — GADOBUTROL 10 ML: 604.72 INJECTION INTRAVENOUS at 00:04

## 2023-07-07 ASSESSMENT — ACTIVITIES OF DAILY LIVING (ADL): ADLS_ACUITY_SCORE: 35

## 2023-07-07 NOTE — ED PROVIDER NOTES
"  History     Chief Complaint:  Dizziness     HPI   Sundar German is a 73 year old male who presents to the ED with his wife for evaluation of dizziness. The patient reports he feels off balance when he walks. He was evaluated at [facility] ED on 7/1/23 for similar symptoms and had negative MRI, Head CT, and CTA. He had Ziopatch heart monitor applied earlier today. He states he took a nap earlier today and awoke feeling unsteady while walking and \"like he is drunk.\" His dizziness is worse with change of position and while walking, and the patient notes his symptoms seem to have improved while in the ED. He denies feeling like he is drifting to one side or room spinning sensation. He denies neck pain, chest pain, leg weakness, numbness, lightheadedness while laying down, vision changes, headaches, syncope, ear pain, sinus pain, ear fullness, congestion, or other concerns.    Independent Historian:   None - Patient Only    Review of External Notes:   Reviewed patient's recent admission and discharge at the beginning of July.  Patient presented with dizziness, bradycardia and unsteadiness on his feet.  He underwent a very thorough work-up including an MRI of the brain, CT of the head and CTA and ultimately was admitted for further evaluation. workup significant for bradycardia with HR in the 40's.  TSH is low at 0.05, but normal T4.  CBC shows mild anemia with HGB of 12.9, however, this appears to be near his baseline.  CMP is unremarkable, Troponin normal.  CT head/neck and MRI unremarkable.  During admission patient was evaluated by cardiology who did not feel that his bradycardia was contributing to his dizziness.  Echo showed a slightly decreased ejection fraction and likely old MI.    Medications:    Celebrex  Zocor  Synthroid  Albuterol  Amoxil  Flomax  Norco  Diprosone    Past Medical History:    Allergic Rhinitis  Acquired hypothyroidism  Hyperlipidemia  Impaired Memory  Chronic sinusitis  Benign Prostatic " Hyperplasia  Osteoarthritis  Prediabetes  Class 1 diabetes  GI bleed  Malignant Melanoma of torso  Hearing Loss  CKD  Iron Deficiency Anemia  Alcohol abuse    Past Surgical History:    Total Bilateral Knee Replacement  Tonsillectomy  Adenectomy  Rotator Cuff Repair, right  Vasectomy  Umbilical hernia Repair    Physical Exam     Patient Vitals for the past 24 hrs:   BP Temp Temp src Pulse Resp SpO2   23 2300 136/63 -- -- -- -- 98 %   23 2114 (!) 149/74 97.8  F (36.6  C) Temporal 55 18 99 %        Physical Exam  General: Patient is awake, alert and interactive   Head: No signs of trauma.   Eyes: The pupils are equal, round, and reactive to light. Conjunctivae and sclerae are normal. No nystagmus. Full ROM of both eyes and appears symmetric without obvious palsy.   ENT: small left sided tympanic effusion without erythema or bulging membrane   Neck: Normal range of motion.   CV: Regular rate.   Resp: Lungs are clear without wheezes or rales. No respiratory distress.   GI: Abdomen is soft, no rigidity, guarding, or rebound. No distension. No tenderness to palpation in any quadrant.    MS: Normal tone. Joints grossly normal without effusions. No asymmetric leg swelling, calf or thigh tenderness.    Skin: No rash or lesions noted. Normal capillary refill noted  Neuro:   Speech is normal and fluent.   Face is symmetric without droop. CN's II-XII intact. Negative pronator drift. Finger to nose intact. Heel to shin intact.   Right Arm: Good  strength. 5/5 elbow flexion. 5/5 elbow extension. Sensation intact to light touch.   Left Arm: Good  strength. 5/5 elbow flexion. 5/5 elbow extension. Sensation intact to light touch.   Right Le/5 straight leg raise, 5/5 knee flexion, 5/5 knee extension, 5/5 dorsiflexion, 5/5 plantar flexion. Sensation intact to light touch.   Left Le/5 straight leg raise, 5/5 knee flexion, 5/5 knee extension, 5/5 dorsiflexion, 5/5 plantar flexion. Sensation intact to light  touch.    Romberg and gait: normal and steady   Psych:  Normal affect.  Appropriate interactions.    Emergency Department Course   ECG  ECG taken at 2132, ECG read at 2136  No STEMI  Sinus bradycardia. Left axis deviation. Inferior infarct. Abnormal ECG  No change as compared to prior, dated 7/1/23.  Rate 51 bpm. DE interval 254 ms. QRS duration 114 ms. QT/QTc 420/387 ms. P-R-T axes 11/ -32/ 9.     Imaging:  MR Brain w/o & w Contrast   Final Result   IMPRESSION:   1.  No evidence of an acute intracranial abnormality or significant interval change.         Report per radiology    Laboratory:  Labs Ordered and Resulted from Time of ED Arrival to Time of ED Departure   BASIC METABOLIC PANEL - Abnormal       Result Value    Sodium 141      Potassium 4.1      Chloride 103      Carbon Dioxide (CO2) 27      Anion Gap 11      Urea Nitrogen 14.3      Creatinine 0.76      Calcium 10.2      Glucose 121 (*)     GFR Estimate >90     CBC WITH PLATELETS AND DIFFERENTIAL - Abnormal    WBC Count 6.5      RBC Count 4.52      Hemoglobin 15.1      Hematocrit 45.6       (*)     MCH 33.4 (*)     MCHC 33.1      RDW 12.9      Platelet Count 292      % Neutrophils 42      % Lymphocytes 41      % Monocytes 11      % Eosinophils 5      % Basophils 1      % Immature Granulocytes 0      NRBCs per 100 WBC 0      Absolute Neutrophils 2.7      Absolute Lymphocytes 2.7      Absolute Monocytes 0.7      Absolute Eosinophils 0.3      Absolute Basophils 0.1      Absolute Immature Granulocytes 0.0      Absolute NRBCs 0.0     TROPONIN T, HIGH SENSITIVITY - Normal    Troponin T, High Sensitivity 17        Emergency Department Course & Assessments:     Interventions:  Medications   gadobutrol (GADAVIST) injection 10 mL (10 mLs Intravenous $Given 7/7/23 0004)        Assessments:  2310 Initial Examination  0117 Recheck and discussed results    Consultations/Discussion of Management or Tests:  None        Disposition:  The patient was discharged to  home.     Impression & Plan    CMS Diagnoses: None    Medical Decision Making:  Sundar German is a 73 year old male who was seen in our emergency department on 7/1/2023 and underwent a very thorough work-up including MRI and CT of the head and neck which were unremarkable for cause of his dizziness and unsteadiness on his feet.  Ultimately he was admitted and underwent cardiology evaluation for possible symptomatic bradycardia.  However this was not thought to be contributing to his overall symptomatology.  An echo done at that time showed a slightly decreased ejection fraction and likely old MI but no clear culprit for his unsteadiness.  Ultimately was able to be discharged home.  Currently he is wearing a Zio patch and has had waxing and waning symptoms over the last week.  Currently his symptoms have slightly improved.  On my initial evaluation, he is hemodynamically stable with normal vital signs.  He is afebrile.  Oxygenating well on room air.  On my physical exam, I do not detect any significant neurological deficits.  He has a normal Romberg and a steady gait.  However I did repeat his MRI to ensure that there is no evidence of ischemic changes.  Thankfully this was negative for any evidence of infarction, mass or hemorrhage.  The remainder of his work-up was reassuring.  On reevaluation, he has no progression or new symptoms.  His exam remains benign.  I recommended follow-up with ENT and primary care.  No additional sinister causes were noted during my evaluation.  Recommended close follow-up and return to the emergency department with any new or worsening symptoms.    Diagnosis:    ICD-10-CM    1. Unsteadiness on feet  R26.81           Scribe Disclosure:  I, Nikolas Denny, am serving as a scribe at 11:16 PM on 7/6/2023 to document services personally performed by Rasheed Escoto MD based on my observations and the provider's statements to me.     7/6/2023   Rasheed Escoto,  MD Escoto, Rasheed Moser MD  07/07/23 8388

## 2023-07-07 NOTE — ED TRIAGE NOTES
Here for concern of lightheadedness and feeling wobbly when walking. Was seen here for same complaint on Saturday, discharge on Sunday and felt worse again on Monday morning. ABCs intact.      Triage Assessment     Row Name 07/06/23 2112       Triage Assessment (Adult)    Airway WDL WDL       Respiratory WDL    Respiratory WDL WDL       Cardiac WDL    Cardiac WDL WDL

## 2023-07-13 ENCOUNTER — OFFICE VISIT (OUTPATIENT)
Dept: FAMILY MEDICINE | Facility: CLINIC | Age: 73
End: 2023-07-13
Payer: COMMERCIAL

## 2023-07-13 VITALS
RESPIRATION RATE: 14 BRPM | TEMPERATURE: 97.4 F | OXYGEN SATURATION: 99 % | HEART RATE: 62 BPM | DIASTOLIC BLOOD PRESSURE: 58 MMHG | WEIGHT: 218 LBS | SYSTOLIC BLOOD PRESSURE: 126 MMHG | BODY MASS INDEX: 30.4 KG/M2

## 2023-07-13 DIAGNOSIS — R26.81 UNSTEADINESS ON FEET: ICD-10-CM

## 2023-07-13 DIAGNOSIS — H81.399 PERIPHERAL VERTIGO, UNSPECIFIED LATERALITY: Primary | ICD-10-CM

## 2023-07-13 DIAGNOSIS — E03.9 HYPOTHYROIDISM, UNSPECIFIED TYPE: ICD-10-CM

## 2023-07-13 PROBLEM — N18.2 CKD (CHRONIC KIDNEY DISEASE) STAGE 2, GFR 60-89 ML/MIN: Status: ACTIVE | Noted: 2021-12-02

## 2023-07-13 PROBLEM — D50.9 IRON DEFICIENCY ANEMIA, UNSPECIFIED IRON DEFICIENCY ANEMIA TYPE: Status: RESOLVED | Noted: 2023-02-01 | Resolved: 2023-07-13

## 2023-07-13 PROCEDURE — 99214 OFFICE O/P EST MOD 30 MIN: CPT | Performed by: FAMILY MEDICINE

## 2023-07-13 RX ORDER — LEVOTHYROXINE SODIUM 175 UG/1
175 TABLET ORAL DAILY
Qty: 90 TABLET | Refills: 3 | Status: SHIPPED | OUTPATIENT
Start: 2023-07-13 | End: 2024-03-26

## 2023-07-13 RX ORDER — PREDNISONE 10 MG/1
TABLET ORAL DAILY
COMMUNITY
Start: 2023-07-12 | End: 2023-11-24

## 2023-07-13 NOTE — PROGRESS NOTES
Assessment & Plan   Peripheral vertigo, unspecified laterality, most likely diagnosis after complete work-up.  Unsteadiness on feet  Finish prednisone course.     Hypothyroidism-TSH is elevated and dose decreased to 175 mcg daily.  New prescription sent for #90.  Recheck labs in about 2 to 3 months.    MED REC REQUIRED  Post Medication Reconciliation Status: discharge medications reconciled, continue medications without change    Saw ENT and put on prednisone.    Return in about 7 months (around 2/2/2024) for wellness exam with fasting labs .          Buddy Womack MD      [address] [address]  [email or web address]   Office: [phone]       Rossi Escamilla is a 73 year old, presenting for the following health issues:  ER F/U        7/13/2023     2:10 PM   Additional Questions   Roomed by Eric HURLEY CMA     History of Present Illness       Reason for visit:  Follow up from er    He eats 2-3 servings of fruits and vegetables daily.He consumes 0 sweetened beverage(s) daily.He exercises with enough effort to increase his heart rate 20 to 29 minutes per day.  He exercises with enough effort to increase his heart rate 4 days per week.   He is taking medications regularly.   Hospital Follow-up Visit:    Hospital/Nursing Home/IP Rehab Facility: [facility]  Date of Admission: 07/06/2023  Date of Discharge: 07/07/2023  Reason(s) for Admission: Unsteadiness on feet & Dizziness    Hospital/Nursing Home/IP Rehab Facility: [facility]  Date of Admission: 07/01/2023  Date of Discharge: 07/02/2023  Reason(s) for Admission:   Dizziness  Bradycardia  Hypertension  Hypothyroidism  Benign prostatic hypertrophy  Hyperlipidemia  Alcohol use with intoxication    Was your hospitalization related to COVID-19? No   Problems taking medications regularly:  None  Medication changes since discharge: None  Problems adhering to non-medication  [facility] Emergency Department  Tierra Hsieh [address] [address]  Phone: [phone]               June 20, 2019    Patient: Vargas Reynaga   YOB: 1999   Date of Visit: 6/20/2019       To Whom It May Concern:    Vargas Reynaga was seen and treated in our emergency department on 6/20/2019. She may return to work on 6/21/2019.       Sincerely,       Carmella Azevedo RN         Signature:__________________________________ therapy:  None    Summary of hospitalization:  [facility] discharge summary reviewed  Diagnostic Tests/Treatments reviewed.  Follow up needed: none  Other Healthcare Providers Involved in Patient s Care:         None  Update since discharge: improved. Plan of care communicated with patient     Review of Systems      Objective    /58 (BP Location: Right arm, Patient Position: Sitting, Cuff Size: Adult Large)   Pulse 62   Temp 97.4  F (36.3  C) (Tympanic)   Resp 14   Wt 98.9 kg (218 lb)   SpO2 99%   BMI 30.40 kg/m    Body mass index is 30.4 kg/m .  Physical Exam   GENERAL: healthy, alert and no distress  EYES: Eyes grossly normal to inspection, PERRL and conjunctivae and sclerae normal  HENT: ear canals and TM's normal, nose and mouth without ulcers or lesions  NECK: no adenopathy, no asymmetry, masses, or scars and thyroid normal to palpation  RESP: lungs clear to auscultation - no rales, rhonchi or wheezes  CV: regular rate and rhythm, normal S1 S2, no S3 or S4, no murmur, click or rub, no peripheral edema and peripheral pulses strong  ABDOMEN: soft, nontender, no hepatosplenomegaly, no masses and bowel sounds normal  MS: no gross musculoskeletal defects noted, no edema  SKIN: no suspicious lesions or rashes  NEURO: Normal strength and tone, mentation intact and speech normal  BACK: no CVA tenderness, no paralumbar tenderness  PSYCH: mentation appears normal, affect normal/bright    Hospital/ER studies reviewed

## 2023-09-12 ENCOUNTER — TRANSFERRED RECORDS (OUTPATIENT)
Dept: HEALTH INFORMATION MANAGEMENT | Facility: CLINIC | Age: 73
End: 2023-09-12
Payer: COMMERCIAL

## 2023-09-13 ENCOUNTER — LAB (OUTPATIENT)
Dept: LAB | Facility: CLINIC | Age: 73
End: 2023-09-13
Payer: COMMERCIAL

## 2023-09-13 DIAGNOSIS — E03.9 HYPOTHYROIDISM, UNSPECIFIED TYPE: ICD-10-CM

## 2023-09-13 LAB
T3FREE SERPL-MCNC: 3.1 PG/ML (ref 2–4.4)
T4 FREE SERPL-MCNC: 1.8 NG/DL (ref 0.9–1.7)
TSH SERPL DL<=0.005 MIU/L-ACNC: 0.32 UIU/ML (ref 0.3–4.2)

## 2023-09-13 PROCEDURE — 84481 FREE ASSAY (FT-3): CPT

## 2023-09-13 PROCEDURE — 36415 COLL VENOUS BLD VENIPUNCTURE: CPT

## 2023-09-13 PROCEDURE — 84443 ASSAY THYROID STIM HORMONE: CPT

## 2023-09-13 PROCEDURE — 84439 ASSAY OF FREE THYROXINE: CPT

## 2023-09-14 NOTE — RESULT ENCOUNTER NOTE
Dear Andi,    Here is a summary of your recent test results:  -TSH (thyroid stimulating hormone) level is normal (T4 was slightly elevated but T3 was normal) which indicates appropriate thyroid replacement dosing.  ADVISE: continuing same replacement dose and rechecking this in 12 months.    For additional lab test information, www.testing.com is a very good reference.    In addition, here is a list of due or overdue Health Maintenance reminders:  COVID-19 Vaccine(5 - Moderna series) due on 01/23/2023  Flu Vaccine(1) due on 09/01/2023  Colorectal Cancer Screening due on 10/02/2023    Please call us at [phone] (or use [facility]) to address the above recommendations if needed.           Thank you very much for trusting me and [facility].     Have a peaceful day.    Healthy regards,  Buddy Womack MD

## 2023-09-22 ENCOUNTER — TELEPHONE (OUTPATIENT)
Dept: GASTROENTEROLOGY | Facility: CLINIC | Age: 73
End: 2023-09-22
Payer: COMMERCIAL

## 2023-09-22 NOTE — TELEPHONE ENCOUNTER
"Endoscopy Scheduling Screen    Have you had a positive Covid test in the last 14 days?  No    Are you active on MyChart?   Yes    What insurance is in the chart?  Other:  [facility]    Ordering/Referring Provider:   JASMIN CABRAL        (If ordering provider performs procedure, schedule with ordering provider unless otherwise instructed. )    BMI: Estimated body mass index is 30.4 kg/m  as calculated from the following:    Height as of 7/1/23: 1.803 m (5' 11\").    Weight as of 7/13/23: 98.9 kg (218 lb).     Sedation Ordered  moderate sedation.   If patient BMI > 50 do not schedule in [facility].    If patient BMI > 45 do not schedule at [facility].    Are you taking methadone or Suboxone?  No    Are you taking any prescription medications for pain 3 or more times per week?   No    Do you have a history of malignant hyperthermia or adverse reaction to anesthesia?  No    (Females) Are you currently pregnant?   No     Have you been diagnosed or told you have pulmonary hypertension?   No    Do you have an LVAD?  No    Have you been told you have moderate to severe sleep apnea?  No    Have you been told you have COPD, asthma, or any other lung disease?  No    Do you have any heart conditions?  No     Have you ever had an organ transplant?   No    Have you ever had or are you awaiting a heart or lung transplant?   No    Have you had a stroke or transient ischemic attack (TIA aka \"mini stroke\" in the last 6 months?   No    Have you been diagnosed with or been told you have cirrhosis of the liver?   No    Are you currently on dialysis?   No    Do you need assistance transferring?   No    BMI: Estimated body mass index is 30.4 kg/m  as calculated from the following:    Height as of 7/1/23: 1.803 m (5' 11\").    Weight as of 7/13/23: 98.9 kg (218 lb).     Is patients BMI > 40 and scheduling location [facility]?  No    Do you take an injectable medication for weight loss or diabetes (excluding insulin)?  No    Do you take the medication " Naltrexone?  No    Do you take blood thinners?  No       Prep   Are you currently on dialysis or do you have chronic kidney disease?  No    Do you have a diagnosis of diabetes?  No    Do you have a diagnosis of cystic fibrosis (CF)?  No    On a regular basis do you go 3 -5 days between bowel movements?  No    BMI > 40?  No    Preferred Pharmacy:        [id number] - [place], [place] - [address] [place] [address] ROAD  AT [facility] [address]  [place] [address]  Phone: [phone] Fax: [phone]          Final Scheduling Details   Colonoscopy prep sent?  Standard MiraLAX    Procedure scheduled  Colonoscopy    Surgeon:  Daniela     Date of procedure:  11/30     Pre-OP / PAC:   No - Not required for this site.    Location  [facility] - Patient preference.    Sedation   Moderate Sedation - Per order.      Patient Reminders:   You will receive a call from a Nurse to review instructions and health history.  This assessment must be completed prior to your procedure.  Failure to complete the Nurse assessment may result in the procedure being cancelled.      On the day of your procedure, please designate an adult(s) who can drive you home stay with you for the next 24 hours. The medicines used in the exam will make you sleepy. You will not be able to drive.      You cannot take public transportation, ride share services, or non-medical taxi service without a responsible caregiver.  Medical transport services are allowed with the requirement that a responsible caregiver will receive you at your destination.  We require that drivers and caregivers are confirmed prior to your procedure.

## 2023-10-07 NOTE — TELEPHONE ENCOUNTER
LVM for the patient relaying PCP's recommendation to f/u with opthamology. Donna Hayes RN June 20, 2019 11:40 AM     Assistance OOB with selected safe patient handling equipment if applicable/Assistance with ambulation/Communicate risk of Fall with Harm to all staff, patient, and family/Monitor gait and stability/Monitor for mental status changes and reorient to person, place, and time, as needed/Provide visual cue: red socks, yellow wristband, yellow gown, etc/Reinforce activity limits and safety measures with patient and family/Toileting schedule using arm’s reach rule for commode and bathroom/Use of alarms - bed, stretcher, chair and/or video monitoring/Bed in lowest position, wheels locked, appropriate side rails in place/Call bell, personal items and telephone in reach/Instruct patient to call for assistance before getting out of bed/chair/stretcher/Non-slip footwear applied when patient is off stretcher/[place] to call system/Physically safe environment - no spills, clutter or unnecessary equipment/Purposeful Proactive Rounding/Room/bathroom lighting operational, light cord in reach

## 2023-11-21 ENCOUNTER — TRANSFERRED RECORDS (OUTPATIENT)
Dept: HEALTH INFORMATION MANAGEMENT | Facility: CLINIC | Age: 73
End: 2023-11-21
Payer: COMMERCIAL

## 2023-11-30 ENCOUNTER — HOSPITAL ENCOUNTER (OUTPATIENT)
Facility: CLINIC | Age: 73
Discharge: HOME | End: 2023-11-30
Attending: INTERNAL MEDICINE | Admitting: INTERNAL MEDICINE
Payer: COMMERCIAL

## 2023-11-30 VITALS
HEART RATE: 58 BPM | TEMPERATURE: 97.2 F | OXYGEN SATURATION: 95 % | WEIGHT: 204 LBS | DIASTOLIC BLOOD PRESSURE: 92 MMHG | HEIGHT: 71 IN | BODY MASS INDEX: 28.56 KG/M2 | RESPIRATION RATE: 14 BRPM | SYSTOLIC BLOOD PRESSURE: 111 MMHG

## 2023-11-30 LAB — COLONOSCOPY: NORMAL

## 2023-11-30 PROCEDURE — 250N000011 HC RX IP 250 OP 636: Performed by: INTERNAL MEDICINE

## 2023-11-30 PROCEDURE — 99153 MOD SED SAME PHYS/QHP EA: CPT

## 2023-11-30 PROCEDURE — 45378 DIAGNOSTIC COLONOSCOPY: CPT | Performed by: INTERNAL MEDICINE

## 2023-11-30 PROCEDURE — G0121 COLON CA SCRN NOT HI RSK IND: HCPCS | Performed by: INTERNAL MEDICINE

## 2023-11-30 PROCEDURE — G0500 MOD SEDAT ENDO SERVICE >5YRS: HCPCS | Performed by: INTERNAL MEDICINE

## 2023-11-30 RX ORDER — FLUMAZENIL 0.1 MG/ML
0.2 INJECTION, SOLUTION INTRAVENOUS
Status: DISCONTINUED | OUTPATIENT
Start: 2023-11-30 | End: 2023-11-30 | Stop reason: HOSPADM

## 2023-11-30 RX ORDER — NALOXONE HYDROCHLORIDE 0.4 MG/ML
0.2 INJECTION, SOLUTION INTRAMUSCULAR; INTRAVENOUS; SUBCUTANEOUS
Status: DISCONTINUED | OUTPATIENT
Start: 2023-11-30 | End: 2023-11-30 | Stop reason: HOSPADM

## 2023-11-30 RX ORDER — PROCHLORPERAZINE MALEATE 5 MG
5 TABLET ORAL EVERY 6 HOURS PRN
Status: DISCONTINUED | OUTPATIENT
Start: 2023-11-30 | End: 2023-11-30 | Stop reason: HOSPADM

## 2023-11-30 RX ORDER — ONDANSETRON 2 MG/ML
4 INJECTION INTRAMUSCULAR; INTRAVENOUS
Status: DISCONTINUED | OUTPATIENT
Start: 2023-11-30 | End: 2023-11-30 | Stop reason: HOSPADM

## 2023-11-30 RX ORDER — NALOXONE HYDROCHLORIDE 0.4 MG/ML
0.4 INJECTION, SOLUTION INTRAMUSCULAR; INTRAVENOUS; SUBCUTANEOUS
Status: DISCONTINUED | OUTPATIENT
Start: 2023-11-30 | End: 2023-11-30 | Stop reason: HOSPADM

## 2023-11-30 RX ORDER — ONDANSETRON 4 MG/1
4 TABLET, ORALLY DISINTEGRATING ORAL EVERY 6 HOURS PRN
Status: DISCONTINUED | OUTPATIENT
Start: 2023-11-30 | End: 2023-11-30 | Stop reason: HOSPADM

## 2023-11-30 RX ORDER — ONDANSETRON 2 MG/ML
4 INJECTION INTRAMUSCULAR; INTRAVENOUS EVERY 6 HOURS PRN
Status: DISCONTINUED | OUTPATIENT
Start: 2023-11-30 | End: 2023-11-30 | Stop reason: HOSPADM

## 2023-11-30 RX ORDER — LIDOCAINE 40 MG/G
CREAM TOPICAL
Status: DISCONTINUED | OUTPATIENT
Start: 2023-11-30 | End: 2023-11-30 | Stop reason: HOSPADM

## 2023-11-30 RX ORDER — ATROPINE SULFATE 0.1 MG/ML
1 INJECTION INTRAVENOUS
Status: DISCONTINUED | OUTPATIENT
Start: 2023-11-30 | End: 2023-11-30 | Stop reason: HOSPADM

## 2023-11-30 RX ORDER — SIMETHICONE 40MG/0.6ML
133 SUSPENSION, DROPS(FINAL DOSAGE FORM)(ML) ORAL
Status: DISCONTINUED | OUTPATIENT
Start: 2023-11-30 | End: 2023-11-30 | Stop reason: HOSPADM

## 2023-11-30 RX ORDER — EPINEPHRINE 1 MG/ML
0.1 INJECTION, SOLUTION INTRAMUSCULAR; SUBCUTANEOUS
Status: DISCONTINUED | OUTPATIENT
Start: 2023-11-30 | End: 2023-11-30 | Stop reason: HOSPADM

## 2023-11-30 RX ORDER — FENTANYL CITRATE 50 UG/ML
50-100 INJECTION, SOLUTION INTRAMUSCULAR; INTRAVENOUS EVERY 5 MIN PRN
Status: DISCONTINUED | OUTPATIENT
Start: 2023-11-30 | End: 2023-11-30 | Stop reason: HOSPADM

## 2023-11-30 RX ORDER — DIPHENHYDRAMINE HYDROCHLORIDE 50 MG/ML
25-50 INJECTION INTRAMUSCULAR; INTRAVENOUS
Status: DISCONTINUED | OUTPATIENT
Start: 2023-11-30 | End: 2023-11-30 | Stop reason: HOSPADM

## 2023-11-30 RX ADMIN — MIDAZOLAM 1 MG: 1 INJECTION INTRAMUSCULAR; INTRAVENOUS at 12:36

## 2023-11-30 RX ADMIN — FENTANYL CITRATE 50 MCG: 50 INJECTION, SOLUTION INTRAMUSCULAR; INTRAVENOUS at 12:36

## 2023-11-30 RX ADMIN — MIDAZOLAM 1 MG: 1 INJECTION INTRAMUSCULAR; INTRAVENOUS at 12:43

## 2023-11-30 RX ADMIN — FENTANYL CITRATE 50 MCG: 50 INJECTION, SOLUTION INTRAMUSCULAR; INTRAVENOUS at 12:43

## 2023-11-30 ASSESSMENT — ACTIVITIES OF DAILY LIVING (ADL): ADLS_ACUITY_SCORE: 35

## 2023-11-30 NOTE — H&P
Pre-Endoscopy History and Physical     Sundar German MRN# [id number]   YOB: 1950 Age: 73 year old     Date of Procedure: 11/30/2023  Primary care provider: Liu Nolasco  Type of Endoscopy: Colonoscopy with possible biopsy, possible polypectomy  Reason for Procedure: screen  Type of Anesthesia Anticipated: Conscious Sedation    HPI:    Sundar is a 73 year old male who will be undergoing the above procedure.      A history and physical has been performed. The patient's medications and allergies have been reviewed. The risks and benefits of the procedure and the sedation options and risks were discussed with the patient.  All questions were answered and informed consent was obtained.      He denies a personal or family history of anesthesia complications or bleeding disorders.     Patient Active Problem List   Diagnosis    Allergic rhinitis    Acquired hypothyroidism    Hyperlipidemia LDL goal <100    Impaired memory    Sleep related leg cramps    Chronic sinusitis    S/P revision of total knee, left    BPH (benign prostatic hyperplasia)    OA (osteoarthritis)    Prediabetes    Class 1 obesity with serious comorbidity and body mass index (BMI) of 31.0 to 31.9 in adult, unspecified obesity type    h/o Malignant melanoma of torso excluding breast (H)    GI bleed    * * * SBE PROPHYLAXIS * * *    S/P total knee arthroplasty    Sensorineural hearing loss (SNHL) of both ears    CKD (chronic kidney disease) stage 2, GFR 60-89 ml/min    Dizziness    Alcohol abuse    Bradycardia        Past Medical History:   Diagnosis Date    Abnormal glucose     A1C 1/06 =  6.3    Acquired hypothyroidism 10/11/2005     Problem list name updated by automated process. Provider to review    Allergic rhinitis, cause unspecified     mary spring time    Benign localized hyperplasia of prostate with urinary obstruction and other lower urinary tract symptoms (LUTS)(600.21)     slow stream, nocturia - Dr Malave    Chronic sinusitis  03/14/2014    Complication of anesthesia     Environmental allergies     AIT - Dr Weiss    History of blood transfusion     Hyperlipidemia LDL goal <130 10/31/2010    hypogonadism     low testosterone at times     RAFIQ (iron deficiency anemia)     Impaired memory 02/11/2011    ?    Iron deficiency anemia, unspecified iron deficiency anemia type 02/01/2023    Melanoma (H) 04/2018    Melanoma 0.7 mm depth, Levar III, superficial spreading, stage T1a, Dr Sierra    OA (osteoarthritis) total L knee 3/09    knees bother;; has had bilat arthroscopic    PONV (postoperative nausea and vomiting)     Prediabetes     Sensorineural hearing loss (SNHL) of both ears     Sleep related leg cramps 06/2012        Past Surgical History:   Procedure Laterality Date    ARTHROPLASTY KNEE Right 3/16/2020    Rt TKA - Dr DUY Dudley    ARTHROPLASTY REVISION KNEE Left 10/26/2015     ARTHROPLASTY REVISION KNEE - Dr Dudley    COLONOSCOPY  10/2/2013    diverticulosis - due 10 yrs (10/2023), initial 10/2003    CYSTOSCOPY  04/2016    ENDOSCOPIC SINUS SURGERY  3/17/2014    Bilateral Endoscopic Sinus Surgery - Dr Johnson    ESOPHAGOSCOPY, GASTROSCOPY, DUODENOSCOPY (EGD), COMBINED N/A 2/28/2019    Non bleeding gastric ulcers and duodenal ulcer. Recheck 3 months. Dr Sinclair    LASER REVOLIX PHOTOSELECTIVE VAPORIZATION PROSTATE N/A 4/6/2016    LASER REVOLIX / QUANTA PHOTOSELECTIVE VAPORIZATION PROSTATE - Dr Malave    ROTATOR CUFF REPAIR RT/LT Left 03/2012    left shoulder    SURGICAL HISTORY OF -   1996    tonsils and adenoids    SURGICAL HISTORY OF -  Left 03/2009    Lt TKA    SURGICAL HISTORY OF -  Bilateral     Rt Knee x 1 (meniscus, 1995), Lt x 3 (last 2009)    UPPER GI ENDOSCOPY  05/2019    normal    VASECTOMY Bilateral 1986    ZZHC REPAIR UMBILICAL KASI,5+Y/O,REDUC  2002       Social History     Tobacco Use    Smoking status: Former     Packs/day: 1.00     Years: 20.00     Additional pack years: 0.00     Total pack years: 20.00     Types: Cigarettes      Quit date: 1981     Years since quittin.9    Smokeless tobacco: Former     Types: Chew     Quit date: 1992    Tobacco comments:     1 tin per week   Substance Use Topics    Alcohol use: Yes     Alcohol/week: 7.0 - 8.0 standard drinks of alcohol     Types: 7 - 8 Standard drinks or equivalent per week     Comment: 1 beer daily       Family History   Problem Relation Age of Onset    C.A.D. Father         CHF    Heart Failure Father     Chronic Obstructive Pulmonary Disease Father     Cerebrovascular Disease Brother 61    Gastrointestinal Disease Brother         GI Bleed - colectomy    Hypertension Mother     Cardiovascular Mother 84        MI    Lipids Mother     Chronic Obstructive Pulmonary Disease Mother     No Known Problems Sister     No Known Problems Brother        Prior to Admission medications    Medication Sig Start Date End Date Taking? Authorizing Provider   fluticasone (FLONASE) 50 MCG/ACT nasal spray Spray 1 spray into both nostrils daily 23  Yes Liu Nolasco MD   ipratropium (ATROVENT) 0.06 % nasal spray USE 1 SPRAY IN EACH NOSTRIL UP TO FOUR TIMES DAILY AS NEEDED Strength: 0.06 % 23  Yes Liu Nolasco MD   levocetirizine (XYZAL) 5 MG tablet Take 1 tablet (5 mg) by mouth every evening 23  Yes Liu Nolasco MD   levothyroxine (SYNTHROID/LEVOTHROID) 175 MCG tablet Take 1 tablet (175 mcg) by mouth daily 23  Yes Roscoe Womack MD   multivitamin w/minerals (THERA-VIT-M) tablet Take 1 tablet by mouth daily   Yes Reported, Patient   psyllium (METAMUCIL/KONSYL) Packet Take 1 packet by mouth daily   Yes Reported, Patient   simvastatin (ZOCOR) 20 MG tablet Take 1 tablet (20 mg) by mouth At Bedtime 23  Yes Liu Nolasco MD   tadalafil (CIALIS) 5 MG tablet Take 5 mg by mouth daily   Yes Unknown, Entered By History   tamsulosin (FLOMAX) 0.4 MG capsule Take 1 capsule (0.4 mg) by mouth daily 23  Yes Liu Nolasco MD   ACE/ARB/ARNI NOT PRESCRIBED (INTENTIONAL) Please choose reason  "not prescribed from choices below.    Liu Nolasco MD   betamethasone dipropionate (DIPROSONE) 0.05 % external cream Apply topically 2 times daily 2/1/23   Liu Nolasco MD   ciclopirox (LOPROX) 0.77 % cream Apply topically daily 2/1/23   Liu Nolasco MD   penciclovir (DENAVIR) 1 % external cream Apply topically every 2 hours 2/1/23   Liu Nolasco MD       No Known Allergies     REVIEW OF SYSTEMS:   5 point ROS negative except as noted above in HPI, including Gen., Resp., CV, GI &  system review.    PHYSICAL EXAM:   There were no vitals taken for this visit. Estimated body mass index is 30.4 kg/m  as calculated from the following:    Height as of 7/1/23: 1.803 m (5' 11\").    Weight as of 7/13/23: 98.9 kg (218 lb).   GENERAL APPEARANCE: alert, and oriented  MENTAL STATUS: alert  AIRWAY EXAM: Mallampatti Class I (visualization of the soft palate, fauces, uvula, anterior and posterior pillars)  RESP: lungs clear to auscultation - no rales, rhonchi or wheezes  CV: regular rates and rhythm  DIAGNOSTICS:    Not indicated    IMPRESSION   ASA Class 2 - Mild systemic disease    PLAN:   Plan for Colonoscopy with possible biopsy, possible polypectomy. We discussed the risks, benefits and alternatives and the patient wished to proceed.    The above has been forwarded to the consulting provider.      Signed Electronically by: Juliocesar Suarez MD  November 30, 2023          "

## 2024-01-04 ENCOUNTER — PATIENT OUTREACH (OUTPATIENT)
Dept: CARE COORDINATION | Facility: CLINIC | Age: 74
End: 2024-01-04
Payer: COMMERCIAL

## 2024-01-18 ENCOUNTER — PATIENT OUTREACH (OUTPATIENT)
Dept: CARE COORDINATION | Facility: CLINIC | Age: 74
End: 2024-01-18
Payer: COMMERCIAL

## 2024-02-25 DIAGNOSIS — R73.03 PREDIABETES: ICD-10-CM

## 2024-02-25 DIAGNOSIS — E78.5 HYPERLIPIDEMIA LDL GOAL <100: ICD-10-CM

## 2024-02-26 RX ORDER — SIMVASTATIN 20 MG
20 TABLET ORAL AT BEDTIME
Qty: 90 TABLET | Refills: 0 | Status: SHIPPED | OUTPATIENT
Start: 2024-02-26 | End: 2024-03-26

## 2024-03-16 ENCOUNTER — HEALTH MAINTENANCE LETTER (OUTPATIENT)
Age: 74
End: 2024-03-16

## 2024-03-26 ENCOUNTER — OFFICE VISIT (OUTPATIENT)
Dept: FAMILY MEDICINE | Facility: CLINIC | Age: 74
End: 2024-03-26
Payer: COMMERCIAL

## 2024-03-26 VITALS
BODY MASS INDEX: 31.08 KG/M2 | WEIGHT: 222 LBS | HEIGHT: 71 IN | OXYGEN SATURATION: 97 % | DIASTOLIC BLOOD PRESSURE: 76 MMHG | RESPIRATION RATE: 18 BRPM | SYSTOLIC BLOOD PRESSURE: 114 MMHG | HEART RATE: 59 BPM | TEMPERATURE: 96.3 F

## 2024-03-26 DIAGNOSIS — N40.1 BENIGN PROSTATIC HYPERPLASIA WITH LOWER URINARY TRACT SYMPTOMS, SYMPTOM DETAILS UNSPECIFIED: ICD-10-CM

## 2024-03-26 DIAGNOSIS — Z00.00 ROUTINE GENERAL MEDICAL EXAMINATION AT A HEALTH CARE FACILITY: Primary | ICD-10-CM

## 2024-03-26 DIAGNOSIS — B35.1 ONYCHOMYCOSIS OF TOENAIL: ICD-10-CM

## 2024-03-26 DIAGNOSIS — Z00.00 MEDICARE ANNUAL WELLNESS VISIT, SUBSEQUENT: ICD-10-CM

## 2024-03-26 DIAGNOSIS — C43.59 MALIGNANT MELANOMA OF TORSO EXCLUDING BREAST (H): ICD-10-CM

## 2024-03-26 DIAGNOSIS — R33.9 URINARY RETENTION: ICD-10-CM

## 2024-03-26 DIAGNOSIS — H90.3 SENSORINEURAL HEARING LOSS (SNHL) OF BOTH EARS: ICD-10-CM

## 2024-03-26 DIAGNOSIS — D50.9 IRON DEFICIENCY ANEMIA, UNSPECIFIED IRON DEFICIENCY ANEMIA TYPE: ICD-10-CM

## 2024-03-26 DIAGNOSIS — R31.29 MICROSCOPIC HEMATURIA: ICD-10-CM

## 2024-03-26 DIAGNOSIS — Z12.11 SCREEN FOR COLON CANCER: ICD-10-CM

## 2024-03-26 DIAGNOSIS — N18.2 CKD (CHRONIC KIDNEY DISEASE) STAGE 2, GFR 60-89 ML/MIN: ICD-10-CM

## 2024-03-26 DIAGNOSIS — Z12.5 SCREENING FOR PROSTATE CANCER: ICD-10-CM

## 2024-03-26 DIAGNOSIS — E66.811 CLASS 1 OBESITY WITH SERIOUS COMORBIDITY AND BODY MASS INDEX (BMI) OF 30.0 TO 30.9 IN ADULT, UNSPECIFIED OBESITY TYPE: ICD-10-CM

## 2024-03-26 DIAGNOSIS — Z51.81 MEDICATION MONITORING ENCOUNTER: ICD-10-CM

## 2024-03-26 DIAGNOSIS — E78.5 HYPERLIPIDEMIA LDL GOAL <100: ICD-10-CM

## 2024-03-26 DIAGNOSIS — M15.0 PRIMARY OSTEOARTHRITIS INVOLVING MULTIPLE JOINTS: ICD-10-CM

## 2024-03-26 DIAGNOSIS — R73.03 PREDIABETES: ICD-10-CM

## 2024-03-26 DIAGNOSIS — Z91.09 ENVIRONMENTAL ALLERGIES: ICD-10-CM

## 2024-03-26 DIAGNOSIS — E03.9 ACQUIRED HYPOTHYROIDISM: ICD-10-CM

## 2024-03-26 LAB
ALBUMIN SERPL BCG-MCNC: 4.4 G/DL (ref 3.5–5.2)
ALBUMIN UR-MCNC: NEGATIVE MG/DL
ALP SERPL-CCNC: 57 U/L (ref 40–150)
ALT SERPL W P-5'-P-CCNC: 20 U/L (ref 0–70)
ANION GAP SERPL CALCULATED.3IONS-SCNC: 12 MMOL/L (ref 7–15)
APPEARANCE UR: CLEAR
AST SERPL W P-5'-P-CCNC: 27 U/L (ref 0–45)
BACTERIA #/AREA URNS HPF: ABNORMAL /HPF
BILIRUB SERPL-MCNC: 0.3 MG/DL
BILIRUB UR QL STRIP: NEGATIVE
BUN SERPL-MCNC: 15.1 MG/DL (ref 8–23)
CALCIUM SERPL-MCNC: 9.7 MG/DL (ref 8.8–10.2)
CHLORIDE SERPL-SCNC: 107 MMOL/L (ref 98–107)
CHOLEST SERPL-MCNC: 150 MG/DL
CK SERPL-CCNC: 142 U/L (ref 39–308)
COLOR UR AUTO: YELLOW
CREAT SERPL-MCNC: 0.84 MG/DL (ref 0.67–1.17)
CREAT UR-MCNC: 98.9 MG/DL
DEPRECATED HCO3 PLAS-SCNC: 23 MMOL/L (ref 22–29)
EGFRCR SERPLBLD CKD-EPI 2021: >90 ML/MIN/1.73M2
ERYTHROCYTE [DISTWIDTH] IN BLOOD BY AUTOMATED COUNT: 13.5 % (ref 10–15)
FASTING STATUS PATIENT QL REPORTED: YES
FERRITIN SERPL-MCNC: 292 NG/ML (ref 31–409)
GGT SERPL-CCNC: 35 U/L (ref 8–61)
GLUCOSE SERPL-MCNC: 111 MG/DL (ref 70–99)
GLUCOSE UR STRIP-MCNC: NEGATIVE MG/DL
HBA1C MFR BLD: 5.5 % (ref 0–5.6)
HCT VFR BLD AUTO: 39.3 % (ref 40–53)
HDLC SERPL-MCNC: 68 MG/DL
HGB BLD-MCNC: 13.3 G/DL (ref 13.3–17.7)
HGB UR QL STRIP: ABNORMAL
IRON BINDING CAPACITY (ROCHE): 293 UG/DL (ref 240–430)
IRON SATN MFR SERPL: 26 % (ref 15–46)
IRON SERPL-MCNC: 77 UG/DL (ref 61–157)
KETONES UR STRIP-MCNC: NEGATIVE MG/DL
LDLC SERPL CALC-MCNC: 69 MG/DL
LEUKOCYTE ESTERASE UR QL STRIP: NEGATIVE
MAGNESIUM SERPL-MCNC: 2.1 MG/DL (ref 1.7–2.3)
MCH RBC QN AUTO: 34.1 PG (ref 26.5–33)
MCHC RBC AUTO-ENTMCNC: 33.8 G/DL (ref 31.5–36.5)
MCV RBC AUTO: 101 FL (ref 78–100)
MICROALBUMIN UR-MCNC: 19.9 MG/L
MICROALBUMIN/CREAT UR: 20.12 MG/G CR (ref 0–17)
NITRATE UR QL: NEGATIVE
NONHDLC SERPL-MCNC: 82 MG/DL
PH UR STRIP: 5.5 [PH] (ref 5–7)
PLATELET # BLD AUTO: 224 10E3/UL (ref 150–450)
POTASSIUM SERPL-SCNC: 4.4 MMOL/L (ref 3.4–5.3)
PROT SERPL-MCNC: 7.3 G/DL (ref 6.4–8.3)
PSA SERPL DL<=0.01 NG/ML-MCNC: 1.12 NG/ML (ref 0–6.5)
RBC # BLD AUTO: 3.9 10E6/UL (ref 4.4–5.9)
RBC #/AREA URNS AUTO: ABNORMAL /HPF
SODIUM SERPL-SCNC: 142 MMOL/L (ref 135–145)
SP GR UR STRIP: 1.02 (ref 1–1.03)
T4 FREE SERPL-MCNC: 1.2 NG/DL (ref 0.9–1.7)
TRIGL SERPL-MCNC: 66 MG/DL
TSH SERPL DL<=0.005 MIU/L-ACNC: 3.3 UIU/ML (ref 0.3–4.2)
UROBILINOGEN UR STRIP-ACNC: 0.2 E.U./DL
WBC # BLD AUTO: 4.7 10E3/UL (ref 4–11)
WBC #/AREA URNS AUTO: ABNORMAL /HPF

## 2024-03-26 PROCEDURE — 82570 ASSAY OF URINE CREATININE: CPT | Performed by: FAMILY MEDICINE

## 2024-03-26 PROCEDURE — 83540 ASSAY OF IRON: CPT | Performed by: FAMILY MEDICINE

## 2024-03-26 PROCEDURE — 83036 HEMOGLOBIN GLYCOSYLATED A1C: CPT | Performed by: FAMILY MEDICINE

## 2024-03-26 PROCEDURE — 84439 ASSAY OF FREE THYROXINE: CPT | Performed by: FAMILY MEDICINE

## 2024-03-26 PROCEDURE — 36415 COLL VENOUS BLD VENIPUNCTURE: CPT | Performed by: FAMILY MEDICINE

## 2024-03-26 PROCEDURE — 82728 ASSAY OF FERRITIN: CPT | Performed by: FAMILY MEDICINE

## 2024-03-26 PROCEDURE — 82977 ASSAY OF GGT: CPT | Performed by: FAMILY MEDICINE

## 2024-03-26 PROCEDURE — 83550 IRON BINDING TEST: CPT | Performed by: FAMILY MEDICINE

## 2024-03-26 PROCEDURE — G0439 PPPS, SUBSEQ VISIT: HCPCS | Performed by: FAMILY MEDICINE

## 2024-03-26 PROCEDURE — 85027 COMPLETE CBC AUTOMATED: CPT | Performed by: FAMILY MEDICINE

## 2024-03-26 PROCEDURE — 82550 ASSAY OF CK (CPK): CPT | Performed by: FAMILY MEDICINE

## 2024-03-26 PROCEDURE — 80053 COMPREHEN METABOLIC PANEL: CPT | Performed by: FAMILY MEDICINE

## 2024-03-26 PROCEDURE — 81001 URINALYSIS AUTO W/SCOPE: CPT | Performed by: FAMILY MEDICINE

## 2024-03-26 PROCEDURE — 82043 UR ALBUMIN QUANTITATIVE: CPT | Performed by: FAMILY MEDICINE

## 2024-03-26 PROCEDURE — 83735 ASSAY OF MAGNESIUM: CPT | Performed by: FAMILY MEDICINE

## 2024-03-26 PROCEDURE — 80061 LIPID PANEL: CPT | Performed by: FAMILY MEDICINE

## 2024-03-26 PROCEDURE — 99214 OFFICE O/P EST MOD 30 MIN: CPT | Mod: 25 | Performed by: FAMILY MEDICINE

## 2024-03-26 PROCEDURE — G0103 PSA SCREENING: HCPCS | Performed by: FAMILY MEDICINE

## 2024-03-26 PROCEDURE — 84443 ASSAY THYROID STIM HORMONE: CPT | Performed by: FAMILY MEDICINE

## 2024-03-26 RX ORDER — LEVOTHYROXINE SODIUM 175 UG/1
175 TABLET ORAL DAILY
Qty: 90 TABLET | Refills: 3 | Status: SHIPPED | OUTPATIENT
Start: 2024-03-26

## 2024-03-26 RX ORDER — FLUTICASONE PROPIONATE 50 MCG
1 SPRAY, SUSPENSION (ML) NASAL DAILY
Qty: 48 G | Refills: 3 | Status: SHIPPED | OUTPATIENT
Start: 2024-03-26

## 2024-03-26 RX ORDER — SIMVASTATIN 20 MG
20 TABLET ORAL AT BEDTIME
Qty: 90 TABLET | Refills: 3 | Status: SHIPPED | OUTPATIENT
Start: 2024-03-26

## 2024-03-26 RX ORDER — CICLOPIROX OLAMINE 7.7 MG/G
CREAM TOPICAL DAILY
Qty: 90 G | Refills: 3 | Status: SHIPPED | OUTPATIENT
Start: 2024-03-26

## 2024-03-26 RX ORDER — TAMSULOSIN HYDROCHLORIDE 0.4 MG/1
0.4 CAPSULE ORAL DAILY
Qty: 90 CAPSULE | Refills: 3 | Status: SHIPPED | OUTPATIENT
Start: 2024-03-26 | End: 2024-04-30

## 2024-03-26 SDOH — HEALTH STABILITY: PHYSICAL HEALTH: ON AVERAGE, HOW MANY DAYS PER WEEK DO YOU ENGAGE IN MODERATE TO STRENUOUS EXERCISE (LIKE A BRISK WALK)?: 2 DAYS

## 2024-03-26 ASSESSMENT — SOCIAL DETERMINANTS OF HEALTH (SDOH): HOW OFTEN DO YOU GET TOGETHER WITH FRIENDS OR RELATIVES?: PATIENT DECLINED

## 2024-03-26 NOTE — PROGRESS NOTES
Preventive Care Visit  [facility] [place]  Liu Nolasco MD, Family Medicine  Mar 26, 2024      Assessment & Plan     Routine general medical examination at a health care facility    - Comprehensive metabolic panel  - Lipid panel reflex to direct LDL Fasting  - CBC with platelets  - CK total  - UA Macroscopic with reflex to Microscopic and Culture  - Albumin Random Urine Quantitative with Creat Ratio  - Prostate Specific Antigen Screen  - Fecal colorectal cancer screen FIT  - Hemoglobin A1c  - TSH  - T4, free  - REVIEW OF HEALTH MAINTENANCE PROTOCOL ORDERS  - Fecal colorectal cancer screen FIT  - Comprehensive metabolic panel  - Lipid panel reflex to direct LDL Fasting  - CBC with platelets  - CK total  - UA Macroscopic with reflex to Microscopic and Culture  - Albumin Random Urine Quantitative with Creat Ratio  - Prostate Specific Antigen Screen  - Hemoglobin A1c  - TSH  - T4, free  - UA Microscopic with Reflex to Culture  - GGT  - Magnesium    Medicare annual wellness visit, subsequent    - Comprehensive metabolic panel  - Lipid panel reflex to direct LDL Fasting  - CBC with platelets  - CK total  - UA Macroscopic with reflex to Microscopic and Culture  - Albumin Random Urine Quantitative with Creat Ratio  - Prostate Specific Antigen Screen  - Fecal colorectal cancer screen FIT  - Hemoglobin A1c  - TSH  - T4, free  - REVIEW OF HEALTH MAINTENANCE PROTOCOL ORDERS  - Fecal colorectal cancer screen FIT  - Comprehensive metabolic panel  - Lipid panel reflex to direct LDL Fasting  - CBC with platelets  - CK total  - UA Macroscopic with reflex to Microscopic and Culture  - Albumin Random Urine Quantitative with Creat Ratio  - Prostate Specific Antigen Screen  - Hemoglobin A1c  - TSH  - T4, free  - UA Microscopic with Reflex to Culture  - GGT  - Magnesium    Prediabetes    - Comprehensive metabolic panel  - Lipid panel reflex to direct LDL Fasting  - UA Macroscopic with reflex to Microscopic and Culture  -  Albumin Random Urine Quantitative with Creat Ratio  - Hemoglobin A1c  - REVIEW OF HEALTH MAINTENANCE PROTOCOL ORDERS  - Comprehensive metabolic panel  - Lipid panel reflex to direct LDL Fasting  - UA Macroscopic with reflex to Microscopic and Culture  - Albumin Random Urine Quantitative with Creat Ratio  - Hemoglobin A1c  - UA Microscopic with Reflex to Culture  - simvastatin (ZOCOR) 20 MG tablet  Dispense: 90 tablet; Refill: 3  - PRIMARY CARE FOLLOW-UP SCHEDULING  - Magnesium  - OFFICE/OUTPT VISIT,EST,LEVL IV    CKD (chronic kidney disease) stage 2, GFR 60-89 ml/min    - Comprehensive metabolic panel  - CBC with platelets  - UA Macroscopic with reflex to Microscopic and Culture  - Albumin Random Urine Quantitative with Creat Ratio  - REVIEW OF HEALTH MAINTENANCE PROTOCOL ORDERS  - Comprehensive metabolic panel  - CBC with platelets  - UA Macroscopic with reflex to Microscopic and Culture  - Albumin Random Urine Quantitative with Creat Ratio  - UA Microscopic with Reflex to Culture  - Iron and iron binding capacity  - Ferritin  - PRIMARY CARE FOLLOW-UP SCHEDULING  - Magnesium  - OFFICE/OUTPT VISIT,EST,LEVL IV    Iron deficiency anemia, unspecified iron deficiency anemia type    - Iron and iron binding capacity  - Ferritin  - PRIMARY CARE FOLLOW-UP SCHEDULING  - OFFICE/OUTPT VISIT,EST,LEVL IV    Hyperlipidemia LDL goal <100    - Comprehensive metabolic panel  - Lipid panel reflex to direct LDL Fasting  - CK total  - REVIEW OF HEALTH MAINTENANCE PROTOCOL ORDERS  - Comprehensive metabolic panel  - Lipid panel reflex to direct LDL Fasting  - CK total  - simvastatin (ZOCOR) 20 MG tablet  Dispense: 90 tablet; Refill: 3  - PRIMARY CARE FOLLOW-UP SCHEDULING  - OFFICE/OUTPT VISIT,EST,LEVL IV    Acquired hypothyroidism    - TSH  - T4, free  - REVIEW OF HEALTH MAINTENANCE PROTOCOL ORDERS  - TSH  - T4, free  - levothyroxine (SYNTHROID/LEVOTHROID) 175 MCG tablet  Dispense: 90 tablet; Refill: 3  - PRIMARY CARE FOLLOW-UP  SCHEDULING  - OFFICE/OUTPT VISIT,MELONY CASTRO IV    h/o Malignant melanoma of torso excluding breast (H)    - REVIEW OF HEALTH MAINTENANCE PROTOCOL ORDERS  - PRIMARY CARE FOLLOW-UP SCHEDULING  - OFFICE/OUTPT VISIT,MELONY CASTRO IV    Primary osteoarthritis involving multiple joints    - REVIEW OF HEALTH MAINTENANCE PROTOCOL ORDERS  - PRIMARY CARE FOLLOW-UP SCHEDULING  - OFFICE/OUTPT VISIT,MELONY CASTRO IV    Environmental allergies    - fluticasone (FLONASE) 50 MCG/ACT nasal spray  Dispense: 48 g; Refill: 3  - PRIMARY CARE FOLLOW-UP SCHEDULING  - OFFICE/OUTPT VISIT,MELONY CASTRO IV    Onychomycosis of toenail    - ciclopirox (LOPROX) 0.77 % cream  Dispense: 90 g; Refill: 3  - PRIMARY CARE FOLLOW-UP SCHEDULING  - OFFICE/OUTPT VISIT,EST,LEVL IV    Sensorineural hearing loss (SNHL) of both ears    - REVIEW OF HEALTH MAINTENANCE PROTOCOL ORDERS  - PRIMARY CARE FOLLOW-UP SCHEDULING  - OFFICE/OUTPT VISIT,EST,LEVL IV    Benign prostatic hyperplasia with lower urinary tract symptoms, symptom details unspecified    - Prostate Specific Antigen Screen  - REVIEW OF HEALTH MAINTENANCE PROTOCOL ORDERS  - Prostate Specific Antigen Screen  - tamsulosin (FLOMAX) 0.4 MG capsule  Dispense: 90 capsule; Refill: 3  - PRIMARY CARE FOLLOW-UP SCHEDULING  - OFFICE/OUTPT VISIT,EST,LEVL IV    Urinary retention    - Prostate Specific Antigen Screen  - REVIEW OF HEALTH MAINTENANCE PROTOCOL ORDERS  - Prostate Specific Antigen Screen  - tamsulosin (FLOMAX) 0.4 MG capsule  Dispense: 90 capsule; Refill: 3  - PRIMARY CARE FOLLOW-UP SCHEDULING  - OFFICE/OUTPT VISIT,EST,LEVL IV    Microscopic hematuria    - Prostate Specific Antigen Screen  - REVIEW OF HEALTH MAINTENANCE PROTOCOL ORDERS  - Prostate Specific Antigen Screen  - PRIMARY CARE FOLLOW-UP SCHEDULING  - OFFICE/OUTPT VISIT,MELONY CASTRO IV    Screening for prostate cancer    - Prostate Specific Antigen Screen  - REVIEW OF HEALTH MAINTENANCE PROTOCOL ORDERS  - Prostate Specific Antigen Screen  - PRIMARY CARE FOLLOW-UP  "SCHEDULING  - OFFICE/OUTPT VISIT,EST,LEVL IV    Screen for colon cancer    - Fecal colorectal cancer screen FIT  - REVIEW OF HEALTH MAINTENANCE PROTOCOL ORDERS  - Fecal colorectal cancer screen FIT  - PRIMARY CARE FOLLOW-UP SCHEDULING  - OFFICE/OUTPT VISIT,EST,LEVL IV    Class 1 obesity with serious comorbidity and body mass index (BMI) of 30.0 to 30.9 in adult, unspecified obesity type    - PRIMARY CARE FOLLOW-UP SCHEDULING  - OFFICE/OUTPT VISIT,EST,LEVL IV    Medication monitoring encounter    - Comprehensive metabolic panel  - Lipid panel reflex to direct LDL Fasting  - CBC with platelets  - CK total  - UA Macroscopic with reflex to Microscopic and Culture  - Albumin Random Urine Quantitative with Creat Ratio  - Prostate Specific Antigen Screen  - Fecal colorectal cancer screen FIT  - Hemoglobin A1c  - TSH  - T4, free  - REVIEW OF HEALTH MAINTENANCE PROTOCOL ORDERS  - Fecal colorectal cancer screen FIT  - Comprehensive metabolic panel  - Lipid panel reflex to direct LDL Fasting  - CBC with platelets  - CK total  - UA Macroscopic with reflex to Microscopic and Culture  - Albumin Random Urine Quantitative with Creat Ratio  - Prostate Specific Antigen Screen  - Hemoglobin A1c  - TSH  - T4, free  - UA Microscopic with Reflex to Culture  - PRIMARY CARE FOLLOW-UP SCHEDULING  - Magnesium  - OFFICE/OUTPT VISIT,EST,LEVL IV      Patient has been advised of split billing requirements and indicates understanding: Yes    Prescription drug management    38 minutes spent by me on the date of the encounter doing chart review, history and exam, documentation and further activities per the note    BMI  Estimated body mass index is 30.96 kg/m  as calculated from the following:    Height as of this encounter: 1.803 m (5' 11\").    Weight as of this encounter: 100.7 kg (222 lb).   Weight management plan: diet and exercise    Counseling  Appropriate preventive services were discussed with this patient, including applicable screening as " appropriate for fall prevention, nutrition, physical activity, Tobacco-use cessation, weight loss and cognition.  Checklist reviewing preventive services available has been given to the patient.  Reviewed patient's diet, addressing concerns and/or questions.   He is at risk for lack of exercise and has been provided with information to increase physical activity for the benefit of his well-being.   The patient was provided with written information regarding signs of hearing loss.     Work on weight loss  Regular exercise    Plan:    1) Medications: refills    2) Labs: pending    3) Immunizations: advised prevnar 20, rsv, consider twinrix    4) Imaging/Diagnostics: NA    5) Consults: Dermatology - Dr Sierra    Rossi Escamilla is a 74 year old, presenting for the following:  Physical        3/26/2024    10:41 AM   Additional Questions   Roomed by [facility] Care Directive  Patient does not have a Health Care Directive or Living Will:     Prediabetes    Lab Results   Component Value Date    A1C 5.5 03/26/2024    A1C 5.7 02/01/2023    A1C 5.4 11/29/2021    A1C 5.2 11/04/2020    A1C 5.3 07/30/2020    A1C 5.3 02/28/2020    A1C 5.5 05/07/2019    A1C 5.6 03/02/2018     CKD 2    BP Readings from Last 3 Encounters:   03/26/24 114/76   11/30/23 (!) 111/92   07/13/23 126/58     Creatinine   Date Value Ref Range Status   03/26/2024 0.84 0.67 - 1.17 mg/dL Final   11/04/2020 0.78 0.66 - 1.25 mg/dL Final     GFR Estimate   Date Value Ref Range Status   03/26/2024 >90 >60 mL/min/1.73m2 Final   11/04/2020 >90 >60 mL/min/[1.73_m2] Final     Comment:     Non  GFR Calc  Starting 12/18/2018, serum creatinine based estimated GFR (eGFR) will be   calculated using the Chronic Kidney Disease Epidemiology Collaboration   (CKD-EPI) equation.       Hyperlipidemia Follow-Up    Are you regularly taking any medication or supplement to lower your cholesterol?   Yes- Simvastatin  Are you having muscle aches or other side  effects that you think could be caused by your cholesterol lowering medication?  No    Recent Labs   Lab Test 02/01/23  1057 11/29/21  0920   CHOL 150 162   HDL 68 72   LDL 67 75   TRIG 74 75     Hypothyroidism Follow-up    Since last visit, patient describes the following symptoms: Weight stable, no hair loss, no skin changes, no constipation, no loose stools    TSH   Date Value Ref Range Status   03/26/2024 3.30 0.30 - 4.20 uIU/mL Final   03/25/2022 0.26 (L) 0.40 - 4.00 mU/L Final   11/04/2020 2.36 0.40 - 4.00 mU/L Final     Melanoma - Dr Sierra - doing well - annual checks    OA - stable    SNHL - slight declines, doesn't use hearing aids    BPH - stable        3/26/2024   General Health   How would you rate your overall physical health? Excellent   Feel stress (tense, anxious, or unable to sleep) Not at all         3/26/2024   Nutrition   Diet: Regular (no restrictions)         3/26/2024   Exercise   Days per week of moderate/strenous exercise 2 days   (!) EXERCISE CONCERN      3/26/2024   Social Factors   Frequency of gathering with friends or relatives Patient declined   Worry food won't last until get money to buy more No   Food not last or not have enough money for food? No   Do you have housing?  Yes   Are you worried about losing your housing? No   Lack of transportation? No   Unable to get utilities (heat,electricity)? No         3/26/2024   Activities of Daily Living- Home Safety   Needs help with the following daily activites None of the above   Safety concerns in the home None of the above         3/26/2024   Dental   Dentist two times every year? Yes         3/26/2024   Hearing Screening   Hearing concerns? (!) I NEED TO ASK PEOPLE TO SPEAK UP OR REPEAT THEMSELVES.         3/26/2024   Driving Risk Screening   Patient/family members have concerns about driving No         3/26/2024   General Alertness/Fatigue Screening   Have you been more tired than usual lately? No         3/26/2024   Urinary  Incontinence Screening   Bothered by leaking urine in past 6 months No         3/26/2024   TB Screening   Were you born outside of the [place]? No         Today's PHQ-2 Score:       3/26/2024    10:38 AM   PHQ-2 (  [employer])   Q1: Little interest or pleasure in doing things 0   Q2: Feeling down, depressed or hopeless 0   PHQ-2 Score 0   Q1: Little interest or pleasure in doing things Not at all   Q2: Feeling down, depressed or hopeless Not at all   PHQ-2 Score 0           3/26/2024   Substance Use   Alcohol more than 3/day or more than 7/wk No   Do you have a current opioid prescription? No   How severe/bad is pain from 1 to 10? 0/10 (No Pain)   Do you use any other substances recreationally? (!) ALCOHOL     Social History     Tobacco Use    Smoking status: Former     Packs/day: 1.00     Years: 20.00     Additional pack years: 0.00     Total pack years: 20.00     Types: Cigarettes     Quit date: 1981     Years since quittin.2    Smokeless tobacco: Former     Types: Chew     Quit date: 1992    Tobacco comments:     1 tin per week   Vaping Use    Vaping Use: Never used   Substance Use Topics    Alcohol use: Yes     Alcohol/week: 7.0 - 12.0 standard drinks of alcohol     Types: 7 - 12 Standard drinks or equivalent per week     Comment: 1 beer daily    Drug use: No         ASCVD Risk   The 10-year ASCVD risk score (Jake CANNNO, et al., 2019) is: 14.6%    Values used to calculate the score:      Age: 74 years      Sex: Male      Is Non- : No      Diabetic: No      Tobacco smoker: No      Systolic Blood Pressure: 114 mmHg      Is BP treated: No      HDL Cholesterol: 68 mg/dL      Total Cholesterol: 150 mg/dL    Fracture Risk Assessment Tool  Link to Frax Calculator  Use the information below to complete the Frax calculator  : 1950  Sex: male  Weight (kg): 100.7 kg (actual weight)  Height (cm): 180.3 cm  Previous Fragility Fracture:  No  History of parent with fractured hip:   No  Current Smoking:  No  Patient has been on glucocorticoids for more than 3 months (5mg/day or more): No  Rheumatoid Arthritis on Problem List:  No  Secondary Osteoporosis on Problem List:  No  Consumes 3 or more units of alcohol per day: No  Femoral Neck BMD (g/cm2)            Reviewed and updated as needed this visit by Provider                    Patient Active Problem List   Diagnosis    Allergic rhinitis    Acquired hypothyroidism    Hyperlipidemia LDL goal <100    Impaired memory    Sleep related leg cramps    Chronic sinusitis    S/P revision of total knee, left    BPH (benign prostatic hyperplasia)    OA (osteoarthritis)    Prediabetes    Class 1 obesity with serious comorbidity and body mass index (BMI) of 30.0 to 30.9 in adult, unspecified obesity type    h/o Malignant melanoma of torso excluding breast (H)    GI bleed    * * * SBE PROPHYLAXIS * * *    S/P total knee arthroplasty    Sensorineural hearing loss (SNHL) of both ears    CKD (chronic kidney disease) stage 2, GFR 60-89 ml/min    Dizziness    Alcohol abuse    Bradycardia       Past Medical History:   Diagnosis Date    Abnormal glucose     A1C 1/06 =  6.3    Acquired hypothyroidism 10/11/2005     Problem list name updated by automated process. Provider to review    Allergic rhinitis, cause unspecified     mary spring time    Benign localized hyperplasia of prostate with urinary obstruction and other lower urinary tract symptoms (LUTS)(600.21)     slow stream, nocturia - Dr Malave    Chronic sinusitis 03/14/2014    Complication of anesthesia     Environmental allergies     AIT - Dr Weiss    History of blood transfusion     Hyperlipidemia LDL goal <130 10/31/2010    hypogonadism     low testosterone at times     RAFIQ (iron deficiency anemia)     Impaired memory 02/11/2011    ?    Iron deficiency anemia, unspecified iron deficiency anemia type 02/01/2023    Melanoma (H) 04/2018    Melanoma 0.7 mm depth, Levar III, superficial spreading, stage  T1a, Dr Sierra    OA (osteoarthritis) total L knee 3/09    knees bother;; has had bilat arthroscopic    PONV (postoperative nausea and vomiting)     Prediabetes     Sensorineural hearing loss (SNHL) of both ears     Sleep related leg cramps 06/2012       Past Surgical History:   Procedure Laterality Date    ARTHROPLASTY KNEE Right 03/16/2020    Rt TKA - Dr DUY Dudley    ARTHROPLASTY REVISION KNEE Left 10/26/2015     ARTHROPLASTY REVISION KNEE - Dr Dudley    COLONOSCOPY  10/02/2013    diverticulosis - due 10 yrs (10/2023), initial 10/2003    COLONOSCOPY N/A 11/30/2023    diverticulosis - no polyps, consider 5-10 yrs    CYSTOSCOPY  04/2016    ENDOSCOPIC SINUS SURGERY  03/17/2014    Bilateral Endoscopic Sinus Surgery - Dr Johnson    ESOPHAGOSCOPY, GASTROSCOPY, DUODENOSCOPY (EGD), COMBINED N/A 02/28/2019    Non bleeding gastric ulcers and duodenal ulcer. Recheck 3 months. Dr Sinclair    LASER REVOLIX PHOTOSELECTIVE VAPORIZATION PROSTATE N/A 04/06/2016    LASER REVOLIX / QUANTA PHOTOSELECTIVE VAPORIZATION PROSTATE - Dr Malave    ROTATOR CUFF REPAIR RT/LT Left 03/2012    left shoulder    SURGICAL HISTORY OF -   1996    tonsils and adenoids    SURGICAL HISTORY OF -  Left 03/2009    Lt TKA    SURGICAL HISTORY OF -  Bilateral     Rt Knee x 1 (meniscus, 1995), Lt x 3 (last 2009)    UPPER GI ENDOSCOPY  05/2019    normal    VASECTOMY Bilateral 1986    ZZHC REPAIR UMBILICAL KASI,5+Y/O,REDUC  2002       Current Outpatient Medications   Medication Sig Dispense Refill    ciclopirox (LOPROX) 0.77 % cream Apply topically daily 90 g 3    fluticasone (FLONASE) 50 MCG/ACT nasal spray Spray 1 spray into both nostrils daily 48 g 3    levocetirizine (XYZAL) 5 MG tablet Take 1 tablet (5 mg) by mouth every evening 90 tablet 3    levothyroxine (SYNTHROID/LEVOTHROID) 175 MCG tablet Take 1 tablet (175 mcg) by mouth daily 90 tablet 3    multivitamin w/minerals (THERA-VIT-M) tablet Take 1 tablet by mouth daily      psyllium (METAMUCIL/KONSYL) Packet Take 1  packet by mouth daily      simvastatin (ZOCOR) 20 MG tablet Take 1 tablet (20 mg) by mouth at bedtime 90 tablet 3    tamsulosin (FLOMAX) 0.4 MG capsule Take 1 capsule (0.4 mg) by mouth daily 90 capsule 3    ACE/ARB/ARNI NOT PRESCRIBED (INTENTIONAL) Please choose reason not prescribed from choices below.      betamethasone dipropionate (DIPROSONE) 0.05 % external cream Apply topically 2 times daily 45 g 3    ipratropium (ATROVENT) 0.06 % nasal spray USE 1 SPRAY IN EACH NOSTRIL UP TO FOUR TIMES DAILY AS NEEDED Strength: 0.06 % 45 mL 3    penciclovir (DENAVIR) 1 % external cream Apply topically every 2 hours 5 g 3    tadalafil (CIALIS) 5 MG tablet Take 5 mg by mouth daily         No Known Allergies    Family History   Problem Relation Age of Onset    Hypertension Mother     Cardiovascular Mother 84        MI    Lipids Mother     Chronic Obstructive Pulmonary Disease Mother     C.A.D. Father         CHF    Heart Failure Father     Chronic Obstructive Pulmonary Disease Father     Cerebrovascular Disease Brother 61    Gastrointestinal Disease Brother         GI Bleed - colectomy    No Known Problems Brother     No Known Problems Sister     Colon Cancer No family hx of        Social History     Socioeconomic History    Marital status:      Spouse name: Ingrid    Number of children: 4    Years of education: None    Highest education level: None   Occupational History     Employer: [employer]   Tobacco Use    Smoking status: Former     Packs/day: 1.00     Years: 20.00     Additional pack years: 0.00     Total pack years: 20.00     Types: Cigarettes     Quit date: 1981     Years since quittin.2    Smokeless tobacco: Former     Types: Chew     Quit date: 1992    Tobacco comments:     1 tin per week   Vaping Use    Vaping Use: Never used   Substance and Sexual Activity    Alcohol use: Yes     Alcohol/week: 7.0 - 8.0 standard drinks of alcohol     Types: 7 - 8 Standard drinks or equivalent per week      Comment: 1 beer daily    Drug use: No    Sexual activity: Yes     Partners: Female     Social Determinants of Health     Financial Resource Strain: Low Risk  (3/26/2024)    Financial Resource Strain     Within the past 12 months, have you or your family members you live with been unable to get utilities (heat, electricity) when it was really needed?: No   Food Insecurity: Low Risk  (3/26/2024)    Food Insecurity     Within the past 12 months, did you worry that your food would run out before you got money to buy more?: No     Within the past 12 months, did the food you bought just not last and you didn t have money to get more?: No   Transportation Needs: Low Risk  (3/26/2024)    Transportation Needs     Within the past 12 months, has lack of transportation kept you from medical appointments, getting your medicines, non-medical meetings or appointments, work, or from getting things that you need?: No   Physical Activity: Unknown (3/26/2024)    Exercise Vital Sign     Days of Exercise per Week: 2 days   Stress: No Stress Concern Present (3/26/2024)    [employer] [place] [employer] - Occupational Stress Questionnaire     Feeling of Stress : Not at all   Social Connections: Unknown (3/26/2024)    Social Connection and Isolation Panel [NHANES]     Frequency of Social Gatherings with Friends and Family: Patient declined   Housing Stability: Low Risk  (3/26/2024)    Housing Stability     Do you have housing? : Yes     Are you worried about losing your housing?: No       Colonoscopy:  last 11/2023  FIT / Cologuard: NA  PSA: pending    Current providers sharing in care for this patient include:  Patient Care Team:  Liu Nolasco MD as PCP - General (Family Practice)  Liu Nolasco MD as Assigned PCP  Sundar Gandhi MD as MD (Urology)  Sabrina Milan RN as Registered Nurse    The following health maintenance items are reviewed in Epic and correct as of today:  Health Maintenance   Topic Date Due    RSV VACCINE  "(Pregnancy & 60+) (1 - 1-dose 60+ series) Never done    MEDICARE ANNUAL WELLNESS VISIT  03/26/2025    BMP  03/26/2025    LIPID  03/26/2025    MICROALBUMIN  03/26/2025    PSA  03/26/2025    TSH W/FREE T4 REFLEX  03/26/2025    ANNUAL REVIEW OF HM ORDERS  03/26/2025    FALL RISK ASSESSMENT  03/26/2025    GLUCOSE  03/26/2027    ADVANCE CARE PLANNING  02/01/2028    DTAP/TDAP/TD IMMUNIZATION (3 - Td or Tdap) 03/02/2028    HEPATITIS C SCREENING  Completed    PHQ-2 (once per calendar year)  Completed    INFLUENZA VACCINE  Completed    Pneumococcal Vaccine: 65+ Years  Completed    URINALYSIS  Completed    ZOSTER IMMUNIZATION  Completed    AORTIC ANEURYSM SCREENING (SYSTEM ASSIGNED)  Completed    COVID-19 Vaccine  Completed    IPV IMMUNIZATION  Aged Out    HPV IMMUNIZATION  Aged Out    MENINGITIS IMMUNIZATION  Aged Out    RSV MONOCLONAL ANTIBODY  Aged Out    COLORECTAL CANCER SCREENING  Discontinued         Review of Systems  CONSTITUTIONAL: NEGATIVE for fever, chills, change in weight  INTEGUMENTARY/SKIN: NEGATIVE for worrisome rashes, moles or lesions  EYES: NEGATIVE for vision changes or irritation  ENT/MOUTH: NEGATIVE for ear, mouth and throat problems  RESP: NEGATIVE for significant cough or SOB  CV: NEGATIVE for chest pain, palpitations or peripheral edema  GI: NEGATIVE for nausea, abdominal pain, heartburn, or change in bowel habits  : NEGATIVE for frequency, dysuria, or hematuria  MUSCULOSKELETAL: NEGATIVE for significant arthralgias or myalgia  NEURO: NEGATIVE for weakness, dizziness or paresthesias  ENDOCRINE: NEGATIVE for temperature intolerance, skin/hair changes  HEME: NEGATIVE for bleeding problems  PSYCHIATRIC: NEGATIVE for changes in mood or affect     Objective    Exam  /76   Pulse 59   Temp (!) 96.3  F (35.7  C) (Tympanic)   Resp 18   Ht 1.803 m (5' 11\")   Wt 100.7 kg (222 lb)   SpO2 97%   BMI 30.96 kg/m     Estimated body mass index is 30.96 kg/m  as calculated from the following:    Height " "as of this encounter: 1.803 m (5' 11\").    Weight as of this encounter: 100.7 kg (222 lb).    Physical Exam  GENERAL: alert and no distress  EYES: Eyes grossly normal to inspection, PERRL and conjunctivae and sclerae normal  HENT: ear canals and TM's normal, nose and mouth without ulcers or lesions  NECK: no adenopathy, no asymmetry, masses, or scars  RESP: lungs clear to auscultation - no rales, rhonchi or wheezes  CV: regular rate and rhythm, normal S1 S2, no S3 or S4, no murmur, click or rub, no peripheral edema  ABDOMEN: soft, nontender, no hepatosplenomegaly, no masses and bowel sounds normal   (male): pt declines  RECTAL: pt declines  MS: no gross musculoskeletal defects noted, no edema  SKIN: no suspicious lesions or rashes  NEURO: Normal strength and tone, mentation intact and speech normal  PSYCH: mentation appears normal, affect normal/bright         3/26/2024   Mini Cog   Clock Draw Score 2 Normal   3 Item Recall 3 objects recalled   Mini Cog Total Score 5              Signed Electronically by:              Liu Nolasco MD, FAAFP     [facility]  [address] [place] [id number]  [email or web address]   Office: [phone]  Fax: [phone]         "

## 2024-03-31 DIAGNOSIS — R80.9 PROTEINURIA, UNSPECIFIED TYPE: ICD-10-CM

## 2024-03-31 DIAGNOSIS — R31.29 MICROSCOPIC HEMATURIA: Primary | ICD-10-CM

## 2024-04-30 ENCOUNTER — OFFICE VISIT (OUTPATIENT)
Dept: UROLOGY | Facility: CLINIC | Age: 74
End: 2024-04-30
Attending: FAMILY MEDICINE
Payer: COMMERCIAL

## 2024-04-30 VITALS
DIASTOLIC BLOOD PRESSURE: 70 MMHG | WEIGHT: 222 LBS | SYSTOLIC BLOOD PRESSURE: 126 MMHG | HEIGHT: 71 IN | BODY MASS INDEX: 31.08 KG/M2

## 2024-04-30 DIAGNOSIS — R39.9 LOWER URINARY TRACT SYMPTOMS (LUTS): Primary | ICD-10-CM

## 2024-04-30 DIAGNOSIS — R80.9 PROTEINURIA, UNSPECIFIED TYPE: ICD-10-CM

## 2024-04-30 DIAGNOSIS — R31.29 MICROSCOPIC HEMATURIA: ICD-10-CM

## 2024-04-30 LAB
ALBUMIN UR-MCNC: NEGATIVE MG/DL
APPEARANCE UR: CLEAR
BILIRUB UR QL STRIP: NEGATIVE
COLOR UR AUTO: YELLOW
GLUCOSE UR STRIP-MCNC: NEGATIVE MG/DL
HGB UR QL STRIP: NEGATIVE
KETONES UR STRIP-MCNC: NEGATIVE MG/DL
LEUKOCYTE ESTERASE UR QL STRIP: NEGATIVE
NITRATE UR QL: NEGATIVE
PH UR STRIP: 7.5 [PH] (ref 5–7)
SP GR UR STRIP: 1.01 (ref 1–1.03)
UROBILINOGEN UR STRIP-ACNC: 0.2 E.U./DL

## 2024-04-30 PROCEDURE — 99214 OFFICE O/P EST MOD 30 MIN: CPT | Performed by: NURSE PRACTITIONER

## 2024-04-30 PROCEDURE — 81003 URINALYSIS AUTO W/O SCOPE: CPT | Mod: QW | Performed by: NURSE PRACTITIONER

## 2024-04-30 RX ORDER — TAMSULOSIN HYDROCHLORIDE 0.4 MG/1
0.8 CAPSULE ORAL DAILY
Qty: 180 CAPSULE | Refills: 4 | Status: SHIPPED | OUTPATIENT
Start: 2024-04-30

## 2024-04-30 ASSESSMENT — PAIN SCALES - GENERAL: PAINLEVEL: NO PAIN (0)

## 2024-04-30 NOTE — NURSING NOTE
Chief Complaint   Patient presents with    Microhematuria     Pt does report a slow urinary stream.  Pt states TURP did not help his urinary symptoms.    PVR: 51 mL by bladder scan    Denies gross hematuria or dysuria.    Pt states he had a TURP with Dr Malave in 2015, which did not help urination much.  States last year, Feb 2023, while in [place], we went into retention and they drained 1700 mL from his bladder and a urologist started him on tamsulosin and tadalafil.      Aaliyah Crowe, EMT

## 2024-04-30 NOTE — LETTER
4/30/2024       RE: Sundar German  [address] [facility] [address]     Dear Colleague,    Thank you for referring your patient, Sundar German, to the [facility] [place] at [facility]. Please see a copy of my visit note below.      Urology Outpatient Visit    Name: Sundar German    MRN: [id number]   YOB: 1950               Chief Complaint:   Hematuria         Impression and Plan:   Impression / Plan:   Sundar German is a 74 year old male with possible microhematuria and LUTS      It was my pleasure to meet with Sundar German in the clinic today regarding their recently discovered microscopic hematuria. We discussed possible etiologies of microscopic hematuria including both benign and malignant causes. We discussed the American Urological Association's recommendation for workup of microscopic hematuria (3 or more red blood cells on urinalysis) which would include a CT urogram & cystoscopy. I described these 2 tests to the patient in detail.    After reviewing the above information, Sundar German expressed understanding and agreement with moving forward with the CT Urogram and cystoscopy.     If the hematuria evaluation is negative, the patient should undergo a repeat urinalysis in one year. If this repeat urinalysis is also negative for microscopic hematuria, no further workup is needed. If there is persistent microscopic hematuria, we will need to enter into shared decision making regarding repeat evaluation with CT Urogram and cystoscopy vs. Observation.     Will increase Flomax to 0.8 mg daily for management of LUTS.     All comments, questions, and concerns were answered.    Thank you for the opportunity to participate in the care of Sundar German.     LÁZARO Guillen, CNP   [facility] - Department of Urology  [phone]          History of Present Illness:   Sundar German is a 74 year old  male referred for evaluation of micro hematuria. Smoking Hx, quit smoking about 40-45 years ago. He has a 20.00 pack-year smoking history. No Hx of gross hematuria.  PSH transurethral resection of the prostate w Quanta laser 4/6/16. Takes tadalafil for ED/LUTS, on Flomax 0.4 mg daily. Complains of stopping/starting of stream. Had an isolated episode of AUR in [place] w 1700 mL drained.    History is obtained from patient & EMR         Past Medical History:     Past Medical History:   Diagnosis Date    Abnormal glucose     A1C 1/06 =  6.3    Acquired hypothyroidism 10/11/2005     Problem list name updated by automated process. Provider to review    Allergic rhinitis, cause unspecified     mary spring time    Benign localized hyperplasia of prostate with urinary obstruction and other lower urinary tract symptoms (LUTS)(600.21)     slow stream, nocturia - Dr Malave    Chronic sinusitis 03/14/2014    Complication of anesthesia     Environmental allergies     AIT - Dr Weiss    History of blood transfusion     Hyperlipidemia LDL goal <130 10/31/2010    hypogonadism     low testosterone at times     RAFIQ (iron deficiency anemia)     Impaired memory 02/11/2011    ?    Iron deficiency anemia, unspecified iron deficiency anemia type 02/01/2023    Melanoma (H) 04/2018    Melanoma 0.7 mm depth, Levar III, superficial spreading, stage T1a, Dr Sierra    Microscopic hematuria     OA (osteoarthritis) total L knee 3/09    knees bother;; has had bilat arthroscopic    PONV (postoperative nausea and vomiting)     Prediabetes     Sensorineural hearing loss (SNHL) of both ears     Sleep related leg cramps 06/2012          Past Surgical History:     Past Surgical History:   Procedure Laterality Date    ARTHROPLASTY KNEE Right 03/16/2020    Rt TKA - Dr DUY Dudley    ARTHROPLASTY REVISION KNEE Left 10/26/2015     ARTHROPLASTY REVISION KNEE - Dr Dudley    COLONOSCOPY  10/02/2013    diverticulosis - due 10 yrs (10/2023), initial 10/2003     COLONOSCOPY N/A 2023    diverticulosis - no polyps, consider 5-10 yrs    CYSTOSCOPY  2016    ENDOSCOPIC SINUS SURGERY  2014    Bilateral Endoscopic Sinus Surgery - Dr Johnson    ESOPHAGOSCOPY, GASTROSCOPY, DUODENOSCOPY (EGD), COMBINED N/A 2019    Non bleeding gastric ulcers and duodenal ulcer. Recheck 3 months. Dr Sinclair    LASER REVOLIX PHOTOSELECTIVE VAPORIZATION PROSTATE N/A 2016    LASER REVOLIX / QUANTA PHOTOSELECTIVE VAPORIZATION PROSTATE - Dr Malave    PROSTATE SURGERY      TURP    ROTATOR CUFF REPAIR RT/LT Left 2012    left shoulder    SURGICAL HISTORY OF -       tonsils and adenoids    SURGICAL HISTORY OF -  Left 2009    Lt TKA    SURGICAL HISTORY OF -  Bilateral     Rt Knee x 1 (meniscus, ), Lt x 3 (last )    UPPER GI ENDOSCOPY  2019    normal    VASECTOMY Bilateral     ZZHC REPAIR UMBILICAL KASI,5+Y/O,REDUC            Social History:     Social History     Tobacco Use    Smoking status: Former     Current packs/day: 0.00     Average packs/day: 1 pack/day for 20.0 years (20.0 ttl pk-yrs)     Types: Cigarettes     Start date: 1961     Quit date: 1981     Years since quittin.3    Smokeless tobacco: Former     Types: Chew     Quit date: 1992    Tobacco comments:     1 tin per week   Substance Use Topics    Alcohol use: Yes     Alcohol/week: 7.0 - 12.0 standard drinks of alcohol     Types: 7 - 12 Standard drinks or equivalent per week     Comment: 1 beer daily          Family History:     Family History   Problem Relation Age of Onset    Hypertension Mother     Cardiovascular Mother 84        MI    Lipids Mother     Chronic Obstructive Pulmonary Disease Mother     C.A.D. Father         CHF    Heart Failure Father     Chronic Obstructive Pulmonary Disease Father     Cerebrovascular Disease Brother 61    Gastrointestinal Disease Brother         GI Bleed - colectomy    No Known Problems Brother     No Known Problems Sister     Colon Cancer No  family hx of           Allergies:   No Known Allergies       Medications:     Current Outpatient Medications   Medication Sig Dispense Refill    ACE/ARB/ARNI NOT PRESCRIBED (INTENTIONAL) Please choose reason not prescribed from choices below.      betamethasone dipropionate (DIPROSONE) 0.05 % external cream Apply topically 2 times daily 45 g 3    ciclopirox (LOPROX) 0.77 % cream Apply topically daily 90 g 3    fluticasone (FLONASE) 50 MCG/ACT nasal spray Spray 1 spray into both nostrils daily 48 g 3    ipratropium (ATROVENT) 0.06 % nasal spray USE 1 SPRAY IN EACH NOSTRIL UP TO FOUR TIMES DAILY AS NEEDED Strength: 0.06 % 45 mL 3    levocetirizine (XYZAL) 5 MG tablet Take 1 tablet (5 mg) by mouth every evening 90 tablet 3    levothyroxine (SYNTHROID/LEVOTHROID) 175 MCG tablet Take 1 tablet (175 mcg) by mouth daily 90 tablet 3    multivitamin w/minerals (THERA-VIT-M) tablet Take 1 tablet by mouth daily      penciclovir (DENAVIR) 1 % external cream Apply topically every 2 hours 5 g 3    psyllium (METAMUCIL/KONSYL) Packet Take 1 packet by mouth daily      simvastatin (ZOCOR) 20 MG tablet Take 1 tablet (20 mg) by mouth at bedtime 90 tablet 3    tadalafil (CIALIS) 5 MG tablet Take 5 mg by mouth daily      tamsulosin (FLOMAX) 0.4 MG capsule Take 1 capsule (0.4 mg) by mouth daily 90 capsule 3     No current facility-administered medications for this visit.          Review of Systems:    ROS: See HPI for pertinent details.  Remainder of 10-point ROS negative.         Physical Exam:   VS:  T: Data Unavailable    HR: Data Unavailable    BP: 126/70    RR: Data Unavailable     GEN:  Alert.  NAD.   HEENT:  Sclerae anicteric.    CV:  No obvious jugular venous distension.  LUNGS: No respiratory distress, breathing comfortably wo accessory muscle use.  ABD:  ND.     SKIN:  Dry. No visible rashes.   NEURO:  CN grossly intact.          Laboratory Data:     Lab Results   Component Value Date    PSA 1.12 03/26/2024    PSA 0.71  02/01/2023    PSA 0.87 11/29/2021    PSA 0.87 11/04/2020    PSA 0.69 07/30/2020    PSA 0.77 05/07/2019    PSA 1.07 03/02/2018    PSA 0.62 01/12/2016    PSA 1.45 06/02/2015    PSA 0.90 11/19/2013    PSA 1.23 11/14/2012    PSA 1.38 03/21/2012    PSA 1.66 01/25/2011     Lab Results   Component Value Date    CR 0.84 03/26/2024    CR 0.76 07/06/2023    CR 0.75 07/02/2023    CR 0.67 07/01/2023    CR 0.82 02/01/2023    CR 0.75 03/25/2022    CR 0.80 11/29/2021    CR 0.78 11/04/2020    CR 0.75 07/30/2020    CR 0.66 02/28/2020    CR 0.78 05/07/2019    CR 0.77 03/23/2019    CR 1.00 03/06/2019    CR 0.88 02/28/2019    CR 0.85 02/27/2019    CR 0.81 02/27/2019    CR 0.77 03/02/2018     Lab Results   Component Value Date    GFRESTIMATED >90 03/26/2024    GFRESTIMATED >90 07/06/2023    GFRESTIMATED >90 07/02/2023    GFRESTIMATED >90 07/01/2023    GFRESTIMATED >90 02/01/2023    GFRESTIMATED >90 03/25/2022    GFRESTIMATED >90 11/29/2021    GFRESTIMATED >90 11/04/2020    GFRESTIMATED >90 07/30/2020    GFRESTIMATED >90 02/28/2020    GFRESTIMATED >90 05/07/2019    GFRESTIMATED >90 03/23/2019    GFRESTIMATED 77 03/06/2019    GFRESTIMATED 88 02/28/2019    GFRESTIMATED 90 02/27/2019    GFRESTIMATED >90 02/27/2019    GFRESTIMATED >90 03/02/2018

## 2024-04-30 NOTE — PROGRESS NOTES
Urology Outpatient Visit    Name: Sundar German    MRN: [id number]   YOB: 1950               Chief Complaint:   Hematuria         Impression and Plan:   Impression / Plan:   Sundar German is a 74 year old male with possible microhematuria and LUTS      It was my pleasure to meet with Sundar German in the clinic today regarding their recently discovered microscopic hematuria. We discussed possible etiologies of microscopic hematuria including both benign and malignant causes. We discussed the American Urological Association's recommendation for workup of microscopic hematuria (3 or more red blood cells on urinalysis) which would include a CT urogram & cystoscopy. I described these 2 tests to the patient in detail.    After reviewing the above information, Sundar German expressed understanding and agreement with moving forward with the CT Urogram and cystoscopy.     If the hematuria evaluation is negative, the patient should undergo a repeat urinalysis in one year. If this repeat urinalysis is also negative for microscopic hematuria, no further workup is needed. If there is persistent microscopic hematuria, we will need to enter into shared decision making regarding repeat evaluation with CT Urogram and cystoscopy vs. Observation.     Will increase Flomax to 0.8 mg daily for management of LUTS.     All comments, questions, and concerns were answered.    Thank you for the opportunity to participate in the care of Sundar German.     LÁZARO Guillen, CNP  [facility] - Department of Urology  [phone]          History of Present Illness:   Sundar German is a 74 year old male referred for evaluation of micro hematuria. Smoking Hx, quit smoking about 40-45 years ago. He has a 20.00 pack-year smoking history. No Hx of gross hematuria.  PSH transurethral resection of the prostate w Quanta laser 4/6/16. Takes tadalafil for ED/LUTS, on Flomax 0.4 mg daily. Complains of stopping/starting of  stream. Had an isolated episode of AUR in [place] w 1700 mL drained.    History is obtained from patient & EMR         Past Medical History:     Past Medical History:   Diagnosis Date    Abnormal glucose     A1C 1/06 =  6.3    Acquired hypothyroidism 10/11/2005     Problem list name updated by automated process. Provider to review    Allergic rhinitis, cause unspecified     mary spring time    Benign localized hyperplasia of prostate with urinary obstruction and other lower urinary tract symptoms (LUTS)(600.21)     slow stream, nocturia - Dr Malave    Chronic sinusitis 03/14/2014    Complication of anesthesia     Environmental allergies     AIT - Dr Weiss    History of blood transfusion     Hyperlipidemia LDL goal <130 10/31/2010    hypogonadism     low testosterone at times     RAFIQ (iron deficiency anemia)     Impaired memory 02/11/2011    ?    Iron deficiency anemia, unspecified iron deficiency anemia type 02/01/2023    Melanoma (H) 04/2018    Melanoma 0.7 mm depth, Levar III, superficial spreading, stage T1a, Dr Sierra    Microscopic hematuria     OA (osteoarthritis) total L knee 3/09    knees bother;; has had bilat arthroscopic    PONV (postoperative nausea and vomiting)     Prediabetes     Sensorineural hearing loss (SNHL) of both ears     Sleep related leg cramps 06/2012          Past Surgical History:     Past Surgical History:   Procedure Laterality Date    ARTHROPLASTY KNEE Right 03/16/2020    Rt TKA - Dr DUY Dudley    ARTHROPLASTY REVISION KNEE Left 10/26/2015     ARTHROPLASTY REVISION KNEE - Dr Dudley    COLONOSCOPY  10/02/2013    diverticulosis - due 10 yrs (10/2023), initial 10/2003    COLONOSCOPY N/A 11/30/2023    diverticulosis - no polyps, consider 5-10 yrs    CYSTOSCOPY  04/2016    ENDOSCOPIC SINUS SURGERY  03/17/2014    Bilateral Endoscopic Sinus Surgery - Dr Johnson    ESOPHAGOSCOPY, GASTROSCOPY, DUODENOSCOPY (EGD), COMBINED N/A 02/28/2019    Non bleeding gastric ulcers and duodenal ulcer. Recheck 3  months. Dr Sinclair    LASER REVOLIX PHOTOSELECTIVE VAPORIZATION PROSTATE N/A 2016    LASER REVOLIX / QUANTA PHOTOSELECTIVE VAPORIZATION PROSTATE - Dr Malave    PROSTATE SURGERY      TURP    ROTATOR CUFF REPAIR RT/LT Left 2012    left shoulder    SURGICAL HISTORY OF -       tonsils and adenoids    SURGICAL HISTORY OF -  Left 2009    Lt TKA    SURGICAL HISTORY OF -  Bilateral     Rt Knee x 1 (meniscus, ), Lt x 3 (last )    UPPER GI ENDOSCOPY  2019    normal    VASECTOMY Bilateral     ZZHC REPAIR UMBILICAL KASI,5+Y/O,REDUC            Social History:     Social History     Tobacco Use    Smoking status: Former     Current packs/day: 0.00     Average packs/day: 1 pack/day for 20.0 years (20.0 ttl pk-yrs)     Types: Cigarettes     Start date: 1961     Quit date: 1981     Years since quittin.3    Smokeless tobacco: Former     Types: Chew     Quit date: 1992    Tobacco comments:     1 tin per week   Substance Use Topics    Alcohol use: Yes     Alcohol/week: 7.0 - 12.0 standard drinks of alcohol     Types: 7 - 12 Standard drinks or equivalent per week     Comment: 1 beer daily          Family History:     Family History   Problem Relation Age of Onset    Hypertension Mother     Cardiovascular Mother 84        MI    Lipids Mother     Chronic Obstructive Pulmonary Disease Mother     C.A.D. Father         CHF    Heart Failure Father     Chronic Obstructive Pulmonary Disease Father     Cerebrovascular Disease Brother 61    Gastrointestinal Disease Brother         GI Bleed - colectomy    No Known Problems Brother     No Known Problems Sister     Colon Cancer No family hx of           Allergies:   No Known Allergies       Medications:     Current Outpatient Medications   Medication Sig Dispense Refill    ACE/ARB/ARNI NOT PRESCRIBED (INTENTIONAL) Please choose reason not prescribed from choices below.      betamethasone dipropionate (DIPROSONE) 0.05 % external cream Apply topically  2 times daily 45 g 3    ciclopirox (LOPROX) 0.77 % cream Apply topically daily 90 g 3    fluticasone (FLONASE) 50 MCG/ACT nasal spray Spray 1 spray into both nostrils daily 48 g 3    ipratropium (ATROVENT) 0.06 % nasal spray USE 1 SPRAY IN EACH NOSTRIL UP TO FOUR TIMES DAILY AS NEEDED Strength: 0.06 % 45 mL 3    levocetirizine (XYZAL) 5 MG tablet Take 1 tablet (5 mg) by mouth every evening 90 tablet 3    levothyroxine (SYNTHROID/LEVOTHROID) 175 MCG tablet Take 1 tablet (175 mcg) by mouth daily 90 tablet 3    multivitamin w/minerals (THERA-VIT-M) tablet Take 1 tablet by mouth daily      penciclovir (DENAVIR) 1 % external cream Apply topically every 2 hours 5 g 3    psyllium (METAMUCIL/KONSYL) Packet Take 1 packet by mouth daily      simvastatin (ZOCOR) 20 MG tablet Take 1 tablet (20 mg) by mouth at bedtime 90 tablet 3    tadalafil (CIALIS) 5 MG tablet Take 5 mg by mouth daily      tamsulosin (FLOMAX) 0.4 MG capsule Take 1 capsule (0.4 mg) by mouth daily 90 capsule 3     No current facility-administered medications for this visit.          Review of Systems:    ROS: See HPI for pertinent details.  Remainder of 10-point ROS negative.         Physical Exam:   VS:  T: Data Unavailable    HR: Data Unavailable    BP: 126/70    RR: Data Unavailable     GEN:  Alert.  NAD.   HEENT:  Sclerae anicteric.    CV:  No obvious jugular venous distension.  LUNGS: No respiratory distress, breathing comfortably wo accessory muscle use.  ABD:  ND.     SKIN:  Dry. No visible rashes.   NEURO:  CN grossly intact.          Laboratory Data:     Lab Results   Component Value Date    PSA 1.12 03/26/2024    PSA 0.71 02/01/2023    PSA 0.87 11/29/2021    PSA 0.87 11/04/2020    PSA 0.69 07/30/2020    PSA 0.77 05/07/2019    PSA 1.07 03/02/2018    PSA 0.62 01/12/2016    PSA 1.45 06/02/2015    PSA 0.90 11/19/2013    PSA 1.23 11/14/2012    PSA 1.38 03/21/2012    PSA 1.66 01/25/2011     Lab Results   Component Value Date    CR 0.84 03/26/2024    CR  0.76 07/06/2023    CR 0.75 07/02/2023    CR 0.67 07/01/2023    CR 0.82 02/01/2023    CR 0.75 03/25/2022    CR 0.80 11/29/2021    CR 0.78 11/04/2020    CR 0.75 07/30/2020    CR 0.66 02/28/2020    CR 0.78 05/07/2019    CR 0.77 03/23/2019    CR 1.00 03/06/2019    CR 0.88 02/28/2019    CR 0.85 02/27/2019    CR 0.81 02/27/2019    CR 0.77 03/02/2018     Lab Results   Component Value Date    GFRESTIMATED >90 03/26/2024    GFRESTIMATED >90 07/06/2023    GFRESTIMATED >90 07/02/2023    GFRESTIMATED >90 07/01/2023    GFRESTIMATED >90 02/01/2023    GFRESTIMATED >90 03/25/2022    GFRESTIMATED >90 11/29/2021    GFRESTIMATED >90 11/04/2020    GFRESTIMATED >90 07/30/2020    GFRESTIMATED >90 02/28/2020    GFRESTIMATED >90 05/07/2019    GFRESTIMATED >90 03/23/2019    GFRESTIMATED 77 03/06/2019    GFRESTIMATED 88 02/28/2019    GFRESTIMATED 90 02/27/2019    GFRESTIMATED >90 02/27/2019    GFRESTIMATED >90 03/02/2018

## 2024-05-24 ENCOUNTER — HOSPITAL ENCOUNTER (OUTPATIENT)
Dept: CT IMAGING | Facility: CLINIC | Age: 74
Discharge: HOME | End: 2024-05-24
Attending: NURSE PRACTITIONER | Admitting: NURSE PRACTITIONER
Payer: COMMERCIAL

## 2024-05-24 DIAGNOSIS — R31.29 MICROSCOPIC HEMATURIA: ICD-10-CM

## 2024-05-24 LAB
CREAT BLD-MCNC: 0.7 MG/DL (ref 0.7–1.3)
EGFRCR SERPLBLD CKD-EPI 2021: >60 ML/MIN/1.73M2

## 2024-05-24 PROCEDURE — 82565 ASSAY OF CREATININE: CPT

## 2024-05-24 PROCEDURE — 250N000009 HC RX 250: Performed by: RADIOLOGY

## 2024-05-24 PROCEDURE — 74178 CT ABD&PLV WO CNTR FLWD CNTR: CPT

## 2024-05-24 PROCEDURE — 250N000011 HC RX IP 250 OP 636: Performed by: RADIOLOGY

## 2024-05-24 RX ORDER — IOPAMIDOL 755 MG/ML
500 INJECTION, SOLUTION INTRAVASCULAR ONCE
Status: COMPLETED | OUTPATIENT
Start: 2024-05-24 | End: 2024-05-24

## 2024-05-24 RX ADMIN — SODIUM CHLORIDE 95 ML: 9 INJECTION, SOLUTION INTRAVENOUS at 09:38

## 2024-05-24 RX ADMIN — IOPAMIDOL 120 ML: 755 INJECTION, SOLUTION INTRAVENOUS at 09:30

## 2024-05-29 ENCOUNTER — OFFICE VISIT (OUTPATIENT)
Dept: UROLOGY | Facility: CLINIC | Age: 74
End: 2024-05-29
Payer: COMMERCIAL

## 2024-05-29 VITALS
HEIGHT: 71 IN | DIASTOLIC BLOOD PRESSURE: 70 MMHG | WEIGHT: 222 LBS | BODY MASS INDEX: 31.08 KG/M2 | SYSTOLIC BLOOD PRESSURE: 130 MMHG

## 2024-05-29 DIAGNOSIS — R31.29 MICROSCOPIC HEMATURIA: Primary | ICD-10-CM

## 2024-05-29 DIAGNOSIS — R39.9 LOWER URINARY TRACT SYMPTOMS (LUTS): ICD-10-CM

## 2024-05-29 DIAGNOSIS — Z79.2 PROPHYLACTIC ANTIBIOTIC: ICD-10-CM

## 2024-05-29 LAB
ALBUMIN UR-MCNC: NEGATIVE MG/DL
APPEARANCE UR: CLEAR
BILIRUB UR QL STRIP: NEGATIVE
COLOR UR AUTO: YELLOW
GLUCOSE UR STRIP-MCNC: NEGATIVE MG/DL
HGB UR QL STRIP: ABNORMAL
KETONES UR STRIP-MCNC: NEGATIVE MG/DL
LEUKOCYTE ESTERASE UR QL STRIP: NEGATIVE
NITRATE UR QL: NEGATIVE
PH UR STRIP: 6 [PH] (ref 5–7)
RESIDUAL VOLUME (RV) (EXTERNAL): 29
SP GR UR STRIP: 1.02 (ref 1–1.03)
UROBILINOGEN UR STRIP-ACNC: 0.2 E.U./DL

## 2024-05-29 PROCEDURE — 99213 OFFICE O/P EST LOW 20 MIN: CPT | Mod: 25 | Performed by: UROLOGY

## 2024-05-29 PROCEDURE — 51798 US URINE CAPACITY MEASURE: CPT | Performed by: UROLOGY

## 2024-05-29 PROCEDURE — 81003 URINALYSIS AUTO W/O SCOPE: CPT | Mod: QW | Performed by: UROLOGY

## 2024-05-29 RX ORDER — LIDOCAINE HYDROCHLORIDE 20 MG/ML
JELLY TOPICAL ONCE
Status: COMPLETED | OUTPATIENT
Start: 2024-05-29 | End: 2024-05-29

## 2024-05-29 RX ORDER — CIPROFLOXACIN 500 MG/1
500 TABLET, FILM COATED ORAL ONCE
Qty: 1 TABLET | Refills: 0 | Status: SHIPPED | OUTPATIENT
Start: 2024-05-29 | End: 2024-05-29

## 2024-05-29 RX ADMIN — LIDOCAINE HYDROCHLORIDE: 20 JELLY TOPICAL at 10:35

## 2024-05-29 ASSESSMENT — PAIN SCALES - GENERAL: PAINLEVEL: NO PAIN (0)

## 2024-05-29 NOTE — PATIENT INSTRUCTIONS

## 2024-05-29 NOTE — LETTER
5/29/2024       RE: Sundar German  [address] [id number]     Dear Colleague,    Thank you for referring your patient, Sundar German, to the [facility] [place] at [facility]. Please see a copy of my visit note below.    Office Visit Note  [facility]  [phone]    UROLOGIC DIAGNOSES:   Microscopic hematuria  Hypocontractile bladder    CURRENT INTERVENTIONS:   PVP in 2016  Urodynamics in 2020    HISTORY:   This is a 74-year-old gentleman who underwent photovaporization of the prostate in 2016.  He then saw Dr. Gandhi in 2020 for lower urinary tract complaints and was found to have incomplete bladder emptying and also underwent urodynamics showing essentially a hypocontractile bladder.  Self-catheterization was discussed with him at that time but he did not proceed with self-catheterization.  He was recently seen for microscopic hematuria and hematuria workup was initiated.  The CT urogram was normal.  He is scheduled today for cystoscopy.       PAST MEDICAL HISTORY:   Past Medical History:   Diagnosis Date    Abnormal glucose     A1C 1/06 =  6.3    Acquired hypothyroidism 10/11/2005     Problem list name updated by automated process. Provider to review    Allergic rhinitis, cause unspecified     mary spring time    Benign localized hyperplasia of prostate with urinary obstruction and other lower urinary tract symptoms (LUTS)(600.21)     slow stream, nocturia - Dr Malave    Chronic sinusitis 03/14/2014    Complication of anesthesia     Environmental allergies     AIT - Dr Weiss    History of blood transfusion     Hyperlipidemia LDL goal <130 10/31/2010    hypogonadism     low testosterone at times     RAFIQ (iron deficiency anemia)     Impaired memory 02/11/2011    ?    Iron deficiency anemia, unspecified iron deficiency anemia type 02/01/2023    Melanoma (H) 04/2018    Melanoma 0.7 mm depth,  Levar III, superficial spreading, stage T1a, Dr Sierra    Microscopic hematuria     OA (osteoarthritis) total L knee 3/09    knees bother;; has had bilat arthroscopic    PONV (postoperative nausea and vomiting)     Prediabetes     Sensorineural hearing loss (SNHL) of both ears     Sleep related leg cramps 06/2012       PAST SURGICAL HISTORY:   Past Surgical History:   Procedure Laterality Date    ARTHROPLASTY KNEE Right 03/16/2020    Rt TKA - Dr DUY Dudley    ARTHROPLASTY REVISION KNEE Left 10/26/2015     ARTHROPLASTY REVISION KNEE - Dr Dudley    COLONOSCOPY  10/02/2013    diverticulosis - due 10 yrs (10/2023), initial 10/2003    COLONOSCOPY N/A 11/30/2023    diverticulosis - no polyps, consider 5-10 yrs    CYSTOSCOPY  04/2016    ENDOSCOPIC SINUS SURGERY  03/17/2014    Bilateral Endoscopic Sinus Surgery - Dr Johnson    ESOPHAGOSCOPY, GASTROSCOPY, DUODENOSCOPY (EGD), COMBINED N/A 02/28/2019    Non bleeding gastric ulcers and duodenal ulcer. Recheck 3 months. Dr Sinclair    LASER REVOLIX PHOTOSELECTIVE VAPORIZATION PROSTATE N/A 04/06/2016    LASER REVOLIX / QUANTA PHOTOSELECTIVE VAPORIZATION PROSTATE - Dr Malave    PROSTATE SURGERY      TURP    ROTATOR CUFF REPAIR RT/LT Left 03/2012    left shoulder    SURGICAL HISTORY OF -   1996    tonsils and adenoids    SURGICAL HISTORY OF -  Left 03/2009    Lt TKA    SURGICAL HISTORY OF -  Bilateral     Rt Knee x 1 (meniscus, 1995), Lt x 3 (last 2009)    UPPER GI ENDOSCOPY  05/2019    normal    VASECTOMY Bilateral 1986    ZZHC REPAIR UMBILICAL KASI,5+Y/O,REDUC  2002       FAMILY HISTORY:   Family History   Problem Relation Age of Onset    Hypertension Mother     Cardiovascular Mother 84        MI    Lipids Mother     Chronic Obstructive Pulmonary Disease Mother     C.A.D. Father         CHF    Heart Failure Father     Chronic Obstructive Pulmonary Disease Father     Cerebrovascular Disease Brother 61    Gastrointestinal Disease Brother         GI Bleed - colectomy    No Known Problems  Brother     No Known Problems Sister     Colon Cancer No family hx of        SOCIAL HISTORY:   Social History     Socioeconomic History    Marital status:      Spouse name: Ingrid    Number of children: 4    Years of education: None    Highest education level: None   Occupational History     Employer: [employer]   Tobacco Use    Smoking status: Former     Current packs/day: 0.00     Average packs/day: 1 pack/day for 20.0 years (20.0 ttl pk-yrs)     Types: Cigarettes     Start date: 1961     Quit date: 1981     Years since quittin.4    Smokeless tobacco: Former     Types: Chew     Quit date: 1992    Tobacco comments:     1 tin per week   Vaping Use    Vaping status: Never Used   Substance and Sexual Activity    Alcohol use: Yes     Alcohol/week: 7.0 - 12.0 standard drinks of alcohol     Types: 7 - 12 Standard drinks or equivalent per week     Comment: 1 beer daily    Drug use: No    Sexual activity: Yes     Partners: Female     Social Determinants of Health     Financial Resource Strain: Low Risk  (3/26/2024)    Financial Resource Strain     Within the past 12 months, have you or your family members you live with been unable to get utilities (heat, electricity) when it was really needed?: No   Food Insecurity: Low Risk  (3/26/2024)    Food Insecurity     Within the past 12 months, did you worry that your food would run out before you got money to buy more?: No     Within the past 12 months, did the food you bought just not last and you didn t have money to get more?: No   Transportation Needs: Low Risk  (3/26/2024)    Transportation Needs     Within the past 12 months, has lack of transportation kept you from medical appointments, getting your medicines, non-medical meetings or appointments, work, or from getting things that you need?: No   Physical Activity: Unknown (3/26/2024)    Exercise Vital Sign     Days of Exercise per Week: 2 days   Stress: No Stress Concern Present (3/26/2024)     "[employer] [place] [employer] - Occupational Stress Questionnaire     Feeling of Stress : Not at all   Social Connections: Unknown (3/26/2024)    Social Connection and Isolation Panel [NHANES]     Frequency of Social Gatherings with Friends and Family: Patient declined   Housing Stability: Low Risk  (3/26/2024)    Housing Stability     Do you have housing? : Yes     Are you worried about losing your housing?: No       Review Of Systems:  Skin: No rash, pruritis, or skin pigmentation  Eyes: No changes in vision  Ears/Nose/Throat: No changes in hearing, no nosebleeds  Respiratory: No shortness of breath, dyspnea on exertion, cough, or hemoptysis  Cardiovascular: No chest pain or palpitations  Gastrointestinal: No diarrhea or constipation. No abdominal pain. No hematochezia  Genitourinary: see HPI  Musculoskeletal: No pain or swelling of joints, normal range of motion  Neurologic: No weakness or tremors  Psychiatric: No recent changes in memory or mood  Hematologic/Lymphatic/Immunologic: No easy bruising or enlarged lymph nodes  Endocrine: No weight gain or loss      PHYSICAL EXAM:    /70   Ht 1.803 m (5' 11\")   Wt 100.7 kg (222 lb)   BMI 30.96 kg/m      Constitutional: Well developed. Conversant and in no acute distress  Eyes: Anicteric sclera, conjunctiva clear, normal extraocular movements  ENT: Normocephalic and atraumatic,   Skin: Warm and dry. No rashes or lesions  Cardiac: No peripheral edema  Back/Flank: Not done  CNS/PNS: Normal musculature and movements, moves all extremities normally  Respiratory: Normal non-labored breathing  Abdomen: Soft nontender and nondistended  Peripheral Vascular: No peripheral edema  Mental Status/Psych: Alert and Oriented x 3. Normal mood and affect    Penis: Not done  Scrotal Skin: Not done  Testicles: Not done  Epididymis: Not done  Digital Rectal Exam:     Cystoscopy: Not done    Imaging: None    Urinalysis: UA RESULTS:  Recent Labs   Lab Test 04/30/24  1256 " 03/26/24  1107   COLOR Yellow Yellow   APPEARANCE Clear Clear   URINEGLC Negative Negative   URINEBILI Negative Negative   URINEKETONE Negative Negative   SG 1.015 1.025   UBLD Negative Trace*   URINEPH 7.5* 5.5   PROTEIN Negative Negative   UROBILINOGEN 0.2 0.2   NITRITE Negative Negative   LEUKEST Negative Negative   RBCU  --  10-25*   WBCU  --  None Seen       PSA: 1.12    Post Void Residual: 29mL    Other labs: None today      IMPRESSION:  Normal urologic evaluation    PLAN:  His urologic evaluation is normal today.  His CT scan is normal.  He is emptying his bladder well.  Cystoscopy is normal and the prostatic fossa is still nicely wide open.  He was provided reassurance.  He will follow-up with urology as needed in the future.        Sean Malave M.D.            Again, thank you for allowing me to participate in the care of your patient.      Sincerely,    Sean Malave MD

## 2024-05-29 NOTE — PROGRESS NOTES
Office Visit Note  [facility]  [phone]    UROLOGIC DIAGNOSES:   Microscopic hematuria  Hypocontractile bladder    CURRENT INTERVENTIONS:   PVP in 2016  Urodynamics in 2020    HISTORY:   This is a 74-year-old gentleman who underwent photovaporization of the prostate in 2016.  He then saw Dr. Gandhi in 2020 for lower urinary tract complaints and was found to have incomplete bladder emptying and also underwent urodynamics showing essentially a hypocontractile bladder.  Self-catheterization was discussed with him at that time but he did not proceed with self-catheterization.  He was recently seen for microscopic hematuria and hematuria workup was initiated.  The CT urogram was normal.  He is scheduled today for cystoscopy.       PAST MEDICAL HISTORY:   Past Medical History:   Diagnosis Date    Abnormal glucose     A1C 1/06 =  6.3    Acquired hypothyroidism 10/11/2005     Problem list name updated by automated process. Provider to review    Allergic rhinitis, cause unspecified     mary spring time    Benign localized hyperplasia of prostate with urinary obstruction and other lower urinary tract symptoms (LUTS)(600.21)     slow stream, nocturia - Dr Malave    Chronic sinusitis 03/14/2014    Complication of anesthesia     Environmental allergies     AIT - Dr Weiss    History of blood transfusion     Hyperlipidemia LDL goal <130 10/31/2010    hypogonadism     low testosterone at times     RAFIQ (iron deficiency anemia)     Impaired memory 02/11/2011    ?    Iron deficiency anemia, unspecified iron deficiency anemia type 02/01/2023    Melanoma (H) 04/2018    Melanoma 0.7 mm depth, Levar III, superficial spreading, stage T1a, Dr Sierra    Microscopic hematuria     OA (osteoarthritis) total L knee 3/09    knees bother;; has had bilat arthroscopic    PONV (postoperative nausea and vomiting)     Prediabetes     Sensorineural hearing loss (SNHL) of both ears     Sleep related leg cramps 06/2012       PAST  SURGICAL HISTORY:   Past Surgical History:   Procedure Laterality Date    ARTHROPLASTY KNEE Right 03/16/2020    Rt TKA - Dr DUY Dudley    ARTHROPLASTY REVISION KNEE Left 10/26/2015     ARTHROPLASTY REVISION KNEE - Dr Dudley    COLONOSCOPY  10/02/2013    diverticulosis - due 10 yrs (10/2023), initial 10/2003    COLONOSCOPY N/A 11/30/2023    diverticulosis - no polyps, consider 5-10 yrs    CYSTOSCOPY  04/2016    ENDOSCOPIC SINUS SURGERY  03/17/2014    Bilateral Endoscopic Sinus Surgery - Dr Johnson    ESOPHAGOSCOPY, GASTROSCOPY, DUODENOSCOPY (EGD), COMBINED N/A 02/28/2019    Non bleeding gastric ulcers and duodenal ulcer. Recheck 3 months. Dr Sinclair    LASER REVOLIX PHOTOSELECTIVE VAPORIZATION PROSTATE N/A 04/06/2016    LASER REVOLIX / QUANTA PHOTOSELECTIVE VAPORIZATION PROSTATE - Dr Malave    PROSTATE SURGERY      TURP    ROTATOR CUFF REPAIR RT/LT Left 03/2012    left shoulder    SURGICAL HISTORY OF -   1996    tonsils and adenoids    SURGICAL HISTORY OF -  Left 03/2009    Lt TKA    SURGICAL HISTORY OF -  Bilateral     Rt Knee x 1 (meniscus, 1995), Lt x 3 (last 2009)    UPPER GI ENDOSCOPY  05/2019    normal    VASECTOMY Bilateral 1986    ZZHC REPAIR UMBILICAL KASI,5+Y/O,REDUC  2002       FAMILY HISTORY:   Family History   Problem Relation Age of Onset    Hypertension Mother     Cardiovascular Mother 84        MI    Lipids Mother     Chronic Obstructive Pulmonary Disease Mother     C.A.D. Father         CHF    Heart Failure Father     Chronic Obstructive Pulmonary Disease Father     Cerebrovascular Disease Brother 61    Gastrointestinal Disease Brother         GI Bleed - colectomy    No Known Problems Brother     No Known Problems Sister     Colon Cancer No family hx of        SOCIAL HISTORY:   Social History     Socioeconomic History    Marital status:      Spouse name: Ingrid    Number of children: 4    Years of education: None    Highest education level: None   Occupational History     Employer: [employer]    Tobacco Use    Smoking status: Former     Current packs/day: 0.00     Average packs/day: 1 pack/day for 20.0 years (20.0 ttl pk-yrs)     Types: Cigarettes     Start date: 1961     Quit date: 1981     Years since quittin.4    Smokeless tobacco: Former     Types: Chew     Quit date: 1992    Tobacco comments:     1 tin per week   Vaping Use    Vaping status: Never Used   Substance and Sexual Activity    Alcohol use: Yes     Alcohol/week: 7.0 - 12.0 standard drinks of alcohol     Types: 7 - 12 Standard drinks or equivalent per week     Comment: 1 beer daily    Drug use: No    Sexual activity: Yes     Partners: Female     Social Determinants of Health     Financial Resource Strain: Low Risk  (3/26/2024)    Financial Resource Strain     Within the past 12 months, have you or your family members you live with been unable to get utilities (heat, electricity) when it was really needed?: No   Food Insecurity: Low Risk  (3/26/2024)    Food Insecurity     Within the past 12 months, did you worry that your food would run out before you got money to buy more?: No     Within the past 12 months, did the food you bought just not last and you didn t have money to get more?: No   Transportation Needs: Low Risk  (3/26/2024)    Transportation Needs     Within the past 12 months, has lack of transportation kept you from medical appointments, getting your medicines, non-medical meetings or appointments, work, or from getting things that you need?: No   Physical Activity: Unknown (3/26/2024)    Exercise Vital Sign     Days of Exercise per Week: 2 days   Stress: No Stress Concern Present (3/26/2024)    [employer] [place] [employer] - Occupational Stress Questionnaire     Feeling of Stress : Not at all   Social Connections: Unknown (3/26/2024)    Social Connection and Isolation Panel [NHANES]     Frequency of Social Gatherings with Friends and Family: Patient declined   Housing Stability: Low Risk  (3/26/2024)  "   Housing Stability     Do you have housing? : Yes     Are you worried about losing your housing?: No       Review Of Systems:  Skin: No rash, pruritis, or skin pigmentation  Eyes: No changes in vision  Ears/Nose/Throat: No changes in hearing, no nosebleeds  Respiratory: No shortness of breath, dyspnea on exertion, cough, or hemoptysis  Cardiovascular: No chest pain or palpitations  Gastrointestinal: No diarrhea or constipation. No abdominal pain. No hematochezia  Genitourinary: see HPI  Musculoskeletal: No pain or swelling of joints, normal range of motion  Neurologic: No weakness or tremors  Psychiatric: No recent changes in memory or mood  Hematologic/Lymphatic/Immunologic: No easy bruising or enlarged lymph nodes  Endocrine: No weight gain or loss      PHYSICAL EXAM:    /70   Ht 1.803 m (5' 11\")   Wt 100.7 kg (222 lb)   BMI 30.96 kg/m      Constitutional: Well developed. Conversant and in no acute distress  Eyes: Anicteric sclera, conjunctiva clear, normal extraocular movements  ENT: Normocephalic and atraumatic,   Skin: Warm and dry. No rashes or lesions  Cardiac: No peripheral edema  Back/Flank: Not done  CNS/PNS: Normal musculature and movements, moves all extremities normally  Respiratory: Normal non-labored breathing  Abdomen: Soft nontender and nondistended  Peripheral Vascular: No peripheral edema  Mental Status/Psych: Alert and Oriented x 3. Normal mood and affect    Penis: Not done  Scrotal Skin: Not done  Testicles: Not done  Epididymis: Not done  Digital Rectal Exam:     Cystoscopy: Not done    Imaging: None    Urinalysis: UA RESULTS:  Recent Labs   Lab Test 04/30/24  1256 03/26/24  1107   COLOR Yellow Yellow   APPEARANCE Clear Clear   URINEGLC Negative Negative   URINEBILI Negative Negative   URINEKETONE Negative Negative   SG 1.015 1.025   UBLD Negative Trace*   URINEPH 7.5* 5.5   PROTEIN Negative Negative   UROBILINOGEN 0.2 0.2   NITRITE Negative Negative   LEUKEST Negative Negative "   RBCU  --  10-25*   WBCU  --  None Seen       PSA: 1.12    Post Void Residual: 29mL    Other labs: None today      IMPRESSION:  Normal urologic evaluation    PLAN:  His urologic evaluation is normal today.  His CT scan is normal.  He is emptying his bladder well.  Cystoscopy is normal and the prostatic fossa is still nicely wide open.  He was provided reassurance.  He will follow-up with urology as needed in the future.        Sean Malave M.D.

## 2025-03-22 ENCOUNTER — HEALTH MAINTENANCE LETTER (OUTPATIENT)
Age: 75
End: 2025-03-22

## 2025-03-31 ENCOUNTER — TRANSFERRED RECORDS (OUTPATIENT)
Dept: HEALTH INFORMATION MANAGEMENT | Facility: CLINIC | Age: 75
End: 2025-03-31
Payer: COMMERCIAL

## 2025-03-31 ENCOUNTER — APPOINTMENT (OUTPATIENT)
Age: 75
Setting detail: DERMATOLOGY
End: 2025-03-31

## 2025-03-31 DIAGNOSIS — L82.1 OTHER SEBORRHEIC KERATOSIS: ICD-10-CM

## 2025-03-31 DIAGNOSIS — Z71.89 OTHER SPECIFIED COUNSELING: ICD-10-CM

## 2025-03-31 DIAGNOSIS — Z87.2 PERSONAL HISTORY OF DISEASES OF THE SKIN AND SUBCUTANEOUS TISSUE: ICD-10-CM

## 2025-03-31 DIAGNOSIS — L57.0 ACTINIC KERATOSIS: ICD-10-CM | Status: STABLE

## 2025-03-31 DIAGNOSIS — D22 MELANOCYTIC NEVI: ICD-10-CM

## 2025-03-31 DIAGNOSIS — Z85.820 PERSONAL HISTORY OF MALIGNANT MELANOMA OF SKIN: ICD-10-CM

## 2025-03-31 DIAGNOSIS — L21.8 OTHER SEBORRHEIC DERMATITIS: ICD-10-CM

## 2025-03-31 DIAGNOSIS — D18.0 HEMANGIOMA: ICD-10-CM

## 2025-03-31 PROBLEM — D22.5 MELANOCYTIC NEVI OF TRUNK: Status: ACTIVE | Noted: 2025-03-31

## 2025-03-31 PROBLEM — D18.01 HEMANGIOMA OF SKIN AND SUBCUTANEOUS TISSUE: Status: ACTIVE | Noted: 2025-03-31

## 2025-03-31 PROCEDURE — ? SUNSCREEN RECOMMENDATIONS

## 2025-03-31 PROCEDURE — ? OBSERVATION

## 2025-03-31 PROCEDURE — ? COUNSELING

## 2025-03-31 PROCEDURE — 99214 OFFICE O/P EST MOD 30 MIN: CPT

## 2025-03-31 PROCEDURE — ? OTC TREATMENT REGIMEN

## 2025-03-31 PROCEDURE — ? PRESCRIPTION MEDICATION MANAGEMENT

## 2025-03-31 PROCEDURE — ? PRESCRIPTION

## 2025-03-31 RX ORDER — KETOCONAZOLE 20 MG/G
CREAM TOPICAL
Qty: 30 | Refills: 3 | Status: ERX | COMMUNITY
Start: 2025-03-31

## 2025-03-31 RX ADMIN — KETOCONAZOLE: 20 CREAM TOPICAL at 00:00

## 2025-03-31 ASSESSMENT — LOCATION DETAILED DESCRIPTION DERM
LOCATION DETAILED: SUPERIOR THORACIC SPINE
LOCATION DETAILED: LEFT INFERIOR UPPER BACK
LOCATION DETAILED: INFERIOR MID FOREHEAD
LOCATION DETAILED: RIGHT MEDIAL MALAR CHEEK
LOCATION DETAILED: MIDDLE STERNUM
LOCATION DETAILED: LEFT BUTTOCK
LOCATION DETAILED: RIGHT SUPERIOR MEDIAL MIDBACK
LOCATION DETAILED: LEFT INFERIOR MEDIAL FOREHEAD
LOCATION DETAILED: EPIGASTRIC SKIN
LOCATION DETAILED: INFERIOR THORACIC SPINE

## 2025-03-31 ASSESSMENT — LOCATION SIMPLE DESCRIPTION DERM
LOCATION SIMPLE: ABDOMEN
LOCATION SIMPLE: LEFT FOREHEAD
LOCATION SIMPLE: RIGHT CHEEK
LOCATION SIMPLE: LEFT UPPER BACK
LOCATION SIMPLE: LEFT BUTTOCK
LOCATION SIMPLE: CHEST
LOCATION SIMPLE: RIGHT LOWER BACK
LOCATION SIMPLE: INFERIOR FOREHEAD
LOCATION SIMPLE: UPPER BACK

## 2025-03-31 ASSESSMENT — LOCATION ZONE DERM
LOCATION ZONE: TRUNK
LOCATION ZONE: FACE

## 2025-03-31 NOTE — PROCEDURE: PRESCRIPTION MEDICATION MANAGEMENT
Detail Level: Zone
Render In Strict Bullet Format?: No
Initiate Treatment: Ketoconazole 2% cream daily to the gabella, eyebrows and bilateral nasal sidewalls.

## 2025-03-31 NOTE — HPI: MELANOMA F/U (HISTORY OF MALIGNANT MELANOMA)
What Stage Is The Melanoma?: Stage IA
What Is The Reason For Today's Visit?: History of Melanoma
Year Excised?: 04/19/2018
Breslow Depth?: 0.7mm

## 2025-03-31 NOTE — PROCEDURE: OTC TREATMENT REGIMEN
Patient Specific Otc Recommendations (Will Not Stick From Patient To Patient): Recommended the use of Head&Shoulders or selsun blue shampoos 2-3 times a week.
Detail Level: Zone

## 2025-03-31 NOTE — HPI: SKIN LESION
What Type Of Note Output Would You Prefer (Optional)?: Bullet Format
How Severe Is Your Skin Lesion?: mild
Has Your Skin Lesion Been Treated?: not been treated
Is This A New Presentation, Or A Follow-Up?: Skin Lesions
Additional History: Patient just noticed while on vacation in [place] and noticed the 2 lesions on the right buttocks. Reports no symptoms or pain at any time.

## 2025-04-07 ENCOUNTER — OFFICE VISIT (OUTPATIENT)
Dept: FAMILY MEDICINE | Facility: CLINIC | Age: 75
End: 2025-04-07
Payer: COMMERCIAL

## 2025-04-07 VITALS
WEIGHT: 226.7 LBS | RESPIRATION RATE: 16 BRPM | TEMPERATURE: 97.3 F | BODY MASS INDEX: 32.45 KG/M2 | OXYGEN SATURATION: 97 % | HEIGHT: 70 IN | SYSTOLIC BLOOD PRESSURE: 128 MMHG | DIASTOLIC BLOOD PRESSURE: 64 MMHG | HEART RATE: 50 BPM

## 2025-04-07 DIAGNOSIS — E03.9 ACQUIRED HYPOTHYROIDISM: ICD-10-CM

## 2025-04-07 DIAGNOSIS — Z51.81 MEDICATION MONITORING ENCOUNTER: ICD-10-CM

## 2025-04-07 DIAGNOSIS — H90.3 SENSORINEURAL HEARING LOSS (SNHL) OF BOTH EARS: ICD-10-CM

## 2025-04-07 DIAGNOSIS — R39.9 LOWER URINARY TRACT SYMPTOMS (LUTS): ICD-10-CM

## 2025-04-07 DIAGNOSIS — C43.59 MALIGNANT MELANOMA OF TORSO EXCLUDING BREAST (H): ICD-10-CM

## 2025-04-07 DIAGNOSIS — Z91.09 ENVIRONMENTAL ALLERGIES: ICD-10-CM

## 2025-04-07 DIAGNOSIS — F10.90 ALCOHOL USE: ICD-10-CM

## 2025-04-07 DIAGNOSIS — M15.0 PRIMARY OSTEOARTHRITIS INVOLVING MULTIPLE JOINTS: ICD-10-CM

## 2025-04-07 DIAGNOSIS — L24.9 IRRITANT CONTACT DERMATITIS, UNSPECIFIED TRIGGER: ICD-10-CM

## 2025-04-07 DIAGNOSIS — N40.1 BENIGN PROSTATIC HYPERPLASIA WITH LOWER URINARY TRACT SYMPTOMS, SYMPTOM DETAILS UNSPECIFIED: ICD-10-CM

## 2025-04-07 DIAGNOSIS — Z12.5 SCREENING FOR PROSTATE CANCER: ICD-10-CM

## 2025-04-07 DIAGNOSIS — Z00.00 ROUTINE GENERAL MEDICAL EXAMINATION AT A HEALTH CARE FACILITY: Primary | ICD-10-CM

## 2025-04-07 DIAGNOSIS — Z12.11 SCREEN FOR COLON CANCER: ICD-10-CM

## 2025-04-07 DIAGNOSIS — R31.29 MICROSCOPIC HEMATURIA: ICD-10-CM

## 2025-04-07 DIAGNOSIS — B00.1 RECURRENT COLD SORES: ICD-10-CM

## 2025-04-07 DIAGNOSIS — G47.62 SLEEP RELATED LEG CRAMPS: ICD-10-CM

## 2025-04-07 DIAGNOSIS — D50.9 IRON DEFICIENCY ANEMIA, UNSPECIFIED IRON DEFICIENCY ANEMIA TYPE: ICD-10-CM

## 2025-04-07 DIAGNOSIS — E78.5 HYPERLIPIDEMIA LDL GOAL <100: ICD-10-CM

## 2025-04-07 DIAGNOSIS — N18.2 CKD (CHRONIC KIDNEY DISEASE) STAGE 2, GFR 60-89 ML/MIN: ICD-10-CM

## 2025-04-07 DIAGNOSIS — R73.03 PREDIABETES: ICD-10-CM

## 2025-04-07 DIAGNOSIS — B35.1 ONYCHOMYCOSIS OF TOENAIL: ICD-10-CM

## 2025-04-07 DIAGNOSIS — N52.9 ERECTILE DYSFUNCTION, UNSPECIFIED ERECTILE DYSFUNCTION TYPE: ICD-10-CM

## 2025-04-07 DIAGNOSIS — Z00.00 MEDICARE ANNUAL WELLNESS VISIT, SUBSEQUENT: ICD-10-CM

## 2025-04-07 LAB
ALBUMIN SERPL BCG-MCNC: 4.4 G/DL (ref 3.5–5.2)
ALBUMIN UR-MCNC: NEGATIVE MG/DL
ALP SERPL-CCNC: 54 U/L (ref 40–150)
ALT SERPL W P-5'-P-CCNC: 23 U/L (ref 0–70)
ANION GAP SERPL CALCULATED.3IONS-SCNC: 12 MMOL/L (ref 7–15)
APPEARANCE UR: CLEAR
AST SERPL W P-5'-P-CCNC: 27 U/L (ref 0–45)
BACTERIA #/AREA URNS HPF: ABNORMAL /HPF
BILIRUB SERPL-MCNC: 0.3 MG/DL
BILIRUB UR QL STRIP: NEGATIVE
BUN SERPL-MCNC: 18.3 MG/DL (ref 8–23)
CALCIUM SERPL-MCNC: 9.8 MG/DL (ref 8.8–10.4)
CHLORIDE SERPL-SCNC: 108 MMOL/L (ref 98–107)
CHOLEST SERPL-MCNC: 136 MG/DL
CK SERPL-CCNC: 142 U/L (ref 39–308)
COLOR UR AUTO: YELLOW
CREAT SERPL-MCNC: 0.8 MG/DL (ref 0.67–1.17)
CREAT UR-MCNC: 113 MG/DL
EGFRCR SERPLBLD CKD-EPI 2021: >90 ML/MIN/1.73M2
ERYTHROCYTE [DISTWIDTH] IN BLOOD BY AUTOMATED COUNT: 12.7 % (ref 10–15)
EST. AVERAGE GLUCOSE BLD GHB EST-MCNC: 108 MG/DL
FASTING STATUS PATIENT QL REPORTED: YES
FASTING STATUS PATIENT QL REPORTED: YES
FERRITIN SERPL-MCNC: 246 NG/ML (ref 31–409)
GGT SERPL-CCNC: 32 U/L (ref 8–61)
GLUCOSE SERPL-MCNC: 104 MG/DL (ref 70–99)
GLUCOSE UR STRIP-MCNC: NEGATIVE MG/DL
HBA1C MFR BLD: 5.4 % (ref 0–5.6)
HCO3 SERPL-SCNC: 23 MMOL/L (ref 22–29)
HCT VFR BLD AUTO: 37.6 % (ref 40–53)
HDLC SERPL-MCNC: 51 MG/DL
HGB BLD-MCNC: 12.8 G/DL (ref 13.3–17.7)
HGB UR QL STRIP: NEGATIVE
IRON BINDING CAPACITY (ROCHE): 277 UG/DL (ref 240–430)
IRON SATN MFR SERPL: 21 % (ref 15–46)
IRON SERPL-MCNC: 59 UG/DL (ref 61–157)
KETONES UR STRIP-MCNC: NEGATIVE MG/DL
LDLC SERPL CALC-MCNC: 70 MG/DL
LEUKOCYTE ESTERASE UR QL STRIP: ABNORMAL
MCH RBC QN AUTO: 34.2 PG (ref 26.5–33)
MCHC RBC AUTO-ENTMCNC: 34 G/DL (ref 31.5–36.5)
MCV RBC AUTO: 101 FL (ref 78–100)
MICROALBUMIN UR-MCNC: 25.8 MG/L
MICROALBUMIN/CREAT UR: 22.83 MG/G CR (ref 0–17)
NITRATE UR QL: NEGATIVE
NONHDLC SERPL-MCNC: 85 MG/DL
PH UR STRIP: 5.5 [PH] (ref 5–7)
PLATELET # BLD AUTO: 237 10E3/UL (ref 150–450)
POTASSIUM SERPL-SCNC: 4.3 MMOL/L (ref 3.4–5.3)
PROT SERPL-MCNC: 7.1 G/DL (ref 6.4–8.3)
PSA SERPL DL<=0.01 NG/ML-MCNC: 0.97 NG/ML (ref 0–6.5)
RBC # BLD AUTO: 3.74 10E6/UL (ref 4.4–5.9)
RBC #/AREA URNS AUTO: ABNORMAL /HPF
SODIUM SERPL-SCNC: 143 MMOL/L (ref 135–145)
SP GR UR STRIP: 1.02 (ref 1–1.03)
T4 FREE SERPL-MCNC: 1.48 NG/DL (ref 0.9–1.7)
TRIGL SERPL-MCNC: 77 MG/DL
TSH SERPL DL<=0.005 MIU/L-ACNC: 2.25 UIU/ML (ref 0.3–4.2)
UROBILINOGEN UR STRIP-ACNC: 0.2 E.U./DL
WBC # BLD AUTO: 4.8 10E3/UL (ref 4–11)
WBC #/AREA URNS AUTO: ABNORMAL /HPF

## 2025-04-07 PROCEDURE — 3078F DIAST BP <80 MM HG: CPT | Performed by: FAMILY MEDICINE

## 2025-04-07 PROCEDURE — 80053 COMPREHEN METABOLIC PANEL: CPT | Performed by: FAMILY MEDICINE

## 2025-04-07 PROCEDURE — G0439 PPPS, SUBSEQ VISIT: HCPCS | Performed by: FAMILY MEDICINE

## 2025-04-07 PROCEDURE — G2211 COMPLEX E/M VISIT ADD ON: HCPCS | Performed by: FAMILY MEDICINE

## 2025-04-07 PROCEDURE — 82550 ASSAY OF CK (CPK): CPT | Performed by: FAMILY MEDICINE

## 2025-04-07 PROCEDURE — 84439 ASSAY OF FREE THYROXINE: CPT | Performed by: FAMILY MEDICINE

## 2025-04-07 PROCEDURE — 81001 URINALYSIS AUTO W/SCOPE: CPT | Performed by: FAMILY MEDICINE

## 2025-04-07 PROCEDURE — 83550 IRON BINDING TEST: CPT | Performed by: FAMILY MEDICINE

## 2025-04-07 PROCEDURE — 82570 ASSAY OF URINE CREATININE: CPT | Performed by: FAMILY MEDICINE

## 2025-04-07 PROCEDURE — 82728 ASSAY OF FERRITIN: CPT | Performed by: FAMILY MEDICINE

## 2025-04-07 PROCEDURE — 82043 UR ALBUMIN QUANTITATIVE: CPT | Performed by: FAMILY MEDICINE

## 2025-04-07 PROCEDURE — 80061 LIPID PANEL: CPT | Performed by: FAMILY MEDICINE

## 2025-04-07 PROCEDURE — 82977 ASSAY OF GGT: CPT | Performed by: FAMILY MEDICINE

## 2025-04-07 PROCEDURE — 84443 ASSAY THYROID STIM HORMONE: CPT | Performed by: FAMILY MEDICINE

## 2025-04-07 PROCEDURE — 83036 HEMOGLOBIN GLYCOSYLATED A1C: CPT | Performed by: FAMILY MEDICINE

## 2025-04-07 PROCEDURE — 83540 ASSAY OF IRON: CPT | Performed by: FAMILY MEDICINE

## 2025-04-07 PROCEDURE — 3074F SYST BP LT 130 MM HG: CPT | Performed by: FAMILY MEDICINE

## 2025-04-07 PROCEDURE — G0103 PSA SCREENING: HCPCS | Performed by: FAMILY MEDICINE

## 2025-04-07 PROCEDURE — 36415 COLL VENOUS BLD VENIPUNCTURE: CPT | Performed by: FAMILY MEDICINE

## 2025-04-07 PROCEDURE — 99214 OFFICE O/P EST MOD 30 MIN: CPT | Mod: 25 | Performed by: FAMILY MEDICINE

## 2025-04-07 PROCEDURE — 85027 COMPLETE CBC AUTOMATED: CPT | Performed by: FAMILY MEDICINE

## 2025-04-07 RX ORDER — SIMVASTATIN 20 MG
20 TABLET ORAL AT BEDTIME
Qty: 90 TABLET | Refills: 3 | Status: SHIPPED | OUTPATIENT
Start: 2025-04-07 | End: 2025-04-07

## 2025-04-07 RX ORDER — CICLOPIROX OLAMINE 7.7 MG/G
CREAM TOPICAL DAILY
Qty: 90 G | Refills: 3 | Status: SHIPPED | OUTPATIENT
Start: 2025-04-07

## 2025-04-07 RX ORDER — CICLOPIROX OLAMINE 7.7 MG/G
CREAM TOPICAL DAILY
Qty: 90 G | Refills: 3 | Status: SHIPPED | OUTPATIENT
Start: 2025-04-07 | End: 2025-04-07

## 2025-04-07 RX ORDER — TAMSULOSIN HYDROCHLORIDE 0.4 MG/1
0.8 CAPSULE ORAL DAILY
Qty: 180 CAPSULE | Refills: 3 | Status: SHIPPED | OUTPATIENT
Start: 2025-04-07

## 2025-04-07 RX ORDER — IPRATROPIUM BROMIDE 42 UG/1
SPRAY, METERED NASAL
Qty: 45 ML | Refills: 3 | Status: SHIPPED | OUTPATIENT
Start: 2025-04-07

## 2025-04-07 RX ORDER — LEVOCETIRIZINE DIHYDROCHLORIDE 5 MG/1
5 TABLET, FILM COATED ORAL EVERY EVENING
Qty: 90 TABLET | Refills: 3 | Status: SHIPPED | OUTPATIENT
Start: 2025-04-07 | End: 2025-04-07

## 2025-04-07 RX ORDER — BETAMETHASONE DIPROPIONATE 0.5 MG/G
CREAM TOPICAL 2 TIMES DAILY
Qty: 45 G | Refills: 3 | Status: SHIPPED | OUTPATIENT
Start: 2025-04-07 | End: 2025-04-07

## 2025-04-07 RX ORDER — IPRATROPIUM BROMIDE 42 UG/1
SPRAY, METERED NASAL
Qty: 45 ML | Refills: 3 | Status: SHIPPED | OUTPATIENT
Start: 2025-04-07 | End: 2025-04-07

## 2025-04-07 RX ORDER — LEVOTHYROXINE SODIUM 175 UG/1
175 TABLET ORAL DAILY
Qty: 90 TABLET | Refills: 3 | Status: SHIPPED | OUTPATIENT
Start: 2025-04-07 | End: 2025-04-07

## 2025-04-07 RX ORDER — SIMVASTATIN 20 MG
20 TABLET ORAL AT BEDTIME
Qty: 90 TABLET | Refills: 3 | Status: SHIPPED | OUTPATIENT
Start: 2025-04-07

## 2025-04-07 RX ORDER — TAMSULOSIN HYDROCHLORIDE 0.4 MG/1
0.8 CAPSULE ORAL DAILY
Qty: 180 CAPSULE | Refills: 3 | Status: SHIPPED | OUTPATIENT
Start: 2025-04-07 | End: 2025-04-07

## 2025-04-07 RX ORDER — BETAMETHASONE DIPROPIONATE 0.5 MG/G
CREAM TOPICAL 2 TIMES DAILY
Qty: 45 G | Refills: 3 | Status: SHIPPED | OUTPATIENT
Start: 2025-04-07

## 2025-04-07 RX ORDER — FLUTICASONE PROPIONATE 50 MCG
1 SPRAY, SUSPENSION (ML) NASAL DAILY
Qty: 48 G | Refills: 3 | Status: SHIPPED | OUTPATIENT
Start: 2025-04-07 | End: 2025-04-07

## 2025-04-07 RX ORDER — TADALAFIL 5 MG/1
5 TABLET ORAL DAILY
Qty: 90 TABLET | Refills: 3 | Status: SHIPPED | OUTPATIENT
Start: 2025-04-07

## 2025-04-07 RX ORDER — PENCICLOVIR 10 MG/G
CREAM TOPICAL
Qty: 5 G | Refills: 3 | Status: SHIPPED | OUTPATIENT
Start: 2025-04-07 | End: 2025-04-07

## 2025-04-07 RX ORDER — LEVOCETIRIZINE DIHYDROCHLORIDE 5 MG/1
5 TABLET, FILM COATED ORAL EVERY EVENING
Qty: 90 TABLET | Refills: 3 | Status: SHIPPED | OUTPATIENT
Start: 2025-04-07

## 2025-04-07 RX ORDER — FLUTICASONE PROPIONATE 50 MCG
1 SPRAY, SUSPENSION (ML) NASAL DAILY
Qty: 48 G | Refills: 3 | Status: SHIPPED | OUTPATIENT
Start: 2025-04-07

## 2025-04-07 RX ORDER — PENCICLOVIR 10 MG/G
CREAM TOPICAL
Qty: 5 G | Refills: 3 | Status: SHIPPED | OUTPATIENT
Start: 2025-04-07

## 2025-04-07 RX ORDER — LEVOTHYROXINE SODIUM 175 UG/1
175 TABLET ORAL DAILY
Qty: 90 TABLET | Refills: 3 | Status: SHIPPED | OUTPATIENT
Start: 2025-04-07

## 2025-04-07 SDOH — HEALTH STABILITY: PHYSICAL HEALTH: ON AVERAGE, HOW MANY DAYS PER WEEK DO YOU ENGAGE IN MODERATE TO STRENUOUS EXERCISE (LIKE A BRISK WALK)?: 3 DAYS

## 2025-04-07 ASSESSMENT — ANXIETY QUESTIONNAIRES
6. BECOMING EASILY ANNOYED OR IRRITABLE: NOT AT ALL
GAD7 TOTAL SCORE: 0
7. FEELING AFRAID AS IF SOMETHING AWFUL MIGHT HAPPEN: NOT AT ALL
1. FEELING NERVOUS, ANXIOUS, OR ON EDGE: NOT AT ALL
7. FEELING AFRAID AS IF SOMETHING AWFUL MIGHT HAPPEN: NOT AT ALL
3. WORRYING TOO MUCH ABOUT DIFFERENT THINGS: NOT AT ALL
GAD7 TOTAL SCORE: 0
2. NOT BEING ABLE TO STOP OR CONTROL WORRYING: NOT AT ALL
GAD7 TOTAL SCORE: 0
4. TROUBLE RELAXING: NOT AT ALL
5. BEING SO RESTLESS THAT IT IS HARD TO SIT STILL: NOT AT ALL

## 2025-04-07 ASSESSMENT — PATIENT HEALTH QUESTIONNAIRE - PHQ9
SUM OF ALL RESPONSES TO PHQ QUESTIONS 1-9: 0
SUM OF ALL RESPONSES TO PHQ QUESTIONS 1-9: 0

## 2025-04-07 ASSESSMENT — SOCIAL DETERMINANTS OF HEALTH (SDOH): HOW OFTEN DO YOU GET TOGETHER WITH FRIENDS OR RELATIVES?: TWICE A WEEK

## 2025-04-07 NOTE — PROGRESS NOTES
Preventive Care Visit  [facility] [place]  Liu Nolasco MD, Family Medicine  Apr 7, 2025      Assessment & Plan     Routine general medical examination at a health care facility    - Comprehensive metabolic panel  - Lipid panel reflex to direct LDL Fasting  - CBC with platelets  - CK total  - UA Macroscopic with reflex to Microscopic and Culture  - Albumin Random Urine Quantitative with Creat Ratio  - Prostate Specific Antigen Screen  - Fecal colorectal cancer screen FIT  - Hemoglobin A1c  - TSH  - T4, free  - Iron and iron binding capacity  - GGT  - Ferritin  - REVIEW OF HEALTH MAINTENANCE PROTOCOL ORDERS  - Comprehensive metabolic panel  - Lipid panel reflex to direct LDL Fasting  - CBC with platelets  - CK total  - UA Macroscopic with reflex to Microscopic and Culture  - Albumin Random Urine Quantitative with Creat Ratio  - Prostate Specific Antigen Screen  - Hemoglobin A1c  - TSH  - T4, free  - Iron and iron binding capacity  - GGT  - Ferritin  - UA Microscopic with Reflex to Culture  - PRIMARY CARE FOLLOW-UP SCHEDULING    Medicare annual wellness visit, subsequent    - Comprehensive metabolic panel  - Lipid panel reflex to direct LDL Fasting  - CBC with platelets  - CK total  - UA Macroscopic with reflex to Microscopic and Culture  - Albumin Random Urine Quantitative with Creat Ratio  - Prostate Specific Antigen Screen  - Fecal colorectal cancer screen FIT  - Hemoglobin A1c  - TSH  - T4, free  - Iron and iron binding capacity  - GGT  - Ferritin  - REVIEW OF HEALTH MAINTENANCE PROTOCOL ORDERS  - Comprehensive metabolic panel  - Lipid panel reflex to direct LDL Fasting  - CBC with platelets  - CK total  - UA Macroscopic with reflex to Microscopic and Culture  - Albumin Random Urine Quantitative with Creat Ratio  - Prostate Specific Antigen Screen  - Hemoglobin A1c  - TSH  - T4, free  - Iron and iron binding capacity  - GGT  - Ferritin  - UA Microscopic with Reflex to Culture  - PRIMARY CARE  FOLLOW-UP SCHEDULING    Prediabetes    - Comprehensive metabolic panel  - Lipid panel reflex to direct LDL Fasting  - UA Macroscopic with reflex to Microscopic and Culture  - Albumin Random Urine Quantitative with Creat Ratio  - Hemoglobin A1c  - REVIEW OF HEALTH MAINTENANCE PROTOCOL ORDERS  - Comprehensive metabolic panel  - Lipid panel reflex to direct LDL Fasting  - UA Macroscopic with reflex to Microscopic and Culture  - Albumin Random Urine Quantitative with Creat Ratio  - Hemoglobin A1c  - UA Microscopic with Reflex to Culture  - PRIMARY CARE FOLLOW-UP SCHEDULING  - simvastatin (ZOCOR) 20 MG tablet  Dispense: 90 tablet; Refill: 3  - OFFICE/OUTPT VISIT,EST,LEVL IV    CKD (chronic kidney disease) stage 2, GFR 60-89 ml/min    - Comprehensive metabolic panel  - CBC with platelets  - UA Macroscopic with reflex to Microscopic and Culture  - Albumin Random Urine Quantitative with Creat Ratio  - Hemoglobin A1c  - REVIEW OF HEALTH MAINTENANCE PROTOCOL ORDERS  - Comprehensive metabolic panel  - CBC with platelets  - UA Macroscopic with reflex to Microscopic and Culture  - Albumin Random Urine Quantitative with Creat Ratio  - Hemoglobin A1c  - UA Microscopic with Reflex to Culture  - PRIMARY CARE FOLLOW-UP SCHEDULING  - OFFICE/OUTPT VISIT,EST,LEVL IV    Hyperlipidemia LDL goal <100    - Comprehensive metabolic panel  - Lipid panel reflex to direct LDL Fasting  - CK total  - REVIEW OF HEALTH MAINTENANCE PROTOCOL ORDERS  - Comprehensive metabolic panel  - Lipid panel reflex to direct LDL Fasting  - CK total  - PRIMARY CARE FOLLOW-UP SCHEDULING  - simvastatin (ZOCOR) 20 MG tablet  Dispense: 90 tablet; Refill: 3  - OFFICE/OUTPT VISIT,EST,LEVL IV    Acquired hypothyroidism    - TSH  - T4, free  - REVIEW OF HEALTH MAINTENANCE PROTOCOL ORDERS  - TSH  - T4, free  - PRIMARY CARE FOLLOW-UP SCHEDULING  - levothyroxine (SYNTHROID/LEVOTHROID) 175 MCG tablet  Dispense: 90 tablet; Refill: 3  - OFFICE/OUTPT VISIT,EST,LEVL IV    Iron  deficiency anemia, unspecified iron deficiency anemia type    - CBC with platelets  - Iron and iron binding capacity  - Ferritin  - REVIEW OF HEALTH MAINTENANCE PROTOCOL ORDERS  - CBC with platelets  - Iron and iron binding capacity  - Ferritin  - PRIMARY CARE FOLLOW-UP SCHEDULING  - OFFICE/OUTPT VISIT,MELONY CASTRO IV    h/o Malignant melanoma of torso excluding breast (H)    - REVIEW OF HEALTH MAINTENANCE PROTOCOL ORDERS  - PRIMARY CARE FOLLOW-UP SCHEDULING  - OFFICE/OUTPT VISIT,ESTMELONY IV    Primary osteoarthritis involving multiple joints    - REVIEW OF HEALTH MAINTENANCE PROTOCOL ORDERS  - PRIMARY CARE FOLLOW-UP SCHEDULING  - OFFICE/OUTPT VISIT,MELONY CASTRO IV    Environmental allergies    - REVIEW OF HEALTH MAINTENANCE PROTOCOL ORDERS  - PRIMARY CARE FOLLOW-UP SCHEDULING  - fluticasone (FLONASE) 50 MCG/ACT nasal spray  Dispense: 48 g; Refill: 3  - ipratropium (ATROVENT) 0.06 % nasal spray  Dispense: 45 mL; Refill: 3  - levocetirizine (XYZAL) 5 MG tablet  Dispense: 90 tablet; Refill: 3  - OFFICE/OUTPT VISIT,EST,LEVL IV    Sleep related leg cramps    - Ferritin  - REVIEW OF HEALTH MAINTENANCE PROTOCOL ORDERS  - Ferritin  - PRIMARY CARE FOLLOW-UP SCHEDULING  - OFFICE/OUTPT VISIT,EST,LEVL IV    Sensorineural hearing loss (SNHL) of both ears    - REVIEW OF HEALTH MAINTENANCE PROTOCOL ORDERS  - PRIMARY CARE FOLLOW-UP SCHEDULING  - OFFICE/OUTPT VISIT,EST,LEVL IV    Alcohol use    - REVIEW OF HEALTH MAINTENANCE PROTOCOL ORDERS  - PRIMARY CARE FOLLOW-UP SCHEDULING  - OFFICE/OUTPT VISIT,ESTLEVL IV    Erectile dysfunction, unspecified erectile dysfunction type    - Adult Urology  Referral  - tadalafil (CIALIS) 5 MG tablet  Dispense: 90 tablet; Refill: 3  - OFFICE/OUTPT VISIT,EST,LEVL IV    Microscopic hematuria    - UA Macroscopic with reflex to Microscopic and Culture  - REVIEW OF HEALTH MAINTENANCE PROTOCOL ORDERS  - UA Macroscopic with reflex to Microscopic and Culture  - UA Microscopic with Reflex to Culture  -  PRIMARY CARE FOLLOW-UP SCHEDULING  - OFFICE/OUTPT VISIT,EST,LEVL IV    Onychomycosis of toenail    - REVIEW OF HEALTH MAINTENANCE PROTOCOL ORDERS  - PRIMARY CARE FOLLOW-UP SCHEDULING  - ciclopirox (LOPROX) 0.77 % cream  Dispense: 90 g; Refill: 3  - OFFICE/OUTPT VISIT,EST,LEVL IV    Benign prostatic hyperplasia with lower urinary tract symptoms, symptom details unspecified    - tadalafil (CIALIS) 5 MG tablet  Dispense: 90 tablet; Refill: 3  - OFFICE/OUTPT VISIT,EST,LEVL IV    Lower urinary tract symptoms (LUTS)    - tamsulosin (FLOMAX) 0.4 MG capsule  Dispense: 180 capsule; Refill: 3  - OFFICE/OUTPT VISIT,EST,LEVL IV    Irritant contact dermatitis, unspecified trigger    - betamethasone dipropionate (DIPROSONE) 0.05 % external cream  Dispense: 45 g; Refill: 3  - OFFICE/OUTPT VISIT,EST,LEVL IV    Recurrent cold sores    - penciclovir (DENAVIR) 1 % external cream  Dispense: 5 g; Refill: 3  - OFFICE/OUTPT VISIT,EST,LEVL IV    Screening for prostate cancer    - Prostate Specific Antigen Screen  - REVIEW OF HEALTH MAINTENANCE PROTOCOL ORDERS  - Prostate Specific Antigen Screen  - PRIMARY CARE FOLLOW-UP SCHEDULING  - OFFICE/OUTPT VISIT,EST,LEVL IV    Screen for colon cancer    - Fecal colorectal cancer screen FIT  - REVIEW OF HEALTH MAINTENANCE PROTOCOL ORDERS  - PRIMARY CARE FOLLOW-UP SCHEDULING  - OFFICE/OUTPT VISIT,EST,LEVL IV    Medication monitoring encounter    - Comprehensive metabolic panel  - Lipid panel reflex to direct LDL Fasting  - CBC with platelets  - CK total  - UA Macroscopic with reflex to Microscopic and Culture  - Albumin Random Urine Quantitative with Creat Ratio  - Prostate Specific Antigen Screen  - Fecal colorectal cancer screen FIT  - Hemoglobin A1c  - TSH  - T4, free  - Iron and iron binding capacity  - GGT  - Ferritin  - REVIEW OF HEALTH MAINTENANCE PROTOCOL ORDERS  - Comprehensive metabolic panel  - Lipid panel reflex to direct LDL Fasting  - CBC with platelets  - CK total  - UA Macroscopic with  "reflex to Microscopic and Culture  - Albumin Random Urine Quantitative with Creat Ratio  - Prostate Specific Antigen Screen  - Hemoglobin A1c  - TSH  - T4, free  - Iron and iron binding capacity  - GGT  - Ferritin  - UA Microscopic with Reflex to Culture  - PRIMARY CARE FOLLOW-UP SCHEDULING  - OFFICE/OUTPT VISIT,EST,LEVL IV    Patient has been advised of split billing requirements and indicates understanding: Yes    BMI  Estimated body mass index is 32.3 kg/m  as calculated from the following:    Height as of this encounter: 1.784 m (5' 10.25\").    Weight as of this encounter: 102.8 kg (226 lb 11.2 oz).   Weight management plan: diet, exercise, offered weight loss clinic    Counseling  Appropriate preventive services were addressed with this patient via screening, questionnaire, or discussion as appropriate for fall prevention, nutrition, physical activity, Tobacco-use cessation, social engagement, weight loss and cognition.  Checklist reviewing preventive services available has been given to the patient.  Reviewed patient's diet, addressing concerns and/or questions.   He is at risk for lack of exercise and has been provided with information to increase physical activity for the benefit of his well-being.   The patient was provided with written information regarding signs of hearing loss.     Work on weight loss  Regular exercise    Plan:    1) Medications: reviewed, refilled    2) Labs: pending    3) Immunizations: consider covid, prevnar 20, twinrix    4) Imaging/Diagnostics: NA    5) Consults: Urology, dermatology    Return in about 1 year (around 4/7/2026) for Complete Physical, Medication Recheck Visit, Follow Up Chronic.    52 minutes spent by myself on the date of the encounter doing chart review, history and exam, documentation and further activities per the note.    The longitudinal plan of care for the diagnosis(es)/condition(s) as documented were addressed during this visit. Due to the added complexity " in care, I will continue to support Pat in the subsequent management and with ongoing continuity of care.    Shared Decision making completed.    Rossi Escamilla is a 75 year old, presenting for the following:  Medicare Visit        4/7/2025    10:16 AM   Additional Questions   Roomed by Ingrid REID CMA         Patient is fasting.     Prediabetes    Lab Results   Component Value Date    A1C 5.4 04/07/2025    A1C 5.5 03/26/2024    A1C 5.7 02/01/2023    A1C 5.4 11/29/2021    A1C 5.2 11/04/2020    A1C 5.3 07/30/2020    A1C 5.3 02/28/2020    A1C 5.5 05/07/2019    A1C 5.6 03/02/2018     CKD 2     BP Readings from Last 3 Encounters:   04/07/25 128/64   05/29/24 130/70   04/30/24 126/70     Creatinine   Date Value Ref Range Status   03/26/2024 0.84 0.67 - 1.17 mg/dL Final   11/04/2020 0.78 0.66 - 1.25 mg/dL Final     Creatinine POCT   Date Value Ref Range Status   05/24/2024 0.7 0.7 - 1.3 mg/dL Final     GFR Estimate   Date Value Ref Range Status   11/04/2020 >90 >60 mL/min/[1.73_m2] Final     Comment:     Non  GFR Calc  Starting 12/18/2018, serum creatinine based estimated GFR (eGFR) will be   calculated using the Chronic Kidney Disease Epidemiology Collaboration   (CKD-EPI) equation.       GFR, ESTIMATED POCT   Date Value Ref Range Status   05/24/2024 >60 >60 mL/min/1.73m2 Final     Hyperlipidemia Follow-Up    Are you regularly taking any medication or supplement to lower your cholesterol?   Yes- simvastatin  Are you having muscle aches or other side effects that you think could be caused by your cholesterol lowering medication?  No    Recent Labs   Lab Test 03/26/24  1107 02/01/23  1057   CHOL 150 150   HDL 68 68   LDL 69 67   TRIG 66 74     Hypothyroidism Follow-up    Since last visit, patient describes the following symptoms: Weight stable, no hair loss, no skin changes, no constipation, no loose stools    TSH   Date Value Ref Range Status   03/26/2024 3.30 0.30 - 4.20 uIU/mL Final   03/25/2022 0.26 (L)  0.40 - 4.00 mU/L Final   11/04/2020 2.36 0.40 - 4.00 mU/L Final     RAFIQ    Hemoglobin   Date Value Ref Range Status   04/07/2025 12.8 (L) 13.3 - 17.7 g/dL Final   03/26/2024 13.3 13.3 - 17.7 g/dL Final   11/04/2020 13.5 13.3 - 17.7 g/dL Final   07/30/2020 14.0 13.3 - 17.7 g/dL Final     Hx of melanoma - Dr Sierra - no issues    OA - stable - overall ok    Environmental allergies - Allergy clinic follow up - [facility]    Sleep related cramps - worse in summer    SNHL - using hearing aids    Alcohol use - 1 per day    ED - difficulty getting erection - using tadalfil 5 mg daily - has been seen by Urology, may need follow up    Advance Care Planning  Patient does not have a Health Care Directive: Patient states has Advance Directive and will bring in a copy to clinic.      4/7/2025   General Health   How would you rate your overall physical health? Excellent   Feel stress (tense, anxious, or unable to sleep) Not at all         4/7/2025   Nutrition   Diet: Regular (no restrictions)         4/7/2025   Exercise   Days per week of moderate/strenous exercise 3 days         4/7/2025   Social Factors   Frequency of gathering with friends or relatives Twice a week   Worry food won't last until get money to buy more No   Food not last or not have enough money for food? No   Do you have housing? (Housing is defined as stable permanent housing and does not include staying ouside in a car, in a tent, in an abandoned building, in an overnight shelter, or couch-surfing.) Yes   Are you worried about losing your housing? No   Lack of transportation? No   Unable to get utilities (heat,electricity)? No         4/7/2025   Fall Risk   Fallen 2 or more times in the past year? No   Trouble with walking or balance? No          4/7/2025   Activities of Daily Living- Home Safety   Needs help with the following daily activites None of the above   Safety concerns in the home None of the above         4/7/2025   Dental   Dentist two times every  year? Yes         2025   Hearing Screening   Hearing concerns? (!) I NEED TO ASK PEOPLE TO SPEAK UP OR REPEAT THEMSELVES.    (!) IT'S HARD TO FOLLOW A CONVERSATION IN A NOISY RESTAURANT OR CROWDED ROOM.    (!) TROUBLE UNDERSTANDING SOFT OR WHISPERED SPEECH.       Multiple values from one day are sorted in reverse-chronological order         2025   Driving Risk Screening   Patient/family members have concerns about driving No         2025   General Alertness/Fatigue Screening   Have you been more tired than usual lately? No         2025   Urinary Incontinence Screening   Bothered by leaking urine in past 6 months No           3/26/2024   TB Screening   Were you born outside of the [place]? No         Today's PHQ-9 Score:       2025    10:06 AM   PHQ-9 SCORE   PHQ-9 Total Score MyChart 0   PHQ-9 Total Score 0        Patient-reported         2025   Substance Use   Alcohol more than 3/day or more than 7/wk No   Do you have a current opioid prescription? No   How severe/bad is pain from 1 to 10? 0/10 (No Pain)   Do you use any other substances recreationally? No     Social History     Tobacco Use    Smoking status: Former     Current packs/day: 0.00     Average packs/day: 1 pack/day for 20.0 years (20.0 ttl pk-yrs)     Types: Cigarettes     Start date: 1961     Quit date: 1981     Years since quittin.2    Smokeless tobacco: Former     Types: Chew     Quit date: 1992    Tobacco comments:     1 tin per week     Quit smoking , 20 yrs, 1 ppd   Vaping Use    Vaping status: Never Used   Substance Use Topics    Alcohol use: Yes     Alcohol/week: 7.0 standard drinks of alcohol     Types: 7 Standard drinks or equivalent per week     Comment: 1 beer daily    Drug use: No         ASCVD Risk   The 10-year ASCVD risk score (Jake CANNON, et al., 2019) is: 19.1%    Values used to calculate the score:      Age: 75 years      Sex: Male      Is Non- : No      Diabetic:  No      Tobacco smoker: No      Systolic Blood Pressure: 128 mmHg      Is BP treated: No      HDL Cholesterol: 68 mg/dL      Total Cholesterol: 150 mg/dL    Fracture Risk Assessment Tool  Link to Frax Calculator  Use the information below to complete the Frax calculator  : 1950  Sex: male  Weight (kg): 102.8 kg (actual weight)  Height (cm): 178.4 cm  Previous Fragility Fracture:  No  History of parent with fractured hip:  No  Current Smoking:  No  Patient has been on glucocorticoids for more than 3 months (5mg/day or more): No  Rheumatoid Arthritis on Problem List:  No  Secondary Osteoporosis on Problem List:  No  Consumes 3 or more units of alcohol per day: No  Femoral Neck BMD (g/cm2)            Reviewed and updated as needed this visit by Provider                  Current providers sharing in care for this patient include:  Patient Care Team:  Liu Nolasco MD as PCP - General (Family Practice)  Liu Nolasco MD as Assigned PCP  Sundar Gandhi MD as MD (Urology)  Sabrina Milan RN as Registered Nurse  Vikas Beyer APRN CNP as Nurse Practitioner (Urology)  Vikas Beyer APRN CNP as Nurse Practitioner (Urology)  Sean Malave MD as Assigned Surgical Provider    The following health maintenance items are reviewed in Epic and correct as of today:  Health Maintenance   Topic Date Due    COVID-19 Vaccine ( season) 2025    BMP  2025    LIPID  2025    MICROALBUMIN  2025    PSA  2025    TSH W/FREE T4 REFLEX  2025    MEDICARE ANNUAL WELLNESS VISIT  2026    ANNUAL REVIEW OF HM ORDERS  2026    FALL RISK ASSESSMENT  2026    ADVANCE CARE PLANNING  2028    DTAP/TDAP/TD IMMUNIZATION (3 - Td or Tdap) 2028    DIABETES SCREENING  2028    HEPATITIS C SCREENING  Completed    PHQ-2 (once per calendar year)  Completed    INFLUENZA VACCINE  Completed    Pneumococcal Vaccine: 50+ Years  Completed    URINALYSIS  Completed     ZOSTER IMMUNIZATION  Completed    RSV VACCINE  Completed    AORTIC ANEURYSM SCREENING (SYSTEM ASSIGNED)  Completed    HPV IMMUNIZATION  Aged Out    MENINGITIS IMMUNIZATION  Aged Out    COLORECTAL CANCER SCREENING  Discontinued     Patient Active Problem List   Diagnosis    Allergic rhinitis    Acquired hypothyroidism    Hyperlipidemia LDL goal <100    Impaired memory    Sleep related leg cramps    Chronic sinusitis    S/P revision of total knee, left    BPH (benign prostatic hyperplasia)    OA (osteoarthritis)    Prediabetes    Class 1 obesity with serious comorbidity and body mass index (BMI) of 30.0 to 30.9 in adult, unspecified obesity type    h/o Malignant melanoma of torso excluding breast (H)    GI bleed    * * * SBE PROPHYLAXIS * * *    S/P total knee arthroplasty    Sensorineural hearing loss (SNHL) of both ears    CKD (chronic kidney disease) stage 2, GFR 60-89 ml/min    Dizziness    Alcohol abuse    Bradycardia    Microscopic hematuria       Past Medical History:   Diagnosis Date    Abnormal glucose     A1C 1/06 =  6.3    Acquired hypothyroidism 10/11/2005     Problem list name updated by automated process. Provider to review    Allergic rhinitis, cause unspecified     mary spring time    Benign localized hyperplasia of prostate with urinary obstruction and other lower urinary tract symptoms (LUTS)(600.21)     slow stream, nocturia - Dr Malave    Chronic sinusitis 03/14/2014    Complication of anesthesia     Environmental allergies     AIT - Dr Weiss    History of blood transfusion     Hyperlipidemia LDL goal <130 10/31/2010    hypogonadism     low testosterone at times     RAFIQ (iron deficiency anemia)     Impaired memory 02/11/2011    ?    Iron deficiency anemia, unspecified iron deficiency anemia type 02/01/2023    Melanoma (H) 04/2018    Melanoma 0.7 mm depth, Levar III, superficial spreading, stage T1a, Dr Sierra    Microscopic hematuria     negative work up - Dr Malave - 5/2024    OA  (osteoarthritis) total L knee 3/09    knees bother;; has had bilat arthroscopic    PONV (postoperative nausea and vomiting)     Prediabetes     Sensorineural hearing loss (SNHL) of both ears     Sleep related leg cramps 06/2012       Past Surgical History:   Procedure Laterality Date    ARTHROPLASTY KNEE Right 03/16/2020    Rt TKA - Dr DUY Dudley    ARTHROPLASTY REVISION KNEE Left 10/26/2015     ARTHROPLASTY REVISION KNEE - Dr Dudley    COLONOSCOPY  10/02/2013    diverticulosis - due 10 yrs (10/2023), initial 10/2003    COLONOSCOPY N/A 11/30/2023    diverticulosis - no polyps, consider 5-10 yrs    CYSTOSCOPY  04/2016    ENDOSCOPIC SINUS SURGERY  03/17/2014    Bilateral Endoscopic Sinus Surgery - Dr Johnson    ESOPHAGOSCOPY, GASTROSCOPY, DUODENOSCOPY (EGD), COMBINED N/A 02/28/2019    Non bleeding gastric ulcers and duodenal ulcer. Recheck 3 months. Dr Sinclair    LASER REVOLIX PHOTOSELECTIVE VAPORIZATION PROSTATE N/A 04/06/2016    LASER REVOLIX / QUANTA PHOTOSELECTIVE VAPORIZATION PROSTATE - Dr Malave    ROTATOR CUFF REPAIR RT/LT Left 03/2012    left shoulder    SURGICAL HISTORY OF -   1996    tonsils and adenoids    SURGICAL HISTORY OF -  Left 03/2009    Lt TKA    SURGICAL HISTORY OF -  Bilateral     Rt Knee x 1 (meniscus, 1995), Lt x 3 (last 2009)    UPPER GI ENDOSCOPY  05/2019    normal    VASECTOMY Bilateral 1986    ZZHC REPAIR UMBILICAL KASI,5+Y/O,REDUC  2002       Current Outpatient Medications   Medication Sig Dispense Refill    betamethasone dipropionate (DIPROSONE) 0.05 % external cream Apply topically 2 times daily. 45 g 3    ciclopirox (LOPROX) 0.77 % cream Apply topically daily. 90 g 3    fluticasone (FLONASE) 50 MCG/ACT nasal spray Spray 1 spray into both nostrils daily. 48 g 3    ipratropium (ATROVENT) 0.06 % nasal spray USE 1 SPRAY IN EACH NOSTRIL UP TO FOUR TIMES DAILY AS NEEDED Strength: 0.06 % 45 mL 3    levocetirizine (XYZAL) 5 MG tablet Take 1 tablet (5 mg) by mouth every evening. 90 tablet 3     levothyroxine (SYNTHROID/LEVOTHROID) 175 MCG tablet Take 1 tablet (175 mcg) by mouth daily. 90 tablet 3    multivitamin w/minerals (THERA-VIT-M) tablet Take 1 tablet by mouth daily      penciclovir (DENAVIR) 1 % external cream Apply topically every 2 hours. 5 g 3    psyllium (METAMUCIL/KONSYL) Packet Take 1 packet by mouth daily      simvastatin (ZOCOR) 20 MG tablet Take 1 tablet (20 mg) by mouth at bedtime. 90 tablet 3    tadalafil (CIALIS) 5 MG tablet Take 1 tablet (5 mg) by mouth daily. 90 tablet 3    tamsulosin (FLOMAX) 0.4 MG capsule Take 2 capsules (0.8 mg) by mouth daily. 180 capsule 3    ACE/ARB/ARNI NOT PRESCRIBED (INTENTIONAL) Please choose reason not prescribed from choices below.         No Known Allergies    Family History   Problem Relation Age of Onset    Hypertension Mother     Cardiovascular Mother 84        MI    Lipids Mother     Chronic Obstructive Pulmonary Disease Mother     C.A.D. Father         CHF    Heart Failure Father     Chronic Obstructive Pulmonary Disease Father     No Known Problems Sister     Cerebrovascular Disease Brother 61    Gastrointestinal Disease Brother         GI Bleed - colectomy    CAD (coronary artery disease) Brother     No Known Problems Brother     Colon Cancer No family hx of        Social History     Socioeconomic History    Marital status:      Spouse name: Ingrid    Number of children: 4    Years of education: None    Highest education level: None   Occupational History     Employer: [employer]   Tobacco Use    Smoking status: Former     Current packs/day: 0.00     Average packs/day: 1 pack/day for 20.0 years (20.0 ttl pk-yrs)     Types: Cigarettes     Start date: 1961     Quit date: 1981     Years since quittin.2    Smokeless tobacco: Former     Types: Chew     Quit date: 1992    Tobacco comments:     1 tin per week   Vaping Use    Vaping status: Never Used   Substance and Sexual Activity    Alcohol use: Yes     Alcohol/week: 7.0 -  12.0 standard drinks of alcohol     Types: 7 - 12 Standard drinks or equivalent per week     Comment: 1 beer daily    Drug use: No    Sexual activity: Yes     Partners: Female     Social Drivers of Health     Financial Resource Strain: Low Risk  (4/7/2025)    Financial Resource Strain     Within the past 12 months, have you or your family members you live with been unable to get utilities (heat, electricity) when it was really needed?: No   Food Insecurity: Low Risk  (4/7/2025)    Food Insecurity     Within the past 12 months, did you worry that your food would run out before you got money to buy more?: No     Within the past 12 months, did the food you bought just not last and you didn t have money to get more?: No   Transportation Needs: Low Risk  (4/7/2025)    Transportation Needs     Within the past 12 months, has lack of transportation kept you from medical appointments, getting your medicines, non-medical meetings or appointments, work, or from getting things that you need?: No   Physical Activity: Unknown (4/7/2025)    Exercise Vital Sign     Days of Exercise per Week: 3 days   Stress: No Stress Concern Present (4/7/2025)    [employer] - Occupational Stress Questionnaire     Feeling of Stress : Not at all   Social Connections: Unknown (4/7/2025)    Social Connection and Isolation Panel [NHANES]     Frequency of Social Gatherings with Friends and Family: Twice a week   Interpersonal Safety: Low Risk  (4/7/2025)    Interpersonal Safety     Do you feel physically and emotionally safe where you currently live?: Yes     Within the past 12 months, have you been hit, slapped, kicked or otherwise physically hurt by someone?: No     Within the past 12 months, have you been humiliated or emotionally abused in other ways by your partner or ex-partner?: No   Housing Stability: Low Risk  (4/7/2025)    Housing Stability     Do you have housing? : Yes     Are you worried about losing your  "housing?: No       Colonoscopy:  just done 11/2023  FIT / Cologuard: NA  PSA: pending - Urology      Review of Systems  CONSTITUTIONAL: NEGATIVE for fever, chills, change in weight  INTEGUMENTARY/SKIN: NEGATIVE for worrisome rashes, moles or lesions  EYES: NEGATIVE for vision changes or irritation  ENT/MOUTH: NEGATIVE for ear, mouth and throat problems  RESP: NEGATIVE for significant cough or SOB  CV: NEGATIVE for chest pain, palpitations or peripheral edema  GI: NEGATIVE for nausea, abdominal pain, heartburn, or change in bowel habits  : NEGATIVE for frequency, dysuria, or hematuria  MUSCULOSKELETAL: NEGATIVE for significant arthralgias or myalgia  NEURO: NEGATIVE for weakness, dizziness or paresthesias  ENDOCRINE: NEGATIVE for temperature intolerance, skin/hair changes  HEME: NEGATIVE for bleeding problems  PSYCHIATRIC: NEGATIVE for changes in mood or affect     Objective    Exam  /64   Pulse 50   Temp 97.3  F (36.3  C) (Tympanic)   Resp 16   Ht 1.784 m (5' 10.25\")   Wt 102.8 kg (226 lb 11.2 oz)   SpO2 97%   BMI 32.30 kg/m     Estimated body mass index is 32.3 kg/m  as calculated from the following:    Height as of this encounter: 1.784 m (5' 10.25\").    Weight as of this encounter: 102.8 kg (226 lb 11.2 oz).    Physical Exam  GENERAL: alert and no distress  EYES: Eyes grossly normal to inspection, PERRL and conjunctivae and sclerae normal  HENT: ear canals and TM's normal, nose and mouth without ulcers or lesions  NECK: no adenopathy, no asymmetry, masses, or scars  RESP: lungs clear to auscultation - no rales, rhonchi or wheezes  CV: regular rate and rhythm, normal S1 S2, no S3 or S4, grade 2-3 / 6 murmur, click or rub, no peripheral edema  ABDOMEN: soft, nontender, no hepatosplenomegaly, no masses and bowel sounds normal   (male): pt declines  RECTAL: pt declines  MS: no gross musculoskeletal defects noted, no edema  SKIN: no suspicious lesions or rashes  NEURO: Normal strength and tone, " mentation intact and speech normal  PSYCH: mentation appears normal, affect normal/bright        4/7/2025   Mini Cog   Clock Draw Score 2 Normal   3 Item Recall 3 objects recalled   Mini Cog Total Score 5         Signed Electronically by:            Liu Nolasco MD, FAAFP, [facility]  [facility]  [address] [place] [id number]  [email or web address]   Office: [phone]  Fax: [phone]       Answers submitted by the patient for this visit:  Patient Health Questionnaire (Submitted on 4/7/2025)  PHQ9 TOTAL SCORE: 0  Patient Health Questionnaire (G7) (Submitted on 4/7/2025)  RHONDA 7 TOTAL SCORE: 0

## 2025-06-16 ENCOUNTER — TELEPHONE (OUTPATIENT)
Dept: UROLOGY | Facility: CLINIC | Age: 75
End: 2025-06-16
Payer: COMMERCIAL

## 2025-06-16 NOTE — TELEPHONE ENCOUNTER
Health Call Center    Phone Message    May a detailed message be left on voicemail: yes     Reason for Call: Other: Patient called to request prescription order tadalafil (CIALIS) 20 MG be resubmitted by Vikas Beyer. Patient's PCP Dr. Hatfield recommended that patient reach out to either Dr. Malave or Vikas Beyer to request resubmission. Please call back patient to verify.     Action Taken: Message routed to:  Other: [facility]    Travel Screening: Not Applicable

## (undated) DEVICE — GLOVE PROTEXIS BLUE W/NEU-THERA 8.5  2D73EB85

## (undated) DEVICE — BONE CEMENT MIXEVAC III HI VAC KIT  0206-015-000

## (undated) DEVICE — KIT ENDO TURNOVER/PROCEDURE W/CLEAN A SCOPE LINERS 103888

## (undated) DEVICE — PACK TOTAL KNEE BOXED LATEX FREE PO15TKFCT

## (undated) DEVICE — DRAIN HEMOVAC RESERVOIR KIT 10FR 1/8" MED 00-2550-002-10

## (undated) DEVICE — BLADE SAW SAGITTAL STRK 25X90X1.27MM HD SYS 6 6125-127-090

## (undated) DEVICE — GLOVE PROTEXIS BLUE W/NEU-THERA 7.0  2D73EB70

## (undated) DEVICE — SU VICRYL 2-0 CT-1 27" UND J259H

## (undated) DEVICE — SUCTION MANIFOLD NEPTUNE 2 SYS 4 PORT 0702-020-000

## (undated) DEVICE — SU VICRYL 1 CT-1 27" J341H

## (undated) DEVICE — GLOVE PROTEXIS POWDER FREE 7.0 ORTHOPEDIC 2D73ET70

## (undated) DEVICE — LINEN ORTHO ACL PACK 5447

## (undated) DEVICE — GLOVE PROTEXIS W/NEU-THERA 7.0  2D73TE70

## (undated) DEVICE — GLOVE PROTEXIS W/NEU-THERA 8.5  2D73TE85

## (undated) DEVICE — BAG CLEAR TRASH 1.3M 39X33" P4040C

## (undated) DEVICE — SYR 03ML LL W/O NDL 309657

## (undated) DEVICE — SU ETHIBOND 0 CT-1 CR 8X18" CX21D

## (undated) DEVICE — ENDO FORCEP ENDOJAW BIOPSY 2.8MMX160CM FB-220K

## (undated) DEVICE — PREP CHLORAPREP 26ML TINTED ORANGE  260815

## (undated) DEVICE — CAST PADDING 6" STERILE 9046S

## (undated) DEVICE — BLADE SAW SAGITTAL STRK 13X90X1.27MM HD SYS 6 6113-127-090

## (undated) DEVICE — SOL WATER IRRIG 1000ML BOTTLE 2F7114

## (undated) DEVICE — SET HANDPIECE INTERPULSE W/COAXIAL FAN SPRAY TIP 0210118000

## (undated) DEVICE — NDL BLUNT 18GA 1" W/O FILTER 305181

## (undated) DEVICE — GLOVE PROTEXIS POWDER FREE 8.5 ORTHOPEDIC 2D73ET85

## (undated) DEVICE — TOURNIQUET CUFF 30" REPRO BLUE 60-7070-105

## (undated) DEVICE — LINEN FULL SHEET 5511

## (undated) DEVICE — CATH TRAY FOLEY COUDE SURESTEP 16FR W/DRN BAG LATEX A304416A

## (undated) DEVICE — DRSG AQUACEL AG 3.5X9.75" HYDROFIBER 412011

## (undated) RX ORDER — KETOROLAC TROMETHAMINE 30 MG/ML
INJECTION, SOLUTION INTRAMUSCULAR; INTRAVENOUS
Status: DISPENSED
Start: 2020-03-16

## (undated) RX ORDER — LIDOCAINE HYDROCHLORIDE 20 MG/ML
JELLY TOPICAL
Status: DISPENSED
Start: 2020-09-08

## (undated) RX ORDER — PROPOFOL 10 MG/ML
INJECTION, EMULSION INTRAVENOUS
Status: DISPENSED
Start: 2020-03-16

## (undated) RX ORDER — HYDROMORPHONE HYDROCHLORIDE 1 MG/ML
INJECTION, SOLUTION INTRAMUSCULAR; INTRAVENOUS; SUBCUTANEOUS
Status: DISPENSED
Start: 2020-03-16

## (undated) RX ORDER — ACETAMINOPHEN 500 MG
TABLET ORAL
Status: DISPENSED
Start: 2020-03-16

## (undated) RX ORDER — FENTANYL CITRATE 50 UG/ML
INJECTION, SOLUTION INTRAMUSCULAR; INTRAVENOUS
Status: DISPENSED
Start: 2023-11-30

## (undated) RX ORDER — CEFAZOLIN SODIUM 2 G/100ML
INJECTION, SOLUTION INTRAVENOUS
Status: DISPENSED
Start: 2020-03-16

## (undated) RX ORDER — KETAMINE HCL IN 0.9 % NACL 50 MG/5 ML
SYRINGE (ML) INTRAVENOUS
Status: DISPENSED
Start: 2020-03-16

## (undated) RX ORDER — FENTANYL CITRATE 50 UG/ML
INJECTION, SOLUTION INTRAMUSCULAR; INTRAVENOUS
Status: DISPENSED
Start: 2020-03-16

## (undated) RX ORDER — EPINEPHRINE 1 MG/ML(1)
AMPUL (ML) INJECTION
Status: DISPENSED
Start: 2020-03-16